# Patient Record
Sex: MALE | Race: WHITE | NOT HISPANIC OR LATINO | Employment: UNEMPLOYED | URBAN - METROPOLITAN AREA
[De-identification: names, ages, dates, MRNs, and addresses within clinical notes are randomized per-mention and may not be internally consistent; named-entity substitution may affect disease eponyms.]

---

## 2017-04-07 ENCOUNTER — HISTORICAL (OUTPATIENT)
Dept: LAB | Facility: HOSPITAL | Age: 22
End: 2017-04-07

## 2018-10-17 ENCOUNTER — HISTORICAL (OUTPATIENT)
Dept: LAB | Facility: HOSPITAL | Age: 23
End: 2018-10-17

## 2018-10-17 LAB
ALBUMIN SERPL-MCNC: 4.6 GM/DL (ref 3.4–5)
ALP SERPL-CCNC: 36 UNIT/L (ref 46–116)
ALT SERPL-CCNC: 16 UNIT/L (ref 12–78)
AST SERPL-CCNC: 10 UNIT/L (ref 15–37)
BILIRUB SERPL-MCNC: 0.6 MG/DL (ref 0.2–1)
BILIRUBIN DIRECT+TOT PNL SERPL-MCNC: 0.15 MG/DL (ref 0–0.2)
BILIRUBIN DIRECT+TOT PNL SERPL-MCNC: 0.45 MG/DL (ref 0–0.8)
BUN SERPL-MCNC: 10.4 MG/DL (ref 7–18)
CALCIUM SERPL-MCNC: 9.1 MG/DL (ref 8.5–10.1)
CHLORIDE SERPL-SCNC: 104 MMOL/L (ref 98–107)
CHOLEST SERPL-MCNC: 116 MG/DL (ref 0–200)
CHOLEST/HDLC SERPL: 2 {RATIO} (ref 0–5)
CK SERPL-CCNC: 44 UNIT/L (ref 26–308)
CO2 SERPL-SCNC: 25.7 MMOL/L (ref 21–32)
CREAT SERPL-MCNC: 1.01 MG/DL (ref 0.6–1.3)
CREAT/UREA NIT SERPL: 10
ERYTHROCYTE [DISTWIDTH] IN BLOOD BY AUTOMATED COUNT: 13 % (ref 11.5–17)
FT4I SERPL CALC-MCNC: 2.94
GLUCOSE SERPL-MCNC: 91 MG/DL (ref 74–106)
HCT VFR BLD AUTO: 47.4 % (ref 42–52)
HDLC SERPL-MCNC: 57 MG/DL (ref 40–60)
HGB BLD-MCNC: 15.4 GM/DL (ref 14–18)
LDLC SERPL CALC-MCNC: 48 MG/DL (ref 0–129)
MCH RBC QN AUTO: 29.7 PG (ref 27–31)
MCHC RBC AUTO-ENTMCNC: 32.5 GM/DL (ref 33–36)
MCV RBC AUTO: 91.3 FL (ref 80–94)
PLATELET # BLD AUTO: 241 X10(3)/MCL (ref 130–400)
PMV BLD AUTO: 11.4 FL (ref 9.4–12.4)
POTASSIUM SERPL-SCNC: 4.2 MMOL/L (ref 3.5–5.1)
PROT SERPL-MCNC: 7.8 GM/DL (ref 6.4–8.2)
RBC # BLD AUTO: 5.19 X10(6)/MCL (ref 4.7–6.1)
SODIUM SERPL-SCNC: 140 MMOL/L (ref 136–145)
T3RU NFR SERPL: 35 % (ref 31–39)
T4 SERPL-MCNC: 8.4 MCG/DL (ref 4.7–13.3)
TRIGL SERPL-MCNC: 56 MG/DL
TSH SERPL-ACNC: 2.98 MIU/ML (ref 0.36–3.74)
VLDLC SERPL CALC-MCNC: 11 MG/DL
WBC # SPEC AUTO: 9.7 X10(3)/MCL (ref 4.5–11.5)

## 2024-03-25 ENCOUNTER — HOSPITAL ENCOUNTER (INPATIENT)
Facility: HOSPITAL | Age: 29
LOS: 16 days | Discharge: HOME | DRG: 853 | End: 2024-04-11
Attending: EMERGENCY MEDICINE | Admitting: INTERNAL MEDICINE

## 2024-03-25 DIAGNOSIS — R00.0 TACHYCARDIA: ICD-10-CM

## 2024-03-25 DIAGNOSIS — D72.829 LEUKOCYTOSIS, UNSPECIFIED TYPE: ICD-10-CM

## 2024-03-25 DIAGNOSIS — J85.1 ABSCESS OF LEFT LUNG WITH PNEUMONIA, UNSPECIFIED PART OF LUNG: ICD-10-CM

## 2024-03-25 DIAGNOSIS — R07.9 CHEST PAIN: ICD-10-CM

## 2024-03-25 DIAGNOSIS — T17.500A MUCUS PLUGGING OF BRONCHI: ICD-10-CM

## 2024-03-25 DIAGNOSIS — J18.9 PNEUMONIA OF LEFT LOWER LOBE DUE TO INFECTIOUS ORGANISM: ICD-10-CM

## 2024-03-25 DIAGNOSIS — E87.1 HYPONATREMIA: ICD-10-CM

## 2024-03-25 DIAGNOSIS — J94.8 HYDROPNEUMOTHORAX: Primary | ICD-10-CM

## 2024-03-25 LAB
ALBUMIN SERPL BCP-MCNC: 2.3 G/DL (ref 3.5–5.2)
ALP SERPL-CCNC: 69 U/L (ref 55–135)
ALT SERPL W/O P-5'-P-CCNC: 26 U/L (ref 10–44)
AMPHET+METHAMPHET UR QL: NEGATIVE
ANION GAP SERPL CALC-SCNC: 11 MMOL/L (ref 8–16)
AST SERPL-CCNC: 22 U/L (ref 10–40)
BARBITURATES UR QL SCN>200 NG/ML: NEGATIVE
BASOPHILS # BLD AUTO: 0.09 K/UL (ref 0–0.2)
BASOPHILS NFR BLD: 0.3 % (ref 0–1.9)
BENZODIAZ UR QL SCN>200 NG/ML: NEGATIVE
BILIRUB SERPL-MCNC: 0.8 MG/DL (ref 0.1–1)
BILIRUB UR QL STRIP: NEGATIVE
BNP SERPL-MCNC: 14 PG/ML (ref 0–99)
BUN SERPL-MCNC: 8 MG/DL (ref 6–20)
BZE UR QL SCN: NEGATIVE
CALCIUM SERPL-MCNC: 8.8 MG/DL (ref 8.7–10.5)
CANNABINOIDS UR QL SCN: NEGATIVE
CHLORIDE SERPL-SCNC: 90 MMOL/L (ref 95–110)
CLARITY UR: CLEAR
CO2 SERPL-SCNC: 21 MMOL/L (ref 23–29)
COLOR UR: YELLOW
CREAT SERPL-MCNC: 0.7 MG/DL (ref 0.5–1.4)
CREAT UR-MCNC: 67.7 MG/DL (ref 23–375)
D DIMER PPP IA.FEU-MCNC: 1.37 MG/L FEU
DIFFERENTIAL METHOD BLD: ABNORMAL
EOSINOPHIL # BLD AUTO: 0 K/UL (ref 0–0.5)
EOSINOPHIL NFR BLD: 0.1 % (ref 0–8)
ERYTHROCYTE [DISTWIDTH] IN BLOOD BY AUTOMATED COUNT: 13.3 % (ref 11.5–14.5)
EST. GFR  (NO RACE VARIABLE): >60 ML/MIN/1.73 M^2
GLUCOSE SERPL-MCNC: 121 MG/DL (ref 70–110)
GLUCOSE UR QL STRIP: NEGATIVE
HCT VFR BLD AUTO: 37.2 % (ref 40–54)
HGB BLD-MCNC: 12.7 G/DL (ref 14–18)
HGB UR QL STRIP: NEGATIVE
IMM GRANULOCYTES # BLD AUTO: 0.28 K/UL (ref 0–0.04)
IMM GRANULOCYTES NFR BLD AUTO: 0.9 % (ref 0–0.5)
INFLUENZA A, MOLECULAR: NEGATIVE
INFLUENZA B, MOLECULAR: NEGATIVE
KETONES UR QL STRIP: ABNORMAL
LACTATE SERPL-SCNC: 1.5 MMOL/L (ref 0.5–2.2)
LEUKOCYTE ESTERASE UR QL STRIP: NEGATIVE
LYMPHOCYTES # BLD AUTO: 1.3 K/UL (ref 1–4.8)
LYMPHOCYTES NFR BLD: 4.3 % (ref 18–48)
MCH RBC QN AUTO: 29.4 PG (ref 27–31)
MCHC RBC AUTO-ENTMCNC: 34.1 G/DL (ref 32–36)
MCV RBC AUTO: 86 FL (ref 82–98)
METHADONE UR QL SCN>300 NG/ML: NEGATIVE
MONOCYTES # BLD AUTO: 1.4 K/UL (ref 0.3–1)
MONOCYTES NFR BLD: 4.5 % (ref 4–15)
NEUTROPHILS # BLD AUTO: 27.4 K/UL (ref 1.8–7.7)
NEUTROPHILS NFR BLD: 89.9 % (ref 38–73)
NITRITE UR QL STRIP: NEGATIVE
NRBC BLD-RTO: 0 /100 WBC
OPIATES UR QL SCN: NEGATIVE
PCP UR QL SCN>25 NG/ML: NEGATIVE
PH UR STRIP: 6 [PH] (ref 5–8)
PLATELET # BLD AUTO: 577 K/UL (ref 150–450)
PMV BLD AUTO: 8.7 FL (ref 9.2–12.9)
POTASSIUM SERPL-SCNC: 3.8 MMOL/L (ref 3.5–5.1)
PROT SERPL-MCNC: 8.3 G/DL (ref 6–8.4)
PROT UR QL STRIP: NEGATIVE
RBC # BLD AUTO: 4.32 M/UL (ref 4.6–6.2)
SARS-COV-2 RDRP RESP QL NAA+PROBE: NEGATIVE
SODIUM SERPL-SCNC: 122 MMOL/L (ref 136–145)
SP GR UR STRIP: 1.01 (ref 1–1.03)
SPECIMEN SOURCE: NORMAL
TOXICOLOGY INFORMATION: NORMAL
TROPONIN I SERPL DL<=0.01 NG/ML-MCNC: <0.006 NG/ML (ref 0–0.03)
URN SPEC COLLECT METH UR: ABNORMAL
UROBILINOGEN UR STRIP-ACNC: NEGATIVE EU/DL
WBC # BLD AUTO: 30.46 K/UL (ref 3.9–12.7)

## 2024-03-25 PROCEDURE — U0002 COVID-19 LAB TEST NON-CDC: HCPCS | Performed by: NURSE PRACTITIONER

## 2024-03-25 PROCEDURE — 80307 DRUG TEST PRSMV CHEM ANLYZR: CPT | Performed by: NURSE PRACTITIONER

## 2024-03-25 PROCEDURE — 25000003 PHARM REV CODE 250: Performed by: NURSE PRACTITIONER

## 2024-03-25 PROCEDURE — 85025 COMPLETE CBC W/AUTO DIFF WBC: CPT | Performed by: NURSE PRACTITIONER

## 2024-03-25 PROCEDURE — 96365 THER/PROPH/DIAG IV INF INIT: CPT

## 2024-03-25 PROCEDURE — 87449 NOS EACH ORGANISM AG IA: CPT

## 2024-03-25 PROCEDURE — 83605 ASSAY OF LACTIC ACID: CPT | Performed by: NURSE PRACTITIONER

## 2024-03-25 PROCEDURE — 81003 URINALYSIS AUTO W/O SCOPE: CPT | Mod: 59 | Performed by: NURSE PRACTITIONER

## 2024-03-25 PROCEDURE — 84484 ASSAY OF TROPONIN QUANT: CPT | Performed by: NURSE PRACTITIONER

## 2024-03-25 PROCEDURE — 25500020 PHARM REV CODE 255: Performed by: EMERGENCY MEDICINE

## 2024-03-25 PROCEDURE — 85379 FIBRIN DEGRADATION QUANT: CPT | Performed by: NURSE PRACTITIONER

## 2024-03-25 PROCEDURE — 96368 THER/DIAG CONCURRENT INF: CPT

## 2024-03-25 PROCEDURE — 99285 EMERGENCY DEPT VISIT HI MDM: CPT | Mod: 25

## 2024-03-25 PROCEDURE — 87040 BLOOD CULTURE FOR BACTERIA: CPT | Mod: 59 | Performed by: NURSE PRACTITIONER

## 2024-03-25 PROCEDURE — 63600175 PHARM REV CODE 636 W HCPCS: Performed by: NURSE PRACTITIONER

## 2024-03-25 PROCEDURE — 80053 COMPREHEN METABOLIC PANEL: CPT | Performed by: NURSE PRACTITIONER

## 2024-03-25 PROCEDURE — 83880 ASSAY OF NATRIURETIC PEPTIDE: CPT | Performed by: NURSE PRACTITIONER

## 2024-03-25 PROCEDURE — 87502 INFLUENZA DNA AMP PROBE: CPT | Performed by: NURSE PRACTITIONER

## 2024-03-25 RX ORDER — SPIRONOLACTONE 50 MG/1
50 TABLET, FILM COATED ORAL 2 TIMES DAILY
Status: ON HOLD | COMMUNITY
Start: 2023-11-16 | End: 2024-04-11 | Stop reason: HOSPADM

## 2024-03-25 RX ORDER — MEDROXYPROGESTERONE ACETATE 10 MG/1
10 TABLET ORAL
COMMUNITY
Start: 2024-02-16

## 2024-03-25 RX ORDER — SPIRONOLACTONE 100 MG/1
100 TABLET, FILM COATED ORAL 2 TIMES DAILY
Status: ON HOLD | COMMUNITY
Start: 2024-03-04 | End: 2024-04-11 | Stop reason: HOSPADM

## 2024-03-25 RX ORDER — ESTRADIOL 2 MG/1
8 TABLET ORAL
COMMUNITY
Start: 2024-03-04

## 2024-03-25 RX ADMIN — SODIUM CHLORIDE 1000 ML: 0.9 INJECTION, SOLUTION INTRAVENOUS at 10:03

## 2024-03-25 RX ADMIN — IOHEXOL 100 ML: 350 INJECTION, SOLUTION INTRAVENOUS at 09:03

## 2024-03-25 RX ADMIN — AZITHROMYCIN MONOHYDRATE 500 MG: 500 INJECTION, POWDER, LYOPHILIZED, FOR SOLUTION INTRAVENOUS at 11:03

## 2024-03-25 RX ADMIN — CEFTRIAXONE 1 G: 1 INJECTION, POWDER, FOR SOLUTION INTRAMUSCULAR; INTRAVENOUS at 11:03

## 2024-03-26 PROBLEM — D64.9 NORMOCYTIC ANEMIA: Status: ACTIVE | Noted: 2024-03-26

## 2024-03-26 PROBLEM — A41.9 SEPSIS: Status: ACTIVE | Noted: 2024-03-26

## 2024-03-26 PROBLEM — D75.839 THROMBOCYTOSIS: Status: ACTIVE | Noted: 2024-03-26

## 2024-03-26 PROBLEM — Z78.9 MALE-TO-FEMALE TRANSGENDER PERSON: Status: ACTIVE | Noted: 2024-03-26

## 2024-03-26 PROBLEM — J18.9 PNEUMONIA OF LEFT LUNG DUE TO INFECTIOUS ORGANISM: Status: ACTIVE | Noted: 2024-03-26

## 2024-03-26 PROBLEM — R07.81 PLEURITIC CHEST PAIN: Status: ACTIVE | Noted: 2024-03-26

## 2024-03-26 PROBLEM — I47.9 TACHYCARDIA, PAROXYSMAL: Status: ACTIVE | Noted: 2024-03-26

## 2024-03-26 PROBLEM — E87.1 HYPONATREMIA: Status: ACTIVE | Noted: 2024-03-26

## 2024-03-26 PROBLEM — R63.6 UNDERWEIGHT: Status: ACTIVE | Noted: 2024-03-26

## 2024-03-26 PROBLEM — I95.9 HYPOTENSION: Status: ACTIVE | Noted: 2024-03-26

## 2024-03-26 LAB
ALBUMIN SERPL BCP-MCNC: 2 G/DL (ref 3.5–5.2)
ALLENS TEST: ABNORMAL
ALP SERPL-CCNC: 61 U/L (ref 55–135)
ALT SERPL W/O P-5'-P-CCNC: 21 U/L (ref 10–44)
ANION GAP SERPL CALC-SCNC: 12 MMOL/L (ref 8–16)
AST SERPL-CCNC: 20 U/L (ref 10–40)
BASOPHILS # BLD AUTO: 0.1 K/UL (ref 0–0.2)
BASOPHILS NFR BLD: 0.3 % (ref 0–1.9)
BILIRUB SERPL-MCNC: 0.9 MG/DL (ref 0.1–1)
BILIRUB UR QL STRIP: NEGATIVE
BUN SERPL-MCNC: 4 MG/DL (ref 6–20)
CALCIUM SERPL-MCNC: 8.3 MG/DL (ref 8.7–10.5)
CHLORIDE SERPL-SCNC: 96 MMOL/L (ref 95–110)
CK SERPL-CCNC: 18 U/L (ref 20–200)
CLARITY UR: CLEAR
CO2 SERPL-SCNC: 21 MMOL/L (ref 23–29)
COLOR UR: YELLOW
CORTIS SERPL-MCNC: 36.6 UG/DL
CREAT SERPL-MCNC: 0.7 MG/DL (ref 0.5–1.4)
DELSYS: ABNORMAL
DIFFERENTIAL METHOD BLD: ABNORMAL
EOSINOPHIL # BLD AUTO: 0.1 K/UL (ref 0–0.5)
EOSINOPHIL NFR BLD: 0.2 % (ref 0–8)
ERYTHROCYTE [DISTWIDTH] IN BLOOD BY AUTOMATED COUNT: 13.4 % (ref 11.5–14.5)
EST. GFR  (NO RACE VARIABLE): >60 ML/MIN/1.73 M^2
FIO2: 36
FLOW: 4
GLUCOSE SERPL-MCNC: 116 MG/DL (ref 70–110)
GLUCOSE SERPL-MCNC: 125 MG/DL (ref 70–110)
GLUCOSE UR QL STRIP: NEGATIVE
HCO3 UR-SCNC: 22.1 MMOL/L (ref 24–28)
HCT VFR BLD AUTO: 34.8 % (ref 40–54)
HCT VFR BLD CALC: 32 %PCV (ref 36–54)
HGB BLD-MCNC: 11.9 G/DL (ref 14–18)
HGB UR QL STRIP: NEGATIVE
HIV 1+2 AB+HIV1 P24 AG SERPL QL IA: NEGATIVE
IMM GRANULOCYTES # BLD AUTO: 0.32 K/UL (ref 0–0.04)
IMM GRANULOCYTES NFR BLD AUTO: 1.1 % (ref 0–0.5)
INR PPP: 1.2 (ref 0.8–1.2)
KETONES UR QL STRIP: NEGATIVE
LEUKOCYTE ESTERASE UR QL STRIP: NEGATIVE
LYMPHOCYTES # BLD AUTO: 1.9 K/UL (ref 1–4.8)
LYMPHOCYTES NFR BLD: 6.5 % (ref 18–48)
MAGNESIUM SERPL-MCNC: 1.6 MG/DL (ref 1.6–2.6)
MCH RBC QN AUTO: 29.5 PG (ref 27–31)
MCHC RBC AUTO-ENTMCNC: 34.2 G/DL (ref 32–36)
MCV RBC AUTO: 86 FL (ref 82–98)
MODE: ABNORMAL
MONOCYTES # BLD AUTO: 1.1 K/UL (ref 0.3–1)
MONOCYTES NFR BLD: 3.7 % (ref 4–15)
NEUTROPHILS # BLD AUTO: 26 K/UL (ref 1.8–7.7)
NEUTROPHILS NFR BLD: 88.2 % (ref 38–73)
NITRITE UR QL STRIP: NEGATIVE
NRBC BLD-RTO: 0 /100 WBC
OSMOLALITY SERPL: 271 MOSM/KG (ref 280–300)
OSMOLALITY SERPL: 271 MOSM/KG (ref 280–300)
PCO2 BLDA: 31.1 MMHG (ref 35–45)
PH SMN: 7.46 [PH] (ref 7.35–7.45)
PH UR STRIP: 7 [PH] (ref 5–8)
PHOSPHATE SERPL-MCNC: 3.5 MG/DL (ref 2.7–4.5)
PLATELET # BLD AUTO: 571 K/UL (ref 150–450)
PMV BLD AUTO: 8.7 FL (ref 9.2–12.9)
PO2 BLDA: 62 MMHG (ref 80–100)
POC BE: -2 MMOL/L
POC IONIZED CALCIUM: 1.13 MMOL/L (ref 1.06–1.42)
POC SATURATED O2: 93 % (ref 95–100)
POTASSIUM BLD-SCNC: 4 MMOL/L (ref 3.5–5.1)
POTASSIUM SERPL-SCNC: 4 MMOL/L (ref 3.5–5.1)
PREALB SERPL-MCNC: 5 MG/DL (ref 20–43)
PROT SERPL-MCNC: 7.3 G/DL (ref 6–8.4)
PROT UR QL STRIP: NEGATIVE
PROTHROMBIN TIME: 12.7 SEC (ref 9–12.5)
RBC # BLD AUTO: 4.04 M/UL (ref 4.6–6.2)
SAMPLE: ABNORMAL
SITE: ABNORMAL
SODIUM BLD-SCNC: 128 MMOL/L (ref 136–145)
SODIUM SERPL-SCNC: 122 MMOL/L (ref 136–145)
SODIUM SERPL-SCNC: 128 MMOL/L (ref 136–145)
SODIUM SERPL-SCNC: 129 MMOL/L (ref 136–145)
SODIUM SERPL-SCNC: 129 MMOL/L (ref 136–145)
SODIUM SERPL-SCNC: 131 MMOL/L (ref 136–145)
SP GR UR STRIP: 1.01 (ref 1–1.03)
TSH SERPL DL<=0.005 MIU/L-ACNC: 1.98 UIU/ML (ref 0.4–4)
URN SPEC COLLECT METH UR: NORMAL
UROBILINOGEN UR STRIP-ACNC: NEGATIVE EU/DL
WBC # BLD AUTO: 29.45 K/UL (ref 3.9–12.7)

## 2024-03-26 PROCEDURE — 0BH17EZ INSERTION OF ENDOTRACHEAL AIRWAY INTO TRACHEA, VIA NATURAL OR ARTIFICIAL OPENING: ICD-10-PCS | Performed by: INTERNAL MEDICINE

## 2024-03-26 PROCEDURE — 82330 ASSAY OF CALCIUM: CPT

## 2024-03-26 PROCEDURE — 25000003 PHARM REV CODE 250: Performed by: NURSE PRACTITIONER

## 2024-03-26 PROCEDURE — 87081 CULTURE SCREEN ONLY: CPT | Performed by: INTERNAL MEDICINE

## 2024-03-26 PROCEDURE — 25000003 PHARM REV CODE 250

## 2024-03-26 PROCEDURE — 82533 TOTAL CORTISOL: CPT | Performed by: INTERNAL MEDICINE

## 2024-03-26 PROCEDURE — 20000000 HC ICU ROOM

## 2024-03-26 PROCEDURE — 99900035 HC TECH TIME PER 15 MIN (STAT)

## 2024-03-26 PROCEDURE — 25000003 PHARM REV CODE 250: Performed by: INTERNAL MEDICINE

## 2024-03-26 PROCEDURE — 63600175 PHARM REV CODE 636 W HCPCS

## 2024-03-26 PROCEDURE — 82550 ASSAY OF CK (CPK): CPT | Performed by: INTERNAL MEDICINE

## 2024-03-26 PROCEDURE — 85025 COMPLETE CBC W/AUTO DIFF WBC: CPT | Performed by: INTERNAL MEDICINE

## 2024-03-26 PROCEDURE — 36415 COLL VENOUS BLD VENIPUNCTURE: CPT | Performed by: INTERNAL MEDICINE

## 2024-03-26 PROCEDURE — 85014 HEMATOCRIT: CPT

## 2024-03-26 PROCEDURE — 84132 ASSAY OF SERUM POTASSIUM: CPT

## 2024-03-26 PROCEDURE — 63600175 PHARM REV CODE 636 W HCPCS: Performed by: INTERNAL MEDICINE

## 2024-03-26 PROCEDURE — 80053 COMPREHEN METABOLIC PANEL: CPT | Performed by: INTERNAL MEDICINE

## 2024-03-26 PROCEDURE — 5A0935A ASSISTANCE WITH RESPIRATORY VENTILATION, LESS THAN 24 CONSECUTIVE HOURS, HIGH NASAL FLOW/VELOCITY: ICD-10-PCS | Performed by: INTERNAL MEDICINE

## 2024-03-26 PROCEDURE — 84443 ASSAY THYROID STIM HORMONE: CPT | Performed by: INTERNAL MEDICINE

## 2024-03-26 PROCEDURE — 25000242 PHARM REV CODE 250 ALT 637 W/ HCPCS: Performed by: INTERNAL MEDICINE

## 2024-03-26 PROCEDURE — 87389 HIV-1 AG W/HIV-1&-2 AB AG IA: CPT | Performed by: INTERNAL MEDICINE

## 2024-03-26 PROCEDURE — 83735 ASSAY OF MAGNESIUM: CPT | Performed by: INTERNAL MEDICINE

## 2024-03-26 PROCEDURE — 36415 COLL VENOUS BLD VENIPUNCTURE: CPT | Mod: XB | Performed by: INTERNAL MEDICINE

## 2024-03-26 PROCEDURE — 94640 AIRWAY INHALATION TREATMENT: CPT

## 2024-03-26 PROCEDURE — 27100171 HC OXYGEN HIGH FLOW UP TO 24 HOURS

## 2024-03-26 PROCEDURE — 36600 WITHDRAWAL OF ARTERIAL BLOOD: CPT

## 2024-03-26 PROCEDURE — 84100 ASSAY OF PHOSPHORUS: CPT | Performed by: INTERNAL MEDICINE

## 2024-03-26 PROCEDURE — 84295 ASSAY OF SERUM SODIUM: CPT

## 2024-03-26 PROCEDURE — 84295 ASSAY OF SERUM SODIUM: CPT | Mod: 91 | Performed by: INTERNAL MEDICINE

## 2024-03-26 PROCEDURE — 83930 ASSAY OF BLOOD OSMOLALITY: CPT | Performed by: INTERNAL MEDICINE

## 2024-03-26 PROCEDURE — 82800 BLOOD PH: CPT

## 2024-03-26 PROCEDURE — 84134 ASSAY OF PREALBUMIN: CPT | Performed by: INTERNAL MEDICINE

## 2024-03-26 PROCEDURE — 85610 PROTHROMBIN TIME: CPT | Performed by: INTERNAL MEDICINE

## 2024-03-26 PROCEDURE — 81003 URINALYSIS AUTO W/O SCOPE: CPT | Performed by: INTERNAL MEDICINE

## 2024-03-26 PROCEDURE — 94761 N-INVAS EAR/PLS OXIMETRY MLT: CPT | Mod: XB

## 2024-03-26 PROCEDURE — 82803 BLOOD GASES ANY COMBINATION: CPT

## 2024-03-26 RX ORDER — SODIUM CHLORIDE 0.9 % (FLUSH) 0.9 %
10 SYRINGE (ML) INJECTION
Status: DISCONTINUED | OUTPATIENT
Start: 2024-03-26 | End: 2024-04-10

## 2024-03-26 RX ORDER — BENZONATATE 100 MG/1
100 CAPSULE ORAL 3 TIMES DAILY PRN
Status: DISCONTINUED | OUTPATIENT
Start: 2024-03-26 | End: 2024-04-08

## 2024-03-26 RX ORDER — ALBUTEROL SULFATE 0.83 MG/ML
2.5 SOLUTION RESPIRATORY (INHALATION) EVERY 6 HOURS PRN
Status: DISCONTINUED | OUTPATIENT
Start: 2024-03-26 | End: 2024-04-11 | Stop reason: HOSPADM

## 2024-03-26 RX ORDER — ENOXAPARIN SODIUM 100 MG/ML
30 INJECTION SUBCUTANEOUS EVERY 24 HOURS
Status: DISCONTINUED | OUTPATIENT
Start: 2024-03-26 | End: 2024-03-28

## 2024-03-26 RX ORDER — LORAZEPAM 2 MG/ML
1 INJECTION INTRAMUSCULAR ONCE
Status: COMPLETED | OUTPATIENT
Start: 2024-03-26 | End: 2024-03-26

## 2024-03-26 RX ORDER — ONDANSETRON HYDROCHLORIDE 2 MG/ML
4 INJECTION, SOLUTION INTRAVENOUS EVERY 6 HOURS PRN
Status: DISCONTINUED | OUTPATIENT
Start: 2024-03-26 | End: 2024-04-10

## 2024-03-26 RX ORDER — METOPROLOL TARTRATE 1 MG/ML
5 INJECTION, SOLUTION INTRAVENOUS ONCE
Status: DISCONTINUED | OUTPATIENT
Start: 2024-03-26 | End: 2024-03-26

## 2024-03-26 RX ORDER — ACETAMINOPHEN 325 MG/1
650 TABLET ORAL EVERY 6 HOURS PRN
Status: DISCONTINUED | OUTPATIENT
Start: 2024-03-26 | End: 2024-04-11 | Stop reason: HOSPADM

## 2024-03-26 RX ORDER — FAMOTIDINE 20 MG/1
20 TABLET, FILM COATED ORAL 2 TIMES DAILY
Status: DISCONTINUED | OUTPATIENT
Start: 2024-03-26 | End: 2024-03-29 | Stop reason: SDUPTHER

## 2024-03-26 RX ORDER — SODIUM CHLORIDE 9 MG/ML
INJECTION, SOLUTION INTRAVENOUS CONTINUOUS
Status: DISCONTINUED | OUTPATIENT
Start: 2024-03-26 | End: 2024-03-26

## 2024-03-26 RX ORDER — IBUPROFEN 200 MG
16 TABLET ORAL
Status: DISCONTINUED | OUTPATIENT
Start: 2024-03-26 | End: 2024-04-11 | Stop reason: HOSPADM

## 2024-03-26 RX ORDER — ADENOSINE 3 MG/ML
6 INJECTION, SOLUTION INTRAVENOUS ONCE
Status: COMPLETED | OUTPATIENT
Start: 2024-03-26 | End: 2024-03-26

## 2024-03-26 RX ORDER — NALOXONE HCL 0.4 MG/ML
0.02 VIAL (ML) INJECTION
Status: DISCONTINUED | OUTPATIENT
Start: 2024-03-26 | End: 2024-04-11 | Stop reason: HOSPADM

## 2024-03-26 RX ORDER — SODIUM CHLORIDE 0.9 % (FLUSH) 0.9 %
10 SYRINGE (ML) INJECTION EVERY 12 HOURS PRN
Status: DISCONTINUED | OUTPATIENT
Start: 2024-03-26 | End: 2024-04-09

## 2024-03-26 RX ORDER — MUPIROCIN 20 MG/G
OINTMENT TOPICAL 2 TIMES DAILY
Status: COMPLETED | OUTPATIENT
Start: 2024-03-26 | End: 2024-03-30

## 2024-03-26 RX ORDER — GLUCAGON 1 MG
1 KIT INJECTION
Status: DISCONTINUED | OUTPATIENT
Start: 2024-03-26 | End: 2024-04-11 | Stop reason: HOSPADM

## 2024-03-26 RX ORDER — LORAZEPAM 2 MG/ML
INJECTION INTRAMUSCULAR
Status: COMPLETED
Start: 2024-03-26 | End: 2024-03-26

## 2024-03-26 RX ORDER — IBUPROFEN 200 MG
24 TABLET ORAL
Status: DISCONTINUED | OUTPATIENT
Start: 2024-03-26 | End: 2024-04-11 | Stop reason: HOSPADM

## 2024-03-26 RX ORDER — ALBUTEROL SULFATE 90 UG/1
2 AEROSOL, METERED RESPIRATORY (INHALATION) EVERY 6 HOURS PRN
Status: DISCONTINUED | OUTPATIENT
Start: 2024-03-26 | End: 2024-03-26 | Stop reason: CLARIF

## 2024-03-26 RX ORDER — CHLORHEXIDINE GLUCONATE ORAL RINSE 1.2 MG/ML
15 SOLUTION DENTAL 2 TIMES DAILY
Status: DISCONTINUED | OUTPATIENT
Start: 2024-03-26 | End: 2024-03-27

## 2024-03-26 RX ORDER — TALC
6 POWDER (GRAM) TOPICAL NIGHTLY PRN
Status: DISCONTINUED | OUTPATIENT
Start: 2024-03-26 | End: 2024-04-11 | Stop reason: HOSPADM

## 2024-03-26 RX ORDER — ENOXAPARIN SODIUM 100 MG/ML
40 INJECTION SUBCUTANEOUS EVERY 24 HOURS
Status: DISCONTINUED | OUTPATIENT
Start: 2024-03-26 | End: 2024-03-26

## 2024-03-26 RX ADMIN — MUPIROCIN: 20 OINTMENT TOPICAL at 09:03

## 2024-03-26 RX ADMIN — ACETAMINOPHEN 650 MG: 325 TABLET ORAL at 02:03

## 2024-03-26 RX ADMIN — ALBUTEROL SULFATE 2.5 MG: 2.5 SOLUTION RESPIRATORY (INHALATION) at 08:03

## 2024-03-26 RX ADMIN — CHLORHEXIDINE GLUCONATE 0.12% ORAL RINSE 15 ML: 1.2 LIQUID ORAL at 08:03

## 2024-03-26 RX ADMIN — ENOXAPARIN SODIUM 30 MG: 30 INJECTION SUBCUTANEOUS at 04:03

## 2024-03-26 RX ADMIN — SODIUM CHLORIDE, POTASSIUM CHLORIDE, SODIUM LACTATE AND CALCIUM CHLORIDE 1000 ML: 600; 310; 30; 20 INJECTION, SOLUTION INTRAVENOUS at 06:03

## 2024-03-26 RX ADMIN — CHLORHEXIDINE GLUCONATE 0.12% ORAL RINSE 15 ML: 1.2 LIQUID ORAL at 09:03

## 2024-03-26 RX ADMIN — FAMOTIDINE 20 MG: 20 TABLET ORAL at 09:03

## 2024-03-26 RX ADMIN — VANCOMYCIN HYDROCHLORIDE 750 MG: 750 INJECTION, POWDER, LYOPHILIZED, FOR SOLUTION INTRAVENOUS at 02:03

## 2024-03-26 RX ADMIN — BENZONATATE 100 MG: 100 CAPSULE ORAL at 02:03

## 2024-03-26 RX ADMIN — ADENOSINE 6 MG: 3 INJECTION, SOLUTION INTRAVENOUS at 06:03

## 2024-03-26 RX ADMIN — MUPIROCIN: 20 OINTMENT TOPICAL at 08:03

## 2024-03-26 RX ADMIN — Medication 6 MG: at 11:03

## 2024-03-26 RX ADMIN — PIPERACILLIN SODIUM AND TAZOBACTAM SODIUM 4.5 G: 4; .5 INJECTION, POWDER, FOR SOLUTION INTRAVENOUS at 04:03

## 2024-03-26 RX ADMIN — FAMOTIDINE 20 MG: 20 TABLET ORAL at 08:03

## 2024-03-26 RX ADMIN — AZITHROMYCIN MONOHYDRATE 500 MG: 500 INJECTION, POWDER, LYOPHILIZED, FOR SOLUTION INTRAVENOUS at 10:03

## 2024-03-26 RX ADMIN — SODIUM CHLORIDE: 9 INJECTION, SOLUTION INTRAVENOUS at 01:03

## 2024-03-26 RX ADMIN — ACETAMINOPHEN 650 MG: 325 TABLET ORAL at 09:03

## 2024-03-26 RX ADMIN — VANCOMYCIN HYDROCHLORIDE 1000 MG: 1 INJECTION, POWDER, LYOPHILIZED, FOR SOLUTION INTRAVENOUS at 01:03

## 2024-03-26 RX ADMIN — BENZONATATE 100 MG: 100 CAPSULE ORAL at 10:03

## 2024-03-26 RX ADMIN — PIPERACILLIN SODIUM AND TAZOBACTAM SODIUM 4.5 G: 4; .5 INJECTION, POWDER, FOR SOLUTION INTRAVENOUS at 08:03

## 2024-03-26 RX ADMIN — SODIUM CHLORIDE, POTASSIUM CHLORIDE, SODIUM LACTATE AND CALCIUM CHLORIDE 500 ML: 600; 310; 30; 20 INJECTION, SOLUTION INTRAVENOUS at 08:03

## 2024-03-26 RX ADMIN — LORAZEPAM 1 MG: 2 INJECTION INTRAMUSCULAR; INTRAVENOUS at 05:03

## 2024-03-26 RX ADMIN — LORAZEPAM 1 MG: 2 INJECTION INTRAMUSCULAR at 05:03

## 2024-03-26 NOTE — ED PROVIDER NOTES
HISTORY     Chief Complaint   Patient presents with    Cough     Pt c/o cough/SOB  x3-4wks w/ pain from coughing. Denies sick contacts/CP.      Review of patient's allergies indicates:  No Known Allergies     HPI   The history is provided by the patient.   Cough  This is a new problem. The current episode started several weeks ago. The problem occurs hourly. The problem has been waxing and waning. The cough is Non-productive. Associated symptoms include chest pain and shortness of breath. Pertinent negatives include no sore throat. He has tried nothing for the symptoms. The treatment provided no relief. He is not a smoker.        PCP: No primary care provider on file.     Past Medical History:  History reviewed. No pertinent past medical history.     Past Surgical History:  History reviewed. No pertinent surgical history.     Family History:  History reviewed. No pertinent family history.     Social History:  Social History     Tobacco Use    Smoking status: Never    Smokeless tobacco: Never   Substance and Sexual Activity    Alcohol use: Never    Drug use: Never    Sexual activity: Yes         ROS   Review of Systems   Constitutional:  Negative for fever.   HENT:  Negative for sore throat.    Respiratory:  Positive for cough and shortness of breath.    Cardiovascular:  Positive for chest pain.   Gastrointestinal:  Negative for nausea.   Genitourinary:  Negative for dysuria.   Musculoskeletal:  Negative for back pain.   Skin:  Negative for rash.   Neurological:  Negative for weakness.   Hematological:  Does not bruise/bleed easily.       PHYSICAL EXAM     Initial Vitals [03/25/24 1944]   BP Pulse Resp Temp SpO2   118/67 (!) 135 19 99.8 °F (37.7 °C) 98 %      MAP       --           Physical Exam    Constitutional: He appears well-developed and well-nourished. No distress.   Thin    Transgender   HENT:   Head: Normocephalic and atraumatic.   Eyes: Conjunctivae are normal. Pupils are equal, round, and reactive to  "light.   Neck: Neck supple.   Normal range of motion.  Cardiovascular:  Regular rhythm and normal heart sounds.   Tachycardia present.         Pulmonary/Chest: Breath sounds normal.   Abdominal: Abdomen is soft. Bowel sounds are normal.   Musculoskeletal:         General: Normal range of motion.      Cervical back: Normal range of motion and neck supple.     Neurological: He is alert and oriented to person, place, and time. No cranial nerve deficit.   Skin: Skin is warm and dry.   Psychiatric: He has a normal mood and affect.          ED COURSE   Procedures  ED ONGOING VITALS:  Vitals:    03/25/24 1944 03/26/24 0016 03/26/24 0031 03/26/24 0047   BP: 118/67 (!) 101/58 102/62 99/62   Pulse: (!) 135 106 106 105   Resp: 19      Temp: 99.8 °F (37.7 °C)      TempSrc: Oral      SpO2: 98% 99% 100% 99%   Weight: 46.9 kg (103 lb 6.3 oz)      Height: 5' 9" (1.753 m)            ABNORMAL LAB VALUES:  Labs Reviewed   CBC W/ AUTO DIFFERENTIAL - Abnormal; Notable for the following components:       Result Value    WBC 30.46 (*)     RBC 4.32 (*)     Hemoglobin 12.7 (*)     Hematocrit 37.2 (*)     Platelets 577 (*)     MPV 8.7 (*)     Immature Granulocytes 0.9 (*)     Gran # (ANC) 27.4 (*)     Immature Grans (Abs) 0.28 (*)     Mono # 1.4 (*)     Gran % 89.9 (*)     Lymph % 4.3 (*)     All other components within normal limits   COMPREHENSIVE METABOLIC PANEL - Abnormal; Notable for the following components:    Sodium 122 (*)     Chloride 90 (*)     CO2 21 (*)     Glucose 121 (*)     Albumin 2.3 (*)     All other components within normal limits   URINALYSIS, REFLEX TO URINE CULTURE - Abnormal; Notable for the following components:    Ketones, UA Trace (*)     All other components within normal limits    Narrative:     Specimen Source->Urine   D DIMER, QUANTITATIVE - Abnormal; Notable for the following components:    D-Dimer 1.37 (*)     All other components within normal limits   INFLUENZA A & B BY MOLECULAR   CULTURE, BLOOD   CULTURE, " BLOOD   CULTURE, RESPIRATORY   CULTURE, METHICILLIN-RESISTANT STAPHYLOCOCCUS AUREUS   DRUG SCREEN PANEL, URINE EMERGENCY    Narrative:     Specimen Source->Urine   TROPONIN I   B-TYPE NATRIURETIC PEPTIDE   SARS-COV-2 RNA AMPLIFICATION, QUAL   LACTIC ACID, PLASMA   SODIUM   HIV 1 / 2 ANTIBODY   PREALBUMIN   TSH   PROTIME-INR         ALL LAB VALUES:  Results for orders placed or performed during the hospital encounter of 03/25/24   Influenza A & B by Molecular    Specimen: Nasopharyngeal Swab   Result Value Ref Range    Influenza A, Molecular Negative Negative    Influenza B, Molecular Negative Negative    Flu A & B Source Nasal swab    CBC auto differential   Result Value Ref Range    WBC 30.46 (H) 3.90 - 12.70 K/uL    RBC 4.32 (L) 4.60 - 6.20 M/uL    Hemoglobin 12.7 (L) 14.0 - 18.0 g/dL    Hematocrit 37.2 (L) 40.0 - 54.0 %    MCV 86 82 - 98 fL    MCH 29.4 27.0 - 31.0 pg    MCHC 34.1 32.0 - 36.0 g/dL    RDW 13.3 11.5 - 14.5 %    Platelets 577 (H) 150 - 450 K/uL    MPV 8.7 (L) 9.2 - 12.9 fL    Immature Granulocytes 0.9 (H) 0.0 - 0.5 %    Gran # (ANC) 27.4 (H) 1.8 - 7.7 K/uL    Immature Grans (Abs) 0.28 (H) 0.00 - 0.04 K/uL    Lymph # 1.3 1.0 - 4.8 K/uL    Mono # 1.4 (H) 0.3 - 1.0 K/uL    Eos # 0.0 0.0 - 0.5 K/uL    Baso # 0.09 0.00 - 0.20 K/uL    nRBC 0 0 /100 WBC    Gran % 89.9 (H) 38.0 - 73.0 %    Lymph % 4.3 (L) 18.0 - 48.0 %    Mono % 4.5 4.0 - 15.0 %    Eosinophil % 0.1 0.0 - 8.0 %    Basophil % 0.3 0.0 - 1.9 %    Differential Method Automated    Comprehensive metabolic panel   Result Value Ref Range    Sodium 122 (L) 136 - 145 mmol/L    Potassium 3.8 3.5 - 5.1 mmol/L    Chloride 90 (L) 95 - 110 mmol/L    CO2 21 (L) 23 - 29 mmol/L    Glucose 121 (H) 70 - 110 mg/dL    BUN 8 6 - 20 mg/dL    Creatinine 0.7 0.5 - 1.4 mg/dL    Calcium 8.8 8.7 - 10.5 mg/dL    Total Protein 8.3 6.0 - 8.4 g/dL    Albumin 2.3 (L) 3.5 - 5.2 g/dL    Total Bilirubin 0.8 0.1 - 1.0 mg/dL    Alkaline Phosphatase 69 55 - 135 U/L    AST 22 10 -  40 U/L    ALT 26 10 - 44 U/L    eGFR >60 >60 mL/min/1.73 m^2    Anion Gap 11 8 - 16 mmol/L   Urinalysis, Reflex to Urine Culture Urine, Clean Catch    Specimen: Urine   Result Value Ref Range    Specimen UA Urine, Clean Catch     Color, UA Yellow Yellow, Straw, Rachel    Appearance, UA Clear Clear    pH, UA 6.0 5.0 - 8.0    Specific Gravity, UA 1.010 1.005 - 1.030    Protein, UA Negative Negative    Glucose, UA Negative Negative    Ketones, UA Trace (A) Negative    Bilirubin (UA) Negative Negative    Occult Blood UA Negative Negative    Nitrite, UA Negative Negative    Urobilinogen, UA Negative <2.0 EU/dL    Leukocytes, UA Negative Negative   Drug screen panel, emergency   Result Value Ref Range    Benzodiazepines Negative Negative    Methadone metabolites Negative Negative    Cocaine (Metab.) Negative Negative    Opiate Scrn, Ur Negative Negative    Barbiturate Screen, Ur Negative Negative    Amphetamine Screen, Ur Negative Negative    THC Negative Negative    Phencyclidine Negative Negative    Creatinine, Urine 67.7 23.0 - 375.0 mg/dL    Toxicology Information SEE COMMENT    Troponin I   Result Value Ref Range    Troponin I <0.006 0.000 - 0.026 ng/mL   B-Type natriuretic peptide (BNP)   Result Value Ref Range    BNP 14 0 - 99 pg/mL   D dimer, quantitative   Result Value Ref Range    D-Dimer 1.37 (H) <0.50 mg/L FEU   COVID-19 Rapid Screening   Result Value Ref Range    SARS-CoV-2 RNA, Amplification, Qual Negative Negative   Lactic acid, plasma   Result Value Ref Range    Lactate (Lactic Acid) 1.5 0.5 - 2.2 mmol/L           RADIOLOGY STUDIES:  Imaging Results              CTA Chest Non-Coronary (PE Studies) (Final result)  Result time 03/25/24 22:02:47      Final result by Chandler Lopez MD (03/25/24 22:02:47)                   Impression:      No pulmonary embolism.  No dissection.    Dense consolidation in the left lower lobe extending to the left upper lobe with tree in bud opacities consistent with severe  pneumonia    Questionable areas of fluid density in the left lung base with foci of gas may relate to component of fluid density or possible liquefaction or possible abscess formation.  Recommend clinical correlation and short-term follow-up.    All CT scans   are performed using dose optimization techniques including the following: automated exposure control; adjustment of the mA and/or kV; use of iterative reconstruction technique.  Dose modulation was employed for ALARA by means of: Automated exposure control; adjustment of the mA and/or kV according to patient size (this includes techniques or standardized protocols for targeted exams where dose is matched to indication/reason for exam; i.e. extremities or head); and/or use of iterative reconstructive technique.      Electronically signed by: Chandler Lopez  Date:    03/25/2024  Time:    22:02               Narrative:    EXAMINATION:  CTA CHEST NON CORONARY (PE STUDIES)    CLINICAL HISTORY:  Pulmonary embolism (PE) suspected, high prob;    TECHNIQUE:  Low dose axial images, sagittal and coronal reformations were obtained from the thoracic inlet to the lung bases following the IV administration of 100 mL of Omnipaque 350.  Contrast timing was optimized to evaluate the pulmonary arteries.  MIP images were performed.    COMPARISON:  None    FINDINGS:  No evidence for pulmonary embolism.  No evidence for aortic dissection or aneurysm.  Cardiovascular structures have a normal non gated appearance.  Moderate severe consolidation in the left lower lobe with extension to the posterior aspect of left upper lobe.  Tree in bud opacities noted in the left upper lobe.  Question foci of fluid in the left lower lobe and foci of gas.  Right lung is clear.  Moderate motion artifacts.    No acute osseous injury                                       X-Ray Chest AP Portable (Final result)  Result time 03/25/24 20:31:00      Final result by Chandler Lopez MD (03/25/24 20:31:00)                    Impression:      As above      Electronically signed by: Chandler Lopez  Date:    03/25/2024  Time:    20:31               Narrative:    EXAMINATION:  XR CHEST AP PORTABLE    CLINICAL HISTORY:  cough;    TECHNIQUE:  Single frontal view of the chest was performed.    COMPARISON:  None    FINDINGS:  Moderate left-sided pleural effusion with associated atelectasis/consolidation consistent with left basilar pneumonia.  Correlate clinically.    Bones are intact.                                        EKG Readings: (Independently Interpreted)   Initial Reading: No STEMI. Rhythm: Sinus Tachycardia. Heart Rate: 121.          The above vital signs and test results have been reviewed by the emergency provider.     ED Medications:  Current Discharge Medication List        Discharge Medications:  New Prescriptions    No medications on file         Consult: I discussed the case with Dr. Sylvester ([facility] medicine). Dr. Sylvester will not admit the pt at this time due to the patient not being in a bed w/ cardiac monitoring.    I discussed the case with Dr. Sylvester ([facility] medicine). Dr. Sylvester will now admit the pt since patient is in a bed w/ cardiac monitoring. Dr. Sylvester recommends: Tele, inpatient, rocephin and azithromycin     Reassessment: I reassessed the pt.  Discussed test results, shared treatment plan, and the need for admission.  Pt and family understand and agree to the plan.  All questions were answered at this time.    Admitting service: [facility] medicine   Admitting physician: Dr. Sylvester  Admit to: Inpatient Tele         MEDICAL DECISION MAKING                 CLINICAL IMPRESSION       ICD-10-CM ICD-9-CM   1. Pneumonia of left lower lobe due to infectious organism  J18.9 486   2. Tachycardia  R00.0 785.0   3. Leukocytosis, unspecified type  D72.829 288.60   4. Hyponatremia  E87.1 276.1   5. Chest pain  R07.9 786.50       Disposition:   Disposition: Admitted  Condition: Stable         Shane Harry  NP  03/26/24 0113

## 2024-03-26 NOTE — PROGRESS NOTES
Pharmacokinetic Initial Assessment: IV Vancomycin    Assessment/Plan:    Initiate intravenous vancomycin with loading dose of 1000 mg once followed by a maintenance dose of vancomycin 750 mg IV every 12 hours  Desired empiric serum trough concentration is 15 to 20 mcg/mL  Draw vancomycin trough level 60 min prior to fourth dose on 3/27/24 at approximately 1300.  Pharmacy will continue to follow and monitor vancomycin.      Please contact pharmacy at extension [phone] with any questions regarding this assessment.     Thank you for the consult,   Jeremy Garg       Patient brief summary:  Mayela Molina is a 28 y.o. male initiated on antimicrobial therapy with IV Vancomycin for treatment of suspected lower respiratory infection    Drug Allergies:   Review of patient's allergies indicates:  No Known Allergies    Actual Body Weight:   46.9 kg    Renal Function:   Estimated Creatinine Clearance: 106.1 mL/min (based on SCr of 0.7 mg/dL).,     Dialysis Method (if applicable):  N/A    CBC (last 72 hours):  Recent Labs   Lab Result Units 03/25/24 2042   WBC K/uL 30.46*   Hemoglobin g/dL 12.7*   Hematocrit % 37.2*   Platelets K/uL 577*   Gran % % 89.9*   Lymph % % 4.3*   Mono % % 4.5   Eosinophil % % 0.1   Basophil % % 0.3   Differential Method  Automated       Metabolic Panel (last 72 hours):  Recent Labs   Lab Result Units 03/25/24 2042 03/25/24  2133 03/26/24  0102   Sodium mmol/L 122*  --  122*   Potassium mmol/L 3.8  --   --    Chloride mmol/L 90*  --   --    CO2 mmol/L 21*  --   --    Glucose mg/dL 121*  --   --    Glucose, UA   --  Negative  --    BUN mg/dL 8  --   --    Creatinine mg/dL 0.7  --   --    Creatinine, Urine mg/dL  --  67.7  --    Albumin g/dL 2.3*  --   --    Total Bilirubin mg/dL 0.8  --   --    Alkaline Phosphatase U/L 69  --   --    AST U/L 22  --   --    ALT U/L 26  --   --        Microbiologic Results:  Microbiology Results (last 7 days)       Procedure Component Value Units Date/Time     Culture, MRSA [[id number]] Collected: 03/26/24 0145    Order Status: Sent Specimen: MRSA source from Nares, Left Updated: 03/26/24 0148    Culture, Respiratory with Gram Stain [[id number]]     Order Status: No result Specimen: Sputum, Expectorated     Blood Culture #1 **CANNOT BE ORDERED STAT** [[id number]] Collected: 03/25/24 2306    Order Status: Sent Specimen: Blood from Peripheral, Forearm, Left     Blood Culture #2 **CANNOT BE ORDERED STAT** [[id number]] Collected: 03/25/24 2306    Order Status: Sent Specimen: Blood from Peripheral, Hand, Left     Influenza A & B by Molecular [[id number]] Collected: 03/25/24 2034    Order Status: Completed Specimen: Nasopharyngeal Swab Updated: 03/25/24 2209     Influenza A, Molecular Negative     Influenza B, Molecular Negative     Flu A & B Source Nasal swab

## 2024-03-26 NOTE — ASSESSMENT & PLAN NOTE
Current BMI 15.27, albumin 2.3.  Check pre-albumin level, TSH, and HIV.  Patient may benefit from nutrition consult.

## 2024-03-26 NOTE — ASSESSMENT & PLAN NOTE
"-white blood cell count 30.46, lactic acid level 1.5, D-dimer 1.37, BNP 14, troponin negative, influenza a/B negative, COVID-19 negative  -blood cultures pending  -CTA of chest with "no pulmonary embolism, no dissection; dense consolidation in the left lower lobe extending to the left upper lobe with tree in bud opacities consistent with severe pneumonia; questionable areas of fluid density in the left lung base with foci of gas may relate to component of fluid density or possible liquefaction or possible abscess formation."  -continue IV Rocephin and IV azithromycin initiated in the emergency department  -will also add IV vancomycin in the setting of possible abscess formation  -check MRSA culture, sputum culture, HIV  -give IV fluids, O2 supplementation, p.r.n. albuterol inhaler, continuous pulse oximetry monitoring, cardiac monitoring  -NPO except for ice chips and medications until patient is evaluated by Pulmonary  -pulmonary consult for a.m.  "

## 2024-03-26 NOTE — HPI
Chief complaint: pleuritic pain    Mayela Molina is a 28-year-old patient with no chronic medical problems who presented to the ED complaining of a cough x 3 weeks. Patient is a biological male in the transition to female who reports a cough with yellow sputum, left sided pleuritic pain, and dyspnea that increases with exertion. Denies fever, chills, dizziness, abdominal pain, n/v/d, dysuria, hematuria, myalgia. Denies tobacco use, vaping/e-cigarettes, or drug use. Abnormal labs in the emergency department include white blood cell count 30.46, hemoglobin 12.7, platelets 577, D-dimer 1.37, sodium 122, chloride 90, bicarb 21, glucose 121, albumin 2.3. Patient was admitted under hospital medicine service for left-sided pneumonia with possible underlying abscess formation. Pulmonary consulted for evaluation.    Interval history:  3/30: underwent VATS yesterday; chest tube in place. Pulmonary status stable on room air.   3/31: Remains on room air; poor cough quality. H&H decreased this am with plans for blood transfusion.  4/8: Pulmonary status stable on room air. Chest tube x1 removed today per cardiothoracic surgery.   4/9: Pulmonary status stable on room air. Plan of care discussed. Patient anxious for discharge to home. Discussed long-term goals upon discharge and follow-up needs.

## 2024-03-26 NOTE — ASSESSMENT & PLAN NOTE
This patient does have evidence of infective focus  My overall impression is sepsis.  Source: Respiratory  Antibiotics given-   Antibiotics (72h ago, onward)      Start     Stop Route Frequency Ordered    03/26/24 2300  azithromycin (ZITHROMAX) 500 mg in dextrose 5 % (D5W) 250 mL IVPB (Vial-Mate)         -- IV Every 24 hours (non-standard times) 03/26/24 0043    03/26/24 1400  vancomycin 750 mg in dextrose 5 % (D5W) 250 mL IVPB (Vial-Mate)         -- IV Every 12 hours (non-standard times) 03/26/24 0156    03/26/24 0800  piperacillin-tazobactam (ZOSYN) 4.5 g in dextrose 5 % in water (D5W) 100 mL IVPB (MB+)         -- IV Every 8 hours (non-standard times) 03/26/24 0731    03/26/24 0141  vancomycin - pharmacy to dose  (vancomycin IVPB (PEDS and ADULTS))        See Hyperspace for full Linked Orders Report.    -- IV pharmacy to manage frequency 03/26/24 0043          Latest lactate reviewed-  Recent Labs   Lab 03/25/24  2307   LACTATE 1.5     Organ dysfunction indicated by  none    Fluid challenge Ideal Body Weight- The patient's ideal body weight is Ideal body weight: 70.7 kg (155 lb 14.2 oz) was given in ED.      Post- resuscitation assessment No - Post resuscitation assessment not needed       Will Not start Pressors- Levophed for MAP of 65  Source control achieved by: Antibiotics

## 2024-03-26 NOTE — ASSESSMENT & PLAN NOTE
- sinus tachycardia on 12-lead  - no response to adenosine  - appears to be self-limiting  - continue cardiac monitoring  - additional IVF ordered

## 2024-03-26 NOTE — CONSULTS
[facility] - Emergency Dept.  Critical Care Medicine  Consult Note    Patient Name: Mayela Molina  MRN: [id number]  Admission Date: 3/25/2024  Hospital Length of Stay: 0 days  Code Status: Full Code  Attending Physician: Shasha Grace MD   Primary Care Provider: No primary care provider on file.   Principal Problem: Pneumonia of left lung due to infectious organism    Inpatient consult to Critical Care Medicine  Consult performed by: Leonora Fuentes NP  Consult ordered by: Romy Sylvester MD        Subjective:     HPI:  28-year-old patient (biological male to female, on hormone therapy) with no chronic medical problems who presented to the ED complaining of a cough x 3 weeks. She reports yellow sputum, left sided pleuritic pain, and dyspnea that increases with exertion. She reports no fever, chills, dizziness, abdominal pain, n/v/d, dysuria, hematuria, myalgia. Patient denies tobacco use, vaping/e-cigarettes, or drug use.      Abnormal labs in the emergency department include white blood cell count 30.46, hemoglobin 12.7, platelets 577, D-dimer 1.37, sodium 122, chloride 90, bicarb 21, glucose 121, albumin 2.3.     Patient was admitted under hospital medicine service for left-sided pneumonia with possible abscess formation. Patient was on nasal cannula and this morning, became acutely short of breath, anxious, SpO2 87%, tachycardic w/HR ST into 170's. Stat ABG and CXR were ordered by  and reviewed. Vagal maneuvers ineffective. Patient was placed on high-flow nasal cannula. EKG shows ST. She was given a dose of adenosine with no effect. Her HR has gradually improved, however. Critical care was consulted by  for higher level of care and management.     Hospital/ICU Course:  No notes on file    History reviewed. No pertinent past medical history.    History reviewed. No pertinent surgical history.    Review of patient's allergies indicates:  No Known Allergies    Family History    None       Tobacco Use     Smoking status: Never    Smokeless tobacco: Never   Substance and Sexual Activity    Alcohol use: Never    Drug use: Never    Sexual activity: Yes         Review of Systems   Constitutional:  Positive for activity change and fever. Negative for chills, diaphoresis, fatigue and unexpected weight change.   HENT: Negative.     Eyes: Negative.    Respiratory:  Positive for cough and shortness of breath.    Cardiovascular: Negative.    Gastrointestinal: Negative.    Endocrine: Negative.    Genitourinary: Negative.    Musculoskeletal: Negative.    Skin: Negative.    Allergic/Immunologic: Negative.    Neurological: Negative.    Hematological: Negative.    Psychiatric/Behavioral: Negative.       Objective:     Vital Signs (Most Recent):  Temp: 99.8 °F (37.7 °C) (03/25/24 1944)  Pulse: (!) 156 (03/26/24 0621)  Resp: (!) 45 (03/26/24 0621)  BP: (!) 105/56 (03/26/24 0621)  SpO2: 100 % (03/26/24 0621) Vital Signs (24h Range):  Temp:  [99.8 °F (37.7 °C)] 99.8 °F (37.7 °C)  Pulse:  [105-164] 156  Resp:  [19-46] 45  SpO2:  [87 %-100 %] 100 %  BP: ()/(56-69) 105/56     Weight: 46.9 kg (103 lb 6.3 oz)  Body mass index is 15.27 kg/m².      Intake/Output Summary (Last 24 hours) at 3/26/2024 0622  Last data filed at 3/26/2024 0327  Gross per 24 hour   Intake 1600.78 ml   Output --   Net 1600.78 ml        Physical Exam  Vitals and nursing note reviewed.   Constitutional:       General: She is not in acute distress.     Appearance: She is ill-appearing. She is not diaphoretic.   HENT:      Head: Normocephalic and atraumatic.      Mouth/Throat:      Mouth: Mucous membranes are moist.      Pharynx: Oropharynx is clear. No posterior oropharyngeal erythema.   Eyes:      Extraocular Movements: Extraocular movements intact.      Pupils: Pupils are equal, round, and reactive to light.   Cardiovascular:      Rate and Rhythm: Regular rhythm. Tachycardia present.      Pulses: Normal pulses.      Heart sounds: Normal heart sounds.    Pulmonary:      Effort: Respiratory distress present.      Breath sounds: Rhonchi present.   Chest:      Chest wall: Tenderness present.   Abdominal:      General: Bowel sounds are normal.      Palpations: Abdomen is soft.   Musculoskeletal:         General: Normal range of motion.      Cervical back: Normal range of motion and neck supple.   Skin:     General: Skin is warm and dry.      Capillary Refill: Capillary refill takes less than 2 seconds.   Neurological:      General: No focal deficit present.      Mental Status: She is alert and oriented to person, place, and time.   Psychiatric:         Mood and Affect: Mood normal.         Behavior: Behavior normal.          Vents:       Lines/Drains/Airways       Peripheral Intravenous Line  Duration                  Peripheral IV - Single Lumen 03/25/24 2044 20 G Left Antecubital <1 day         Peripheral IV - Single Lumen 03/26/24 0550 18 G Right Antecubital <1 day                    Significant Labs:    CBC/Anemia Profile:  Recent Labs   Lab 03/25/24 2042 03/26/24  0551 03/26/24  0559   WBC 30.46* 29.45*  --    HGB 12.7* 11.9*  --    HCT 37.2* 34.8* 32*   * 571*  --    MCV 86 86  --    RDW 13.3 13.4  --         Chemistries:  Recent Labs   Lab 03/25/24 2042 03/26/24  0102   * 122*   K 3.8  --    CL 90*  --    CO2 21*  --    BUN 8  --    CREATININE 0.7  --    CALCIUM 8.8  --    ALBUMIN 2.3*  --    PROT 8.3  --    BILITOT 0.8  --    ALKPHOS 69  --    ALT 26  --    AST 22  --        Lactic Acid:   Recent Labs   Lab 03/25/24  2307   LACTATE 1.5     Urine Studies:   Recent Labs   Lab 03/25/24  2133   COLORU Yellow   APPEARANCEUA Clear   PHUR 6.0   SPECGRAV 1.010   PROTEINUA Negative   GLUCUA Negative   KETONESU Trace*   BILIRUBINUA Negative   OCCULTUA Negative   NITRITE Negative   UROBILINOGEN Negative   LEUKOCYTESUR Negative     ABG  Recent Labs   Lab 03/26/24  0559   PH 7.461*   PO2 62*   PCO2 31.1*   HCO3 22.1*   BE -2       All pertinent labs within  the past 24 hours have been reviewed.    Significant Imaging:   I have reviewed all pertinent imaging results/findings within the past 24 hours.    ABG  Recent Labs   Lab 03/26/24  0559   PH 7.461*   PO2 62*   PCO2 31.1*   HCO3 22.1*   BE -2     Assessment/Plan:     Pulmonary  * Pneumonia of left lung due to infectious organism  - blood, sputum cultures, MRSA swab pending  - HIV negative  - CT chest w/tree in bud opacities and questionable areas of abscess formation  - broaden from Rocephin to Zosyn, continue Azithro & Vanc  - check legionella  - supplemental oxygen-currently HFNC, wean for SpO2>92%  - NPO and no anticoagulation pending pulmonology evaluation for poss procedures    Cardiac/Vascular  Tachycardia, paroxysmal  - sinus tachycardia on 12-lead  - no response to adenosine  - appears to be self-limiting  - continue cardiac monitoring  - additional IVF ordered    Hypotension  - resolved    Renal/  Hyponatremia  Patient has hyponatremia which is uncontrolled,We will aim to correct the sodium by 4-6mEq in 24 hours. We will monitor sodium Every 6 hours. The hyponatremia is due to Dehydration/hypovolemia and Medications: Spironolactone. We will obtain the following studies: AM cortisol or TSH, T4. We will treat the hyponatremia with IV fluids as follows: NS infusion. The patient's sodium results have been reviewed and are listed below.  Recent Labs   Lab 03/26/24  0551   *  129*       ID  Sepsis  This patient does have evidence of infective focus  My overall impression is sepsis.  Source: Respiratory  Antibiotics given-   Antibiotics (72h ago, onward)      Start     Stop Route Frequency Ordered    03/26/24 2300  azithromycin (ZITHROMAX) 500 mg in dextrose 5 % (D5W) 250 mL IVPB (Vial-Mate)         -- IV Every 24 hours (non-standard times) 03/26/24 0043    03/26/24 1400  vancomycin 750 mg in dextrose 5 % (D5W) 250 mL IVPB (Vial-Mate)         -- IV Every 12 hours (non-standard times) 03/26/24 0156     03/26/24 0800  piperacillin-tazobactam (ZOSYN) 4.5 g in dextrose 5 % in water (D5W) 100 mL IVPB (MB+)         -- IV Every 8 hours (non-standard times) 03/26/24 0731    03/26/24 0141  vancomycin - pharmacy to dose  (vancomycin IVPB (PEDS and ADULTS))        See Hyperspace for full Linked Orders Report.    -- IV pharmacy to manage frequency 03/26/24 0043          Latest lactate reviewed-  Recent Labs   Lab 03/25/24  2307   LACTATE 1.5     Organ dysfunction indicated by  none    Fluid challenge Ideal Body Weight- The patient's ideal body weight is Ideal body weight: 70.7 kg (155 lb 14.2 oz) was given in ED.      Post- resuscitation assessment No - Post resuscitation assessment not needed       Will Not start Pressors- Levophed for MAP of 65  Source control achieved by: Antibiotics    Oncology  Thrombocytosis  - likely sepsis mediated  - monitor    Normocytic anemia  - Monitor serial CBC and transfuse if patient becomes hemodynamically unstable, symptomatic or H/H drops below 7/21.    Endocrine  Underweight  - consider nutrition consult  - pre-albumin pending  - dietary supplements as recommended when able to take PO again    Other  Male-to-female transgender person  - acutely holding home meds of Estrace, Provera, Spironolactone        Critical Care Daily Checklist:    A: Awake: RASS Goal/Actual Goal:    Actual:     B: Spontaneous Breathing Trial Performed?     C: SAT & SBT Coordinated?  N/a                      D: Delirium: CAM-ICU     E: Early Mobility Performed? Yes   F: Feeding Goal:    Status:     Current Diet Order   Procedures    Diet NPO Except for: Medication, Ice Chips, Sips with Medication     Order Specific Question:   Except for     Answer:   Medication     Order Specific Question:   Except for     Answer:   Ice Chips     Order Specific Question:   Except for     Answer:   Sips with Medication      AS: Analgesia/Sedation N/a   T: Thromboembolic Prophylaxis SCD's   H: HOB > 300 Yes   U: Stress Ulcer  Prophylaxis (if needed) Pepcid   G: Glucose Control Monitor   B: Bowel Function     I: Indwelling Catheter (Lines & Najera) Necessity N/a   D: De-escalation of Antimicrobials/Pharmacotherapies reviewed    Plan for the day/ETD admit    Code Status:  Family/Goals of Care: Full Code  Discharge home     Critical Care Time: 39 minutes  Critical secondary to high risk monitoring. Patient has a condition that poses threat to life and bodily function.      Critical care was time spent personally by me on the following activities: development of treatment plan with patient or surrogate and bedside caregivers, discussions with consultants, evaluation of patient's response to treatment, examination of patient, ordering and performing treatments and interventions, ordering and review of laboratory studies, ordering and review of radiographic studies, pulse oximetry, re-evaluation of patient's condition. This critical care time did not overlap with that of any other provider or involve time for any procedures.    Thank you for your consult. I will follow-up with patient. Please contact us if you have any additional questions.     Leonora Fuentes NP  Critical Care Medicine  [facility] - Emergency Dept.

## 2024-03-26 NOTE — ASSESSMENT & PLAN NOTE
Relative hypotension with blood pressure 99/56.  Patient received 1 L normal saline IV fluid bolus in the emergency department.  Continue IV fluids with normal saline at 100 mL/hr.

## 2024-03-26 NOTE — ASSESSMENT & PLAN NOTE
Patient has hyponatremia which is uncontrolled,We will aim to correct the sodium by 4-6mEq in 24 hours. We will monitor sodium Every 6 hours. The hyponatremia is due to Dehydration/hypovolemia and Medications: Spironolactone. We will obtain the following studies: AM cortisol or TSH, T4. We will treat the hyponatremia with IV fluids as follows: NS infusion. The patient's sodium results have been reviewed and are listed below.  Recent Labs   Lab 03/26/24  0551   *  129*

## 2024-03-26 NOTE — HPI
28-year-old patient (male to female transgender) with no chronic medical conditions who presented to the emergency department with complaint of cough over the last 3 weeks, moderate severity, persistent.  Patient does have associated yellow sputum production, shortness for breath, and pleuritic chest pain.  No associated nausea, vomiting, diarrhea.  No complaints of fevers or chills although T-max in the emergency department recorded as 99.8.  Patient does not smoke cigarettes or vape.  No illicit drug use.  Abnormal labs in the emergency department include white blood cell count 30.46, hemoglobin 12.7, platelets 577, D-dimer 1.37, sodium 122, chloride 90, bicarb 21, glucose 121, albumin 2.3.  Last labs are from 2018 with no interim labs to compare with.  Patient does take spironolactone, Estrace, and Provera.  CT scan of chest with left-sided pneumonia with possible abscess formation.

## 2024-03-26 NOTE — PLAN OF CARE
Problem: Adjustment to Illness (Sepsis/Septic Shock)  Goal: Optimal Coping  3/26/2024 1744 by Noah Nicholson RN  Outcome: Ongoing, Progressing  3/26/2024 1744 by Noah Nicholson RN  Outcome: Ongoing, Progressing     Problem: Adult Inpatient Plan of Care  Goal: Plan of Care Review  3/26/2024 1744 by Noah Nicholson RN  Outcome: Ongoing, Progressing  3/26/2024 1744 by Noah Nicholson RN  Outcome: Ongoing, Progressing  Goal: Patient-Specific Goal (Individualized)  3/26/2024 1744 by Noah Nicholson RN  Outcome: Ongoing, Progressing  3/26/2024 1744 by Noah Nicholson RN  Outcome: Ongoing, Progressing  Goal: Absence of Hospital-Acquired Illness or Injury  3/26/2024 1744 by Noah Nicholson RN  Outcome: Ongoing, Progressing  3/26/2024 1744 by Noah Nicholson RN  Outcome: Ongoing, Progressing  Goal: Optimal Comfort and Wellbeing  3/26/2024 1744 by Noah Nicholson RN  Outcome: Ongoing, Progressing  3/26/2024 1744 by Noah Nicholson RN  Outcome: Ongoing, Progressing  Goal: Readiness for Transition of Care  3/26/2024 1744 by Noah Nicholson RN  Outcome: Ongoing, Progressing  3/26/2024 1744 by Noah Nicholson RN  Outcome: Ongoing, Progressing     Problem: Adjustment to Illness (Sepsis/Septic Shock)  Goal: Optimal Coping  3/26/2024 1744 by Noah Nicholson RN  Outcome: Ongoing, Progressing  3/26/2024 1744 by Noah Nicholson RN  Outcome: Ongoing, Progressing     Problem: Bleeding (Sepsis/Septic Shock)  Goal: Absence of Bleeding  3/26/2024 1744 by Noah Nicholson RN  Outcome: Ongoing, Progressing  3/26/2024 1744 by Noah Nicholson RN  Outcome: Ongoing, Progressing     Problem: Glycemic Control Impaired (Sepsis/Septic Shock)  Goal: Blood Glucose Level Within Desired Range  3/26/2024 1744 by Noah Nicholson RN  Outcome: Ongoing, Progressing  3/26/2024 1744 by Noah Nicholson RN  Outcome: Ongoing, Progressing     Problem: Infection Progression (Sepsis/Septic Shock)  Goal: Absence of Infection Signs and  Symptoms  3/26/2024 1744 by Noah Nicholson RN  Outcome: Ongoing, Progressing  3/26/2024 1744 by Noah Nicholson RN  Outcome: Ongoing, Progressing     Problem: Nutrition Impaired (Sepsis/Septic Shock)  Goal: Optimal Nutrition Intake  3/26/2024 1744 by Noah Nicholson RN  Outcome: Ongoing, Progressing  3/26/2024 1744 by Naoh Nicholson RN  Outcome: Ongoing, Progressing     Problem: Fluid Imbalance (Pneumonia)  Goal: Fluid Balance  3/26/2024 1744 by Noah Nicholson RN  Outcome: Ongoing, Progressing  3/26/2024 1744 by Noah Nicholson RN  Outcome: Ongoing, Progressing     Problem: Infection (Pneumonia)  Goal: Resolution of Infection Signs and Symptoms  3/26/2024 1744 by Noah Nicholson RN  Outcome: Ongoing, Progressing  3/26/2024 1744 by Noah Nicholson RN  Outcome: Ongoing, Progressing     Problem: Respiratory Compromise (Pneumonia)  Goal: Effective Oxygenation and Ventilation  3/26/2024 1744 by Noah Nicholson RN  Outcome: Ongoing, Progressing  3/26/2024 1744 by Noah Nicholson RN  Outcome: Ongoing, Progressing

## 2024-03-26 NOTE — ASSESSMENT & PLAN NOTE
- blood, sputum cultures, MRSA swab pending  - HIV negative  - CT chest w/tree in bud opacities and questionable areas of abscess formation  - broaden from Rocephin to Zosyn, continue Azithro & Vanc  - check legionella  - supplemental oxygen-currently HFNC, wean for SpO2>92%  - NPO and no anticoagulation pending pulmonology evaluation for poss procedures

## 2024-03-26 NOTE — ASSESSMENT & PLAN NOTE
This patient does have evidence of infective focus  My overall impression is sepsis.  Source: Respiratory  Antibiotics given-   Antibiotics (72h ago, onward)      Start     Stop Route Frequency Ordered    03/26/24 2300  azithromycin (ZITHROMAX) 500 mg in dextrose 5 % (D5W) 250 mL IVPB (Vial-Mate)         -- IV Every 24 hours (non-standard times) 03/26/24 0043    03/26/24 2100  cefTRIAXone (ROCEPHIN) 2 g in dextrose 5 % in water (D5W) 100 mL IVPB (MB+)         -- IV Every 24 hours (non-standard times) 03/26/24 0043    03/26/24 0141  vancomycin - pharmacy to dose  (vancomycin IVPB (PEDS and ADULTS))        See Hyperspace for full Linked Orders Report.    -- IV pharmacy to manage frequency 03/26/24 0043          Latest lactate reviewed-  Recent Labs   Lab 03/25/24 2307   LACTATE 1.5     Organ dysfunction indicated by  none    Fluid challenge Not needed - patient is not hypotensive      Post- resuscitation assessment No - Post resuscitation assessment not needed       Will Not start Pressors- Levophed for MAP of 65  Source control achieved by: broad spectrum antibiotics

## 2024-03-26 NOTE — ASSESSMENT & PLAN NOTE
- consider nutrition consult  - pre-albumin pending  - dietary supplements as recommended when able to take PO again

## 2024-03-26 NOTE — ASSESSMENT & PLAN NOTE
- Monitor serial CBC and transfuse if patient becomes hemodynamically unstable, symptomatic or H/H drops below 7/21.

## 2024-03-26 NOTE — SUBJECTIVE & OBJECTIVE
History reviewed. No pertinent past medical history.    History reviewed. No pertinent surgical history.    Review of patient's allergies indicates:  No Known Allergies    Family History    None       Tobacco Use    Smoking status: Never    Smokeless tobacco: Never   Substance and Sexual Activity    Alcohol use: Never    Drug use: Never    Sexual activity: Yes         Review of Systems   Constitutional:  Positive for activity change and fever. Negative for chills, diaphoresis, fatigue and unexpected weight change.   HENT: Negative.     Eyes: Negative.    Respiratory:  Positive for cough and shortness of breath.    Cardiovascular: Negative.    Gastrointestinal: Negative.    Endocrine: Negative.    Genitourinary: Negative.    Musculoskeletal: Negative.    Skin: Negative.    Allergic/Immunologic: Negative.    Neurological: Negative.    Hematological: Negative.    Psychiatric/Behavioral: Negative.       Objective:     Vital Signs (Most Recent):  Temp: 99.8 °F (37.7 °C) (03/25/24 1944)  Pulse: (!) 156 (03/26/24 0621)  Resp: (!) 45 (03/26/24 0621)  BP: (!) 105/56 (03/26/24 0621)  SpO2: 100 % (03/26/24 0621) Vital Signs (24h Range):  Temp:  [99.8 °F (37.7 °C)] 99.8 °F (37.7 °C)  Pulse:  [105-164] 156  Resp:  [19-46] 45  SpO2:  [87 %-100 %] 100 %  BP: ()/(56-69) 105/56     Weight: 46.9 kg (103 lb 6.3 oz)  Body mass index is 15.27 kg/m².      Intake/Output Summary (Last 24 hours) at 3/26/2024 0622  Last data filed at 3/26/2024 0327  Gross per 24 hour   Intake 1600.78 ml   Output --   Net 1600.78 ml        Physical Exam  Vitals and nursing note reviewed.   Constitutional:       General: She is not in acute distress.     Appearance: She is ill-appearing. She is not diaphoretic.   HENT:      Head: Normocephalic and atraumatic.      Mouth/Throat:      Mouth: Mucous membranes are moist.      Pharynx: Oropharynx is clear. No posterior oropharyngeal erythema.   Eyes:      Extraocular Movements: Extraocular movements intact.       Pupils: Pupils are equal, round, and reactive to light.   Cardiovascular:      Rate and Rhythm: Regular rhythm. Tachycardia present.      Pulses: Normal pulses.      Heart sounds: Normal heart sounds.   Pulmonary:      Effort: Respiratory distress present.      Breath sounds: Rhonchi present.   Chest:      Chest wall: Tenderness present.   Abdominal:      General: Bowel sounds are normal.      Palpations: Abdomen is soft.   Musculoskeletal:         General: Normal range of motion.      Cervical back: Normal range of motion and neck supple.   Skin:     General: Skin is warm and dry.      Capillary Refill: Capillary refill takes less than 2 seconds.   Neurological:      General: No focal deficit present.      Mental Status: She is alert and oriented to person, place, and time.   Psychiatric:         Mood and Affect: Mood normal.         Behavior: Behavior normal.          Vents:       Lines/Drains/Airways       Peripheral Intravenous Line  Duration                  Peripheral IV - Single Lumen 03/25/24 2044 20 G Left Antecubital <1 day         Peripheral IV - Single Lumen 03/26/24 0550 18 G Right Antecubital <1 day                    Significant Labs:    CBC/Anemia Profile:  Recent Labs   Lab 03/25/24 2042 03/26/24  0551 03/26/24  0559   WBC 30.46* 29.45*  --    HGB 12.7* 11.9*  --    HCT 37.2* 34.8* 32*   * 571*  --    MCV 86 86  --    RDW 13.3 13.4  --         Chemistries:  Recent Labs   Lab 03/25/24 2042 03/26/24  0102   * 122*   K 3.8  --    CL 90*  --    CO2 21*  --    BUN 8  --    CREATININE 0.7  --    CALCIUM 8.8  --    ALBUMIN 2.3*  --    PROT 8.3  --    BILITOT 0.8  --    ALKPHOS 69  --    ALT 26  --    AST 22  --        Lactic Acid:   Recent Labs   Lab 03/25/24  2307   LACTATE 1.5     Urine Studies:   Recent Labs   Lab 03/25/24  2133   COLORU Yellow   APPEARANCEUA Clear   PHUR 6.0   SPECGRAV 1.010   PROTEINUA Negative   GLUCUA Negative   KETONESU Trace*   BILIRUBINUA Negative   OCCULTUA  Negative   NITRITE Negative   UROBILINOGEN Negative   LEUKOCYTESUR Negative     ABG  Recent Labs   Lab 03/26/24  0559   PH 7.461*   PO2 62*   PCO2 31.1*   HCO3 22.1*   BE -2       All pertinent labs within the past 24 hours have been reviewed.    Significant Imaging:   I have reviewed all pertinent imaging results/findings within the past 24 hours.   normal...

## 2024-03-26 NOTE — ASSESSMENT & PLAN NOTE
Current platelet count 577.  Likely reactive thrombocytosis related to sepsis with underlying pneumonia.  Check a.m. labs.

## 2024-03-26 NOTE — ASSESSMENT & PLAN NOTE
Patient's anemia is currently controlled. Has not received any PRBCs to date.   Current CBC reviewed-   Lab Results   Component Value Date    HGB 12.7 (L) 03/25/2024    HCT 37.2 (L) 03/25/2024     Monitor serial CBC and transfuse if patient becomes hemodynamically unstable, symptomatic or H/H drops below 7/21.

## 2024-03-26 NOTE — ASSESSMENT & PLAN NOTE
Patient has hyponatremia which is uncontrolled,We will aim to correct the sodium by 4-6mEq in 24 hours. We will monitor sodium Every 6 hours x4. The hyponatremia is due to Dehydration/hypovolemia. We will treat the hyponatremia with IV fluids as follows:  Normal saline at 100 mL/hr. The patient's sodium results have been reviewed and are listed below.  Recent Labs   Lab 03/25/24  2042   *     -hold spironolactone  -check sodium level q.6 hours x4 occurrences  -check TSH  -status post 1 L normal saline IV fluid bolus given in the emergency department  -continue IV fluids with normal saline at 100 mL/hr

## 2024-03-26 NOTE — SIGNIFICANT EVENT
Patient admitted earlier this morning for sepsis, left lung pneumonia with possible developing abscess, hypotension, hyponatremia, pleuritic chest pain, thrombocytosis, normocytic anemia, and underweight.    Nursing staff called as patient became extremely short of breath, tachypneic, tachycardic (heart rate 160-170s), and acutely hypoxic.  Most recent vital signs with blood pressure 105/56 heart rate 156 respiratory rate 45 pulse ox 100% on high-flow nasal cannula, previously 87% on 4 L nasal cannula.  Patient is alert and oriented, tachycardic, in respiratory distress.  Labs for this a.m. with white blood cell count 29.45, hemoglobin 11.9, platelets 571, INR 1.2, sodium 122, TSH 1.981, HIV negative.    Impression/plan:  1.  Sepsis with left-sided pneumonia   2. Acute hypoxic respiratory failure  3.  Sinus tachycardia   4. Hyponatremia   5. Relative hypotension     -stat ABG ordered and reviewed-->7.461/31.1/62/22.1/93% (on 4 L NC)  -stat chest x-ray ordered  -stat EKG  -patient received adenosine 6 mg IV x1 dose with no improvement in heart rate  -place patient on high-flow nasal cannula/Vapotherm  -continue broad-spectrum IV antibiotics  -will transfer patient to the ICU, I did speak with critical care nurse practitioner Leonora  -consult critical Care team

## 2024-03-26 NOTE — SUBJECTIVE & OBJECTIVE
History reviewed. No pertinent past medical history.    History reviewed. No pertinent surgical history.    Review of patient's allergies indicates:  No Known Allergies    No current facility-administered medications on file prior to encounter.     Current Outpatient Medications on File Prior to Encounter   Medication Sig    estradioL (ESTRACE) 2 MG tablet Take 8 mg by mouth.    medroxyPROGESTERone (PROVERA) 10 MG tablet Take 10 mg by mouth.    spironolactone (ALDACTONE) 100 MG tablet Take 100 mg by mouth 2 (two) times daily.    spironolactone (ALDACTONE) 50 MG tablet Take 50 mg by mouth 2 (two) times daily.     Family History    None       Tobacco Use    Smoking status: Never    Smokeless tobacco: Never   Substance and Sexual Activity    Alcohol use: Never    Drug use: Never    Sexual activity: Yes     Review of Systems   Constitutional:  Negative for chills and fever.   Respiratory:  Positive for cough and shortness of breath.    Cardiovascular:  Positive for chest pain (pleuritic).   Gastrointestinal:  Negative for diarrhea, nausea and vomiting.   All other systems reviewed and are negative.    Objective:     Vital Signs (Most Recent):  Temp: 99.8 °F (37.7 °C) (03/25/24 1944)  Pulse: 105 (03/26/24 0047)  Resp: 19 (03/25/24 1944)  BP: 99/62 (03/26/24 0047)  SpO2: 99 % (03/26/24 0047) Vital Signs (24h Range):  Temp:  [99.8 °F (37.7 °C)] 99.8 °F (37.7 °C)  Pulse:  [105-135] 105  Resp:  [19] 19  SpO2:  [98 %-100 %] 99 %  BP: ()/(58-67) 99/62     Weight: 46.9 kg (103 lb 6.3 oz)  Body mass index is 15.27 kg/m².     Physical Exam  Vitals reviewed.   Constitutional:       Appearance: He is ill-appearing.      Comments: cachectic   HENT:      Nose: Nose normal.   Eyes:      Conjunctiva/sclera: Conjunctivae normal.   Cardiovascular:      Rate and Rhythm: Tachycardia present.   Pulmonary:      Effort: Pulmonary effort is normal. No respiratory distress.   Abdominal:      General: There is no distension.       "Tenderness: There is no abdominal tenderness.   Musculoskeletal:         General: No swelling.   Skin:     General: Skin is warm and dry.   Neurological:      Mental Status: He is alert and oriented to person, place, and time.   Psychiatric:         Mood and Affect: Mood normal.         Behavior: Behavior normal.                Significant Labs: All pertinent labs within the past 24 hours have been reviewed.  Blood Culture: No results for input(s): "LABBLOO" in the last 48 hours.  Recent Lab Results         03/25/24 2307 03/25/24 2133 03/25/24 2042 03/25/24 2034        Influenza A, Molecular       Negative       Influenza B, Molecular       Negative       Benzodiazepines   Negative           Methadone metabolites   Negative           Phencyclidine   Negative           Albumin     2.3         ALP     69         ALT     26         Amphetamines, Urine   Negative           Anion Gap     11         Appearance, UA   Clear           AST     22         Barbituates, Urine   Negative           Baso #     0.09         Basophil %     0.3         Bilirubin (UA)   Negative           BILIRUBIN TOTAL     0.8  Comment: For infants and newborns, interpretation of results should be based  on gestational age, weight and in agreement with clinical  observations.    Premature Infant recommended reference ranges:  Up to 24 hours.............<8.0 mg/dL  Up to 48 hours............<12.0 mg/dL  3-5 days..................<15.0 mg/dL  6-29 days.................<15.0 mg/dL           BNP     14  Comment: Values of less than 100 pg/ml are consistent with non-CHF populations.         BUN     8         Calcium     8.8         Chloride     90         CO2     21         Cocaine, Urine   Negative           Color, UA   Yellow           Creatinine     0.7         Urine Creatinine   67.7           D-Dimer     1.37  Comment: The quantitative D-dimer assay should be used as an aid in   the diagnosis of deep vein thrombosis and pulmonary " embolism  in patients with the appropriate presentation and clinical  history. The upper limit of the reference interval and the clinical   cut off   point are identical. Causes of a positive (>0.50 mg/L FEU) D-Dimer   test  include, but are not limited to: DVT, PE, DIC, thrombolytic   therapy, anticoagulant therapy, recent surgery, trauma, or   pregnancy, disseminated malignancy, aortic aneurysm, cirrhosis,  and severe infection. False negative results may occur in   patients with distal DVT.           Differential Method     Automated         eGFR     >60         Eos #     0.0         Eos %     0.1         Flu A & B Source       Nasal swab       Glucose     121         Glucose, UA   Negative           Gran # (ANC)     27.4         Gran %     89.9         Hematocrit     37.2         Hemoglobin     12.7         Immature Grans (Abs)     0.28  Comment: Mild elevation in immature granulocytes is non specific and   can be seen in a variety of conditions including stress response,   acute inflammation, trauma and pregnancy. Correlation with other   laboratory and clinical findings is essential.           Immature Granulocytes     0.9         Ketones, UA   Trace           Lactic Acid Level 1.5  Comment: Falsely low lactic acid results can be found in samples   containing >=13.0 mg/dL total bilirubin and/or >=3.5 mg/dL   direct bilirubin.               Leukocyte Esterase, UA   Negative           Lymph #     1.3         Lymph %     4.3         MCH     29.4         MCHC     34.1         MCV     86         Mono #     1.4         Mono %     4.5         MPV     8.7         NITRITE UA   Negative           nRBC     0         Blood, UA   Negative           Opiates, Urine   Negative           pH, UA   6.0           Platelet Count     577         Potassium     3.8         PROTEIN TOTAL     8.3         Protein, UA   Negative  Comment: Recommend a 24 hour urine protein or a urine   protein/creatinine ratio if globulin induced  proteinuria is  clinically suspected.             RBC     4.32         RDW     13.3         SARS-CoV-2 RNA, Amplification, Qual       Negative  Comment: This test utilizes isothermal nucleic acid amplification technology   to   detect the SARS-CoV-2 RdRp nucleic acid segment. The analytical   sensitivity   (limit of detection) is 500 copies/swab.    A POSITIVE result is indicative of the presence of SARS-CoV-2 RNA;   clinical   correlation with patient history and other diagnostic information is   necessary to determine patient infection status.    A NEGATIVE result means that SARS-CoV-2 nucleic acids are not present   above   the limit of detection. A NEGATIVE result should be treated as   presumptive.   It does not rule out the possibility of COVID-19 and should not be   the sole   basis for treatment decisions.    If COVID-19 is strongly suspected based on clinical and exposure   history,   re-testing using an alternate molecular assay should be considered.    This test is Food and Drug Administration (FDA) approved. Performance   characteristics of this has been independently verified by [facility] Department of Pathology and Laboratory Medicine.         Sodium     122         Specific [place], UA   1.010           Specimen UA   Urine, Clean Catch           Marijuana (THC) Metabolite   Negative           Toxicology Information   SEE COMMENT  Comment: This screen includes the following classes of drugs at the listed   cut-off:    Benzodiazepines 200 ng/ml  Methadone 300 ng/ml  Cocaine metabolite 300 ng/ml  Opiates 300 ng/ml  Barbiturates 200 ng/ml  Amphetamines 1000 ng/ml  Marijuana metabs (THC) 50 ng/ml  Phencyclidine (PCP) 25 ng/ml    This is a screening test. If results do not correlate with clinical   presentation, then a confirmatory send out test is advised.     This report is intended for use in clinical monitoring and management   of   patients. It is not intended for use in employment  related drug   testing.             Troponin I     <0.006  Comment: The reference interval for Troponin I represents the 99th percentile   cutoff   for our facility and is consistent with 3rd generation assay   performance.           UROBILINOGEN UA   Negative           WBC     30.46                 Significant Imaging: I have reviewed all pertinent imaging results/findings within the past 24 hours.  CTA Chest Non-Coronary (PE Studies)   Final Result      No pulmonary embolism.  No dissection.      Dense consolidation in the left lower lobe extending to the left upper lobe with tree in bud opacities consistent with severe pneumonia      Questionable areas of fluid density in the left lung base with foci of gas may relate to component of fluid density or possible liquefaction or possible abscess formation.  Recommend clinical correlation and short-term follow-up.      All CT scans   are performed using dose optimization techniques including the following: automated exposure control; adjustment of the mA and/or kV; use of iterative reconstruction technique.  Dose modulation was employed for ALARA by means of: Automated exposure control; adjustment of the mA and/or kV according to patient size (this includes techniques or standardized protocols for targeted exams where dose is matched to indication/reason for exam; i.e. extremities or head); and/or use of iterative reconstructive technique.         Electronically signed by: Chandler Lopez   Date:    03/25/2024   Time:    22:02      X-Ray Chest AP Portable   Final Result      As above         Electronically signed by: Chandler Lopez   Date:    03/25/2024   Time:    20:31

## 2024-03-27 PROBLEM — R76.8 HEPATITIS C ANTIBODY POSITIVE IN BLOOD: Status: ACTIVE | Noted: 2024-03-27

## 2024-03-27 LAB
ALBUMIN SERPL BCP-MCNC: 1.7 G/DL (ref 3.5–5.2)
ALP SERPL-CCNC: 53 U/L (ref 55–135)
ALT SERPL W/O P-5'-P-CCNC: 16 U/L (ref 10–44)
ANION GAP SERPL CALC-SCNC: 10 MMOL/L (ref 8–16)
ANISOCYTOSIS BLD QL SMEAR: SLIGHT
AST SERPL-CCNC: 14 U/L (ref 10–40)
BASOPHILS # BLD AUTO: 0.03 K/UL (ref 0–0.2)
BASOPHILS NFR BLD: 0.1 % (ref 0–1.9)
BILIRUB SERPL-MCNC: 0.4 MG/DL (ref 0.1–1)
BUN SERPL-MCNC: 6 MG/DL (ref 6–20)
CALCIUM SERPL-MCNC: 7.5 MG/DL (ref 8.7–10.5)
CHLORIDE SERPL-SCNC: 95 MMOL/L (ref 95–110)
CO2 SERPL-SCNC: 24 MMOL/L (ref 23–29)
CREAT SERPL-MCNC: 0.6 MG/DL (ref 0.5–1.4)
DACRYOCYTES BLD QL SMEAR: ABNORMAL
DIFFERENTIAL METHOD BLD: ABNORMAL
EOSINOPHIL # BLD AUTO: 0 K/UL (ref 0–0.5)
EOSINOPHIL NFR BLD: 0.1 % (ref 0–8)
ERYTHROCYTE [DISTWIDTH] IN BLOOD BY AUTOMATED COUNT: 13.3 % (ref 11.5–14.5)
EST. GFR  (NO RACE VARIABLE): >60 ML/MIN/1.73 M^2
GLUCOSE SERPL-MCNC: 126 MG/DL (ref 70–110)
HCT VFR BLD AUTO: 28.4 % (ref 40–54)
HGB BLD-MCNC: 9.5 G/DL (ref 14–18)
IMM GRANULOCYTES # BLD AUTO: 0.21 K/UL (ref 0–0.04)
IMM GRANULOCYTES NFR BLD AUTO: 0.8 % (ref 0–0.5)
LYMPHOCYTES # BLD AUTO: 1.8 K/UL (ref 1–4.8)
LYMPHOCYTES NFR BLD: 6.6 % (ref 18–48)
MAGNESIUM SERPL-MCNC: 1.8 MG/DL (ref 1.6–2.6)
MCH RBC QN AUTO: 29 PG (ref 27–31)
MCHC RBC AUTO-ENTMCNC: 33.5 G/DL (ref 32–36)
MCV RBC AUTO: 87 FL (ref 82–98)
MONOCYTES # BLD AUTO: 1.4 K/UL (ref 0.3–1)
MONOCYTES NFR BLD: 5.1 % (ref 4–15)
NEUTROPHILS # BLD AUTO: 23.5 K/UL (ref 1.8–7.7)
NEUTROPHILS NFR BLD: 87.3 % (ref 38–73)
NRBC BLD-RTO: 0 /100 WBC
PLATELET # BLD AUTO: 387 K/UL (ref 150–450)
PLATELET BLD QL SMEAR: ABNORMAL
PMV BLD AUTO: 9.3 FL (ref 9.2–12.9)
POTASSIUM SERPL-SCNC: 3.9 MMOL/L (ref 3.5–5.1)
PROT SERPL-MCNC: 5.7 G/DL (ref 6–8.4)
RBC # BLD AUTO: 3.28 M/UL (ref 4.6–6.2)
SCHISTOCYTES BLD QL SMEAR: PRESENT
SODIUM SERPL-SCNC: 129 MMOL/L (ref 136–145)
SODIUM UR-SCNC: 38 MMOL/L (ref 20–250)
VANCOMYCIN TROUGH SERPL-MCNC: 4 UG/ML (ref 10–22)
WBC # BLD AUTO: 26.9 K/UL (ref 3.9–12.7)

## 2024-03-27 PROCEDURE — 83935 ASSAY OF URINE OSMOLALITY: CPT | Performed by: INTERNAL MEDICINE

## 2024-03-27 PROCEDURE — 25000003 PHARM REV CODE 250: Performed by: INTERNAL MEDICINE

## 2024-03-27 PROCEDURE — 85025 COMPLETE CBC W/AUTO DIFF WBC: CPT

## 2024-03-27 PROCEDURE — 36415 COLL VENOUS BLD VENIPUNCTURE: CPT | Performed by: INTERNAL MEDICINE

## 2024-03-27 PROCEDURE — 25000003 PHARM REV CODE 250

## 2024-03-27 PROCEDURE — 99900035 HC TECH TIME PER 15 MIN (STAT)

## 2024-03-27 PROCEDURE — 83735 ASSAY OF MAGNESIUM: CPT

## 2024-03-27 PROCEDURE — 94799 UNLISTED PULMONARY SVC/PX: CPT

## 2024-03-27 PROCEDURE — 97116 GAIT TRAINING THERAPY: CPT

## 2024-03-27 PROCEDURE — 80053 COMPREHEN METABOLIC PANEL: CPT

## 2024-03-27 PROCEDURE — 63600175 PHARM REV CODE 636 W HCPCS: Performed by: INTERNAL MEDICINE

## 2024-03-27 PROCEDURE — 80202 ASSAY OF VANCOMYCIN: CPT | Performed by: INTERNAL MEDICINE

## 2024-03-27 PROCEDURE — 21400001 HC TELEMETRY ROOM

## 2024-03-27 PROCEDURE — 97161 PT EVAL LOW COMPLEX 20 MIN: CPT

## 2024-03-27 PROCEDURE — 63600175 PHARM REV CODE 636 W HCPCS

## 2024-03-27 PROCEDURE — 36415 COLL VENOUS BLD VENIPUNCTURE: CPT | Mod: XB

## 2024-03-27 PROCEDURE — 84300 ASSAY OF URINE SODIUM: CPT | Performed by: INTERNAL MEDICINE

## 2024-03-27 RX ORDER — GUAIFENESIN 600 MG/1
600 TABLET, EXTENDED RELEASE ORAL 2 TIMES DAILY
Status: DISCONTINUED | OUTPATIENT
Start: 2024-03-27 | End: 2024-03-29

## 2024-03-27 RX ORDER — MEDROXYPROGESTERONE ACETATE 5 MG/1
10 TABLET ORAL DAILY
Status: DISCONTINUED | OUTPATIENT
Start: 2024-03-27 | End: 2024-04-11 | Stop reason: HOSPADM

## 2024-03-27 RX ORDER — ESTRADIOL 1 MG/1
8 TABLET ORAL DAILY
Status: DISCONTINUED | OUTPATIENT
Start: 2024-03-27 | End: 2024-04-11 | Stop reason: HOSPADM

## 2024-03-27 RX ORDER — SODIUM CHLORIDE 9 MG/ML
INJECTION, SOLUTION INTRAVENOUS
Status: DISCONTINUED | OUTPATIENT
Start: 2024-03-27 | End: 2024-04-11 | Stop reason: HOSPADM

## 2024-03-27 RX ADMIN — SODIUM CHLORIDE: 9 INJECTION, SOLUTION INTRAVENOUS at 03:03

## 2024-03-27 RX ADMIN — MUPIROCIN: 20 OINTMENT TOPICAL at 08:03

## 2024-03-27 RX ADMIN — FAMOTIDINE 20 MG: 20 TABLET ORAL at 08:03

## 2024-03-27 RX ADMIN — ACETAMINOPHEN 650 MG: 325 TABLET ORAL at 07:03

## 2024-03-27 RX ADMIN — VANCOMYCIN HYDROCHLORIDE 1000 MG: 1 INJECTION, POWDER, LYOPHILIZED, FOR SOLUTION INTRAVENOUS at 11:03

## 2024-03-27 RX ADMIN — GUAIFENESIN 600 MG: 600 TABLET, EXTENDED RELEASE ORAL at 08:03

## 2024-03-27 RX ADMIN — PIPERACILLIN SODIUM AND TAZOBACTAM SODIUM 4.5 G: 4; .5 INJECTION, POWDER, FOR SOLUTION INTRAVENOUS at 12:03

## 2024-03-27 RX ADMIN — ESTRADIOL 8 MG: 1 TABLET ORAL at 08:03

## 2024-03-27 RX ADMIN — CHLORHEXIDINE GLUCONATE 0.12% ORAL RINSE 15 ML: 1.2 LIQUID ORAL at 08:03

## 2024-03-27 RX ADMIN — ACETAMINOPHEN 650 MG: 325 TABLET ORAL at 03:03

## 2024-03-27 RX ADMIN — PIPERACILLIN SODIUM AND TAZOBACTAM SODIUM 4.5 G: 4; .5 INJECTION, POWDER, FOR SOLUTION INTRAVENOUS at 11:03

## 2024-03-27 RX ADMIN — GUAIFENESIN 600 MG: 600 TABLET, EXTENDED RELEASE ORAL at 09:03

## 2024-03-27 RX ADMIN — VANCOMYCIN HYDROCHLORIDE 1000 MG: 1 INJECTION, POWDER, LYOPHILIZED, FOR SOLUTION INTRAVENOUS at 04:03

## 2024-03-27 RX ADMIN — MEDROXYPROGESTERONE ACETATE 10 MG: 5 TABLET ORAL at 05:03

## 2024-03-27 RX ADMIN — PIPERACILLIN SODIUM AND TAZOBACTAM SODIUM 4.5 G: 4; .5 INJECTION, POWDER, FOR SOLUTION INTRAVENOUS at 04:03

## 2024-03-27 RX ADMIN — AZITHROMYCIN MONOHYDRATE 500 MG: 500 INJECTION, POWDER, LYOPHILIZED, FOR SOLUTION INTRAVENOUS at 10:03

## 2024-03-27 RX ADMIN — SODIUM CHLORIDE: 9 INJECTION, SOLUTION INTRAVENOUS at 04:03

## 2024-03-27 RX ADMIN — PIPERACILLIN SODIUM AND TAZOBACTAM SODIUM 4.5 G: 4; .5 INJECTION, POWDER, FOR SOLUTION INTRAVENOUS at 08:03

## 2024-03-27 RX ADMIN — BENZONATATE 100 MG: 100 CAPSULE ORAL at 11:03

## 2024-03-27 RX ADMIN — VANCOMYCIN HYDROCHLORIDE 750 MG: 750 INJECTION, POWDER, LYOPHILIZED, FOR SOLUTION INTRAVENOUS at 03:03

## 2024-03-27 RX ADMIN — ACETAMINOPHEN 650 MG: 325 TABLET ORAL at 11:03

## 2024-03-27 RX ADMIN — ENOXAPARIN SODIUM 30 MG: 30 INJECTION SUBCUTANEOUS at 06:03

## 2024-03-27 NOTE — PLAN OF CARE
[facility] - Telemetry (Hospital)  Initial Discharge Assessment       Primary Care Provider: No primary care provider on file.    Admission Diagnosis: Hyponatremia [E87.1]  Tachycardia [R00.0]  Chest pain [R07.9]  Pneumonia of left lower lobe due to infectious organism [J18.9]  Leukocytosis, unspecified type [D72.829]    Admission Date: 3/25/2024  Expected Discharge Date:     Transition of Care Barriers: Unisured    Payor: /     Extended Emergency Contact Information  Primary Emergency Contact: RolandoMilesTracy  Address: [address] [place] [address] [place]  Home Phone: [phone]  Relation: Mother    Discharge Plan A: Home       No Pharmacies Listed    Initial Assessment (most recent)       Adult Discharge Assessment - 03/27/24 1100          Discharge Assessment    Assessment Type Discharge Planning Assessment     Confirmed/corrected address, phone number and insurance Yes     Confirmed Demographics Correct on Facesheet     Source of Information patient     Communicated RAVINDER with patient/caregiver Yes     Reason For Admission PNA     People in Home alone     Facility Arrived From: home     Do you expect to return to your current living situation? Yes     Do you have help at home or someone to help you manage your care at home? Yes     Who are your caregiver(s) and their phone number(s)? Friend, Tomasa     Prior to hospitilization cognitive status: Alert/Oriented     Current cognitive status: Alert/Oriented     Walking or Climbing Stairs Difficulty no     Dressing/Bathing Difficulty no     Home Accessibility wheelchair accessible     Home Layout Able to live on 1st floor     Equipment Currently Used at Home none     Readmission within 30 days? No     Patient currently being followed by outpatient case management? No     Do you currently have service(s) that help you manage your care at home? No     Do you take prescription medications? No     Do you have prescription coverage?  No     Who is going to help you get home at discharge? Self - car in in the parking lot     How do you get to doctors appointments? car, drives self     Are you on dialysis? No     Do you take coumadin? No     Discharge Plan A Home     DME Needed Upon Discharge  none     Discharge Plan discussed with: Patient     Transition of Care Barriers Unisured                   Anticipated DC dispo: home   Prior Level of Function: independent with ADLs   People in home: lives alone     Comments:  CM met with patient at bedside to introduce role and discuss discharge planning. Friend, Tomasa, will be help at home and can provide transport at time of discharge. Patient does not use any DME at home. Confirmed demographics, insurance, and emergency contacts. CM discharge needs depends on hospital progress. CM will continue following to assist with other needs.

## 2024-03-27 NOTE — ASSESSMENT & PLAN NOTE
"-white blood cell count 30.46, lactic acid level 1.5, D-dimer 1.37, BNP 14, troponin negative, influenza a/B negative, COVID-19 negative  -blood cultures pending  -CTA of chest with "no pulmonary embolism, no dissection; dense consolidation in the left lower lobe extending to the left upper lobe with tree in bud opacities consistent with severe pneumonia; questionable areas of fluid density in the left lung base with foci of gas may relate to component of fluid density or possible liquefaction or possible abscess formation."  -continue IV Zosyn, vanco and IV azithromycin initiated in the emergency department  -check MRSA culture, sputum culture, HIV  -give IV fluids, O2 supplementation, p.r.n. albuterol inhaler  -resume regular diet  "

## 2024-03-27 NOTE — PROGRESS NOTES
[facility]  Progress Note    Patient Name: Mayela Molina  MRN: [id number]  Patient Class: IP- Inpatient   Admission Date: 3/25/2024  Length of Stay: 1 days  Attending Physician: Shasha Grace MD  Primary Care Provider: No primary care provider on file.        Subjective:     Principal Problem:Pneumonia of left lung due to infectious organism        HPI:  28-year-old patient (male to female transgender) with no chronic medical conditions who presented to the emergency department with complaint of cough over the last 3 weeks, moderate severity, persistent.  Patient does have associated yellow sputum production, shortness for breath, and pleuritic chest pain.  No associated nausea, vomiting, diarrhea.  No complaints of fevers or chills although T-max in the emergency department recorded as 99.8.  Patient does not smoke cigarettes or vape.  No illicit drug use.  Abnormal labs in the emergency department include white blood cell count 30.46, hemoglobin 12.7, platelets 577, D-dimer 1.37, sodium 122, chloride 90, bicarb 21, glucose 121, albumin 2.3.  Last labs are from 2018 with no interim labs to compare with.  Patient does take spironolactone, Estrace, and Provera.  CT scan of chest with left-sided pneumonia with possible abscess formation.    Overview/Hospital Course:   Patient was admitted acute respiratory failure with left-sided pneumonia and possible abscess formation.  She initially was on nasal cannula and on morning after admission became acutely short of breath with a PO2 of 87% and tachycardic.  She was given a dose of adenosine which did not improve things however gradually her heart rate decreased.  Patient was admitted to the intensive care unit for respiratory failure tachycardia.  She was initially on high-flow nasal cannula and subsequently was able to wean to room air.  Cultures remained pending.  She was initially started on Rocephin and azithromycin and  changed to Zosyn who azithromycin vein.  Legionella antigen pending.  Continue with aggressive pulmonary care    Hyponatremia patient was admitted with hyponatremia she had fluid restrictions in place cortisol was normal, TSH normal, patient was on spironolactone and this was held.  She was being maintained on fluid restricted diet.  fluid restriction.     Interval History:  Patient seen and examined at bedside discussed with critical Care Medicine.  She reports feeling better.  Occasional cough which is nonproductive.    Review of Systems   Constitutional:  Positive for fatigue. Negative for fever.   Respiratory:  Positive for cough. Negative for shortness of breath.         Coarse breath sounds in the lower lobes   Cardiovascular:  Negative for chest pain and leg swelling.   Gastrointestinal:  Negative for nausea and vomiting.   Neurological:  Negative for dizziness and weakness.   Hematological:  Negative for adenopathy. Does not bruise/bleed easily.   All other systems reviewed and are negative.    Objective:     Vital Signs (Most Recent):  Temp: 99.4 °F (37.4 °C) (03/27/24 1424)  Pulse: (!) 130 (03/27/24 1424)  Resp: (!) 28 (03/27/24 1424)  BP: 113/73 (03/27/24 1424)  SpO2: 98 % (03/27/24 1424) Vital Signs (24h Range):  Temp:  [97.7 °F (36.5 °C)-99.4 °F (37.4 °C)] 99.4 °F (37.4 °C)  Pulse:  [108-139] 130  Resp:  [24-43] 28  SpO2:  [93 %-100 %] 98 %  BP: (103-128)/(55-74) 113/73     Weight: 45.4 kg (100 lb 1.4 oz)  Body mass index is 14.78 kg/m².    Intake/Output Summary (Last 24 hours) at 3/27/2024 1458  Last data filed at 3/27/2024 1130  Gross per 24 hour   Intake 1762.42 ml   Output 500 ml   Net 1262.42 ml         Physical Exam  Vitals reviewed.   Constitutional:       Appearance: Normal appearance.   HENT:      Head: Normocephalic and atraumatic.      Mouth/Throat:      Mouth: Mucous membranes are moist.      Pharynx: Oropharynx is clear.   Eyes:      Extraocular Movements: Extraocular movements intact.       Conjunctiva/sclera: Conjunctivae normal.   Cardiovascular:      Rate and Rhythm: Regular rhythm. Tachycardia present.      Pulses: Normal pulses.      Heart sounds: Normal heart sounds.   Pulmonary:      Effort: Pulmonary effort is normal.      Breath sounds: Rhonchi present.   Abdominal:      General: Bowel sounds are normal.      Palpations: Abdomen is soft.      Tenderness: There is no abdominal tenderness.   Musculoskeletal:         General: Normal range of motion.      Cervical back: Normal range of motion and neck supple.   Skin:     General: Skin is warm and dry.   Neurological:      General: No focal deficit present.      Mental Status: She is alert and oriented to person, place, and time. Mental status is at baseline.   Psychiatric:         Mood and Affect: Mood normal.         Behavior: Behavior normal.         Thought Content: Thought content normal.             Significant Labs: All pertinent labs within the past 24 hours have been reviewed.  ABGs:   Recent Labs   Lab 03/26/24  0559   PH 7.461*   PCO2 31.1*   HCO3 22.1*   POCSATURATED 93   BE -2   PO2 62*     Blood Culture:   Recent Labs   Lab 03/25/24  2306   LABBLOO No Growth to date  No Growth to date  No Growth to date  No Growth to date     CBC:   Recent Labs   Lab 03/25/24 2042 03/26/24  0551 03/26/24  0559 03/27/24  0429   WBC 30.46* 29.45*  --  26.90*   HGB 12.7* 11.9*  --  9.5*   HCT 37.2* 34.8* 32* 28.4*   * 571*  --  387     CMP:   Recent Labs   Lab 03/25/24  2042 03/26/24  0102 03/26/24  0551 03/26/24  1203 03/26/24  1807 03/27/24  0429   *   < > 129*  129* 128* 131* 129*   K 3.8  --  4.0  --   --  3.9   CL 90*  --  96  --   --  95   CO2 21*  --  21*  --   --  24   *  --  116*  --   --  126*   BUN 8  --  4*  --   --  6   CREATININE 0.7  --  0.7  --   --  0.6   CALCIUM 8.8  --  8.3*  --   --  7.5*   PROT 8.3  --  7.3  --   --  5.7*   ALBUMIN 2.3*  --  2.0*  --   --  1.7*   BILITOT 0.8  --  0.9  --   --  0.4   ALKPHOS  "69  --  61  --   --  53*   AST 22  --  20  --   --  14   ALT 26  --  21  --   --  16   ANIONGAP 11  --  12  --   --  10    < > = values in this interval not displayed.     Cardiac Markers:   Recent Labs   Lab 03/25/24  2042   BNP 14     Lactic Acid:   Recent Labs   Lab 03/25/24  2307   LACTATE 1.5     Magnesium:   Recent Labs   Lab 03/26/24  0551 03/27/24  0429   MG 1.6 1.8     Prealbumin:   Recent Labs   Lab 03/26/24  0102   PREALBUMIN 5*     Troponin:   Recent Labs   Lab 03/25/24  2042   TROPONINI <0.006     TSH:   Recent Labs   Lab 03/26/24  0102   TSH 1.981     Urine Studies:   Recent Labs   Lab 03/26/24  0807   COLORU Yellow   APPEARANCEUA Clear   PHUR 7.0   SPECGRAV 1.010   PROTEINUA Negative   GLUCUA Negative   KETONESU Negative   BILIRUBINUA Negative   OCCULTUA Negative   NITRITE Negative   UROBILINOGEN Negative   LEUKOCYTESUR Negative       Significant Imaging: I have reviewed all pertinent imaging results/findings within the past 24 hours.    Assessment/Plan:      * Pneumonia of left lung due to infectious organism  -white blood cell count 30.46, lactic acid level 1.5, D-dimer 1.37, BNP 14, troponin negative, influenza a/B negative, COVID-19 negative  -blood cultures pending  -CTA of chest with "no pulmonary embolism, no dissection; dense consolidation in the left lower lobe extending to the left upper lobe with tree in bud opacities consistent with severe pneumonia; questionable areas of fluid density in the left lung base with foci of gas may relate to component of fluid density or possible liquefaction or possible abscess formation."  -continue IV Zosyn, vanco and IV azithromycin initiated in the emergency department  -check MRSA culture, sputum culture, HIV  -give IV fluids, O2 supplementation, p.r.n. albuterol inhaler  -resume regular diet    Tachycardia, paroxysmal    Pleuritic chest pain  Pleuritic chest pain related to left-sided pneumonia.  Cardiac enzymes negative.      Male-to-female transgender " person  Resume home medications to include Estrace & Provera; hold spironolactone due to hyponatremia      Thrombocytosis  Current platelet count 577.  Likely reactive thrombocytosis related to sepsis with underlying pneumonia.      Normocytic anemia  Patient's anemia is currently controlled. Has not received any PRBCs to date.   Current CBC reviewed-   Lab Results   Component Value Date    HGB 9.5 (L) 03/27/2024    HCT 28.4 (L) 03/27/2024     Monitor serial CBC and transfuse if patient becomes hemodynamically unstable, symptomatic or H/H drops below 7/21.  Check iron stores    Underweight  Current BMI 15.27, albumin 2.3.    Add nutritional supplements      Hyponatremia  Patient has hyponatremia which is uncontrolled,We will aim to correct the sodium by 4-6mEq in 24 hours. We will monitor sodium Every 6 hours x4. The hyponatremia is due to Dehydration/hypovolemia. We will treat the hyponatremia with IV fluids as follows:  Normal saline at 100 mL/hr. The patient's sodium results have been reviewed and are listed below.  Recent Labs   Lab 03/27/24  0429   *       -hold spironolactone  -check sodium level q.6 hours x4 occurrences  - TSH normal, cortisol appropriate, free water restrict  -status post 1 L normal saline IV fluid bolus given in the emergency department      Hypotension  Relative hypotension with blood pressure 99/56.  Patient received 1 L normal saline IV fluid bolus in the emergency department.   Transfer to ICU with overall improvement , now patient is able to be transitioned to telemetry floor      Sepsis  This patient does have evidence of infective focus  My overall impression is sepsis.  Source: Respiratory  Antibiotics given-   Antibiotics (72h ago, onward)      Start     Stop Route Frequency Ordered    03/26/24 2300  azithromycin (ZITHROMAX) 500 mg in dextrose 5 % (D5W) 250 mL IVPB (Vial-Mate)         03/28/24 2259 IV Every 24 hours (non-standard times) 03/26/24 0043    03/26/24 1400   vancomycin 750 mg in dextrose 5 % (D5W) 250 mL IVPB (Vial-Mate)         -- IV Every 12 hours (non-standard times) 03/26/24 0156    03/26/24 0900  mupirocin 2 % ointment         03/31/24 0859 Nasl 2 times daily 03/26/24 0751    03/26/24 0800  piperacillin-tazobactam (ZOSYN) 4.5 g in dextrose 5 % in water (D5W) 100 mL IVPB (MB+)         -- IV Every 8 hours (non-standard times) 03/26/24 0731    03/26/24 0141  vancomycin - pharmacy to dose  (vancomycin IVPB (PEDS and ADULTS))        See Hyperspace for full Linked Orders Report.    -- IV pharmacy to manage frequency 03/26/24 0043          Latest lactate reviewed-  Recent Labs   Lab 03/25/24  2307   LACTATE 1.5       Organ dysfunction indicated by  none    Fluid challenge Not needed - patient is not hypotensive      Post- resuscitation assessment No - Post resuscitation assessment not needed       Will Not start Pressors- Levophed for MAP of 65  Source control achieved by: broad spectrum antibiotics      VTE Risk Mitigation (From admission, onward)           Ordered     enoxaparin injection 30 mg  Every 24 hours         03/26/24 1029     IP VTE HIGH RISK PATIENT  Once         03/26/24 0043     Place sequential compression device  Until discontinued         03/26/24 0043     Reason for No Pharmacological VTE Prophylaxis  Once        Question:  Reasons:  Answer:  Physician Provided (leave comment)  Comment:  pending pulmonary consult    03/26/24 0043     IP VTE HIGH RISK PATIENT  Once         03/26/24 0043     Place sequential compression device  Until discontinued         03/26/24 0043                    Discharge Planning   RAVINDER:      Code Status: Full Code   Is the patient medically ready for discharge?:     Reason for patient still in hospital (select all that apply): Patient trending condition, Laboratory test, Treatment, and Consult recommendations  Discharge Plan A: Home                  Linsey Barragan MD  Department of Hospital Medicine   '[place] - Telemetry  ([facility])

## 2024-03-27 NOTE — ASSESSMENT & PLAN NOTE
Patient has hyponatremia which is uncontrolled,We will aim to correct the sodium by 4-6mEq in 24 hours. We will monitor sodium Every 6 hours x4. The hyponatremia is due to Dehydration/hypovolemia. We will treat the hyponatremia with IV fluids as follows:  Normal saline at 100 mL/hr. The patient's sodium results have been reviewed and are listed below.  Recent Labs   Lab 03/27/24  0429   *       -hold spironolactone  -check sodium level q.6 hours x4 occurrences  - TSH normal, cortisol appropriate, free water restrict  -status post 1 L normal saline IV fluid bolus given in the emergency department

## 2024-03-27 NOTE — ASSESSMENT & PLAN NOTE
Patient has hyponatremia which is uncontrolled,We will aim to correct the sodium by 4-6mEq in 24 hours. We will monitor sodium Every 6 hours. The hyponatremia is due to Dehydration/hypovolemia and Medications: Spironolactone. We will obtain the following studies: AM cortisol or TSH, T4. We will treat the hyponatremia with IV fluids as follows: NS infusion. The patient's sodium results have been reviewed and are listed below.  Recent Labs   Lab 03/27/24  0429   *       3/27- aldactone held  Monitor

## 2024-03-27 NOTE — ASSESSMENT & PLAN NOTE
- blood, sputum cultures, MRSA swab pending  - HIV negative  - CT chest w/tree in bud opacities and questionable areas of abscess formation  - broaden from Rocephin to Zosyn, continue Azithro & Vanc  - check legionella  - supplemental oxygen-currently HFNC, wean for SpO2>92%  - NPO and no anticoagulation pending pulmonology evaluation for poss procedures    3/27 ? Abscess   Cont f/u cultures  On broad antibiotics  Cont f/u CXR for effusion , CT did not have significant effusion  If enlarges will need thora

## 2024-03-27 NOTE — ASSESSMENT & PLAN NOTE
- sinus tachycardia on 12-lead  - no response to adenosine  - appears to be self-limiting  - continue cardiac monitoring  - additional IVF ordered    3/27 monitor

## 2024-03-27 NOTE — SUBJECTIVE & OBJECTIVE
Interval History: no acute events    On 2l nc  No complaints       Objective:     Vital Signs (Most Recent):  Temp: 97.7 °F (36.5 °C) (03/27/24 1130)  Pulse: (!) 131 (03/27/24 1130)  Resp: (!) 37 (03/27/24 1130)  BP: 112/74 (03/27/24 1129)  SpO2: 95 % (03/27/24 1130) Vital Signs (24h Range):  Temp:  [97.7 °F (36.5 °C)-98.7 °F (37.1 °C)] 97.7 °F (36.5 °C)  Pulse:  [108-139] 131  Resp:  [24-43] 37  SpO2:  [93 %-100 %] 95 %  BP: (103-128)/(55-74) 112/74     Weight: 46.9 kg (103 lb 6.3 oz)  Body mass index is 15.27 kg/m².      Intake/Output Summary (Last 24 hours) at 3/27/2024 1301  Last data filed at 3/27/2024 1130  Gross per 24 hour   Intake 1781.22 ml   Output 500 ml   Net 1281.22 ml        Physical Exam  Vitals and nursing note reviewed.   Constitutional:       General: She is not in acute distress.  HENT:      Head: Normocephalic and atraumatic.   Eyes:      General:         Right eye: No discharge.         Left eye: No discharge.   Cardiovascular:      Rate and Rhythm: Regular rhythm. Tachycardia present.      Heart sounds: No murmur heard.  Pulmonary:      Effort: No respiratory distress.      Breath sounds: No wheezing or rales.   Abdominal:      General: There is no distension.      Palpations: Abdomen is soft.   Musculoskeletal:         General: No swelling or tenderness.      Cervical back: Neck supple.   Neurological:      General: No focal deficit present.      Mental Status: She is alert and oriented to person, place, and time.           Review of Systems   Constitutional: Negative.    HENT: Negative.     Respiratory: Negative.     Cardiovascular: Negative.    Gastrointestinal: Negative.    Genitourinary: Negative.    Neurological: Negative.        Vents:       Lines/Drains/Airways       Peripheral Intravenous Line  Duration                  Peripheral IV - Single Lumen 03/27/24 0116 20 G Distal;Posterior;Right Forearm <1 day         Peripheral IV - Single Lumen 03/27/24 0116 20 G Posterior;Right Forearm  <1 day                    Significant Labs:    CBC/Anemia Profile:  Recent Labs   Lab 03/25/24 2042 03/26/24  0551 03/26/24  0559 03/27/24  0429   WBC 30.46* 29.45*  --  26.90*   HGB 12.7* 11.9*  --  9.5*   HCT 37.2* 34.8* 32* 28.4*   * 571*  --  387   MCV 86 86  --  87   RDW 13.3 13.4  --  13.3        Chemistries:  Recent Labs   Lab 03/25/24 2042 03/26/24  0102 03/26/24  0551 03/26/24  1203 03/26/24  1807 03/27/24  0429   *   < > 129*  129* 128* 131* 129*   K 3.8  --  4.0  --   --  3.9   CL 90*  --  96  --   --  95   CO2 21*  --  21*  --   --  24   BUN 8  --  4*  --   --  6   CREATININE 0.7  --  0.7  --   --  0.6   CALCIUM 8.8  --  8.3*  --   --  7.5*   ALBUMIN 2.3*  --  2.0*  --   --  1.7*   PROT 8.3  --  7.3  --   --  5.7*   BILITOT 0.8  --  0.9  --   --  0.4   ALKPHOS 69  --  61  --   --  53*   ALT 26  --  21  --   --  16   AST 22  --  20  --   --  14   MG  --   --  1.6  --   --  1.8   PHOS  --   --  3.5  --   --   --     < > = values in this interval not displayed.       All pertinent labs within the past 24 hours have been reviewed.    Significant Imaging:  I have reviewed all pertinent imaging results/findings within the past 24 hours.

## 2024-03-27 NOTE — SUBJECTIVE & OBJECTIVE
Interval History:  Patient seen and examined at bedside discussed with critical Care Medicine.  She reports feeling better.  Occasional cough which is nonproductive.    Review of Systems   Constitutional:  Positive for fatigue. Negative for fever.   Respiratory:  Positive for cough. Negative for shortness of breath.         Coarse breath sounds in the lower lobes   Cardiovascular:  Negative for chest pain and leg swelling.   Gastrointestinal:  Negative for nausea and vomiting.   Neurological:  Negative for dizziness and weakness.   Hematological:  Negative for adenopathy. Does not bruise/bleed easily.   All other systems reviewed and are negative.    Objective:     Vital Signs (Most Recent):  Temp: 99.4 °F (37.4 °C) (03/27/24 1424)  Pulse: (!) 130 (03/27/24 1424)  Resp: (!) 28 (03/27/24 1424)  BP: 113/73 (03/27/24 1424)  SpO2: 98 % (03/27/24 1424) Vital Signs (24h Range):  Temp:  [97.7 °F (36.5 °C)-99.4 °F (37.4 °C)] 99.4 °F (37.4 °C)  Pulse:  [108-139] 130  Resp:  [24-43] 28  SpO2:  [93 %-100 %] 98 %  BP: (103-128)/(55-74) 113/73     Weight: 45.4 kg (100 lb 1.4 oz)  Body mass index is 14.78 kg/m².    Intake/Output Summary (Last 24 hours) at 3/27/2024 1458  Last data filed at 3/27/2024 1130  Gross per 24 hour   Intake 1762.42 ml   Output 500 ml   Net 1262.42 ml         Physical Exam  Vitals reviewed.   Constitutional:       Appearance: Normal appearance.   HENT:      Head: Normocephalic and atraumatic.      Mouth/Throat:      Mouth: Mucous membranes are moist.      Pharynx: Oropharynx is clear.   Eyes:      Extraocular Movements: Extraocular movements intact.      Conjunctiva/sclera: Conjunctivae normal.   Cardiovascular:      Rate and Rhythm: Regular rhythm. Tachycardia present.      Pulses: Normal pulses.      Heart sounds: Normal heart sounds.   Pulmonary:      Effort: Pulmonary effort is normal.      Breath sounds: Rhonchi present.   Abdominal:      General: Bowel sounds are normal.      Palpations: Abdomen is  soft.      Tenderness: There is no abdominal tenderness.   Musculoskeletal:         General: Normal range of motion.      Cervical back: Normal range of motion and neck supple.   Skin:     General: Skin is warm and dry.   Neurological:      General: No focal deficit present.      Mental Status: She is alert and oriented to person, place, and time. Mental status is at baseline.   Psychiatric:         Mood and Affect: Mood normal.         Behavior: Behavior normal.         Thought Content: Thought content normal.             Significant Labs: All pertinent labs within the past 24 hours have been reviewed.  ABGs:   Recent Labs   Lab 03/26/24  0559   PH 7.461*   PCO2 31.1*   HCO3 22.1*   POCSATURATED 93   BE -2   PO2 62*     Blood Culture:   Recent Labs   Lab 03/25/24  2306   LABBLOO No Growth to date  No Growth to date  No Growth to date  No Growth to date     CBC:   Recent Labs   Lab 03/25/24 2042 03/26/24  0551 03/26/24  0559 03/27/24  0429   WBC 30.46* 29.45*  --  26.90*   HGB 12.7* 11.9*  --  9.5*   HCT 37.2* 34.8* 32* 28.4*   * 571*  --  387     CMP:   Recent Labs   Lab 03/25/24 2042 03/26/24  0102 03/26/24  0551 03/26/24  1203 03/26/24  1807 03/27/24  0429   *   < > 129*  129* 128* 131* 129*   K 3.8  --  4.0  --   --  3.9   CL 90*  --  96  --   --  95   CO2 21*  --  21*  --   --  24   *  --  116*  --   --  126*   BUN 8  --  4*  --   --  6   CREATININE 0.7  --  0.7  --   --  0.6   CALCIUM 8.8  --  8.3*  --   --  7.5*   PROT 8.3  --  7.3  --   --  5.7*   ALBUMIN 2.3*  --  2.0*  --   --  1.7*   BILITOT 0.8  --  0.9  --   --  0.4   ALKPHOS 69  --  61  --   --  53*   AST 22  --  20  --   --  14   ALT 26  --  21  --   --  16   ANIONGAP 11  --  12  --   --  10    < > = values in this interval not displayed.     Cardiac Markers:   Recent Labs   Lab 03/25/24  2042   BNP 14     Lactic Acid:   Recent Labs   Lab 03/25/24  2307   LACTATE 1.5     Magnesium:   Recent Labs   Lab 03/26/24  0597  03/27/24  0429   MG 1.6 1.8     Prealbumin:   Recent Labs   Lab 03/26/24  0102   PREALBUMIN 5*     Troponin:   Recent Labs   Lab 03/25/24  2042   TROPONINI <0.006     TSH:   Recent Labs   Lab 03/26/24  0102   TSH 1.981     Urine Studies:   Recent Labs   Lab 03/26/24  0807   COLORU Yellow   APPEARANCEUA Clear   PHUR 7.0   SPECGRAV 1.010   PROTEINUA Negative   GLUCUA Negative   KETONESU Negative   BILIRUBINUA Negative   OCCULTUA Negative   NITRITE Negative   UROBILINOGEN Negative   LEUKOCYTESUR Negative       Significant Imaging: I have reviewed all pertinent imaging results/findings within the past 24 hours.

## 2024-03-27 NOTE — ASSESSMENT & PLAN NOTE
- acutely holding home meds of Estrace, Provera, Spironolactone    3/27 -patient asking to restart hormone therapy   Explained risk of dvt/ pe  On lovenox  Pt/oob

## 2024-03-27 NOTE — PLAN OF CARE
POC reviewed with pt. Pt verbalizes understanding of POC. No questions at this time.  AAOx4. NADN.  Sinus tach on cardiac monitor.  Pt remains free of falls.  No complaints at this time.  Receiving IV antibiotics.  Safety measures in place. Will continue to monitor.  Pt independent with activity - will call for assist if needed.  Hourly rounding and chart check complete.

## 2024-03-27 NOTE — PLAN OF CARE
PT EVAL complete. Pt INDEP with all mobility, ambulated 420ft INDEP, no AD. At this time, patient is functioning at their prior level of function and does not require further acute PT services. Please re-consult if situation changes. Recommending home upon d/c.

## 2024-03-27 NOTE — PLAN OF CARE
No significant changes overnight. Pt AAOx4. On 2L NC sating in the upper 90s. ST on heart monitor with HR in 120s, MD aware. Pt independent with ADLs. Bed low and locked. Call light within reach. Bed alarm on. Will continue to monitor.

## 2024-03-27 NOTE — ASSESSMENT & PLAN NOTE
Relative hypotension with blood pressure 99/56.  Patient received 1 L normal saline IV fluid bolus in the emergency department.   Transfer to ICU with overall improvement , now patient is able to be transitioned to telemetry floor

## 2024-03-27 NOTE — PLAN OF CARE
Faxed clinicals and referral to [facility]. Primary Care follow up scheduled at [facility] and added to the AVS. This clinic uses a sliding scale due to pt not having insurance.

## 2024-03-27 NOTE — ASSESSMENT & PLAN NOTE
Patient's anemia is currently controlled. Has not received any PRBCs to date.   Current CBC reviewed-   Lab Results   Component Value Date    HGB 9.5 (L) 03/27/2024    HCT 28.4 (L) 03/27/2024     Monitor serial CBC and transfuse if patient becomes hemodynamically unstable, symptomatic or H/H drops below 7/21.  Check iron stores

## 2024-03-27 NOTE — HOSPITAL COURSE
Patient was admitted acute respiratory failure with left-sided pneumonia and possible abscess formation.  She initially was on nasal cannula and on morning after admission became acutely short of breath with a PO2 of 87% and tachycardic.  She was given a dose of adenosine which did not improve things however gradually her heart rate decreased.  Patient was admitted to the intensive care unit for respiratory failure tachycardia.  She was initially on high-flow nasal cannula and subsequently was able to wean to room air.  Cultures remained pending.  She was initially started on Rocephin and azithromycin and changed to Zosyn who azithromycin vein.  Legionella antigen pending.  Continue with aggressive pulmonary care  With worsening CT scan, continued elevated WBC, continued elevated heart rate.  Patient underwent repeat CT scan of the chest which showed worsening of empyema.  She was seen and evaluated by Cardiothoracic surgery and underwent left VATS with decortication on 3/19.    Hyponatremia patient was admitted with hyponatremia she had fluid restrictions in place cortisol was normal, TSH normal, patient was on spironolactone and this was held.  She was being maintained on fluid restricted diet.     Encourage patient to deep breathe and walk in halls.  4/1 undergoing breathing treatment. Mom at bedside. Questions and concerns addressed. Encourage ambulation  4/2 questions and concerns addressed. Chest tube drainage 50cc yesterday. Ambulated with physical/occupational therapy. Continue intravenous fluids and intravenous antibiotic(s).   4/3 leukocytosis trending down. chest tube drainage 10cc/24 yesterday. Repeat chest x-ray with no adverse interval change. Patient wishes to ambulate more. Continue current care.  4/4 minimal chest tube output last 2 days. Leukocytosis resolved. Acute kidney injury improving. Ambulating with physical/occupational therapy. Repeat chest x-ray reviewed. Appetite improving  4/5 primary  CTS recommending repeat ct chest noncontrast and prealbumin. Chest tube to water seal per primary surgeon. Discussed restarting spironolactone at lower dose with nephrology. Patient ambulating with physical/occupational therapy.   4/6 repeat ct chest with complete plugging of left mainstem bronchus. Chest physiotherapy with G5 added. Spironolactone resumed at reduced dose. Complains of chest pain at site of chest tube improved with pain analgesics.  4/7 pulmonology planning on bronch in am if no improvement. Chest x-ray in AM. Complains of pain with chest tube.  4/8 multidisciplinary meeting with patient. Cts removing one chest tube today. Counseled on compliance with medical recommendations with ct physiotherapy, incentive spirometer, ambulation, cough. Patient voiced understanding    4/9 patient seen and examined.  Discussed with Pulmonary and CTS.  Patient sitting on couch using incentive spirometer and Acapella.  Reports she discussed CPT with respiratory and they reported it was contraindicated with chest tube in place.    Six weeks  Augmentin prescribed and patient instructed to complete all 6 weeks  Patient will need pulmonary follow up.  Referral has been sent to [facility] as well as to [facility].  Patient reports that even though she has no insurance she does have cast to pay for 1-2 pulmonary follow up.  Renal function has stabilized therefore will increase spironolactone to 50 mg b.i.d. with instructions to follow up with her PCP for lab work prior to further increase.  Nutritional status is poor him instructed patient to use protein diet.  Patient has been instructed to continue using her Acapella daily to continue with exercise.    4/10 patient reports much less pain.  She was seen by Cardiothoracic surgery and chest tube removed.    Pt without SOB, still with some discomfort.   Pt seen and examined on day of discharge and stable for dc home.

## 2024-03-27 NOTE — PROGRESS NOTES
Pt transferred to [place] [id number] via wheel chair at this time. VSS. PIVs intact. Skin intact. Receiving nurse at bedside. No questions at this time.

## 2024-03-27 NOTE — ASSESSMENT & PLAN NOTE
Current platelet count 577.  Likely reactive thrombocytosis related to sepsis with underlying pneumonia.

## 2024-03-27 NOTE — CONSULTS
Pharmacokinetic Assessment Follow Up: IV Vancomycin    Vancomycin serum concentration assessment(s):    The trough level was drawn correctly and can be used to guide therapy at this time. The measurement is below the desired definitive target range of 15 to 20 mcg/mL.    Vancomycin Regimen Plan:    Change regimen to Vancomycin 1000 mg IV every 8 hours with next serum trough concentration measured at 0730 prior to 3rd dose on 3/28    Drug levels (last 3 results):  Recent Labs   Lab Result Units 03/27/24  1400   Vancomycin-Trough ug/mL 4.0*       Pharmacy will continue to follow and monitor vancomycin.    Please contact pharmacy at extension [phone] for questions regarding this assessment.    Thank you for the consult,   Kalyani Valle, PharmD 3/27/2024 3:50 PM         Patient brief summary:  Mayela Molina is a 28 y.o. adult initiated on antimicrobial therapy with IV Vancomycin for treatment of lower respiratory infection    The patient's current regimen is 1000 mg every 8 hours.    Drug Allergies:   Review of patient's allergies indicates:  No Known Allergies    Actual Body Weight:   45.4 kg    Renal Function:   Estimated Creatinine Clearance (based on SCr of 0.6 mg/dL)  Female: 101.8 mL/min  Male: 119.8 mL/min  Hover for info,     Dialysis Method (if applicable):  N/A    CBC (last 72 hours):  Recent Labs   Lab Result Units 03/25/24 2042 03/26/24  0551 03/27/24  0429   WBC K/uL 30.46* 29.45* 26.90*   Hemoglobin g/dL 12.7* 11.9* 9.5*   Hematocrit % 37.2* 34.8* 28.4*   Platelets K/uL 577* 571* 387   Gran % % 89.9* 88.2* 87.3*   Lymph % % 4.3* 6.5* 6.6*   Mono % % 4.5 3.7* 5.1   Eosinophil % % 0.1 0.2 0.1   Basophil % % 0.3 0.3 0.1   Differential Method  Automated Automated Automated       Metabolic Panel (last 72 hours):  Recent Labs   Lab Result Units 03/25/24 2042 03/25/24  2133 03/26/24  0102 03/26/24  0551 03/26/24  0807 03/26/24  1203 03/26/24  1807 03/27/24  0429   Sodium mmol/L 122*  --  122* 129*   129*  --  128* 131* 129*   Potassium mmol/L 3.8  --   --  4.0  --   --   --  3.9   Chloride mmol/L 90*  --   --  96  --   --   --  95   CO2 mmol/L 21*  --   --  21*  --   --   --  24   Glucose mg/dL 121*  --   --  116*  --   --   --  126*   Glucose, UA   --  Negative  --   --  Negative  --   --   --    BUN mg/dL 8  --   --  4*  --   --   --  6   Creatinine mg/dL 0.7  --   --  0.7  --   --   --  0.6   Creatinine, Urine mg/dL  --  67.7  --   --   --   --   --   --    Albumin g/dL 2.3*  --   --  2.0*  --   --   --  1.7*   Total Bilirubin mg/dL 0.8  --   --  0.9  --   --   --  0.4   Alkaline Phosphatase U/L 69  --   --  61  --   --   --  53*   AST U/L 22  --   --  20  --   --   --  14   ALT U/L 26  --   --  21  --   --   --  16   Magnesium mg/dL  --   --   --  1.6  --   --   --  1.8   Phosphorus mg/dL  --   --   --  3.5  --   --   --   --        Vancomycin Administrations:  vancomycin given in the last 96 hours                     vancomycin 750 mg in dextrose 5 % (D5W) 250 mL IVPB (Vial-Mate) (mg) 750 mg New Bag 03/27/24 0306     750 mg New Bag 03/26/24 1426    vancomycin (VANCOCIN) 1,000 mg in dextrose 5 % (D5W) 250 mL IVPB (Vial-Mate) (mg) 1,000 mg New Bag 03/26/24 0148                    Microbiologic Results:  Microbiology Results (last 7 days)       Procedure Component Value Units Date/Time    Blood Culture #1 **CANNOT BE ORDERED STAT** [[id number]] Collected: 03/25/24 9058    Order Status: Completed Specimen: Blood from Peripheral, Forearm, Left Updated: 03/27/24 1012     Blood Culture, Routine No Growth to date      No Growth to date    Blood Culture #2 **CANNOT BE ORDERED STAT** [[id number]] Collected: 03/25/24 2302    Order Status: Completed Specimen: Blood from Peripheral, Hand, Left Updated: 03/27/24 1012     Blood Culture, Routine No Growth to date      No Growth to date    Culture, MRSA [[id number]] Collected: 03/26/24 0145    Order Status: Sent Specimen: MRSA source from Nares, Left Updated: 03/26/24 0962     Culture, Respiratory with Gram Stain [[id number]] Collected: 03/26/24 0931    Order Status: Sent Specimen: Sputum, Expectorated Updated: 03/26/24 0931    Culture, Respiratory with Gram Stain [[id number]]     Order Status: Canceled Specimen: Sputum, Expectorated     Influenza A & B by Molecular [[id number]] Collected: 03/25/24 2034    Order Status: Completed Specimen: Nasopharyngeal Swab Updated: 03/25/24 2209     Influenza A, Molecular Negative     Influenza B, Molecular Negative     Flu A & B Source Nasal swab

## 2024-03-27 NOTE — ASSESSMENT & PLAN NOTE
This patient does have evidence of infective focus  My overall impression is sepsis.  Source: Respiratory  Antibiotics given-   Antibiotics (72h ago, onward)    Start     Stop Route Frequency Ordered    03/26/24 2300  azithromycin (ZITHROMAX) 500 mg in dextrose 5 % (D5W) 250 mL IVPB (Vial-Mate)         03/28/24 2259 IV Every 24 hours (non-standard times) 03/26/24 0043    03/26/24 1400  vancomycin 750 mg in dextrose 5 % (D5W) 250 mL IVPB (Vial-Mate)         -- IV Every 12 hours (non-standard times) 03/26/24 0156    03/26/24 0900  mupirocin 2 % ointment         03/31/24 0859 Nasl 2 times daily 03/26/24 0751    03/26/24 0800  piperacillin-tazobactam (ZOSYN) 4.5 g in dextrose 5 % in water (D5W) 100 mL IVPB (MB+)         -- IV Every 8 hours (non-standard times) 03/26/24 0731    03/26/24 0141  vancomycin - pharmacy to dose  (vancomycin IVPB (PEDS and ADULTS))        See Hyperspace for full Linked Orders Report.    -- IV pharmacy to manage frequency 03/26/24 0043        Latest lactate reviewed-  Recent Labs   Lab 03/25/24  2307   LACTATE 1.5       Organ dysfunction indicated by  none    Fluid challenge Not needed - patient is not hypotensive      Post- resuscitation assessment No - Post resuscitation assessment not needed       Will Not start Pressors- Levophed for MAP of 65  Source control achieved by: broad spectrum antibiotics

## 2024-03-27 NOTE — PT/OT/SLP EVAL
Physical Therapy Evaluation and Discharge Note    Patient Name:  Mayela Molina   MRN:  [id number]    Recommendations:     Discharge Recommendations: No Therapy Indicated  Discharge Equipment Recommendations: none   Barriers to discharge: None    Assessment:     Mayela Molina is a 28 y.o. adult admitted with a medical diagnosis of Pneumonia of left lung due to infectious organism. .  At this time, patient is functioning at their prior level of function and does not require further acute PT services.     Recent Surgery: * No surgery found *      Plan:     During this hospitalization, patient does not require further acute PT services.  Please re-consult if situation changes.      Subjective     Chief Complaint: Pt is motivated to participate  Patient/Family Comments/goals: none stated  Pain/Comfort:  Pain Rating 1: 7/10 (reports increasing/decreasing with movement/coughing)  Location - Side 1: Left  Location - Orientation 1: generalized  Location 1: chest  Pain Addressed 1: Distraction, Reposition  Pain Rating Post-Intervention 1: 7/10  Pain Rating 2: 7/10  Location - Side 2: Left  Location - Orientation 2: generalized  Location 2: flank  Pain Addressed 2: Reposition, Distraction  Pain Rating Post-Intervention 2: 7/10    Patients cultural, spiritual, Jehovah's witness conflicts given the current situation: no    Living Environment:  Pt reports living alone in a 1 story home, 2 steps to enter.  Prior to admission, patients level of function was INDEP bathing, dressing, household and community ambulation, no AD, driving, working.  Equipment used at home: none.  DME owned (not currently used): none.  Upon discharge, patient will have assistance from family.    Objective:     Communicated with nurse Jamil and epic chart review prior to session.  Patient found with bed in chair position with peripheral IV, telemetry upon PT entry to room.    General Precautions: Standard, fall    Orthopedic Precautions:N/A  "  Braces: N/A  Respiratory Status: Room air    Exams:  Cognitive Exam:  Patient is oriented to Person, Place, Time, and Situation  Sensation:    -       Intact  Skin Integrity/Edema:      -       Skin integrity: Visible skin intact  RLE ROM: WFL  RLE Strength: Grossly 4+/5  LLE ROM: WFL  LLE Strength: Grossly 4+/5    Functional Mobility:  Gait belt applied - Yes  Bed Mobility  Rolling Left: independence  Scooting: independence  Supine to Sit: independence  Transfers  Sit to Stand: independence with no AD  Gait  Patient ambulated 420ft with no AD and independence. Patient demonstrates steady gait, no LOB. Initially occasional unsteadiness but progressed to INDEP.  Balance  Sitting: independence  Standing: independence  All lines remained intact throughout ambulation trail.  SpO2 after ambulation 98%, HR during ambulation 150s bpm, nurse notified.    AM-PAC 6 CLICK MOBILITY  Total Score:21     Treatment and Education:  Educated on importance of OOB activities, activity pacing, and HEP (marching/hip flex, hip abd, heel slides/LAQ, quad sets, ankle pumps) in order to maintain/regain strength. Encouraged to sit up in chair for all meals. Educated on "call don't fall" policy and increased risk of falling due to weakness, instructed to utilize call bell for assistance with all transfers. Pt agreeable to all requests.      AM-PAC 6 CLICK MOBILITY  Total Score:21     Patient left with bed in chair position with all lines intact, call button in reach, and nurse notified.    GOALS:   Multidisciplinary Problems       Physical Therapy Goals       Not on file                    History:     History reviewed. No pertinent past medical history.    History reviewed. No pertinent surgical history.    Time Tracking:     PT Received On: 03/27/24  PT Start Time: 1425     PT Stop Time: 1450  PT Total Time (min): 25 min     Billable Minutes: Evaluation 15min and Gait Training 10min      03/27/2024  "

## 2024-03-28 ENCOUNTER — ANESTHESIA EVENT (OUTPATIENT)
Dept: SURGERY | Facility: HOSPITAL | Age: 29
DRG: 853 | End: 2024-03-28

## 2024-03-28 PROBLEM — R76.8 HEPATITIS C ANTIBODY POSITIVE IN BLOOD: Status: RESOLVED | Noted: 2024-03-27 | Resolved: 2024-03-28

## 2024-03-28 LAB
ABO + RH BLD: NORMAL
ALBUMIN SERPL BCP-MCNC: 1.8 G/DL (ref 3.5–5.2)
ALP SERPL-CCNC: 59 U/L (ref 55–135)
ALT SERPL W/O P-5'-P-CCNC: 19 U/L (ref 10–44)
ANION GAP SERPL CALC-SCNC: 9 MMOL/L (ref 8–16)
AST SERPL-CCNC: 21 U/L (ref 10–40)
BASOPHILS # BLD AUTO: 0.06 K/UL (ref 0–0.2)
BASOPHILS NFR BLD: 0.2 % (ref 0–1.9)
BILIRUB SERPL-MCNC: 0.5 MG/DL (ref 0.1–1)
BLD GP AB SCN CELLS X3 SERPL QL: NORMAL
BUN SERPL-MCNC: 6 MG/DL (ref 6–20)
CALCIUM SERPL-MCNC: 7.9 MG/DL (ref 8.7–10.5)
CHLORIDE SERPL-SCNC: 95 MMOL/L (ref 95–110)
CO2 SERPL-SCNC: 24 MMOL/L (ref 23–29)
CREAT SERPL-MCNC: 0.6 MG/DL (ref 0.5–1.4)
DIFFERENTIAL METHOD BLD: ABNORMAL
EOSINOPHIL # BLD AUTO: 0 K/UL (ref 0–0.5)
EOSINOPHIL NFR BLD: 0.1 % (ref 0–8)
ERYTHROCYTE [DISTWIDTH] IN BLOOD BY AUTOMATED COUNT: 13.3 % (ref 11.5–14.5)
EST. GFR  (NO RACE VARIABLE): >60 ML/MIN/1.73 M^2
GLUCOSE SERPL-MCNC: 102 MG/DL (ref 70–110)
HCT VFR BLD AUTO: 30.3 % (ref 40–54)
HGB BLD-MCNC: 10.4 G/DL (ref 14–18)
IMM GRANULOCYTES # BLD AUTO: 0.34 K/UL (ref 0–0.04)
IMM GRANULOCYTES NFR BLD AUTO: 1.2 % (ref 0–0.5)
IRON SERPL-MCNC: 11 UG/DL (ref 45–160)
LYMPHOCYTES # BLD AUTO: 1.8 K/UL (ref 1–4.8)
LYMPHOCYTES NFR BLD: 6.6 % (ref 18–48)
MAGNESIUM SERPL-MCNC: 1.9 MG/DL (ref 1.6–2.6)
MCH RBC QN AUTO: 29.5 PG (ref 27–31)
MCHC RBC AUTO-ENTMCNC: 34.3 G/DL (ref 32–36)
MCV RBC AUTO: 86 FL (ref 82–98)
MONOCYTES # BLD AUTO: 1.6 K/UL (ref 0.3–1)
MONOCYTES NFR BLD: 5.7 % (ref 4–15)
MRSA SPEC QL CULT: NORMAL
NEUTROPHILS # BLD AUTO: 24.1 K/UL (ref 1.8–7.7)
NEUTROPHILS NFR BLD: 86.2 % (ref 38–73)
NRBC BLD-RTO: 0 /100 WBC
OSMOLALITY UR: 208 MOSM/KG (ref 50–1200)
PHOSPHATE SERPL-MCNC: 3.6 MG/DL (ref 2.7–4.5)
PLATELET # BLD AUTO: 453 K/UL (ref 150–450)
PMV BLD AUTO: 8.6 FL (ref 9.2–12.9)
POTASSIUM SERPL-SCNC: 4.2 MMOL/L (ref 3.5–5.1)
PROT SERPL-MCNC: 6.4 G/DL (ref 6–8.4)
RBC # BLD AUTO: 3.53 M/UL (ref 4.6–6.2)
SATURATED IRON: 6 % (ref 20–50)
SODIUM SERPL-SCNC: 128 MMOL/L (ref 136–145)
SPECIMEN OUTDATE: NORMAL
TOTAL IRON BINDING CAPACITY: 191 UG/DL (ref 250–450)
TRANSFERRIN SERPL-MCNC: 129 MG/DL (ref 200–375)
VANCOMYCIN SERPL-MCNC: 9.7 UG/ML
WBC # BLD AUTO: 27.93 K/UL (ref 3.9–12.7)

## 2024-03-28 PROCEDURE — 99900035 HC TECH TIME PER 15 MIN (STAT)

## 2024-03-28 PROCEDURE — 25000003 PHARM REV CODE 250

## 2024-03-28 PROCEDURE — 21400001 HC TELEMETRY ROOM

## 2024-03-28 PROCEDURE — 84100 ASSAY OF PHOSPHORUS: CPT | Performed by: INTERNAL MEDICINE

## 2024-03-28 PROCEDURE — 25000003 PHARM REV CODE 250: Performed by: NURSE PRACTITIONER

## 2024-03-28 PROCEDURE — 25000003 PHARM REV CODE 250: Performed by: INTERNAL MEDICINE

## 2024-03-28 PROCEDURE — 36415 COLL VENOUS BLD VENIPUNCTURE: CPT | Mod: XB | Performed by: THORACIC SURGERY (CARDIOTHORACIC VASCULAR SURGERY)

## 2024-03-28 PROCEDURE — 80202 ASSAY OF VANCOMYCIN: CPT | Performed by: INTERNAL MEDICINE

## 2024-03-28 PROCEDURE — 83735 ASSAY OF MAGNESIUM: CPT | Performed by: INTERNAL MEDICINE

## 2024-03-28 PROCEDURE — 94761 N-INVAS EAR/PLS OXIMETRY MLT: CPT

## 2024-03-28 PROCEDURE — 83540 ASSAY OF IRON: CPT | Performed by: INTERNAL MEDICINE

## 2024-03-28 PROCEDURE — 36415 COLL VENOUS BLD VENIPUNCTURE: CPT | Performed by: INTERNAL MEDICINE

## 2024-03-28 PROCEDURE — 94799 UNLISTED PULMONARY SVC/PX: CPT

## 2024-03-28 PROCEDURE — 80053 COMPREHEN METABOLIC PANEL: CPT | Performed by: INTERNAL MEDICINE

## 2024-03-28 PROCEDURE — 63600175 PHARM REV CODE 636 W HCPCS: Performed by: INTERNAL MEDICINE

## 2024-03-28 PROCEDURE — 63600175 PHARM REV CODE 636 W HCPCS

## 2024-03-28 PROCEDURE — 86901 BLOOD TYPING SEROLOGIC RH(D): CPT | Performed by: THORACIC SURGERY (CARDIOTHORACIC VASCULAR SURGERY)

## 2024-03-28 PROCEDURE — 85025 COMPLETE CBC W/AUTO DIFF WBC: CPT | Performed by: INTERNAL MEDICINE

## 2024-03-28 RX ORDER — SODIUM CHLORIDE 0.9 % (FLUSH) 0.9 %
10 SYRINGE (ML) INJECTION
Status: DISCONTINUED | OUTPATIENT
Start: 2024-03-28 | End: 2024-04-11 | Stop reason: HOSPADM

## 2024-03-28 RX ORDER — ENOXAPARIN SODIUM 100 MG/ML
40 INJECTION SUBCUTANEOUS EVERY 24 HOURS
Status: DISCONTINUED | OUTPATIENT
Start: 2024-03-28 | End: 2024-04-11 | Stop reason: HOSPADM

## 2024-03-28 RX ORDER — SODIUM CHLORIDE 0.9 % (FLUSH) 0.9 %
10 SYRINGE (ML) INJECTION
Status: DISCONTINUED | OUTPATIENT
Start: 2024-03-28 | End: 2024-04-09

## 2024-03-28 RX ORDER — METOPROLOL TARTRATE 25 MG/1
12.5 TABLET ORAL 2 TIMES DAILY
Status: DISCONTINUED | OUTPATIENT
Start: 2024-03-28 | End: 2024-03-29

## 2024-03-28 RX ORDER — SODIUM CHLORIDE 1 G/1
1000 TABLET ORAL 3 TIMES DAILY
Status: DISCONTINUED | OUTPATIENT
Start: 2024-03-28 | End: 2024-04-10

## 2024-03-28 RX ADMIN — ENOXAPARIN SODIUM 40 MG: 40 INJECTION SUBCUTANEOUS at 05:03

## 2024-03-28 RX ADMIN — VANCOMYCIN HYDROCHLORIDE 1500 MG: 1.5 INJECTION, POWDER, LYOPHILIZED, FOR SOLUTION INTRAVENOUS at 04:03

## 2024-03-28 RX ADMIN — GUAIFENESIN 600 MG: 600 TABLET, EXTENDED RELEASE ORAL at 08:03

## 2024-03-28 RX ADMIN — SODIUM CHLORIDE 1000 MG: 1 TABLET ORAL at 08:03

## 2024-03-28 RX ADMIN — FAMOTIDINE 20 MG: 20 TABLET ORAL at 08:03

## 2024-03-28 RX ADMIN — METOPROLOL TARTRATE 12.5 MG: 25 TABLET, FILM COATED ORAL at 09:03

## 2024-03-28 RX ADMIN — ESTRADIOL 8 MG: 1 TABLET ORAL at 10:03

## 2024-03-28 RX ADMIN — SODIUM CHLORIDE: 9 INJECTION, SOLUTION INTRAVENOUS at 06:03

## 2024-03-28 RX ADMIN — ACETAMINOPHEN 650 MG: 325 TABLET ORAL at 08:03

## 2024-03-28 RX ADMIN — BENZONATATE 100 MG: 100 CAPSULE ORAL at 08:03

## 2024-03-28 RX ADMIN — MEDROXYPROGESTERONE ACETATE 10 MG: 5 TABLET ORAL at 10:03

## 2024-03-28 RX ADMIN — SODIUM CHLORIDE: 9 INJECTION, SOLUTION INTRAVENOUS at 04:03

## 2024-03-28 RX ADMIN — MUPIROCIN: 20 OINTMENT TOPICAL at 10:03

## 2024-03-28 RX ADMIN — PIPERACILLIN SODIUM AND TAZOBACTAM SODIUM 4.5 G: 4; .5 INJECTION, POWDER, FOR SOLUTION INTRAVENOUS at 06:03

## 2024-03-28 RX ADMIN — MUPIROCIN: 20 OINTMENT TOPICAL at 08:03

## 2024-03-28 RX ADMIN — SODIUM CHLORIDE 1000 MG: 1 TABLET ORAL at 03:03

## 2024-03-28 RX ADMIN — SODIUM CHLORIDE: 9 INJECTION, SOLUTION INTRAVENOUS at 08:03

## 2024-03-28 RX ADMIN — METOPROLOL TARTRATE 12.5 MG: 25 TABLET, FILM COATED ORAL at 08:03

## 2024-03-28 RX ADMIN — PIPERACILLIN SODIUM AND TAZOBACTAM SODIUM 4.5 G: 4; .5 INJECTION, POWDER, FOR SOLUTION INTRAVENOUS at 08:03

## 2024-03-28 RX ADMIN — VANCOMYCIN HYDROCHLORIDE 1000 MG: 1 INJECTION, POWDER, LYOPHILIZED, FOR SOLUTION INTRAVENOUS at 08:03

## 2024-03-28 RX ADMIN — VANCOMYCIN HYDROCHLORIDE 1500 MG: 1.5 INJECTION, POWDER, LYOPHILIZED, FOR SOLUTION INTRAVENOUS at 11:03

## 2024-03-28 RX ADMIN — Medication 6 MG: at 11:03

## 2024-03-28 NOTE — CONSULTS
Pharmacokinetic Assessment Follow Up: IV Vancomycin    Vancomycin serum concentration assessment(s):    The trough level was drawn correctly and can be used to guide therapy at this time. The measurement is below the desired definitive target range of 15 to 20 mcg/mL.    Vancomycin Regimen Plan:    Change regimen to Vancomycin 1500 mg IV every 8 hours with next serum trough concentration measured at 0730 prior to 3rd new dose on 3/29    Drug levels (last 3 results):  Recent Labs   Lab Result Units 03/27/24  1400 03/28/24  0712   Vancomycin, Random ug/mL  --  9.7   Vancomycin-Trough ug/mL 4.0*  --        Pharmacy will continue to follow and monitor vancomycin.    Please contact pharmacy at extension [phone] for questions regarding this assessment.    Thank you for the consult,   Kalyani Valle, PharmD 3/28/2024 4:40 PM         Patient brief summary:  Mayela Molina is a 28 y.o. adult initiated on antimicrobial therapy with IV Vancomycin for treatment of lower respiratory infection    The patient's current regimen is 1500 mg every 8 hours.    Drug Allergies:   Review of patient's allergies indicates:  No Known Allergies    Actual Body Weight:   45.4 kg    Renal Function:   Estimated Creatinine Clearance (based on SCr of 0.6 mg/dL)  Female: 101.8 mL/min  Male: 119.8 mL/min  Hover for info,     Dialysis Method (if applicable):  N/A    CBC (last 72 hours):  Recent Labs   Lab Result Units 03/25/24  2042 03/26/24  0551 03/27/24  0429 03/28/24  0712   WBC K/uL 30.46* 29.45* 26.90* 27.93*   Hemoglobin g/dL 12.7* 11.9* 9.5* 10.4*   Hematocrit % 37.2* 34.8* 28.4* 30.3*   Platelets K/uL 577* 571* 387 453*   Gran % % 89.9* 88.2* 87.3* 86.2*   Lymph % % 4.3* 6.5* 6.6* 6.6*   Mono % % 4.5 3.7* 5.1 5.7   Eosinophil % % 0.1 0.2 0.1 0.1   Basophil % % 0.3 0.3 0.1 0.2   Differential Method  Automated Automated Automated Automated       Metabolic Panel (last 72 hours):  Recent Labs   Lab Result Units 03/25/24 2042 03/25/24  6846  03/26/24  0102 03/26/24  0551 03/26/24  0807 03/26/24  1203 03/26/24  1807 03/27/24  0429 03/27/24  1817 03/28/24  0712   Sodium mmol/L 122*  --  122* 129*  129*  --  128* 131* 129*  --  128*   Sodium, Urine mmol/L  --   --   --   --   --   --   --   --  38  --    Potassium mmol/L 3.8  --   --  4.0  --   --   --  3.9  --  4.2   Chloride mmol/L 90*  --   --  96  --   --   --  95  --  95   CO2 mmol/L 21*  --   --  21*  --   --   --  24  --  24   Glucose mg/dL 121*  --   --  116*  --   --   --  126*  --  102   Glucose, UA   --  Negative  --   --  Negative  --   --   --   --   --    BUN mg/dL 8  --   --  4*  --   --   --  6  --  6   Creatinine mg/dL 0.7  --   --  0.7  --   --   --  0.6  --  0.6   Creatinine, Urine mg/dL  --  67.7  --   --   --   --   --   --   --   --    Albumin g/dL 2.3*  --   --  2.0*  --   --   --  1.7*  --  1.8*   Total Bilirubin mg/dL 0.8  --   --  0.9  --   --   --  0.4  --  0.5   Alkaline Phosphatase U/L 69  --   --  61  --   --   --  53*  --  59   AST U/L 22  --   --  20  --   --   --  14  --  21   ALT U/L 26  --   --  21  --   --   --  16  --  19   Magnesium mg/dL  --   --   --  1.6  --   --   --  1.8  --  1.9   Phosphorus mg/dL  --   --   --  3.5  --   --   --   --   --  3.6       Vancomycin Administrations:  vancomycin given in the last 96 hours                     vancomycin (VANCOCIN) 1,000 mg in dextrose 5 % (D5W) 250 mL IVPB (Vial-Mate) (mg) 1,000 mg New Bag 03/28/24 0852     1,000 mg New Bag 03/27/24 2352     1,000 mg New Bag  1608    vancomycin 750 mg in dextrose 5 % (D5W) 250 mL IVPB (Vial-Mate) (mg) 750 mg New Bag 03/27/24 0306     750 mg New Bag 03/26/24 1426    vancomycin (VANCOCIN) 1,000 mg in dextrose 5 % (D5W) 250 mL IVPB (Vial-Mate) (mg) 1,000 mg New Bag 03/26/24 0148                    Microbiologic Results:  Microbiology Results (last 7 days)       Procedure Component Value Units Date/Time    Blood Culture #1 **CANNOT BE ORDERED STAT** [[id number]] Collected: 03/25/24 2303     Order Status: Completed Specimen: Blood from Peripheral, Forearm, Left Updated: 03/28/24 1012     Blood Culture, Routine No Growth to date      No Growth to date      No Growth to date    Blood Culture #2 **CANNOT BE ORDERED STAT** [[id number]] Collected: 03/25/24 2306    Order Status: Completed Specimen: Blood from Peripheral, Hand, Left Updated: 03/28/24 1012     Blood Culture, Routine No Growth to date      No Growth to date      No Growth to date    Culture, MRSA [[id number]] Collected: 03/26/24 0145    Order Status: Completed Specimen: MRSA source from Nares, Left Updated: 03/28/24 0715     MRSA Surveillance Screen No MRSA isolated    Culture, Respiratory with Gram Stain [[id number]] Collected: 03/26/24 0931    Order Status: Sent Specimen: Sputum, Expectorated Updated: 03/26/24 0931    Culture, Respiratory with Gram Stain [[id number]]     Order Status: Canceled Specimen: Sputum, Expectorated     Influenza A & B by Molecular [[id number]] Collected: 03/25/24 2034    Order Status: Completed Specimen: Nasopharyngeal Swab Updated: 03/25/24 2209     Influenza A, Molecular Negative     Influenza B, Molecular Negative     Flu A & B Source Nasal swab

## 2024-03-28 NOTE — CONSULTS
[facility]  Pulmonology Consult Note    Patient Name: Mayela Molina  MRN: [id number]  Admission Date: 3/25/2024   Hospital Length of Stay: 2 days  Code Status: Full Code    Attending Physician: Linsey Garcia MD    Inpatient consult to Pulmonology  Consult performed by: Kiarra Willis NP  Consult ordered by: Romy Sylvester MD        Subjective:   Chief complaint: cough and pleuritic pain  History of Present Illness:  Mayela Molina is a 28-year-old patient with no chronic medical problems who presented to the ED complaining of a cough x 3 weeks. Patient is a biological male in the transition to female who reports a cough with yellow sputum, left sided pleuritic pain, and dyspnea that increases with exertion. Denies fever, chills, dizziness, abdominal pain, n/v/d, dysuria, hematuria, myalgia. Denies tobacco use, vaping/e-cigarettes, or drug use. Abnormal labs in the emergency department include white blood cell count 30.46, hemoglobin 12.7, platelets 577, D-dimer 1.37, sodium 122, chloride 90, bicarb 21, glucose 121, albumin 2.3. Patient was admitted under hospital medicine service for left-sided pneumonia with possible underlying abscess formation. Pulmonary consulted for evaluation.    History reviewed. No pertinent past medical history.    History reviewed. No pertinent surgical history.    Review of patient's allergies indicates:  No Known Allergies    Family History    None       Tobacco Use    Smoking status: Never    Smokeless tobacco: Never   Substance and Sexual Activity    Alcohol use: Never    Drug use: Never    Sexual activity: Yes       Review of Systems: Negative except as indicated in HPI.  Objective:     Vital Signs (Most Recent):  Temp: 98 °F (36.7 °C) (03/28/24 1133)  Pulse: 106 (03/28/24 1133)  Resp: 16 (03/28/24 1133)  BP: 112/72 (03/28/24 1133)  SpO2: 96 % (03/28/24 1133) Vital Signs (24h Range):  Temp:  [98 °F (36.7 °C)-99.4 °F  (37.4 °C)] 98 °F (36.7 °C)  Pulse:  [106-156] 106  Resp:  [16-33] 16  SpO2:  [96 %-98 %] 96 %  BP: (112-118)/(65-73) 112/72     Weight: 45.4 kg (100 lb 1.4 oz); Body mass index is 14.78 kg/m².    No intake or output data in the 24 hours ending 03/28/24 1228     Physical Exam  Vitals reviewed.   HENT:      Head: Normocephalic.   Eyes:      Conjunctiva/sclera: Conjunctivae normal.   Cardiovascular:      Rate and Rhythm: Normal rate.   Pulmonary:      Effort: Pulmonary effort is normal.   Abdominal:      Palpations: Abdomen is soft.   Musculoskeletal:         General: Normal range of motion.      Cervical back: Normal range of motion.   Skin:     General: Skin is warm and dry.   Neurological:      Mental Status: She is alert and oriented to person, place, and time.      Significant Labs:  CBC/Anemia Profile:  Recent Labs   Lab 03/27/24 0429 03/28/24  0712   WBC 26.90* 27.93*   HGB 9.5* 10.4*   HCT 28.4* 30.3*    453*   MCV 87 86   RDW 13.3 13.3   Chemistries:  Recent Labs   Lab 03/26/24  1807 03/27/24  0429 03/28/24  0712   * 129* 128*   K  --  3.9 4.2   CL  --  95 95   CO2  --  24 24   BUN  --  6 6   CREATININE  --  0.6 0.6   CALCIUM  --  7.5* 7.9*   ALBUMIN  --  1.7* 1.8*   PROT  --  5.7* 6.4   BILITOT  --  0.4 0.5   ALKPHOS  --  53* 59   ALT  --  16 19   AST  --  14 21   MG  --  1.8 1.9   PHOS  --   --  3.6     Significant Imaging:   CXR: I have reviewed all pertinent results/findings within the past 24 hours and my personal findings are:  slightly improved aeration of left lower lobe; possible underlying effusion    ABG  Recent Labs   Lab 03/26/24  0559   PH 7.461*   PO2 62*   PCO2 31.1*   HCO3 22.1*   BE -2     Assessment/Plan:     Pulmonary  * Pneumonia of left lung due to infectious organism  - CT chest on admit with dense consolidation and questionable underlying abscess formation; no significant effusion noted  - blood cultures with NGTD  - sputum culture pending  - MRSA swab negative; HIV  negative  - CT chest w/tree in bud opacities and questionable areas of abscess formation  - continue Zosyn and Vanc for now  - Legionella pending  - supplemental oxygen as needed to maintain saturations 90% or greater; currently on room air  - CXR today reviewed with overall improved aeration; possible underlying effusion  - CT chest to evaluate; if developing effusion may need thoracentesis      Thank you for your consult. I will follow-up with patient. Please contact us if you have any additional questions.     Kiarra Willis NP  Pulmonary and Critical Care  [facility]

## 2024-03-28 NOTE — ASSESSMENT & PLAN NOTE
- CT chest on admit with dense consolidation and questionable underlying abscess formation; no significant effusion noted  - blood cultures with NGTD  - sputum culture pending  - MRSA swab negative; HIV negative  - CT chest w/tree in bud opacities and questionable areas of abscess formation  - continue Zosyn and Vanc for now  - Legionella pending  - supplemental oxygen as needed to maintain saturations 90% or greater; currently on room air  - CXR today reviewed with overall improved aeration; possible underlying effusion  - CT chest to evaluate; if developing effusion may need thoracentesis

## 2024-03-28 NOTE — PROGRESS NOTES
[facility] Medicine  Progress Note    Patient Name: Mayela Molina  MRN: [id number]  Patient Class: IP- Inpatient   Admission Date: 3/25/2024  Length of Stay: 2 days  Attending Physician: Linsey Garcia MD  Primary Care Provider: No primary care provider on file.        Subjective:     Principal Problem:Pneumonia of left lung due to infectious organism        HPI:  28-year-old patient (male to female transgender) with no chronic medical conditions who presented to the emergency department with complaint of cough over the last 3 weeks, moderate severity, persistent.  Patient does have associated yellow sputum production, shortness for breath, and pleuritic chest pain.  No associated nausea, vomiting, diarrhea.  No complaints of fevers or chills although T-max in the emergency department recorded as 99.8.  Patient does not smoke cigarettes or vape.  No illicit drug use.  Abnormal labs in the emergency department include white blood cell count 30.46, hemoglobin 12.7, platelets 577, D-dimer 1.37, sodium 122, chloride 90, bicarb 21, glucose 121, albumin 2.3.  Last labs are from 2018 with no interim labs to compare with.  Patient does take spironolactone, Estrace, and Provera.  CT scan of chest with left-sided pneumonia with possible abscess formation.    Overview/Hospital Course:   Patient was admitted acute respiratory failure with left-sided pneumonia and possible abscess formation.  She initially was on nasal cannula and on morning after admission became acutely short of breath with a PO2 of 87% and tachycardic.  She was given a dose of adenosine which did not improve things however gradually her heart rate decreased.  Patient was admitted to the intensive care unit for respiratory failure tachycardia.  She was initially on high-flow nasal cannula and subsequently was able to wean to room air.  Cultures remained pending.  She was initially started on Rocephin and azithromycin  and changed to Zosyn who azithromycin vein.  Legionella antigen pending.  Continue with aggressive pulmonary care    Hyponatremia patient was admitted with hyponatremia she had fluid restrictions in place cortisol was normal, TSH normal, patient was on spironolactone and this was held.  She was being maintained on fluid restricted diet.  fluid restriction.     Interval History:  Patient seen and examined at bedside.  She complains generalized weakness and malaise but otherwise no complaints.  Noted heart rate runs anywhere from 110-160.    Review of Systems   Constitutional:  Positive for fatigue. Negative for fever.   Respiratory:  Positive for cough. Negative for shortness of breath.    Cardiovascular:  Positive for palpitations. Negative for chest pain and leg swelling.   Gastrointestinal:  Negative for nausea and vomiting.   All other systems reviewed and are negative.    Objective:     Vital Signs (Most Recent):  Temp: 99.5 °F (37.5 °C) (03/28/24 1721)  Pulse: (!) 119 (03/28/24 1721)  Resp: (!) 24 (03/28/24 1721)  BP: 110/70 (03/28/24 1721)  SpO2: 96 % (03/28/24 1721) Vital Signs (24h Range):  Temp:  [98 °F (36.7 °C)-99.5 °F (37.5 °C)] 99.5 °F (37.5 °C)  Pulse:  [106-156] 119  Resp:  [16-26] 24  SpO2:  [96 %] 96 %  BP: (110-118)/(65-72) 110/70     Weight: 45.4 kg (100 lb 1.4 oz)  Body mass index is 14.78 kg/m².    Intake/Output Summary (Last 24 hours) at 3/28/2024 1805  Last data filed at 3/28/2024 1256  Gross per 24 hour   Intake 300 ml   Output --   Net 300 ml         Physical Exam  Vitals reviewed.   Constitutional:       Appearance: Normal appearance.   HENT:      Head: Normocephalic and atraumatic.      Mouth/Throat:      Mouth: Mucous membranes are moist.      Pharynx: Oropharynx is clear.   Eyes:      Extraocular Movements: Extraocular movements intact.      Conjunctiva/sclera: Conjunctivae normal.   Cardiovascular:      Rate and Rhythm: Regular rhythm. Tachycardia present.      Pulses: Normal pulses.     "  Heart sounds: Normal heart sounds.   Pulmonary:      Effort: Pulmonary effort is normal.      Comments: Decreased breath sounds on left  Abdominal:      General: Bowel sounds are normal.      Palpations: Abdomen is soft.   Musculoskeletal:         General: Normal range of motion.      Cervical back: Normal range of motion and neck supple.   Skin:     General: Skin is warm and dry.   Neurological:      General: No focal deficit present.      Mental Status: She is alert and oriented to person, place, and time. Mental status is at baseline.   Psychiatric:         Mood and Affect: Mood normal.         Behavior: Behavior normal.         Thought Content: Thought content normal.             Significant Labs: All pertinent labs within the past 24 hours have been reviewed.  CBC:   Recent Labs   Lab 03/27/24  0429 03/28/24  0712   WBC 26.90* 27.93*   HGB 9.5* 10.4*   HCT 28.4* 30.3*    453*     CMP:   Recent Labs   Lab 03/26/24  1807 03/27/24  0429 03/28/24  0712   * 129* 128*   K  --  3.9 4.2   CL  --  95 95   CO2  --  24 24   GLU  --  126* 102   BUN  --  6 6   CREATININE  --  0.6 0.6   CALCIUM  --  7.5* 7.9*   PROT  --  5.7* 6.4   ALBUMIN  --  1.7* 1.8*   BILITOT  --  0.4 0.5   ALKPHOS  --  53* 59   AST  --  14 21   ALT  --  16 19   ANIONGAP  --  10 9       Significant Imaging: I have reviewed all pertinent imaging results/findings within the past 24 hours.    Assessment/Plan:      * Pneumonia of left lung due to infectious organism  -white blood cell count 30.46, lactic acid level 1.5, D-dimer 1.37, BNP 14, troponin negative, influenza a/B negative, COVID-19 negative  -blood cultures pending  -CTA of chest with "no pulmonary embolism, no dissection; dense consolidation in the left lower lobe extending to the left upper lobe with tree in bud opacities consistent with severe pneumonia; questionable areas of fluid density in the left lung base with foci of gas may relate to component of fluid density or " "possible liquefaction or possible abscess formation."  -continue IV Zosyn, vanco and IV azithromycin initiated in the emergency department  -check MRSA culture-negative, sputum culture-pending, HIV  -give IV fluids, O2 supplementation, p.r.n. albuterol inhaler  - repeat CT scan of chest today looks more like an empyema  -CVT consulted  -keep NPO    Tachycardia, paroxysmal        Pleuritic chest pain  Pleuritic chest pain related to left-sided pneumonia.  Cardiac enzymes negative.      Male-to-female transgender person  Resume home medications to include Estrace & Provera; hold spironolactone due to hyponatremia      Thrombocytosis  Current platelet count 577.  Likely reactive thrombocytosis related to sepsis with underlying pneumonia.      Normocytic anemia  Patient's anemia is currently controlled. Has not received any PRBCs to date.   Current CBC reviewed-   Lab Results   Component Value Date    HGB 10.4 (L) 03/28/2024    HCT 30.3 (L) 03/28/2024     Monitor serial CBC and transfuse if patient becomes hemodynamically unstable, symptomatic or H/H drops below 7/21.  Check iron stores    Underweight  Current BMI 15.27, albumin 2.3.    Add nutritional supplements      Hyponatremia  Patient has hyponatremia which is uncontrolled,We will aim to correct the sodium by 4-6mEq in 24 hours. We will monitor sodium Every 6 hours x4. The hyponatremia is due to Dehydration/hypovolemia. We will treat the hyponatremia with IV fluids as follows:  Normal saline at 100 mL/hr. The patient's sodium results have been reviewed and are listed below.  Recent Labs   Lab 03/28/24  0712   *       -hold spironolactone    - TSH normal, cortisol appropriate, free water restrict  -status post 1 L normal saline IV fluid bolus given in the emergency department      Hypotension  Relative hypotension with blood pressure 99/56.  Patient received 1 L normal saline IV fluid bolus in the emergency department.   Hemodynamically stable    Sepsis  This " patient does have evidence of infective focus  My overall impression is sepsis.  Source: Respiratory  Antibiotics given-   Antibiotics (72h ago, onward)      Start     Stop Route Frequency Ordered    03/28/24 1800  piperacillin-tazobactam (ZOSYN) 4.5 g in dextrose 5 % in water (D5W) 100 mL IVPB (MB+)         -- IV Every 8 hours (non-standard times) 03/28/24 1538    03/28/24 1630  vancomycin 1,500 mg in dextrose 5 % (D5W) 250 mL IVPB (Vial-Mate)         -- IV Every 8 hours (non-standard times) 03/28/24 1608    03/26/24 0900  mupirocin 2 % ointment         03/31/24 0859 Nasl 2 times daily 03/26/24 0751    03/26/24 0141  vancomycin - pharmacy to dose  (vancomycin IVPB (PEDS and ADULTS))        See Hyperspace for full Linked Orders Report.    -- IV pharmacy to manage frequency 03/26/24 0043          Latest lactate reviewed-  Recent Labs   Lab 03/25/24  2307   LACTATE 1.5       Organ dysfunction indicated by  none    Fluid challenge Not needed - patient is not hypotensive      Post- resuscitation assessment No - Post resuscitation assessment not needed       Will Not start Pressors- Levophed for MAP of 65  Source control achieved by: broad spectrum antibiotics      VTE Risk Mitigation (From admission, onward)           Ordered     enoxaparin injection 40 mg  Every 24 hours         03/28/24 0912     IP VTE HIGH RISK PATIENT  Once         03/26/24 0043     Reason for No Pharmacological VTE Prophylaxis  Once        Question:  Reasons:  Answer:  Physician Provided (leave comment)  Comment:  pending pulmonary consult    03/26/24 0043     IP VTE HIGH RISK PATIENT  Once         03/26/24 0043     Place sequential compression device  Until discontinued         03/26/24 0043                    Discharge Planning   RAVINDER:      Code Status: Full Code   Is the patient medically ready for discharge?:     Reason for patient still in hospital (select all that apply): Patient trending condition, Laboratory test, Treatment, Imaging, and  Consult recommendations  Discharge Plan A: Home                  Linsey Barragan MD  Department of Hospital Medicine   [facility] - Telemetry ([facility])

## 2024-03-28 NOTE — ASSESSMENT & PLAN NOTE
Patient's anemia is currently controlled. Has not received any PRBCs to date.   Current CBC reviewed-   Lab Results   Component Value Date    HGB 10.4 (L) 03/28/2024    HCT 30.3 (L) 03/28/2024     Monitor serial CBC and transfuse if patient becomes hemodynamically unstable, symptomatic or H/H drops below 7/21.  Check iron stores

## 2024-03-28 NOTE — ASSESSMENT & PLAN NOTE
Relative hypotension with blood pressure 99/56.  Patient received 1 L normal saline IV fluid bolus in the emergency department.   Hemodynamically stable

## 2024-03-28 NOTE — ASSESSMENT & PLAN NOTE
"-white blood cell count 30.46, lactic acid level 1.5, D-dimer 1.37, BNP 14, troponin negative, influenza a/B negative, COVID-19 negative  -blood cultures pending  -CTA of chest with "no pulmonary embolism, no dissection; dense consolidation in the left lower lobe extending to the left upper lobe with tree in bud opacities consistent with severe pneumonia; questionable areas of fluid density in the left lung base with foci of gas may relate to component of fluid density or possible liquefaction or possible abscess formation."  -continue IV Zosyn, vanco and IV azithromycin initiated in the emergency department  -check MRSA culture-negative, sputum culture-pending, HIV  -give IV fluids, O2 supplementation, p.r.n. albuterol inhaler  - repeat CT scan of chest today looks more like an empyema  -CVT consulted  -keep NPO  "

## 2024-03-28 NOTE — SUBJECTIVE & OBJECTIVE
History reviewed. No pertinent past medical history.    History reviewed. No pertinent surgical history.    Review of patient's allergies indicates:  No Known Allergies    Family History    None       Tobacco Use    Smoking status: Never    Smokeless tobacco: Never   Substance and Sexual Activity    Alcohol use: Never    Drug use: Never    Sexual activity: Yes       Review of Systems: Negative except as indicated in HPI.  Objective:     Vital Signs (Most Recent):  Temp: 98 °F (36.7 °C) (03/28/24 1133)  Pulse: 106 (03/28/24 1133)  Resp: 16 (03/28/24 1133)  BP: 112/72 (03/28/24 1133)  SpO2: 96 % (03/28/24 1133) Vital Signs (24h Range):  Temp:  [98 °F (36.7 °C)-99.4 °F (37.4 °C)] 98 °F (36.7 °C)  Pulse:  [106-156] 106  Resp:  [16-33] 16  SpO2:  [96 %-98 %] 96 %  BP: (112-118)/(65-73) 112/72     Weight: 45.4 kg (100 lb 1.4 oz); Body mass index is 14.78 kg/m².    No intake or output data in the 24 hours ending 03/28/24 1228     Physical Exam  Vitals reviewed.   HENT:      Head: Normocephalic.   Eyes:      Conjunctiva/sclera: Conjunctivae normal.   Cardiovascular:      Rate and Rhythm: Normal rate.   Pulmonary:      Effort: Pulmonary effort is normal.   Abdominal:      Palpations: Abdomen is soft.   Musculoskeletal:         General: Normal range of motion.      Cervical back: Normal range of motion.   Skin:     General: Skin is warm and dry.   Neurological:      Mental Status: She is alert and oriented to person, place, and time.          Significant Labs:  CBC/Anemia Profile:  Recent Labs   Lab 03/27/24  0429 03/28/24  0712   WBC 26.90* 27.93*   HGB 9.5* 10.4*   HCT 28.4* 30.3*    453*   MCV 87 86   RDW 13.3 13.3   Chemistries:  Recent Labs   Lab 03/26/24  1807 03/27/24  0429 03/28/24  0712   * 129* 128*   K  --  3.9 4.2   CL  --  95 95   CO2  --  24 24   BUN  --  6 6   CREATININE  --  0.6 0.6   CALCIUM  --  7.5* 7.9*   ALBUMIN  --  1.7* 1.8*   PROT  --  5.7* 6.4   BILITOT  --  0.4 0.5   ALKPHOS  --  53* 59    ALT  --  16 19   AST  --  14 21   MG  --  1.8 1.9   PHOS  --   --  3.6     Significant Imaging:   CXR: I have reviewed all pertinent results/findings within the past 24 hours and my personal findings are:  slightly improved aeration of left lower lobe; possible underlying effusion

## 2024-03-28 NOTE — ASSESSMENT & PLAN NOTE
Patient has hyponatremia which is uncontrolled,We will aim to correct the sodium by 4-6mEq in 24 hours. We will monitor sodium Every 6 hours x4. The hyponatremia is due to Dehydration/hypovolemia. We will treat the hyponatremia with IV fluids as follows:  Normal saline at 100 mL/hr. The patient's sodium results have been reviewed and are listed below.  Recent Labs   Lab 03/28/24  0712   *       -hold spironolactone    - TSH normal, cortisol appropriate, free water restrict  -status post 1 L normal saline IV fluid bolus given in the emergency department

## 2024-03-28 NOTE — PLAN OF CARE
POC reviewed with pt. Pt verbalizes understanding of POC. No questions at this time.  AAOx4. NADN.  Sinus tach on cardiac monitor, metoprolol started. Resting 's, HR with activity 140's-150's.  Chest CT done.  IV antibiotics received.  Pt remains free of falls.  No complaints at this time.  Safety measures in place. Will continue to monitor.  Informed pt to call for assistance before ambulating out into hallway - is independent in room. Pt verbalizes understanding.  Hourly rounding and chart check complete.

## 2024-03-28 NOTE — ASSESSMENT & PLAN NOTE
This patient does have evidence of infective focus  My overall impression is sepsis.  Source: Respiratory  Antibiotics given-   Antibiotics (72h ago, onward)    Start     Stop Route Frequency Ordered    03/28/24 1800  piperacillin-tazobactam (ZOSYN) 4.5 g in dextrose 5 % in water (D5W) 100 mL IVPB (MB+)         -- IV Every 8 hours (non-standard times) 03/28/24 1538    03/28/24 1630  vancomycin 1,500 mg in dextrose 5 % (D5W) 250 mL IVPB (Vial-Mate)         -- IV Every 8 hours (non-standard times) 03/28/24 1608    03/26/24 0900  mupirocin 2 % ointment         03/31/24 0859 Nasl 2 times daily 03/26/24 0751    03/26/24 0141  vancomycin - pharmacy to dose  (vancomycin IVPB (PEDS and ADULTS))        See Hyperspace for full Linked Orders Report.    -- IV pharmacy to manage frequency 03/26/24 0043        Latest lactate reviewed-  Recent Labs   Lab 03/25/24  2307   LACTATE 1.5       Organ dysfunction indicated by  none    Fluid challenge Not needed - patient is not hypotensive      Post- resuscitation assessment No - Post resuscitation assessment not needed       Will Not start Pressors- Levophed for MAP of 65  Source control achieved by: broad spectrum antibiotics

## 2024-03-28 NOTE — SUBJECTIVE & OBJECTIVE
Interval History:  Patient seen and examined at bedside.  She complains generalized weakness and malaise but otherwise no complaints.  Noted heart rate runs anywhere from 110-160.    Review of Systems   Constitutional:  Positive for fatigue. Negative for fever.   Respiratory:  Positive for cough. Negative for shortness of breath.    Cardiovascular:  Positive for palpitations. Negative for chest pain and leg swelling.   Gastrointestinal:  Negative for nausea and vomiting.   All other systems reviewed and are negative.    Objective:     Vital Signs (Most Recent):  Temp: 99.5 °F (37.5 °C) (03/28/24 1721)  Pulse: (!) 119 (03/28/24 1721)  Resp: (!) 24 (03/28/24 1721)  BP: 110/70 (03/28/24 1721)  SpO2: 96 % (03/28/24 1721) Vital Signs (24h Range):  Temp:  [98 °F (36.7 °C)-99.5 °F (37.5 °C)] 99.5 °F (37.5 °C)  Pulse:  [106-156] 119  Resp:  [16-26] 24  SpO2:  [96 %] 96 %  BP: (110-118)/(65-72) 110/70     Weight: 45.4 kg (100 lb 1.4 oz)  Body mass index is 14.78 kg/m².    Intake/Output Summary (Last 24 hours) at 3/28/2024 1805  Last data filed at 3/28/2024 1256  Gross per 24 hour   Intake 300 ml   Output --   Net 300 ml         Physical Exam  Vitals reviewed.   Constitutional:       Appearance: Normal appearance.   HENT:      Head: Normocephalic and atraumatic.      Mouth/Throat:      Mouth: Mucous membranes are moist.      Pharynx: Oropharynx is clear.   Eyes:      Extraocular Movements: Extraocular movements intact.      Conjunctiva/sclera: Conjunctivae normal.   Cardiovascular:      Rate and Rhythm: Regular rhythm. Tachycardia present.      Pulses: Normal pulses.      Heart sounds: Normal heart sounds.   Pulmonary:      Effort: Pulmonary effort is normal.      Comments: Decreased breath sounds on left  Abdominal:      General: Bowel sounds are normal.      Palpations: Abdomen is soft.   Musculoskeletal:         General: Normal range of motion.      Cervical back: Normal range of motion and neck supple.   Skin:      General: Skin is warm and dry.   Neurological:      General: No focal deficit present.      Mental Status: She is alert and oriented to person, place, and time. Mental status is at baseline.   Psychiatric:         Mood and Affect: Mood normal.         Behavior: Behavior normal.         Thought Content: Thought content normal.             Significant Labs: All pertinent labs within the past 24 hours have been reviewed.  CBC:   Recent Labs   Lab 03/27/24  0429 03/28/24  0712   WBC 26.90* 27.93*   HGB 9.5* 10.4*   HCT 28.4* 30.3*    453*     CMP:   Recent Labs   Lab 03/26/24  1807 03/27/24  0429 03/28/24  0712   * 129* 128*   K  --  3.9 4.2   CL  --  95 95   CO2  --  24 24   GLU  --  126* 102   BUN  --  6 6   CREATININE  --  0.6 0.6   CALCIUM  --  7.5* 7.9*   PROT  --  5.7* 6.4   ALBUMIN  --  1.7* 1.8*   BILITOT  --  0.4 0.5   ALKPHOS  --  53* 59   AST  --  14 21   ALT  --  16 19   ANIONGAP  --  10 9       Significant Imaging: I have reviewed all pertinent imaging results/findings within the past 24 hours.

## 2024-03-29 ENCOUNTER — ANESTHESIA (OUTPATIENT)
Dept: SURGERY | Facility: HOSPITAL | Age: 29
DRG: 853 | End: 2024-03-29

## 2024-03-29 LAB
ALBUMIN SERPL BCP-MCNC: 1.9 G/DL (ref 3.5–5.2)
ALP SERPL-CCNC: 74 U/L (ref 55–135)
ALT SERPL W/O P-5'-P-CCNC: 26 U/L (ref 10–44)
ANION GAP SERPL CALC-SCNC: 13 MMOL/L (ref 8–16)
AST SERPL-CCNC: 32 U/L (ref 10–40)
BASOPHILS # BLD AUTO: 0.06 K/UL (ref 0–0.2)
BASOPHILS NFR BLD: 0.3 % (ref 0–1.9)
BILIRUB SERPL-MCNC: 0.6 MG/DL (ref 0.1–1)
BUN SERPL-MCNC: 11 MG/DL (ref 6–20)
CALCIUM SERPL-MCNC: 8 MG/DL (ref 8.7–10.5)
CHLORIDE SERPL-SCNC: 97 MMOL/L (ref 95–110)
CO2 SERPL-SCNC: 21 MMOL/L (ref 23–29)
CREAT SERPL-MCNC: 0.7 MG/DL (ref 0.5–1.4)
DIFFERENTIAL METHOD BLD: ABNORMAL
EOSINOPHIL # BLD AUTO: 0 K/UL (ref 0–0.5)
EOSINOPHIL NFR BLD: 0.1 % (ref 0–8)
ERYTHROCYTE [DISTWIDTH] IN BLOOD BY AUTOMATED COUNT: 13.3 % (ref 11.5–14.5)
EST. GFR  (NO RACE VARIABLE): >60 ML/MIN/1.73 M^2
GLUCOSE SERPL-MCNC: 93 MG/DL (ref 70–110)
HCT VFR BLD AUTO: 32.1 % (ref 40–54)
HGB BLD-MCNC: 10.7 G/DL (ref 14–18)
IMM GRANULOCYTES # BLD AUTO: 0.19 K/UL (ref 0–0.04)
IMM GRANULOCYTES NFR BLD AUTO: 0.9 % (ref 0–0.5)
L PNEUMO AG UR QL IA: NEGATIVE
LYMPHOCYTES # BLD AUTO: 2.5 K/UL (ref 1–4.8)
LYMPHOCYTES NFR BLD: 11.2 % (ref 18–48)
MCH RBC QN AUTO: 28.9 PG (ref 27–31)
MCHC RBC AUTO-ENTMCNC: 33.3 G/DL (ref 32–36)
MCV RBC AUTO: 87 FL (ref 82–98)
MONOCYTES # BLD AUTO: 1.6 K/UL (ref 0.3–1)
MONOCYTES NFR BLD: 7.1 % (ref 4–15)
NEUTROPHILS # BLD AUTO: 17.6 K/UL (ref 1.8–7.7)
NEUTROPHILS NFR BLD: 80.4 % (ref 38–73)
NRBC BLD-RTO: 0 /100 WBC
PLATELET # BLD AUTO: 486 K/UL (ref 150–450)
PMV BLD AUTO: 8.3 FL (ref 9.2–12.9)
POTASSIUM SERPL-SCNC: 3.9 MMOL/L (ref 3.5–5.1)
PROT SERPL-MCNC: 6.7 G/DL (ref 6–8.4)
RBC # BLD AUTO: 3.7 M/UL (ref 4.6–6.2)
SODIUM SERPL-SCNC: 131 MMOL/L (ref 136–145)
VANCOMYCIN TROUGH SERPL-MCNC: 20.8 UG/ML (ref 10–22)
WBC # BLD AUTO: 21.84 K/UL (ref 3.9–12.7)

## 2024-03-29 PROCEDURE — 94761 N-INVAS EAR/PLS OXIMETRY MLT: CPT

## 2024-03-29 PROCEDURE — 25000003 PHARM REV CODE 250: Performed by: INTERNAL MEDICINE

## 2024-03-29 PROCEDURE — 25000003 PHARM REV CODE 250: Performed by: THORACIC SURGERY (CARDIOTHORACIC VASCULAR SURGERY)

## 2024-03-29 PROCEDURE — 63600175 PHARM REV CODE 636 W HCPCS: Performed by: NURSE ANESTHETIST, CERTIFIED REGISTERED

## 2024-03-29 PROCEDURE — 25000003 PHARM REV CODE 250: Performed by: ANESTHESIOLOGY

## 2024-03-29 PROCEDURE — 87102 FUNGUS ISOLATION CULTURE: CPT | Performed by: INTERNAL MEDICINE

## 2024-03-29 PROCEDURE — 87205 SMEAR GRAM STAIN: CPT | Performed by: INTERNAL MEDICINE

## 2024-03-29 PROCEDURE — 88305 TISSUE EXAM BY PATHOLOGIST: CPT | Mod: 26,,, | Performed by: PATHOLOGY

## 2024-03-29 PROCEDURE — 80053 COMPREHEN METABOLIC PANEL: CPT | Performed by: INTERNAL MEDICINE

## 2024-03-29 PROCEDURE — 36000710: Performed by: THORACIC SURGERY (CARDIOTHORACIC VASCULAR SURGERY)

## 2024-03-29 PROCEDURE — 37000009 HC ANESTHESIA EA ADD 15 MINS: Performed by: THORACIC SURGERY (CARDIOTHORACIC VASCULAR SURGERY)

## 2024-03-29 PROCEDURE — 63600175 PHARM REV CODE 636 W HCPCS: Performed by: ANESTHESIOLOGY

## 2024-03-29 PROCEDURE — A4216 STERILE WATER/SALINE, 10 ML: HCPCS | Performed by: INTERNAL MEDICINE

## 2024-03-29 PROCEDURE — 87070 CULTURE OTHR SPECIMN AEROBIC: CPT | Performed by: INTERNAL MEDICINE

## 2024-03-29 PROCEDURE — 63600175 PHARM REV CODE 636 W HCPCS: Performed by: THORACIC SURGERY (CARDIOTHORACIC VASCULAR SURGERY)

## 2024-03-29 PROCEDURE — 80202 ASSAY OF VANCOMYCIN: CPT | Mod: 91 | Performed by: INTERNAL MEDICINE

## 2024-03-29 PROCEDURE — 36000711: Performed by: THORACIC SURGERY (CARDIOTHORACIC VASCULAR SURGERY)

## 2024-03-29 PROCEDURE — 37000008 HC ANESTHESIA 1ST 15 MINUTES: Performed by: THORACIC SURGERY (CARDIOTHORACIC VASCULAR SURGERY)

## 2024-03-29 PROCEDURE — 21400001 HC TELEMETRY ROOM

## 2024-03-29 PROCEDURE — 99900035 HC TECH TIME PER 15 MIN (STAT)

## 2024-03-29 PROCEDURE — 63600175 PHARM REV CODE 636 W HCPCS: Performed by: INTERNAL MEDICINE

## 2024-03-29 PROCEDURE — 87075 CULTR BACTERIA EXCEPT BLOOD: CPT | Performed by: INTERNAL MEDICINE

## 2024-03-29 PROCEDURE — 25000003 PHARM REV CODE 250

## 2024-03-29 PROCEDURE — 25000003 PHARM REV CODE 250: Performed by: NURSE ANESTHETIST, CERTIFIED REGISTERED

## 2024-03-29 PROCEDURE — 0BDP4ZZ EXTRACTION OF LEFT PLEURA, PERCUTANEOUS ENDOSCOPIC APPROACH: ICD-10-PCS | Performed by: THORACIC SURGERY (CARDIOTHORACIC VASCULAR SURGERY)

## 2024-03-29 PROCEDURE — C9290 INJ, BUPIVACAINE LIPOSOME: HCPCS | Performed by: THORACIC SURGERY (CARDIOTHORACIC VASCULAR SURGERY)

## 2024-03-29 PROCEDURE — 85025 COMPLETE CBC W/AUTO DIFF WBC: CPT | Performed by: INTERNAL MEDICINE

## 2024-03-29 PROCEDURE — A4216 STERILE WATER/SALINE, 10 ML: HCPCS | Performed by: ANESTHESIOLOGY

## 2024-03-29 PROCEDURE — 87206 SMEAR FLUORESCENT/ACID STAI: CPT | Performed by: INTERNAL MEDICINE

## 2024-03-29 PROCEDURE — 87077 CULTURE AEROBIC IDENTIFY: CPT | Performed by: INTERNAL MEDICINE

## 2024-03-29 PROCEDURE — 94799 UNLISTED PULMONARY SVC/PX: CPT | Mod: XB

## 2024-03-29 PROCEDURE — 88305 TISSUE EXAM BY PATHOLOGIST: CPT | Performed by: PATHOLOGY

## 2024-03-29 PROCEDURE — 27201423 OPTIME MED/SURG SUP & DEVICES STERILE SUPPLY: Performed by: THORACIC SURGERY (CARDIOTHORACIC VASCULAR SURGERY)

## 2024-03-29 PROCEDURE — 87186 SC STD MICRODIL/AGAR DIL: CPT | Performed by: INTERNAL MEDICINE

## 2024-03-29 PROCEDURE — 71000039 HC RECOVERY, EACH ADD'L HOUR: Performed by: THORACIC SURGERY (CARDIOTHORACIC VASCULAR SURGERY)

## 2024-03-29 PROCEDURE — 32220 RELEASE OF LUNG: CPT | Mod: LT,,, | Performed by: THORACIC SURGERY (CARDIOTHORACIC VASCULAR SURGERY)

## 2024-03-29 PROCEDURE — 87116 MYCOBACTERIA CULTURE: CPT | Performed by: INTERNAL MEDICINE

## 2024-03-29 PROCEDURE — C1729 CATH, DRAINAGE: HCPCS | Performed by: THORACIC SURGERY (CARDIOTHORACIC VASCULAR SURGERY)

## 2024-03-29 PROCEDURE — 71000033 HC RECOVERY, INTIAL HOUR: Performed by: THORACIC SURGERY (CARDIOTHORACIC VASCULAR SURGERY)

## 2024-03-29 PROCEDURE — 36415 COLL VENOUS BLD VENIPUNCTURE: CPT | Performed by: INTERNAL MEDICINE

## 2024-03-29 RX ORDER — OXYCODONE AND ACETAMINOPHEN 5; 325 MG/1; MG/1
1 TABLET ORAL
Status: DISCONTINUED | OUTPATIENT
Start: 2024-03-29 | End: 2024-03-29

## 2024-03-29 RX ORDER — ONDANSETRON 8 MG/1
8 TABLET, ORALLY DISINTEGRATING ORAL EVERY 8 HOURS PRN
Status: DISCONTINUED | OUTPATIENT
Start: 2024-03-29 | End: 2024-04-11 | Stop reason: HOSPADM

## 2024-03-29 RX ORDER — MUPIROCIN 20 MG/G
1 OINTMENT TOPICAL 2 TIMES DAILY
Status: DISCONTINUED | OUTPATIENT
Start: 2024-03-29 | End: 2024-03-29 | Stop reason: SDUPTHER

## 2024-03-29 RX ORDER — MORPHINE SULFATE 4 MG/ML
4 INJECTION, SOLUTION INTRAMUSCULAR; INTRAVENOUS EVERY 4 HOURS PRN
Status: DISCONTINUED | OUTPATIENT
Start: 2024-03-29 | End: 2024-03-30

## 2024-03-29 RX ORDER — HYDROMORPHONE HYDROCHLORIDE 2 MG/ML
0.2 INJECTION, SOLUTION INTRAMUSCULAR; INTRAVENOUS; SUBCUTANEOUS EVERY 5 MIN PRN
Status: COMPLETED | OUTPATIENT
Start: 2024-03-29 | End: 2024-03-29

## 2024-03-29 RX ORDER — METOPROLOL TARTRATE 25 MG/1
25 TABLET, FILM COATED ORAL 2 TIMES DAILY
Status: DISCONTINUED | OUTPATIENT
Start: 2024-03-29 | End: 2024-03-30

## 2024-03-29 RX ORDER — FAMOTIDINE 20 MG/1
20 TABLET, FILM COATED ORAL 2 TIMES DAILY
Status: DISCONTINUED | OUTPATIENT
Start: 2024-03-29 | End: 2024-04-02

## 2024-03-29 RX ORDER — ONDANSETRON HYDROCHLORIDE 2 MG/ML
INJECTION, SOLUTION INTRAVENOUS
Status: DISCONTINUED | OUTPATIENT
Start: 2024-03-29 | End: 2024-03-29

## 2024-03-29 RX ORDER — IPRATROPIUM BROMIDE 0.5 MG/2.5ML
0.5 SOLUTION RESPIRATORY (INHALATION) EVERY 4 HOURS
Status: DISCONTINUED | OUTPATIENT
Start: 2024-03-30 | End: 2024-03-31

## 2024-03-29 RX ORDER — METOCLOPRAMIDE HYDROCHLORIDE 5 MG/ML
5 INJECTION INTRAMUSCULAR; INTRAVENOUS EVERY 6 HOURS PRN
Status: DISCONTINUED | OUTPATIENT
Start: 2024-03-29 | End: 2024-04-11 | Stop reason: HOSPADM

## 2024-03-29 RX ORDER — BISACODYL 10 MG/1
10 SUPPOSITORY RECTAL DAILY PRN
Status: DISCONTINUED | OUTPATIENT
Start: 2024-03-30 | End: 2024-04-11 | Stop reason: HOSPADM

## 2024-03-29 RX ORDER — BUPIVACAINE HYDROCHLORIDE 2.5 MG/ML
INJECTION, SOLUTION EPIDURAL; INFILTRATION; INTRACAUDAL
Status: DISCONTINUED | OUTPATIENT
Start: 2024-03-29 | End: 2024-03-29 | Stop reason: HOSPADM

## 2024-03-29 RX ORDER — LIDOCAINE HYDROCHLORIDE 20 MG/ML
INJECTION INTRAVENOUS
Status: DISCONTINUED | OUTPATIENT
Start: 2024-03-29 | End: 2024-03-29

## 2024-03-29 RX ORDER — ACETAMINOPHEN 10 MG/ML
1000 INJECTION, SOLUTION INTRAVENOUS ONCE
Status: DISCONTINUED | OUTPATIENT
Start: 2024-03-29 | End: 2024-03-29

## 2024-03-29 RX ORDER — SODIUM CHLORIDE 0.9 % (FLUSH) 0.9 %
2 SYRINGE (ML) INJECTION EVERY 6 HOURS
Status: DISCONTINUED | OUTPATIENT
Start: 2024-03-29 | End: 2024-04-10

## 2024-03-29 RX ORDER — MIDAZOLAM HYDROCHLORIDE 1 MG/ML
INJECTION INTRAMUSCULAR; INTRAVENOUS
Status: DISCONTINUED | OUTPATIENT
Start: 2024-03-29 | End: 2024-03-29

## 2024-03-29 RX ORDER — MEPERIDINE HYDROCHLORIDE 25 MG/ML
12.5 INJECTION INTRAMUSCULAR; INTRAVENOUS; SUBCUTANEOUS ONCE AS NEEDED
Status: DISCONTINUED | OUTPATIENT
Start: 2024-03-29 | End: 2024-03-30

## 2024-03-29 RX ORDER — PROPOFOL 10 MG/ML
VIAL (ML) INTRAVENOUS
Status: DISCONTINUED | OUTPATIENT
Start: 2024-03-29 | End: 2024-03-29

## 2024-03-29 RX ORDER — OXYCODONE HYDROCHLORIDE 5 MG/1
5 TABLET ORAL EVERY 4 HOURS PRN
Status: DISCONTINUED | OUTPATIENT
Start: 2024-03-29 | End: 2024-04-11 | Stop reason: HOSPADM

## 2024-03-29 RX ORDER — DEXTROSE MONOHYDRATE, SODIUM CHLORIDE, AND POTASSIUM CHLORIDE 50; 1.49; 4.5 G/1000ML; G/1000ML; G/1000ML
INJECTION, SOLUTION INTRAVENOUS CONTINUOUS
Status: DISCONTINUED | OUTPATIENT
Start: 2024-03-29 | End: 2024-03-30

## 2024-03-29 RX ORDER — ROCURONIUM BROMIDE 10 MG/ML
INJECTION, SOLUTION INTRAVENOUS
Status: DISCONTINUED | OUTPATIENT
Start: 2024-03-29 | End: 2024-03-29

## 2024-03-29 RX ORDER — POLYETHYLENE GLYCOL 3350 17 G/17G
17 POWDER, FOR SOLUTION ORAL DAILY
Status: DISCONTINUED | OUTPATIENT
Start: 2024-03-30 | End: 2024-03-29 | Stop reason: SDUPTHER

## 2024-03-29 RX ORDER — ONDANSETRON HYDROCHLORIDE 2 MG/ML
4 INJECTION, SOLUTION INTRAVENOUS DAILY PRN
Status: DISCONTINUED | OUTPATIENT
Start: 2024-03-29 | End: 2024-03-29

## 2024-03-29 RX ORDER — PHENYLEPHRINE HYDROCHLORIDE 10 MG/ML
INJECTION INTRAVENOUS
Status: DISCONTINUED | OUTPATIENT
Start: 2024-03-29 | End: 2024-03-29

## 2024-03-29 RX ORDER — ACETAMINOPHEN 10 MG/ML
15 INJECTION, SOLUTION INTRAVENOUS ONCE
Status: COMPLETED | OUTPATIENT
Start: 2024-03-29 | End: 2024-03-29

## 2024-03-29 RX ORDER — POLYETHYLENE GLYCOL 3350 17 G/17G
17 POWDER, FOR SOLUTION ORAL DAILY
Status: DISCONTINUED | OUTPATIENT
Start: 2024-03-29 | End: 2024-04-11 | Stop reason: HOSPADM

## 2024-03-29 RX ORDER — FENTANYL CITRATE 50 UG/ML
25 INJECTION, SOLUTION INTRAMUSCULAR; INTRAVENOUS EVERY 5 MIN PRN
Status: DISCONTINUED | OUTPATIENT
Start: 2024-03-29 | End: 2024-03-30

## 2024-03-29 RX ORDER — FENTANYL CITRATE 50 UG/ML
INJECTION, SOLUTION INTRAMUSCULAR; INTRAVENOUS
Status: DISCONTINUED | OUTPATIENT
Start: 2024-03-29 | End: 2024-03-29

## 2024-03-29 RX ADMIN — MUPIROCIN: 20 OINTMENT TOPICAL at 12:03

## 2024-03-29 RX ADMIN — SODIUM CHLORIDE 1000 MG: 1 TABLET ORAL at 08:03

## 2024-03-29 RX ADMIN — METOPROLOL TARTRATE 25 MG: 25 TABLET, FILM COATED ORAL at 08:03

## 2024-03-29 RX ADMIN — PHENYLEPHRINE HYDROCHLORIDE 100 MCG: 10 INJECTION INTRAVENOUS at 08:03

## 2024-03-29 RX ADMIN — VANCOMYCIN HYDROCHLORIDE 1250 MG: 1.25 INJECTION, POWDER, LYOPHILIZED, FOR SOLUTION INTRAVENOUS at 05:03

## 2024-03-29 RX ADMIN — VANCOMYCIN HYDROCHLORIDE 1500 MG: 1.5 INJECTION, POWDER, LYOPHILIZED, FOR SOLUTION INTRAVENOUS at 08:03

## 2024-03-29 RX ADMIN — Medication 2 ML: at 02:03

## 2024-03-29 RX ADMIN — LIDOCAINE HYDROCHLORIDE 80 MG: 20 INJECTION INTRAVENOUS at 07:03

## 2024-03-29 RX ADMIN — MORPHINE SULFATE 4 MG: 4 INJECTION INTRAVENOUS at 02:03

## 2024-03-29 RX ADMIN — FAMOTIDINE 20 MG: 20 TABLET ORAL at 11:03

## 2024-03-29 RX ADMIN — MEDROXYPROGESTERONE ACETATE 10 MG: 5 TABLET ORAL at 12:03

## 2024-03-29 RX ADMIN — ESTRADIOL 8 MG: 1 TABLET ORAL at 12:03

## 2024-03-29 RX ADMIN — HYDROMORPHONE HYDROCHLORIDE 0.2 MG: 2 INJECTION INTRAMUSCULAR; INTRAVENOUS; SUBCUTANEOUS at 10:03

## 2024-03-29 RX ADMIN — OXYCODONE HYDROCHLORIDE 5 MG: 5 TABLET ORAL at 11:03

## 2024-03-29 RX ADMIN — POLYETHYLENE GLYCOL 3350 17 G: 17 POWDER, FOR SOLUTION ORAL at 11:03

## 2024-03-29 RX ADMIN — DOXYCYCLINE 500 MG: 100 INJECTION, POWDER, LYOPHILIZED, FOR SOLUTION INTRAVENOUS at 08:03

## 2024-03-29 RX ADMIN — SODIUM CHLORIDE 1000 MG: 1 TABLET ORAL at 05:03

## 2024-03-29 RX ADMIN — SUGAMMADEX 200 MG: 100 INJECTION, SOLUTION INTRAVENOUS at 09:03

## 2024-03-29 RX ADMIN — SODIUM CHLORIDE: 9 INJECTION, SOLUTION INTRAVENOUS at 06:03

## 2024-03-29 RX ADMIN — FENTANYL CITRATE 100 MCG: 50 INJECTION, SOLUTION INTRAMUSCULAR; INTRAVENOUS at 07:03

## 2024-03-29 RX ADMIN — METOPROLOL TARTRATE 25 MG: 25 TABLET, FILM COATED ORAL at 10:03

## 2024-03-29 RX ADMIN — FENTANYL CITRATE 50 MCG: 50 INJECTION, SOLUTION INTRAMUSCULAR; INTRAVENOUS at 09:03

## 2024-03-29 RX ADMIN — MUPIROCIN: 20 OINTMENT TOPICAL at 08:03

## 2024-03-29 RX ADMIN — ROCURONIUM BROMIDE 50 MG: 10 INJECTION, SOLUTION INTRAVENOUS at 07:03

## 2024-03-29 RX ADMIN — DEXTROSE, SODIUM CHLORIDE, AND POTASSIUM CHLORIDE: 5; .45; .15 INJECTION INTRAVENOUS at 01:03

## 2024-03-29 RX ADMIN — SODIUM CHLORIDE: 9 INJECTION, SOLUTION INTRAVENOUS at 05:03

## 2024-03-29 RX ADMIN — ONDANSETRON 4 MG: 2 INJECTION INTRAMUSCULAR; INTRAVENOUS at 09:03

## 2024-03-29 RX ADMIN — DEXTROSE, SODIUM CHLORIDE, AND POTASSIUM CHLORIDE: 5; .45; .15 INJECTION INTRAVENOUS at 08:03

## 2024-03-29 RX ADMIN — ENOXAPARIN SODIUM 40 MG: 40 INJECTION SUBCUTANEOUS at 05:03

## 2024-03-29 RX ADMIN — BENZONATATE 100 MG: 100 CAPSULE ORAL at 11:03

## 2024-03-29 RX ADMIN — OXYCODONE HYDROCHLORIDE 5 MG: 5 TABLET ORAL at 05:03

## 2024-03-29 RX ADMIN — PIPERACILLIN SODIUM AND TAZOBACTAM SODIUM 4.5 G: 4; .5 INJECTION, POWDER, FOR SOLUTION INTRAVENOUS at 09:03

## 2024-03-29 RX ADMIN — MIDAZOLAM HYDROCHLORIDE 2 MG: 1 INJECTION, SOLUTION INTRAMUSCULAR; INTRAVENOUS at 07:03

## 2024-03-29 RX ADMIN — PIPERACILLIN SODIUM AND TAZOBACTAM SODIUM 4.5 G: 4; .5 INJECTION, POWDER, FOR SOLUTION INTRAVENOUS at 02:03

## 2024-03-29 RX ADMIN — Medication 2 ML: at 06:03

## 2024-03-29 RX ADMIN — PIPERACILLIN SODIUM AND TAZOBACTAM SODIUM 4.5 G: 4; .5 INJECTION, POWDER, FOR SOLUTION INTRAVENOUS at 06:03

## 2024-03-29 RX ADMIN — GUAIFENESIN 600 MG: 600 TABLET, EXTENDED RELEASE ORAL at 11:03

## 2024-03-29 RX ADMIN — ROCURONIUM BROMIDE 20 MG: 10 INJECTION, SOLUTION INTRAVENOUS at 08:03

## 2024-03-29 RX ADMIN — FAMOTIDINE 20 MG: 20 TABLET ORAL at 08:03

## 2024-03-29 RX ADMIN — ACETAMINOPHEN 680 MG: 10 INJECTION, SOLUTION INTRAVENOUS at 10:03

## 2024-03-29 RX ADMIN — Medication 10 ML: at 02:03

## 2024-03-29 RX ADMIN — PROPOFOL 150 MG: 10 INJECTION, EMULSION INTRAVENOUS at 07:03

## 2024-03-29 RX ADMIN — OXYCODONE HYDROCHLORIDE 5 MG: 5 TABLET ORAL at 09:03

## 2024-03-29 NOTE — SUBJECTIVE & OBJECTIVE
Interval History:  Patient seen and examined at bedside.  She reports feeling better.  Still has some pleuritic type chest pain upon inspiration.    Review of Systems   Constitutional:  Positive for fatigue. Negative for fever.   Respiratory:  Positive for cough. Negative for shortness of breath.    Cardiovascular:  Positive for palpitations. Negative for chest pain and leg swelling.   Gastrointestinal:  Negative for nausea and vomiting.   All other systems reviewed and are negative.    Objective:     Vital Signs (Most Recent):  Temp: 97.2 °F (36.2 °C) (03/29/24 1707)  Pulse: 76 (03/29/24 1707)  Resp: 20 (03/29/24 1732)  BP: 110/66 (03/29/24 1707)  SpO2: 99 % (03/29/24 1707) Vital Signs (24h Range):  Temp:  [97.2 °F (36.2 °C)-98.7 °F (37.1 °C)] 97.2 °F (36.2 °C)  Pulse:  [] 76  Resp:  [12-26] 20  SpO2:  [93 %-99 %] 99 %  BP: (102-135)/(57-78) 110/66     Weight: 45.4 kg (100 lb 1.4 oz)  Body mass index is 14.78 kg/m².    Intake/Output Summary (Last 24 hours) at 3/29/2024 1748  Last data filed at 3/29/2024 1100  Gross per 24 hour   Intake --   Output 180 ml   Net -180 ml         Physical Exam  Vitals reviewed.   Constitutional:       Appearance: Normal appearance.   HENT:      Head: Normocephalic and atraumatic.      Mouth/Throat:      Mouth: Mucous membranes are moist.      Pharynx: Oropharynx is clear.   Eyes:      Extraocular Movements: Extraocular movements intact.      Conjunctiva/sclera: Conjunctivae normal.   Cardiovascular:      Rate and Rhythm: Regular rhythm. Tachycardia present.      Pulses: Normal pulses.      Heart sounds: Normal heart sounds.   Pulmonary:      Effort: Pulmonary effort is normal.      Comments: Decreased breath sounds on left  Abdominal:      General: Bowel sounds are normal.      Palpations: Abdomen is soft.   Musculoskeletal:         General: Normal range of motion.      Cervical back: Normal range of motion and neck supple.   Skin:     General: Skin is warm and dry.    Neurological:      General: No focal deficit present.      Mental Status: She is alert and oriented to person, place, and time. Mental status is at baseline.   Psychiatric:         Mood and Affect: Mood normal.         Behavior: Behavior normal.         Thought Content: Thought content normal.             Significant Labs: All pertinent labs within the past 24 hours have been reviewed.    CBC:   Recent Labs   Lab 03/28/24  0712 03/29/24  0708   WBC 27.93* 21.84*   HGB 10.4* 10.7*   HCT 30.3* 32.1*   * 486*     CMP:   Recent Labs   Lab 03/28/24  0712 03/29/24  0708   * 131*   K 4.2 3.9   CL 95 97   CO2 24 21*    93   BUN 6 11   CREATININE 0.6 0.7   CALCIUM 7.9* 8.0*   PROT 6.4 6.7   ALBUMIN 1.8* 1.9*   BILITOT 0.5 0.6   ALKPHOS 59 74   AST 21 32   ALT 19 26   ANIONGAP 9 13       Significant Imaging: I have reviewed all pertinent imaging results/findings within the past 24 hours.

## 2024-03-29 NOTE — ANESTHESIA PROCEDURE NOTES
Intubation    Date/Time: 3/29/2024 7:47 AM    Performed by: Ted Red CRNA  Authorized by: Kaitlyn Perez MD    Intubation:     Induction:  Intravenous    Intubated:  Postinduction    Mask Ventilation:  Easy mask    Attempts:  1    Attempted By:  CRNA    Method of Intubation:  Direct    Blade:  Marah 3    Laryngeal View Grade: Grade I - full view of cords      Difficult Airway Encountered?: No      Complications:  None    Airway Device:  Oral endotracheal tube    Airway Device Size:  39F    Style/Cuff Inflation:  Cuffed (inflated to minimal occlusive pressure)    Placement Verified By:  Capnometry and Fiber optic visualization    Complicating Factors:  None    Findings Post-Intubation:  BS equal bilateral and atraumatic/condition of teeth unchanged

## 2024-03-29 NOTE — ANESTHESIA POSTPROCEDURE EVALUATION
Anesthesia Post Evaluation    Patient: Mayela Molina    Procedure(s) Performed: Procedure(s) (LRB):  VATS (VIDEO-ASSISTED THORACOSCOPIC SURGERY) (Left)  BLOCK, NERVE, INTERCOSTAL, 2 OR MORE (Left)    Final Anesthesia Type: general      Patient location during evaluation: PACU  Patient participation: Yes- Able to Participate  Level of consciousness: awake and alert, oriented and awake  Post-procedure vital signs: reviewed and stable  Pain management: adequate  Airway patency: patent    PONV status at discharge: No PONV  Anesthetic complications: no      Cardiovascular status: blood pressure returned to baseline  Respiratory status: unassisted  Hydration status: euvolemic  Follow-up not needed.              Vitals Value Taken Time   /78 03/29/24 1030   Temp 36.6 °C (97.8 °F) 03/29/24 0935   Pulse 112 03/29/24 1041   Resp 29 03/29/24 1041   SpO2 96 % 03/29/24 1041   Vitals shown include unvalidated device data.      No case tracking events are documented in the log.      Pain/Karey Score: Pain Rating Prior to Med Admin: 8 (3/29/2024 10:40 AM)  Pain Rating Post Med Admin: 1 (3/28/2024  9:56 AM)  Karey Score: 8 (3/29/2024 10:30 AM)

## 2024-03-29 NOTE — ASSESSMENT & PLAN NOTE
Current platelet count 486.  Likely reactive thrombocytosis related to sepsis with underlying pneumonia.

## 2024-03-29 NOTE — CONSULTS
[facility])  Cardiothoracic Surgery  Consult Note    Patient Name: Mayela Molina  MRN: [id number]  Admission Date: 3/25/2024  Attending Physician: Linsey Garcia MD  Referring Provider: Self, Aaareferral    Patient information was obtained from patient, ER records, and primary team.     Inpatient consult to Cardiothoracic Surgery  Consult performed by: Josafat Sandoval MD  Consult ordered by: Kiarra Willis NP        Subjective:     Chief Complaint/Reason for Admission:  chest pain    History of Present Illness: 28-year-old patient with no chronic medical problems who presented to the ED complaining of a cough x 3 weeks. Patient is a biological male in the transition to female who reports a cough with yellow sputum, left sided pleuritic pain, and dyspnea that increases with exertion  Scan showed a large left-sided hydropneumothorax and the consolidation of the left lower lobe.  She is afebrile and got IV antibiotics.  White cell count is coming down     No current facility-administered medications on file prior to encounter.     Current Outpatient Medications on File Prior to Encounter   Medication Sig    estradioL (ESTRACE) 2 MG tablet Take 8 mg by mouth.    medroxyPROGESTERone (PROVERA) 10 MG tablet Take 10 mg by mouth.    spironolactone (ALDACTONE) 100 MG tablet Take 100 mg by mouth 2 (two) times daily.    spironolactone (ALDACTONE) 50 MG tablet Take 50 mg by mouth 2 (two) times daily.       Review of patient's allergies indicates:  No Known Allergies    History reviewed. No pertinent past medical history.  History reviewed. No pertinent surgical history.  Family History    None       Tobacco Use    Smoking status: Never    Smokeless tobacco: Never   Substance and Sexual Activity    Alcohol use: Never    Drug use: Never    Sexual activity: Yes     Review of Systems   Constitutional:  Positive for chills and fatigue. Negative for activity change and appetite change.   HENT:   Negative for dental problem, nosebleeds and sore throat.    Eyes:  Negative for discharge and visual disturbance.   Respiratory:  Positive for cough, chest tightness and shortness of breath. Negative for stridor.    Cardiovascular:  Negative for leg swelling.   Gastrointestinal:  Negative for abdominal distention and abdominal pain.   Genitourinary:  Negative for difficulty urinating and dysuria.   Musculoskeletal:  Negative for arthralgias, back pain and joint swelling.   Allergic/Immunologic: Negative for environmental allergies.   Neurological:  Negative for dizziness, syncope and headaches.   Hematological:  Does not bruise/bleed easily.   Psychiatric/Behavioral:  Negative for behavioral problems.      Objective:     Vital Signs (Most Recent):  Temp: 98.3 °F (36.8 °C) (03/28/24 2024)  Pulse: (!) 128 (03/28/24 2024)  Resp: 18 (03/28/24 2024)  BP: 126/61 (03/28/24 2024)  SpO2: 96 % (03/28/24 2024) Vital Signs (24h Range):  Temp:  [98 °F (36.7 °C)-99.5 °F (37.5 °C)] 98.3 °F (36.8 °C)  Pulse:  [106-156] 128  Resp:  [16-26] 18  SpO2:  [96 %] 96 %  BP: (110-126)/(61-72) 126/61     Weight: 45.4 kg (100 lb 1.4 oz)  Body mass index is 14.78 kg/m².    SpO2: 96 %        Intake/Output - Last 3 Shifts         03/26 0700  03/27 0659 03/27 0700  03/28 0659 03/28 0700  03/29 0659    P.O. 350 480 300    I.V. (mL/kg) 660.6 (14.1)      IV Piggyback 2353.9      Total Intake(mL/kg) 3364.5 (71.7) 480 (10.6) 300 (6.6)    Urine (mL/kg/hr) 900 (0.8)      Total Output 900      Net +2464.5 +480 +300           Urine Occurrence 5 x 3 x              Lines/Drains/Airways       Peripheral Intravenous Line  Duration                  Peripheral IV - Single Lumen 03/27/24 0116 20 G Distal;Posterior;Right Forearm 1 day                     STS Risk Score: na    Physical Exam  Vitals reviewed.   Constitutional:       Appearance: Normal appearance.   HENT:      Head: Normocephalic and atraumatic.      Mouth/Throat:      Mouth: Mucous membranes are  moist.   Eyes:      Extraocular Movements: Extraocular movements intact.   Cardiovascular:      Rate and Rhythm: Normal rate and regular rhythm.      Pulses: Normal pulses.      Heart sounds: Normal heart sounds.   Pulmonary:      Effort: Pulmonary effort is normal.      Breath sounds: Rhonchi and rales present.   Abdominal:      Palpations: Abdomen is soft.   Musculoskeletal:         General: Normal range of motion.      Cervical back: Normal range of motion and neck supple.   Skin:     General: Skin is warm.      Capillary Refill: Capillary refill takes less than 2 seconds.   Neurological:      General: No focal deficit present.      Mental Status: She is alert and oriented to person, place, and time.   Psychiatric:         Mood and Affect: Mood normal.         Behavior: Behavior normal.         Significant Labs:  BMP:   Recent Labs   Lab 03/28/24 0712      *   K 4.2   CL 95   CO2 24   BUN 6   CREATININE 0.6   CALCIUM 7.9*   MG 1.9     CBC:   Recent Labs   Lab 03/28/24 0712   WBC 27.93*   RBC 3.53*   HGB 10.4*   HCT 30.3*   *   MCV 86   MCH 29.5   MCHC 34.3     CMP:   Recent Labs   Lab 03/28/24  0712      CALCIUM 7.9*   ALBUMIN 1.8*   PROT 6.4   *   K 4.2   CO2 24   CL 95   BUN 6   CREATININE 0.6   ALKPHOS 59   ALT 19   AST 21   BILITOT 0.5       Significant Diagnostics:  CT: I have reviewed all pertinent results/findings within the past 24 hours    Assessment/Plan:   28-year-old female with a left-sided pneumonia and a left-sided hydropneumothorax.  I explained the risks benefits of the procedure left VATS decortication possible thoracotomy and any other indicated procedure the patient understand the risk of infection bleeding myocardial infarction and agreed to proceed.  Continue IV antibiotics  NPO after midnight.  DVT prophylaxis bilateral SCDs.  NYHA Score: NYHA I: cardiac disease, but without resulting limitations of physical activity    Active Diagnoses:    Diagnosis Date  Noted POA    PRINCIPAL PROBLEM:  Pneumonia of left lung due to infectious organism [J18.9] 03/26/2024 Yes    Sepsis [A41.9] 03/26/2024 Yes    Hypotension [I95.9] 03/26/2024 Yes    Hyponatremia [E87.1] 03/26/2024 Yes    Underweight [R63.6] 03/26/2024 Yes    Normocytic anemia [D64.9] 03/26/2024 Yes    Thrombocytosis [D75.839] 03/26/2024 Yes    Male-to-female transgender person [Z78.9] 03/26/2024 Yes    Pleuritic chest pain [R07.81] 03/26/2024 Yes    Tachycardia, paroxysmal [I47.9] 03/26/2024 No      Problems Resolved During this Admission:    Diagnosis Date Noted Date Resolved POA    Hepatitis C antibody positive in blood [R76.8] 03/27/2024 03/28/2024 Yes       Thank you for your consult. I will follow-up with patient. Please contact us if you have any additional questions.    Josafat Sandoval MD  Cardiothoracic Surgery  [facility] ([facility])

## 2024-03-29 NOTE — TRANSFER OF CARE
"Anesthesia Transfer of Care Note    Patient: Mayela Molina    Procedure(s) Performed: Procedure(s) (LRB):  VATS (VIDEO-ASSISTED THORACOSCOPIC SURGERY) (Left)  BLOCK, NERVE, INTERCOSTAL, 2 OR MORE (Left)    Patient location: PACU    Anesthesia Type: general    Transport from OR: Transported from OR on room air with adequate spontaneous ventilation    Post pain: adequate analgesia    Post assessment: no apparent anesthetic complications and tolerated procedure well    Post vital signs: stable    Level of consciousness: sedated    Nausea/Vomiting: no nausea/vomiting    Complications: none    Transfer of care protocol was followed      Last vitals: Visit Vitals  /64   Pulse (!) 117   Temp 36.6 °C (97.8 °F) (Temporal)   Resp (!) 24   Ht 5' 9" (1.753 m)   Wt 45.4 kg (100 lb 1.4 oz)   SpO2 96%   BMI 14.78 kg/m²     "

## 2024-03-29 NOTE — ASSESSMENT & PLAN NOTE
Patient has hyponatremia which is uncontrolled,We will aim to correct the sodium by 4-6mEq in 24 hours. We will monitor sodium Every 6 hours x4. The hyponatremia is due to Dehydration/hypovolemia. We will treat the hyponatremia with IV fluids as follows:  Normal saline at 100 mL/hr. The patient's sodium results have been reviewed and are listed below.  Recent Labs   Lab 03/29/24  0708   *       -hold spironolactone    - TSH normal, cortisol appropriate, free water restrict  -status post 1 L normal saline IV fluid bolus given in the emergency department  Salt tablets begun

## 2024-03-29 NOTE — PLAN OF CARE
Bedside report given to primary nurse. Patient's family at bedside as well. Incisions x2 has clean, dry, and intact dressing. No air leak present- chest tube on -20 water suction, dark red drainage, output charted on flowsheet. Najera catheter present. Patient reports moderate discomfort to left lateral chest when deep breathing/coughing, currently tolerable. Questions answered.

## 2024-03-29 NOTE — OP NOTE
[facility] ([facility])  Cardiothoracic Surgery  Operative Note    SUMMARY     Date of Procedure: 3/29/2024     Procedure: Procedure(s) (LRB):  VATS (VIDEO-ASSISTED THORACOSCOPIC SURGERY) (Left)  BLOCK, NERVE, INTERCOSTAL, 2 OR MORE (Left)    Surgeon(s) and Role:     * Josafat Sandoval MD - Primary    Assisting Surgeon: None    Pre-Operative Diagnosis: Pneumonia of left lower lobe due to infectious organism [J18.9]  Left empyema hydropneumothorax  Hepatitis-C  Supraventricular tachycardia    Post-Operative Diagnosis: Post-Op Diagnosis Codes:     * Pneumonia of left lower lobe due to infectious organism [J18.9]  Same  Anesthesia: General    Operative Findings (including complications, if any):  Purulent secretion with multiloculated effusion consolidation of the lower lobe multiple adhesions and multiple lung abscesses.    Description of Technical Procedures:  The patient was taken to the operating room placed in a supine position endotracheal general anesthesia was given monitoring lines were placed preoperative antibiotics were given surgical time-out was done surgical site was prepared and draped with chlorhexidine x2 patient was placed in a right lateral position adequately pad with a beanbag and secured well to the table pressure points were protected Najera catheter was introduced under aseptic precautions.  Surgical site was prepared and draped with chlorhexidine x2 I placed a 5 mm trocar in the 8th intercostal space in the posterior axillary line diagnostic thoracoscopy was done multiple adhesions thick pleural peel and purulent material was encountered at this time I decided to do a mini thoracotomy.  I went and did a mini thoracotomy in the posterolateral fashion about 10 cm incision over the 8th rib subcutaneous tissue was entered latissimus muscle was divided and after entering the 8th intercostal space placed a to Tufier retractor.  Thick pleural peel was noted multiple lung abscesses on the left  lower lobe which were unroofed and pus sent for Gram stain culture sensitivity washed it with copious amount of antibiotic irrigation.  Then released the left lower lobe of the lung from the diaphragm the pleural peel was then gently peeled off with a sharp and blunt dissection upper lobe was free of any pleural peel could not delineate the fissure after releasing as much of lung as possible from the lower lobe lung was inflated multiple air leaks were identified hemostasis was achieved with Bovie cautery.  Multiple intercostal nerve blocks were given with the 0.25% Marcaine involving 24509 and 9th intercostal space.  I then placed 2 chest tubes 1 straight 32 in the apex and 1 right angle and the base after checking final hemostasis counts were correct instrument and sponge count chest was closed with pericostal sutures followed by 2-0 Vicryl for the muscle latissimus dorsi subcutaneous tissue was closed with 3-0 Vicryl Monocryl 4-0 Monocryl for the skin patient was extubated on the table was transferred to the recovery in stable condition.      Significant Surgical Tasks Conducted by the Assistant(s), if Applicable:     Estimated Blood Loss (EBL): * No values recorded between 3/29/2024  8:14 AM and 3/29/2024  9:15 AM *           Implants: * No implants in log *    Specimens:   Specimen (24h ago, onward)       Start     Ordered    03/29/24 0908  Specimen to Pathology, Surgery Cardiovascular  Once        Comments: Pre-op Diagnosis: Pneumonia of left lower lobe due to infectious organism [J18.9]Procedure(s):VATS (VIDEO-ASSISTED THORACOSCOPIC SURGERY)BLOCK, NERVE, INTERCOSTAL, 2 OR MORE Number of specimens: 1Name of specimens: 1.  Left Pleural Peel--perm.     References:    Click here for ordering Quick Tip   Question Answer Comment   Procedure Type: Cardiovascular    Specimen Class: Routine/Screening    Which provider would you like to cc? MICHAEL ONTIVEROS    Release to patient Immediate        03/29/24 0908                             Condition: Fair    Disposition: PACU - hemodynamically stable.    Attestation: I was present and scrubbed for the entire procedure.

## 2024-03-29 NOTE — ASSESSMENT & PLAN NOTE
"This patient does have evidence of infective focus  My overall impression is sepsis.  Source: Respiratory  Antibiotics given-   Antibiotics (72h ago, onward)    Start     Stop Route Frequency Ordered    03/29/24 1630  vancomycin 1,250 mg in dextrose 5 % (D5W) 250 mL IVPB (Vial-Mate)         -- IV Every 8 hours (non-standard times) 03/29/24 1141    03/28/24 1800  piperacillin-tazobactam (ZOSYN) 4.5 g in dextrose 5 % in water (D5W) 100 mL IVPB (MB+)         -- IV Every 8 hours (non-standard times) 03/28/24 1538    03/26/24 0900  mupirocin 2 % ointment         03/31/24 0859 Nasl 2 times daily 03/26/24 0751    03/26/24 0141  vancomycin - pharmacy to dose  (vancomycin IVPB (PEDS and ADULTS))        See Hyperspace for full Linked Orders Report.    -- IV pharmacy to manage frequency 03/26/24 0043        Latest lactate reviewed-  No results for input(s): "LACTATE", "POCLAC" in the last 72 hours.    Organ dysfunction indicated by  none    Fluid challenge Not needed - patient is not hypotensive      Post- resuscitation assessment No - Post resuscitation assessment not needed       Will Not start Pressors- Levophed for MAP of 65  Source control achieved by: broad spectrum antibiotics  "

## 2024-03-29 NOTE — ASSESSMENT & PLAN NOTE
"-white blood cell count 30.46, lactic acid level 1.5, D-dimer 1.37, BNP 14, troponin negative, influenza a/B negative, COVID-19 negative  -blood cultures pending  -CTA of chest with "no pulmonary embolism, no dissection; dense consolidation in the left lower lobe extending to the left upper lobe with tree in bud opacities consistent with severe pneumonia; questionable areas of fluid density in the left lung base with foci of gas may relate to component of fluid density or possible liquefaction or possible abscess formation."  -continue IV Zosyn, vanco and IV azithromycin initiated in the emergency department  -check MRSA culture-negative, sputum culture-pending, HIV  -give IV fluids, O2 supplementation, p.r.n. albuterol inhaler  - repeat CT scan of chest today looks more like an empyema  -CVT consulted and patient underwent VATS with drainage    "

## 2024-03-29 NOTE — PROGRESS NOTES
[facility] Medicine  Progress Note    Patient Name: Mayela Molina  MRN: [id number]  Patient Class: IP- Inpatient   Admission Date: 3/25/2024  Length of Stay: 3 days  Attending Physician: Linsey Garcia MD  Primary Care Provider: No primary care provider on file.        Subjective:     Principal Problem:Pneumonia of left lung due to infectious organism        HPI:  28-year-old patient (male to female transgender) with no chronic medical conditions who presented to the emergency department with complaint of cough over the last 3 weeks, moderate severity, persistent.  Patient does have associated yellow sputum production, shortness for breath, and pleuritic chest pain.  No associated nausea, vomiting, diarrhea.  No complaints of fevers or chills although T-max in the emergency department recorded as 99.8.  Patient does not smoke cigarettes or vape.  No illicit drug use.  Abnormal labs in the emergency department include white blood cell count 30.46, hemoglobin 12.7, platelets 577, D-dimer 1.37, sodium 122, chloride 90, bicarb 21, glucose 121, albumin 2.3.  Last labs are from 2018 with no interim labs to compare with.  Patient does take spironolactone, Estrace, and Provera.  CT scan of chest with left-sided pneumonia with possible abscess formation.    Overview/Hospital Course:   Patient was admitted acute respiratory failure with left-sided pneumonia and possible abscess formation.  She initially was on nasal cannula and on morning after admission became acutely short of breath with a PO2 of 87% and tachycardic.  She was given a dose of adenosine which did not improve things however gradually her heart rate decreased.  Patient was admitted to the intensive care unit for respiratory failure tachycardia.  She was initially on high-flow nasal cannula and subsequently was able to wean to room air.  Cultures remained pending.  She was initially started on Rocephin and azithromycin  and changed to Zosyn who azithromycin vein.  Legionella antigen pending.  Continue with aggressive pulmonary care  With worsening CT scan, continued elevated WBC, continued elevated heart rate.  Patient underwent repeat CT scan of the chest which showed worsening of empyema.  She was seen and evaluated by Cardiothoracic surgery and underwent left VATS with decortication on 3/19.    Hyponatremia patient was admitted with hyponatremia she had fluid restrictions in place cortisol was normal, TSH normal, patient was on spironolactone and this was held.  She was being maintained on fluid restricted diet.         Interval History:  Patient seen and examined at bedside.  She reports feeling better.  Still has some pleuritic type chest pain upon inspiration.    Review of Systems   Constitutional:  Positive for fatigue. Negative for fever.   Respiratory:  Positive for cough. Negative for shortness of breath.    Cardiovascular:  Positive for palpitations. Negative for chest pain and leg swelling.   Gastrointestinal:  Negative for nausea and vomiting.   All other systems reviewed and are negative.    Objective:     Vital Signs (Most Recent):  Temp: 97.2 °F (36.2 °C) (03/29/24 1707)  Pulse: 76 (03/29/24 1707)  Resp: 20 (03/29/24 1732)  BP: 110/66 (03/29/24 1707)  SpO2: 99 % (03/29/24 1707) Vital Signs (24h Range):  Temp:  [97.2 °F (36.2 °C)-98.7 °F (37.1 °C)] 97.2 °F (36.2 °C)  Pulse:  [] 76  Resp:  [12-26] 20  SpO2:  [93 %-99 %] 99 %  BP: (102-135)/(57-78) 110/66     Weight: 45.4 kg (100 lb 1.4 oz)  Body mass index is 14.78 kg/m².    Intake/Output Summary (Last 24 hours) at 3/29/2024 1148  Last data filed at 3/29/2024 1100  Gross per 24 hour   Intake --   Output 180 ml   Net -180 ml         Physical Exam  Vitals reviewed.   Constitutional:       Appearance: Normal appearance.   HENT:      Head: Normocephalic and atraumatic.      Mouth/Throat:      Mouth: Mucous membranes are moist.      Pharynx: Oropharynx is clear.  "  Eyes:      Extraocular Movements: Extraocular movements intact.      Conjunctiva/sclera: Conjunctivae normal.   Cardiovascular:      Rate and Rhythm: Regular rhythm. Tachycardia present.      Pulses: Normal pulses.      Heart sounds: Normal heart sounds.   Pulmonary:      Effort: Pulmonary effort is normal.      Comments: Decreased breath sounds on left  Abdominal:      General: Bowel sounds are normal.      Palpations: Abdomen is soft.   Musculoskeletal:         General: Normal range of motion.      Cervical back: Normal range of motion and neck supple.   Skin:     General: Skin is warm and dry.   Neurological:      General: No focal deficit present.      Mental Status: She is alert and oriented to person, place, and time. Mental status is at baseline.   Psychiatric:         Mood and Affect: Mood normal.         Behavior: Behavior normal.         Thought Content: Thought content normal.             Significant Labs: All pertinent labs within the past 24 hours have been reviewed.    CBC:   Recent Labs   Lab 03/28/24  0712 03/29/24  0708   WBC 27.93* 21.84*   HGB 10.4* 10.7*   HCT 30.3* 32.1*   * 486*     CMP:   Recent Labs   Lab 03/28/24  0712 03/29/24  0708   * 131*   K 4.2 3.9   CL 95 97   CO2 24 21*    93   BUN 6 11   CREATININE 0.6 0.7   CALCIUM 7.9* 8.0*   PROT 6.4 6.7   ALBUMIN 1.8* 1.9*   BILITOT 0.5 0.6   ALKPHOS 59 74   AST 21 32   ALT 19 26   ANIONGAP 9 13       Significant Imaging: I have reviewed all pertinent imaging results/findings within the past 24 hours.    Assessment/Plan:      * Pneumonia of left lung due to infectious organism  -white blood cell count 30.46, lactic acid level 1.5, D-dimer 1.37, BNP 14, troponin negative, influenza a/B negative, COVID-19 negative  -blood cultures pending  -CTA of chest with "no pulmonary embolism, no dissection; dense consolidation in the left lower lobe extending to the left upper lobe with tree in bud opacities consistent with severe " "pneumonia; questionable areas of fluid density in the left lung base with foci of gas may relate to component of fluid density or possible liquefaction or possible abscess formation."  -continue IV Zosyn, vanco and IV azithromycin initiated in the emergency department  -check MRSA culture-negative, sputum culture-pending, HIV  -give IV fluids, O2 supplementation, p.r.n. albuterol inhaler  - repeat CT scan of chest today looks more like an empyema  -CVT consulted and patient underwent VATS with drainage      Tachycardia, paroxysmal    Beta-blocker started heart rate markedly improved.    Pleuritic chest pain  Pleuritic chest pain related to left-sided pneumonia/empyema.  Cardiac enzymes negative.      Male-to-female transgender person  Resume home medications to include Estrace & Provera; hold spironolactone due to hyponatremia      Thrombocytosis  Current platelet count 486.  Likely reactive thrombocytosis related to sepsis with underlying pneumonia.      Normocytic anemia  Patient's anemia is currently controlled. Has not received any PRBCs to date.   Current CBC reviewed-   Lab Results   Component Value Date    HGB 10.7 (L) 03/29/2024    HCT 32.1 (L) 03/29/2024     Monitor serial CBC and transfuse if patient becomes hemodynamically unstable, symptomatic or H/H drops below 7/21.  Check iron stores    Underweight  Current BMI 15.27, albumin 2.3.    Add nutritional supplements      Hyponatremia  Patient has hyponatremia which is uncontrolled,We will aim to correct the sodium by 4-6mEq in 24 hours. We will monitor sodium Every 6 hours x4. The hyponatremia is due to Dehydration/hypovolemia. We will treat the hyponatremia with IV fluids as follows:  Normal saline at 100 mL/hr. The patient's sodium results have been reviewed and are listed below.  Recent Labs   Lab 03/29/24  0708   *       -hold spironolactone    - TSH normal, cortisol appropriate, free water restrict  -status post 1 L normal saline IV fluid bolus " "given in the emergency department  Salt tablets begun      Hypotension  Relative hypotension with blood pressure 99/56.  Patient received 1 L normal saline IV fluid bolus in the emergency department.   Hemodynamically stable    Sepsis  This patient does have evidence of infective focus  My overall impression is sepsis.  Source: Respiratory  Antibiotics given-   Antibiotics (72h ago, onward)      Start     Stop Route Frequency Ordered    03/29/24 1630  vancomycin 1,250 mg in dextrose 5 % (D5W) 250 mL IVPB (Vial-Mate)         -- IV Every 8 hours (non-standard times) 03/29/24 1141    03/28/24 1800  piperacillin-tazobactam (ZOSYN) 4.5 g in dextrose 5 % in water (D5W) 100 mL IVPB (MB+)         -- IV Every 8 hours (non-standard times) 03/28/24 1538    03/26/24 0900  mupirocin 2 % ointment         03/31/24 0859 Nasl 2 times daily 03/26/24 0751    03/26/24 0141  vancomycin - pharmacy to dose  (vancomycin IVPB (PEDS and ADULTS))        See Hyperspace for full Linked Orders Report.    -- IV pharmacy to manage frequency 03/26/24 0043          Latest lactate reviewed-  No results for input(s): "LACTATE", "POCLAC" in the last 72 hours.    Organ dysfunction indicated by  none    Fluid challenge Not needed - patient is not hypotensive      Post- resuscitation assessment No - Post resuscitation assessment not needed       Will Not start Pressors- Levophed for MAP of 65  Source control achieved by: broad spectrum antibiotics      VTE Risk Mitigation (From admission, onward)           Ordered     enoxaparin injection 40 mg  Every 24 hours         03/28/24 0912     IP VTE HIGH RISK PATIENT  Once         03/26/24 0043     Reason for No Pharmacological VTE Prophylaxis  Once        Question:  Reasons:  Answer:  Physician Provided (leave comment)  Comment:  pending pulmonary consult    03/26/24 0043     IP VTE HIGH RISK PATIENT  Once         03/26/24 0043     Place sequential compression device  Until discontinued         03/26/24 0043 "                    Discharge Planning   RAVINDER:      Code Status: Full Code   Is the patient medically ready for discharge?:     Reason for patient still in hospital (select all that apply): Patient trending condition, Treatment, and Consult recommendations  Discharge Plan A: Home                  Linsey Barragan MD  Department of Hospital Medicine   [facility] [facility] ([facility])

## 2024-03-29 NOTE — PROGRESS NOTES
Pharmacokinetic Assessment Follow Up: IV Vancomycin    Vancomycin serum concentration assessment(s):    The trough level was drawn correctly and can be used to guide therapy at this time. The measurement is above the desired definitive target range of 15 to 20 mcg/mL.    Vancomycin Regimen Plan:    Change regimen to Vancomycin 1250 mg IV every 8 hours with next serum trough concentration measured at 2330 prior to 3rd dose on 3/29    Drug levels (last 3 results):  Recent Labs   Lab Result Units 03/27/24  1400 03/28/24  0712 03/29/24  0708   Vancomycin, Random ug/mL  --  9.7  --    Vancomycin-Trough ug/mL 4.0*  --  20.8       Pharmacy will continue to follow and monitor vancomycin.    Please contact pharmacy at extension [phone] for questions regarding this assessment.    Thank you for the consult,   Therese Meneses       Patient brief summary:  Mayela Molina is a 28 y.o. adult initiated on antimicrobial therapy with IV Vancomycin for treatment of lower respiratory infection    The patient's current regimen is 1250mg eery 8 hours    Drug Allergies:   Review of patient's allergies indicates:  No Known Allergies    Actual Body Weight:   45.4kg    Renal Function:   Estimated Creatinine Clearance (based on SCr of 0.7 mg/dL)  Female: 87.3 mL/min  Male: 102.7 mL/min  Hover for info,     Dialysis Method (if applicable):  N/A    CBC (last 72 hours):  Recent Labs   Lab Result Units 03/27/24  0429 03/28/24  0712 03/29/24  0708   WBC K/uL 26.90* 27.93* 21.84*   Hemoglobin g/dL 9.5* 10.4* 10.7*   Hematocrit % 28.4* 30.3* 32.1*   Platelets K/uL 387 453* 486*   Gran % % 87.3* 86.2* 80.4*   Lymph % % 6.6* 6.6* 11.2*   Mono % % 5.1 5.7 7.1   Eosinophil % % 0.1 0.1 0.1   Basophil % % 0.1 0.2 0.3   Differential Method  Automated Automated Automated       Metabolic Panel (last 72 hours):  Recent Labs   Lab Result Units 03/26/24  1203 03/26/24  1807 03/27/24  0429 03/27/24  1817 03/28/24  0712 03/29/24  0708   Sodium mmol/L  128* 131* 129*  --  128* 131*   Sodium, Urine mmol/L  --   --   --  38  --   --    Potassium mmol/L  --   --  3.9  --  4.2 3.9   Chloride mmol/L  --   --  95  --  95 97   CO2 mmol/L  --   --  24  --  24 21*   Glucose mg/dL  --   --  126*  --  102 93   BUN mg/dL  --   --  6  --  6 11   Creatinine mg/dL  --   --  0.6  --  0.6 0.7   Albumin g/dL  --   --  1.7*  --  1.8* 1.9*   Total Bilirubin mg/dL  --   --  0.4  --  0.5 0.6   Alkaline Phosphatase U/L  --   --  53*  --  59 74   AST U/L  --   --  14  --  21 32   ALT U/L  --   --  16  --  19 26   Magnesium mg/dL  --   --  1.8  --  1.9  --    Phosphorus mg/dL  --   --   --   --  3.6  --        Vancomycin Administrations:  vancomycin given in the last 96 hours                     vancomycin 1,500 mg in dextrose 5 % (D5W) 250 mL IVPB (Vial-Mate) (mg) 1,500 mg Bolus 03/29/24 0819     1,500 mg New Bag 03/28/24 2330     1,500 mg New Bag  1651    vancomycin (VANCOCIN) 1,000 mg in dextrose 5 % (D5W) 250 mL IVPB (Vial-Mate) (mg) 1,000 mg New Bag 03/28/24 0852     1,000 mg New Bag 03/27/24 2352     1,000 mg New Bag  1608    vancomycin 750 mg in dextrose 5 % (D5W) 250 mL IVPB (Vial-Mate) (mg) 750 mg New Bag 03/27/24 0306     750 mg New Bag 03/26/24 1426    vancomycin (VANCOCIN) 1,000 mg in dextrose 5 % (D5W) 250 mL IVPB (Vial-Mate) (mg) 1,000 mg New Bag 03/26/24 0148                    Microbiologic Results:  Microbiology Results (last 7 days)       Procedure Component Value Units Date/Time    Blood Culture #1 **CANNOT BE ORDERED STAT** [[id number]] Collected: 03/25/24 9467    Order Status: Completed Specimen: Blood from Peripheral, Forearm, Left Updated: 03/29/24 1012     Blood Culture, Routine No Growth to date      No Growth to date      No Growth to date      No Growth to date    Blood Culture #2 **CANNOT BE ORDERED STAT** [[id number]] Collected: 03/25/24 2306    Order Status: Completed Specimen: Blood from Peripheral, Hand, Left Updated: 03/29/24 1012     Blood Culture, Routine  No Growth to date      No Growth to date      No Growth to date      No Growth to date    Culture, Body Fluid (Aerobic) w/ GS [[id number]] Collected: 03/29/24 0916    Order Status: Sent Specimen: Body Fluid from Pleural Fluid Updated: 03/29/24 1000    Gram stain [[id number]] Collected: 03/29/24 0916    Order Status: Sent Specimen: Body Fluid from Pleural Fluid Updated: 03/29/24 0957    AFB Culture & Smear [[id number]] Collected: 03/29/24 0916    Order Status: Sent Specimen: Body Fluid from Pleural Fluid Updated: 03/29/24 0957    Fungus culture [[id number]] Collected: 03/29/24 0916    Order Status: Sent Specimen: Body Fluid from Pleural Fluid Updated: 03/29/24 0956    Culture, Anaerobe [[id number]] Collected: 03/29/24 0916    Order Status: Sent Specimen: Body Fluid from Pleural Fluid Updated: 03/29/24 0956    Aerobic culture [[id number]] Collected: 03/29/24 0918    Order Status: Sent Specimen: Abscess from Pleural Fluid Updated: 03/29/24 0956    Culture, Anaerobe [[id number]]     Order Status: No result Specimen: Body Fluid from Pleural Fluid     Aerobic culture [[id number]]     Order Status: No result Specimen: Wound from Pleural Fluid     Aerobic culture [[id number]]     Order Status: No result Specimen: Wound from Pleural Fluid     Culture, MRSA [[id number]] Collected: 03/26/24 0145    Order Status: Completed Specimen: MRSA source from Nares, Left Updated: 03/28/24 0715     MRSA Surveillance Screen No MRSA isolated    Culture, Respiratory with Gram Stain [[id number]] Collected: 03/26/24 0931    Order Status: Sent Specimen: Sputum, Expectorated Updated: 03/26/24 0931    Culture, Respiratory with Gram Stain [[id number]]     Order Status: Canceled Specimen: Sputum, Expectorated     Influenza A & B by Molecular [[id number]] Collected: 03/25/24 2034    Order Status: Completed Specimen: Nasopharyngeal Swab Updated: 03/25/24 2209     Influenza A, Molecular Negative     Influenza B, Molecular Negative     Flu A & B Source  Nasal swab

## 2024-03-29 NOTE — ASSESSMENT & PLAN NOTE
Patient's anemia is currently controlled. Has not received any PRBCs to date.   Current CBC reviewed-   Lab Results   Component Value Date    HGB 10.7 (L) 03/29/2024    HCT 32.1 (L) 03/29/2024     Monitor serial CBC and transfuse if patient becomes hemodynamically unstable, symptomatic or H/H drops below 7/21.  Check iron stores

## 2024-03-29 NOTE — PT/OT/SLP PROGRESS
Physical Therapy      Patient Name:  Mayela Molina   MRN:  [id number]    PT re-eval orders received and chart review completed. Patient not seen today secondary to patient declined. Patient stated she was too sore to move and requested PT return tomorrow.  Will continue efforts.     Carey Chu, PT  03/29/2024  13:35

## 2024-03-29 NOTE — PLAN OF CARE
POC reviewed with pt and mother. Pt and mother verbalizes understanding of POC. No questions at this time.  AAOx4. NADN.  NSR on cardiac monitor. Beta blocker for persistent sinus tach.  S/P VATS today.  Chest tubes x 2 chest tube marked at 300cc with a total of 150cc of dark sanguineous drainage for the shift.  Najera catheter to be removed in am.  Receiving IV antibiotics.  Receiving pain medication.  Encouraging coughing, deep breathing and incentive spirometry.  Pt remains free of falls.  No complaints at this time.  Safety measures in place. Will continue to monitor.  Informed pt to call for assistance for turning. Pt verbalizes understanding.  Hourly rounding and chart check complete.

## 2024-03-29 NOTE — ANESTHESIA PREPROCEDURE EVALUATION
03/28/2024  Mayela Molina is a 28 y.o., adult with no chronic medical problems who presented to the ED complaining of a cough x 3 weeks. Patient is a biological male in the transition to female who reports a cough with yellow sputum, left sided pleuritic pain, and dyspnea that increases with exertion. Denies fever, chills, dizziness, abdominal pain, n/v/d, dysuria, hematuria, myalgia. Denies tobacco use, vaping/e-cigarettes, or drug use. Abnormal labs in the emergency department include white blood cell count 30.46, hemoglobin 12.7, platelets 577, D-dimer 1.37, sodium 122, chloride 90, bicarb 21, glucose 121, albumin 2.3.    Patient Active Problem List   Diagnosis    Sepsis    Pneumonia of left lung due to infectious organism    Hypotension    Hyponatremia    Underweight    Normocytic anemia    Thrombocytosis    Male-to-female transgender person    Pleuritic chest pain    Tachycardia, paroxysmal     History reviewed. No pertinent past medical history.  History reviewed. No pertinent surgical history.      Chemistry        Component Value Date/Time     (L) 03/28/2024 0712    K 4.2 03/28/2024 0712    CL 95 03/28/2024 0712    CO2 24 03/28/2024 0712    BUN 6 03/28/2024 0712    CREATININE 0.6 03/28/2024 0712     03/28/2024 0712        Component Value Date/Time    CALCIUM 7.9 (L) 03/28/2024 0712    ALKPHOS 59 03/28/2024 0712    AST 21 03/28/2024 0712    ALT 19 03/28/2024 0712    BILITOT 0.5 03/28/2024 0712    EGFRNONAA >60 10/17/2018 1110        Lab Results   Component Value Date    WBC 27.93 (H) 03/28/2024    HGB 10.4 (L) 03/28/2024    HCT 30.3 (L) 03/28/2024    MCV 86 03/28/2024     (H) 03/28/2024     Vitals:    03/28/24 1721   BP: 110/70   Pulse: (!) 119   Resp: (!) 24   Temp: 37.5 °C (99.5 °F)     Pre-op Assessment    I have reviewed the Patient Summary Reports.     I have reviewed  the Nursing Notes. I have reviewed the NPO Status.   I have reviewed the Medications.     Review of Systems  Anesthesia Hx:  No problems with previous Anesthesia   Neg history of prior surgery.             Social:  Non-Smoker, No Alcohol Use       Hematology/Oncology:    Oncology Normal    -- Anemia:               Hematology Comments: WBC 27.93    10.4/30.3                        Cardiovascular:  Cardiovascular Normal                                            Pulmonary:  Pneumonia       PNA of Lt lung                Renal/:  Renal/ Normal                 Hepatic/GI:  Hepatic/GI Normal                 Musculoskeletal:  Musculoskeletal Normal                Neurological:  Neurology Normal                                      Endocrine:  Endocrine Normal    Cachexia - BMI 15      Denies Obesity / BMI > 30  Dermatological:  Skin Normal    Psych:  Psychiatric Normal                    Physical Exam  General: Cachexia    Airway:  Mallampati: II / II  Mouth Opening: Normal  TM Distance: Normal  Tongue: Normal  Neck ROM: Normal ROM    Dental:  Intact        Anesthesia Plan  Type of Anesthesia, risks & benefits discussed:    Anesthesia Type: Gen ETT  Intra-op Monitoring Plan: Standard ASA Monitors and Art Line  Post Op Pain Control Plan: multimodal analgesia and IV/PO Opioids PRN  Induction:  IV  Airway Plan: Direct  Informed Consent: Informed consent signed with the Patient and all parties understand the risks and agree with anesthesia plan.  All questions answered.   ASA Score: 3  Day of Surgery Review of History & Physical: H&P Update referred to the surgeon/provider.I have interviewed and examined the patient. I have reviewed the patient's H&P dated: There are no significant changes. H&P completed by Anesthesiologist.    Ready For Surgery From Anesthesia Perspective.     .

## 2024-03-30 PROBLEM — J94.8 HYDROPNEUMOTHORAX: Status: ACTIVE | Noted: 2024-03-30

## 2024-03-30 PROBLEM — J85.2 PULMONARY ABSCESS: Status: ACTIVE | Noted: 2024-03-30

## 2024-03-30 LAB
ALBUMIN SERPL BCP-MCNC: 1.5 G/DL (ref 3.5–5.2)
ANION GAP SERPL CALC-SCNC: 13 MMOL/L (ref 8–16)
BASOPHILS # BLD AUTO: 0.08 K/UL (ref 0–0.2)
BASOPHILS NFR BLD: 0.3 % (ref 0–1.9)
BUN SERPL-MCNC: 20 MG/DL (ref 6–20)
CALCIUM SERPL-MCNC: 7.6 MG/DL (ref 8.7–10.5)
CHLORIDE SERPL-SCNC: 97 MMOL/L (ref 95–110)
CO2 SERPL-SCNC: 19 MMOL/L (ref 23–29)
CREAT SERPL-MCNC: 1.8 MG/DL (ref 0.5–1.4)
DIFFERENTIAL METHOD BLD: ABNORMAL
EOSINOPHIL # BLD AUTO: 0 K/UL (ref 0–0.5)
EOSINOPHIL NFR BLD: 0 % (ref 0–8)
ERYTHROCYTE [DISTWIDTH] IN BLOOD BY AUTOMATED COUNT: 13.9 % (ref 11.5–14.5)
EST. GFR  (NO RACE VARIABLE): 53 ML/MIN/1.73 M^2
GLUCOSE SERPL-MCNC: 102 MG/DL (ref 70–110)
HCT VFR BLD AUTO: 28.1 % (ref 40–54)
HGB BLD-MCNC: 9.1 G/DL (ref 14–18)
IMM GRANULOCYTES # BLD AUTO: 0.32 K/UL (ref 0–0.04)
IMM GRANULOCYTES NFR BLD AUTO: 1.1 % (ref 0–0.5)
LYMPHOCYTES # BLD AUTO: 1.6 K/UL (ref 1–4.8)
LYMPHOCYTES NFR BLD: 5.8 % (ref 18–48)
MCH RBC QN AUTO: 29.2 PG (ref 27–31)
MCHC RBC AUTO-ENTMCNC: 32.4 G/DL (ref 32–36)
MCV RBC AUTO: 90 FL (ref 82–98)
MONOCYTES # BLD AUTO: 1.7 K/UL (ref 0.3–1)
MONOCYTES NFR BLD: 6 % (ref 4–15)
NEUTROPHILS # BLD AUTO: 24.5 K/UL (ref 1.8–7.7)
NEUTROPHILS NFR BLD: 86.8 % (ref 38–73)
NRBC BLD-RTO: 0 /100 WBC
PHOSPHATE SERPL-MCNC: 5.5 MG/DL (ref 2.7–4.5)
PLATELET # BLD AUTO: 446 K/UL (ref 150–450)
PMV BLD AUTO: 8.8 FL (ref 9.2–12.9)
POTASSIUM SERPL-SCNC: 4.9 MMOL/L (ref 3.5–5.1)
RBC # BLD AUTO: 3.12 M/UL (ref 4.6–6.2)
SODIUM SERPL-SCNC: 129 MMOL/L (ref 136–145)
VANCOMYCIN SERPL-MCNC: 41.2 UG/ML
VANCOMYCIN TROUGH SERPL-MCNC: 25.7 UG/ML (ref 10–22)
WBC # BLD AUTO: 28.27 K/UL (ref 3.9–12.7)

## 2024-03-30 PROCEDURE — 80069 RENAL FUNCTION PANEL: CPT | Performed by: INTERNAL MEDICINE

## 2024-03-30 PROCEDURE — 94640 AIRWAY INHALATION TREATMENT: CPT

## 2024-03-30 PROCEDURE — 97162 PT EVAL MOD COMPLEX 30 MIN: CPT

## 2024-03-30 PROCEDURE — 80202 ASSAY OF VANCOMYCIN: CPT | Performed by: INTERNAL MEDICINE

## 2024-03-30 PROCEDURE — A4216 STERILE WATER/SALINE, 10 ML: HCPCS | Performed by: INTERNAL MEDICINE

## 2024-03-30 PROCEDURE — 63600175 PHARM REV CODE 636 W HCPCS: Performed by: THORACIC SURGERY (CARDIOTHORACIC VASCULAR SURGERY)

## 2024-03-30 PROCEDURE — 97116 GAIT TRAINING THERAPY: CPT

## 2024-03-30 PROCEDURE — 25000003 PHARM REV CODE 250: Performed by: NURSE PRACTITIONER

## 2024-03-30 PROCEDURE — 85025 COMPLETE CBC W/AUTO DIFF WBC: CPT | Performed by: THORACIC SURGERY (CARDIOTHORACIC VASCULAR SURGERY)

## 2024-03-30 PROCEDURE — 25000003 PHARM REV CODE 250: Performed by: THORACIC SURGERY (CARDIOTHORACIC VASCULAR SURGERY)

## 2024-03-30 PROCEDURE — 25000003 PHARM REV CODE 250: Performed by: INTERNAL MEDICINE

## 2024-03-30 PROCEDURE — 25000242 PHARM REV CODE 250 ALT 637 W/ HCPCS: Performed by: THORACIC SURGERY (CARDIOTHORACIC VASCULAR SURGERY)

## 2024-03-30 PROCEDURE — 99900035 HC TECH TIME PER 15 MIN (STAT)

## 2024-03-30 PROCEDURE — A4216 STERILE WATER/SALINE, 10 ML: HCPCS | Performed by: ANESTHESIOLOGY

## 2024-03-30 PROCEDURE — P9045 ALBUMIN (HUMAN), 5%, 250 ML: HCPCS | Mod: JZ,JG | Performed by: THORACIC SURGERY (CARDIOTHORACIC VASCULAR SURGERY)

## 2024-03-30 PROCEDURE — 36415 COLL VENOUS BLD VENIPUNCTURE: CPT | Performed by: INTERNAL MEDICINE

## 2024-03-30 PROCEDURE — 63600175 PHARM REV CODE 636 W HCPCS: Performed by: INTERNAL MEDICINE

## 2024-03-30 PROCEDURE — 21400001 HC TELEMETRY ROOM

## 2024-03-30 PROCEDURE — 94761 N-INVAS EAR/PLS OXIMETRY MLT: CPT

## 2024-03-30 PROCEDURE — 25000003 PHARM REV CODE 250: Performed by: ANESTHESIOLOGY

## 2024-03-30 RX ORDER — METOPROLOL TARTRATE 25 MG/1
12.5 TABLET ORAL 2 TIMES DAILY
Status: DISCONTINUED | OUTPATIENT
Start: 2024-03-30 | End: 2024-03-31

## 2024-03-30 RX ORDER — METHOCARBAMOL 500 MG/1
500 TABLET, FILM COATED ORAL 4 TIMES DAILY
Status: DISCONTINUED | OUTPATIENT
Start: 2024-03-30 | End: 2024-04-09

## 2024-03-30 RX ORDER — LINEZOLID 600 MG/1
600 TABLET, FILM COATED ORAL EVERY 12 HOURS
Status: DISCONTINUED | OUTPATIENT
Start: 2024-03-30 | End: 2024-04-08

## 2024-03-30 RX ORDER — OXYCODONE HYDROCHLORIDE 5 MG/1
10 TABLET ORAL EVERY 4 HOURS PRN
Status: DISCONTINUED | OUTPATIENT
Start: 2024-03-30 | End: 2024-04-10

## 2024-03-30 RX ORDER — ALBUMIN HUMAN 50 G/1000ML
25 SOLUTION INTRAVENOUS ONCE
Status: COMPLETED | OUTPATIENT
Start: 2024-03-30 | End: 2024-03-30

## 2024-03-30 RX ORDER — ACETAMINOPHEN 500 MG
1000 TABLET ORAL EVERY 8 HOURS
Status: DISCONTINUED | OUTPATIENT
Start: 2024-03-31 | End: 2024-03-31

## 2024-03-30 RX ORDER — METHOCARBAMOL 100 MG/ML
1000 INJECTION, SOLUTION INTRAMUSCULAR; INTRAVENOUS ONCE
Status: COMPLETED | OUTPATIENT
Start: 2024-03-30 | End: 2024-03-30

## 2024-03-30 RX ORDER — MORPHINE SULFATE 4 MG/ML
4 INJECTION, SOLUTION INTRAMUSCULAR; INTRAVENOUS EVERY 4 HOURS PRN
Status: DISCONTINUED | OUTPATIENT
Start: 2024-03-30 | End: 2024-03-31

## 2024-03-30 RX ADMIN — METHOCARBAMOL 500 MG: 500 TABLET ORAL at 06:03

## 2024-03-30 RX ADMIN — IPRATROPIUM BROMIDE 0.5 MG: 0.5 SOLUTION RESPIRATORY (INHALATION) at 03:03

## 2024-03-30 RX ADMIN — ALBUMIN (HUMAN) 25 G: 12.5 INJECTION, SOLUTION INTRAVENOUS at 12:03

## 2024-03-30 RX ADMIN — ESTRADIOL 8 MG: 1 TABLET ORAL at 08:03

## 2024-03-30 RX ADMIN — FAMOTIDINE 20 MG: 20 TABLET ORAL at 08:03

## 2024-03-30 RX ADMIN — IPRATROPIUM BROMIDE 0.5 MG: 0.5 SOLUTION RESPIRATORY (INHALATION) at 12:03

## 2024-03-30 RX ADMIN — Medication 2 ML: at 01:03

## 2024-03-30 RX ADMIN — METOPROLOL TARTRATE 12.5 MG: 25 TABLET, FILM COATED ORAL at 08:03

## 2024-03-30 RX ADMIN — POLYETHYLENE GLYCOL 3350 17 G: 17 POWDER, FOR SOLUTION ORAL at 08:03

## 2024-03-30 RX ADMIN — PIPERACILLIN SODIUM AND TAZOBACTAM SODIUM 4.5 G: 4; .5 INJECTION, POWDER, FOR SOLUTION INTRAVENOUS at 10:03

## 2024-03-30 RX ADMIN — BENZONATATE 100 MG: 100 CAPSULE ORAL at 10:03

## 2024-03-30 RX ADMIN — OXYCODONE HYDROCHLORIDE 5 MG: 5 TABLET ORAL at 08:03

## 2024-03-30 RX ADMIN — Medication 2 ML: at 05:03

## 2024-03-30 RX ADMIN — MORPHINE SULFATE 4 MG: 4 INJECTION INTRAVENOUS at 05:03

## 2024-03-30 RX ADMIN — LINEZOLID 600 MG: 600 TABLET, FILM COATED ORAL at 08:03

## 2024-03-30 RX ADMIN — METHOCARBAMOL 1000 MG: 100 INJECTION INTRAMUSCULAR; INTRAVENOUS at 12:03

## 2024-03-30 RX ADMIN — MEDROXYPROGESTERONE ACETATE 10 MG: 5 TABLET ORAL at 08:03

## 2024-03-30 RX ADMIN — Medication 10 ML: at 11:03

## 2024-03-30 RX ADMIN — MORPHINE SULFATE 4 MG: 4 INJECTION INTRAVENOUS at 11:03

## 2024-03-30 RX ADMIN — SODIUM CHLORIDE 1000 MG: 1 TABLET ORAL at 08:03

## 2024-03-30 RX ADMIN — SODIUM CHLORIDE 1000 MG: 1 TABLET ORAL at 03:03

## 2024-03-30 RX ADMIN — PIPERACILLIN SODIUM AND TAZOBACTAM SODIUM 4.5 G: 4; .5 INJECTION, POWDER, FOR SOLUTION INTRAVENOUS at 01:03

## 2024-03-30 RX ADMIN — MUPIROCIN: 20 OINTMENT TOPICAL at 08:03

## 2024-03-30 RX ADMIN — MORPHINE SULFATE 4 MG: 4 INJECTION INTRAVENOUS at 07:03

## 2024-03-30 RX ADMIN — SODIUM CHLORIDE, POTASSIUM CHLORIDE, SODIUM LACTATE AND CALCIUM CHLORIDE 1000 ML: 600; 310; 30; 20 INJECTION, SOLUTION INTRAVENOUS at 11:03

## 2024-03-30 RX ADMIN — ENOXAPARIN SODIUM 40 MG: 40 INJECTION SUBCUTANEOUS at 04:03

## 2024-03-30 RX ADMIN — ONDANSETRON 4 MG: 2 INJECTION INTRAMUSCULAR; INTRAVENOUS at 07:03

## 2024-03-30 RX ADMIN — IPRATROPIUM BROMIDE 0.5 MG: 0.5 SOLUTION RESPIRATORY (INHALATION) at 08:03

## 2024-03-30 RX ADMIN — OXYCODONE HYDROCHLORIDE 5 MG: 5 TABLET ORAL at 03:03

## 2024-03-30 RX ADMIN — OXYCODONE HYDROCHLORIDE 10 MG: 5 TABLET ORAL at 10:03

## 2024-03-30 RX ADMIN — OXYCODONE HYDROCHLORIDE 10 MG: 5 TABLET ORAL at 12:03

## 2024-03-30 RX ADMIN — PIPERACILLIN SODIUM AND TAZOBACTAM SODIUM 4.5 G: 4; .5 INJECTION, POWDER, FOR SOLUTION INTRAVENOUS at 05:03

## 2024-03-30 NOTE — ASSESSMENT & PLAN NOTE
Patient has hyponatremia which is uncontrolled,We will aim to correct the sodium by 4-6mEq in 24 hours. We will monitor sodium Every 6 hours x4. The hyponatremia is due to Dehydration/hypovolemia. We will treat the hyponatremia with IV fluids as follows:  Normal saline at 100 mL/hr. The patient's sodium results have been reviewed and are listed below.  Recent Labs   Lab 03/30/24  0439   *       -hold spironolactone    - TSH normal, cortisol appropriate, free water restrict  -status post 1 L normal saline IV fluid bolus given in the emergency department  Salt tablets begun  Fluid restricted diet

## 2024-03-30 NOTE — ASSESSMENT & PLAN NOTE
Relative hypotension with blood pressure 99/56.  Patient received 1 L normal saline IV fluid bolus in the emergency department.   Hemodynamically stable    Blood pressure improving

## 2024-03-30 NOTE — SUBJECTIVE & OBJECTIVE
Interval History:  Patient seen and examined at bedside.  Family at bedside and discussed with CVT.  She still continues to complain of significant pain.    Review of Systems   Constitutional:  Negative for fatigue and fever.   Respiratory:  Positive for cough and shortness of breath.         Pain at site of chest tubes   Cardiovascular:  Negative for chest pain and leg swelling.   Gastrointestinal:  Negative for nausea and vomiting.   All other systems reviewed and are negative.    Objective:     Vital Signs (Most Recent):  Temp: 97.6 °F (36.4 °C) (03/30/24 1606)  Pulse: 99 (03/30/24 1606)  Resp: 18 (03/30/24 1606)  BP: 130/70 (03/30/24 1606)  SpO2: 96 % (03/30/24 1606) Vital Signs (24h Range):  Temp:  [97.2 °F (36.2 °C)-98.4 °F (36.9 °C)] 97.6 °F (36.4 °C)  Pulse:  [] 99  Resp:  [16-22] 18  SpO2:  [92 %-99 %] 96 %  BP: (102-130)/(59-70) 130/70     Weight: 45.4 kg (100 lb 1.4 oz)  Body mass index is 14.78 kg/m².    Intake/Output Summary (Last 24 hours) at 3/30/2024 1647  Last data filed at 3/30/2024 0640  Gross per 24 hour   Intake 1865.75 ml   Output 1085 ml   Net 780.75 ml         Physical Exam  Vitals reviewed.   Constitutional:       Appearance: Normal appearance.   HENT:      Head: Normocephalic and atraumatic.      Mouth/Throat:      Mouth: Mucous membranes are moist.      Pharynx: Oropharynx is clear.   Eyes:      Extraocular Movements: Extraocular movements intact.      Conjunctiva/sclera: Conjunctivae normal.   Cardiovascular:      Rate and Rhythm: Regular rhythm. Tachycardia present.      Pulses: Normal pulses.      Heart sounds: Normal heart sounds.   Pulmonary:      Effort: Pulmonary effort is normal.      Breath sounds: Normal breath sounds.   Abdominal:      General: Bowel sounds are normal.      Palpations: Abdomen is soft.   Musculoskeletal:         General: Normal range of motion.      Cervical back: Normal range of motion and neck supple.   Skin:     General: Skin is warm and dry.  "  Neurological:      General: No focal deficit present.      Mental Status: She is alert and oriented to person, place, and time. Mental status is at baseline.   Psychiatric:         Mood and Affect: Mood normal.         Behavior: Behavior normal.         Thought Content: Thought content normal.             Significant Labs: All pertinent labs within the past 24 hours have been reviewed.  Blood Culture: No results for input(s): "LABBLOO" in the last 48 hours.  CBC:   Recent Labs   Lab 03/29/24  0708 03/30/24  0439   WBC 21.84* 28.27*   HGB 10.7* 9.1*   HCT 32.1* 28.1*   * 446     CMP:   Recent Labs   Lab 03/29/24  0708 03/30/24  0439   * 129*   K 3.9 4.9   CL 97 97   CO2 21* 19*   GLU 93 102   BUN 11 20   CREATININE 0.7 1.8*   CALCIUM 8.0* 7.6*   PROT 6.7  --    ALBUMIN 1.9* 1.5*   BILITOT 0.6  --    ALKPHOS 74  --    AST 32  --    ALT 26  --    ANIONGAP 13 13       Significant Imaging: I have reviewed all pertinent imaging results/findings within the past 24 hours.  "

## 2024-03-30 NOTE — ASSESSMENT & PLAN NOTE
This patient does have evidence of infective focus  My overall impression is sepsis.  Source: Respiratory  Antibiotics given-   Antibiotics (72h ago, onward)      Start     Stop Route Frequency Ordered    03/30/24 0900  linezolid tablet 600 mg         -- Oral Every 12 hours 03/30/24 0731    03/28/24 1800  piperacillin-tazobactam (ZOSYN) 4.5 g in dextrose 5 % in water (D5W) 100 mL IVPB (MB+)         -- IV Every 8 hours (non-standard times) 03/28/24 1538    03/26/24 0900  mupirocin 2 % ointment         03/31/24 0859 Nasl 2 times daily 03/26/24 0751            Organ dysfunction indicated by  none    Fluid challenge Not needed - patient is not hypotensive      Post- resuscitation assessment No - Post resuscitation assessment not needed       Will Not start Pressors- Levophed for MAP of 65  Source control achieved by: broad spectrum antibiotics

## 2024-03-30 NOTE — SUBJECTIVE & OBJECTIVE
Chief complaint: pleuritic pain    Mayela Molina is a 28-year-old patient with no chronic medical problems who presented to the ED complaining of a cough x 3 weeks. Patient is a biological male in the transition to female who reports a cough with yellow sputum, left sided pleuritic pain, and dyspnea that increases with exertion. Denies fever, chills, dizziness, abdominal pain, n/v/d, dysuria, hematuria, myalgia. Denies tobacco use, vaping/e-cigarettes, or drug use. Abnormal labs in the emergency department include white blood cell count 30.46, hemoglobin 12.7, platelets 577, D-dimer 1.37, sodium 122, chloride 90, bicarb 21, glucose 121, albumin 2.3. Patient was admitted under hospital medicine service for left-sided pneumonia with possible underlying abscess formation. Pulmonary consulted for evaluation.    Interval history:  3/30: underwent VATS yesterday; chest tube in place. Pulmonary status stable on room air.     Objective:     Vital Signs (Most Recent):  Temp: 97.3 °F (36.3 °C) (03/30/24 0720)  Pulse: 105 (03/30/24 0720)  Resp: 18 (03/30/24 0820)  BP: 115/64 (03/30/24 0720)  SpO2: 96 % (03/30/24 0720) Vital Signs (24h Range):  Temp:  [97.2 °F (36.2 °C)-98.4 °F (36.9 °C)] 97.3 °F (36.3 °C)  Pulse:  [] 105  Resp:  [12-26] 18  SpO2:  [92 %-99 %] 96 %  BP: (102-124)/(58-78) 115/64   Weight: 45.4 kg (100 lb 1.4 oz); Body mass index is 14.78 kg/m².    Intake/Output Summary (Last 24 hours) at 3/30/2024 0905  Last data filed at 3/30/2024 0640  Gross per 24 hour   Intake 1865.75 ml   Output 1265 ml   Net 600.75 ml      Physical Exam  Vitals and nursing note reviewed.   HENT:      Head: Normocephalic.   Eyes:      Conjunctiva/sclera: Conjunctivae normal.   Cardiovascular:      Rate and Rhythm: Tachycardia present.   Pulmonary:      Effort: Pulmonary effort is normal.      Breath sounds: Normal breath sounds.      Comments: Chest tube to pleurovac  Abdominal:      Palpations: Abdomen is soft.    Musculoskeletal:         General: Normal range of motion.      Cervical back: Normal range of motion.   Skin:     General: Skin is warm.   Neurological:      Mental Status: She is alert and oriented to person, place, and time.   Psychiatric:         Mood and Affect: Mood normal.         Review of Systems: Negative except as indicated in HPI    Significant Labs:  CBC/Anemia Profile:  Recent Labs   Lab 03/29/24  0708 03/30/24  0439   WBC 21.84* 28.27*   HGB 10.7* 9.1*   HCT 32.1* 28.1*   * 446   MCV 87 90   RDW 13.3 13.9   Chemistries:  Recent Labs   Lab 03/29/24  0708 03/30/24  0439   * 129*   K 3.9 4.9   CL 97 97   CO2 21* 19*   BUN 11 20   CREATININE 0.7 1.8*   CALCIUM 8.0* 7.6*   ALBUMIN 1.9* 1.5*   PROT 6.7  --    BILITOT 0.6  --    ALKPHOS 74  --    ALT 26  --    AST 32  --    PHOS  --  5.5*

## 2024-03-30 NOTE — PROGRESS NOTES
Pharmacokinetic Assessment Follow Up: IV Vancomycin    Vancomycin serum concentration assessment(s):    The random level was drawn correctly and can be used to guide therapy at this time. The measurement is above the desired definitive target range of 15 to 20 mcg/mL.    Vancomycin Regimen Plan:    Re-dose when the random level is less than 20 mcg/mL, next level to be drawn at 0600 on 03/31    Drug levels (last 3 results):  Recent Labs   Lab Result Units 03/27/24  1400 03/28/24  0712 03/29/24  0708 03/29/24  2357 03/30/24  0439   Vancomycin, Random ug/mL  --  9.7  --   --  41.2   Vancomycin-Trough ug/mL 4.0*  --  20.8 25.7*  --        Pharmacy will continue to follow and monitor vancomycin.    Please contact pharmacy at extension [phone] for questions regarding this assessment.    Thank you for the consult,   Vandana Garcia       Patient brief summary:  Mayela Molina is a 28 y.o. adult initiated on antimicrobial therapy with IV Vancomycin for treatment of lower respiratory infection    The patient's current regimen is pulse dosing.     Drug Allergies:   Review of patient's allergies indicates:  No Known Allergies    Actual Body Weight:   45.4 kg    Renal Function:   Estimated Creatinine Clearance (based on SCr of 1.8 mg/dL (H))  Female: 33.9 mL/min (A)  Male: 39.9 mL/min (A)  [facility] for info,     Dialysis Method (if applicable):  N/A    CBC (last 72 hours):  Recent Labs   Lab Result Units 03/28/24  0712 03/29/24  0708 03/30/24  0439   WBC K/uL 27.93* 21.84* 28.27*   Hemoglobin g/dL 10.4* 10.7* 9.1*   Hematocrit % 30.3* 32.1* 28.1*   Platelets K/uL 453* 486* 446   Gran % % 86.2* 80.4*  --    Lymph % % 6.6* 11.2*  --    Mono % % 5.7 7.1  --    Eosinophil % % 0.1 0.1  --    Basophil % % 0.2 0.3  --    Differential Method  Automated Automated  --        Metabolic Panel (last 72 hours):  Recent Labs   Lab Result Units 03/27/24  1817 03/28/24  0712 03/29/24  0708 03/30/24  0439   Sodium mmol/L  --  128* 131* 129*    Sodium, Urine mmol/L 38  --   --   --    Potassium mmol/L  --  4.2 3.9 4.9   Chloride mmol/L  --  95 97 97   CO2 mmol/L  --  24 21* 19*   Glucose mg/dL  --  102 93 102   BUN mg/dL  --  6 11 20   Creatinine mg/dL  --  0.6 0.7 1.8*   Albumin g/dL  --  1.8* 1.9* 1.5*   Total Bilirubin mg/dL  --  0.5 0.6  --    Alkaline Phosphatase U/L  --  59 74  --    AST U/L  --  21 32  --    ALT U/L  --  19 26  --    Magnesium mg/dL  --  1.9  --   --    Phosphorus mg/dL  --  3.6  --  5.5*       Vancomycin Administrations:  vancomycin given in the last 96 hours                     vancomycin 1,250 mg in dextrose 5 % (D5W) 250 mL IVPB (Vial-Mate) (mg) 1,250 mg New Bag 03/29/24 1704    vancomycin 1,500 mg in dextrose 5 % (D5W) 250 mL IVPB (Vial-Mate) (mg) 1,500 mg Bolus 03/29/24 0819     1,500 mg New Bag 03/28/24 2330     1,500 mg New Bag  1651    vancomycin (VANCOCIN) 1,000 mg in dextrose 5 % (D5W) 250 mL IVPB (Vial-Mate) (mg) 1,000 mg New Bag 03/28/24 0852     1,000 mg New Bag 03/27/24 2352     1,000 mg New Bag  1608    vancomycin 750 mg in dextrose 5 % (D5W) 250 mL IVPB (Vial-Mate) (mg) 750 mg New Bag 03/27/24 0306     750 mg New Bag 03/26/24 1426                    Microbiologic Results:  Microbiology Results (last 7 days)       Procedure Component Value Units Date/Time    Culture, Body Fluid (Aerobic) w/ GS [[id number]] Collected: 03/29/24 0916    Order Status: Completed Specimen: Body Fluid from Pleural Fluid Updated: 03/29/24 1800     Gram Stain Result Few WBC's      No organisms seen    Culture, Anaerobe [[id number]] Collected: 03/29/24 0916    Order Status: Sent Specimen: Body Fluid from Pleural Fluid Updated: 03/29/24 1551    AFB Culture & Smear [[id number]] Collected: 03/29/24 0916    Order Status: Sent Specimen: Body Fluid from Pleural Fluid Updated: 03/29/24 1551    Fungus culture [[id number]] Collected: 03/29/24 0916    Order Status: Sent Specimen: Body Fluid from Pleural Fluid Updated: 03/29/24 1551    Blood Culture #1  **CANNOT BE ORDERED STAT** [[id number]] Collected: 03/25/24 2306    Order Status: Completed Specimen: Blood from Peripheral, Forearm, Left Updated: 03/29/24 1012     Blood Culture, Routine No Growth to date      No Growth to date      No Growth to date      No Growth to date    Blood Culture #2 **CANNOT BE ORDERED STAT** [[id number]] Collected: 03/25/24 2306    Order Status: Completed Specimen: Blood from Peripheral, Hand, Left Updated: 03/29/24 1012     Blood Culture, Routine No Growth to date      No Growth to date      No Growth to date      No Growth to date    Aerobic culture [[id number]] Collected: 03/29/24 0918    Order Status: Canceled Specimen: Abscess from Pleural Fluid     Gram stain [[id number]] Collected: 03/29/24 0916    Order Status: Canceled Specimen: Body Fluid from Pleural Fluid     Culture, Anaerobe [[id number]]     Order Status: No result Specimen: Body Fluid from Pleural Fluid     Aerobic culture [[id number]]     Order Status: No result Specimen: Wound from Pleural Fluid     Aerobic culture [[id number]]     Order Status: No result Specimen: Wound from Pleural Fluid     Culture, MRSA [[id number]] Collected: 03/26/24 0145    Order Status: Completed Specimen: MRSA source from Nares, Left Updated: 03/28/24 0715     MRSA Surveillance Screen No MRSA isolated    Culture, Respiratory with Gram Stain [[id number]] Collected: 03/26/24 0931    Order Status: Sent Specimen: Sputum, Expectorated Updated: 03/26/24 0931    Culture, Respiratory with Gram Stain [[id number]]     Order Status: Canceled Specimen: Sputum, Expectorated     Influenza A & B by Molecular [[id number]] Collected: 03/25/24 2034    Order Status: Completed Specimen: Nasopharyngeal Swab Updated: 03/25/24 2209     Influenza A, Molecular Negative     Influenza B, Molecular Negative     Flu A & B Source Nasal swab

## 2024-03-30 NOTE — PROGRESS NOTES
Pharmacokinetic Assessment Follow Up: IV Vancomycin    Vancomycin serum concentration assessment(s):    The random level was drawn correctly and can be used to guide therapy at this time. The measurement is above the desired definitive target range of 15 to 20 mcg/mL.    Vancomycin Regimen Plan:    Discontinue the scheduled vancomycin regimen and re-dose when the random level is less than 20 mcg/mL, next level to be drawn at 0600 on 03/30.    Drug levels (last 3 results):  Recent Labs   Lab Result Units 03/27/24  1400 03/28/24  0712 03/29/24  0708 03/29/24  2357   Vancomycin, Random ug/mL  --  9.7  --   --    Vancomycin-Trough ug/mL 4.0*  --  20.8 25.7*       Pharmacy will continue to follow and monitor vancomycin.    Please contact pharmacy at extension [phone] for questions regarding this assessment.    Thank you for the consult,   Vandana Garcia       Patient brief summary:  Mayela Molina is a 28 y.o. adult initiated on antimicrobial therapy with IV Vancomycin for treatment of lower respiratory infection    The patient's current regimen is pulse dosing.     Drug Allergies:   Review of patient's allergies indicates:  No Known Allergies    Actual Body Weight:   45.4 kg    Renal Function:   Estimated Creatinine Clearance (based on SCr of 0.7 mg/dL)  Female: 87.3 mL/min  Male: 102.7 mL/min  [facility] for info,     Dialysis Method (if applicable):  N/A    CBC (last 72 hours):  Recent Labs   Lab Result Units 03/27/24  0429 03/28/24  0712 03/29/24  0708   WBC K/uL 26.90* 27.93* 21.84*   Hemoglobin g/dL 9.5* 10.4* 10.7*   Hematocrit % 28.4* 30.3* 32.1*   Platelets K/uL 387 453* 486*   Gran % % 87.3* 86.2* 80.4*   Lymph % % 6.6* 6.6* 11.2*   Mono % % 5.1 5.7 7.1   Eosinophil % % 0.1 0.1 0.1   Basophil % % 0.1 0.2 0.3   Differential Method  Automated Automated Automated       Metabolic Panel (last 72 hours):  Recent Labs   Lab Result Units 03/27/24  0429 03/27/24  1817 03/28/24  0712 03/29/24  0708   Sodium mmol/L 129*  --   128* 131*   Sodium, Urine mmol/L  --  38  --   --    Potassium mmol/L 3.9  --  4.2 3.9   Chloride mmol/L 95  --  95 97   CO2 mmol/L 24  --  24 21*   Glucose mg/dL 126*  --  102 93   BUN mg/dL 6  --  6 11   Creatinine mg/dL 0.6  --  0.6 0.7   Albumin g/dL 1.7*  --  1.8* 1.9*   Total Bilirubin mg/dL 0.4  --  0.5 0.6   Alkaline Phosphatase U/L 53*  --  59 74   AST U/L 14  --  21 32   ALT U/L 16  --  19 26   Magnesium mg/dL 1.8  --  1.9  --    Phosphorus mg/dL  --   --  3.6  --        Vancomycin Administrations:  vancomycin given in the last 96 hours                     vancomycin 1,250 mg in dextrose 5 % (D5W) 250 mL IVPB (Vial-Mate) (mg) 1,250 mg New Bag 03/29/24 1704    vancomycin 1,500 mg in dextrose 5 % (D5W) 250 mL IVPB (Vial-Mate) (mg) 1,500 mg Bolus 03/29/24 0819     1,500 mg New Bag 03/28/24 2330     1,500 mg New Bag  1651    vancomycin (VANCOCIN) 1,000 mg in dextrose 5 % (D5W) 250 mL IVPB (Vial-Mate) (mg) 1,000 mg New Bag 03/28/24 0852     1,000 mg New Bag 03/27/24 2352     1,000 mg New Bag  1608    vancomycin 750 mg in dextrose 5 % (D5W) 250 mL IVPB (Vial-Mate) (mg) 750 mg New Bag 03/27/24 0306     750 mg New Bag 03/26/24 1426    vancomycin (VANCOCIN) 1,000 mg in dextrose 5 % (D5W) 250 mL IVPB (Vial-Mate) (mg) 1,000 mg New Bag 03/26/24 0148                    Microbiologic Results:  Microbiology Results (last 7 days)       Procedure Component Value Units Date/Time    Culture, Body Fluid (Aerobic) w/ GS [[id number]] Collected: 03/29/24 0916    Order Status: Completed Specimen: Body Fluid from Pleural Fluid Updated: 03/29/24 1800     Gram Stain Result Few WBC's      No organisms seen    Culture, Anaerobe [[id number]] Collected: 03/29/24 0916    Order Status: Sent Specimen: Body Fluid from Pleural Fluid Updated: 03/29/24 1551    AFB Culture & Smear [[id number]] Collected: 03/29/24 0916    Order Status: Sent Specimen: Body Fluid from Pleural Fluid Updated: 03/29/24 1551    Fungus culture [[id number]] Collected:  03/29/24 0916    Order Status: Sent Specimen: Body Fluid from Pleural Fluid Updated: 03/29/24 1551    Blood Culture #1 **CANNOT BE ORDERED STAT** [[id number]] Collected: 03/25/24 2306    Order Status: Completed Specimen: Blood from Peripheral, Forearm, Left Updated: 03/29/24 1012     Blood Culture, Routine No Growth to date      No Growth to date      No Growth to date      No Growth to date    Blood Culture #2 **CANNOT BE ORDERED STAT** [[id number]] Collected: 03/25/24 2306    Order Status: Completed Specimen: Blood from Peripheral, Hand, Left Updated: 03/29/24 1012     Blood Culture, Routine No Growth to date      No Growth to date      No Growth to date      No Growth to date    Aerobic culture [[id number]] Collected: 03/29/24 0918    Order Status: Canceled Specimen: Abscess from Pleural Fluid     Gram stain [[id number]] Collected: 03/29/24 0916    Order Status: Canceled Specimen: Body Fluid from Pleural Fluid     Culture, Anaerobe [[id number]]     Order Status: No result Specimen: Body Fluid from Pleural Fluid     Aerobic culture [[id number]]     Order Status: No result Specimen: Wound from Pleural Fluid     Aerobic culture [[id number]]     Order Status: No result Specimen: Wound from Pleural Fluid     Culture, MRSA [[id number]] Collected: 03/26/24 0145    Order Status: Completed Specimen: MRSA source from Nares, Left Updated: 03/28/24 0715     MRSA Surveillance Screen No MRSA isolated    Culture, Respiratory with Gram Stain [[id number]] Collected: 03/26/24 0931    Order Status: Sent Specimen: Sputum, Expectorated Updated: 03/26/24 0931    Culture, Respiratory with Gram Stain [[id number]]     Order Status: Canceled Specimen: Sputum, Expectorated     Influenza A & B by Molecular [[id number]] Collected: 03/25/24 2034    Order Status: Completed Specimen: Nasopharyngeal Swab Updated: 03/25/24 2209     Influenza A, Molecular Negative     Influenza B, Molecular Negative     Flu A & B Source Nasal swab

## 2024-03-30 NOTE — PROGRESS NOTES
[facility]  Pulmonology Progress Note    Patient Name: Mayela Molina   MRN: [id number]  Admission Date: 3/25/2024    Hospital Length of Stay: 4 days  Code Status: Full Code     Attending Provider: Linsey Garcia MD    Subjective:   History of present illness:  Chief complaint: pleuritic pain    Mayela Molina is a 28-year-old patient with no chronic medical problems who presented to the ED complaining of a cough x 3 weeks. Patient is a biological male in the transition to female who reports a cough with yellow sputum, left sided pleuritic pain, and dyspnea that increases with exertion. Denies fever, chills, dizziness, abdominal pain, n/v/d, dysuria, hematuria, myalgia. Denies tobacco use, vaping/e-cigarettes, or drug use. Abnormal labs in the emergency department include white blood cell count 30.46, hemoglobin 12.7, platelets 577, D-dimer 1.37, sodium 122, chloride 90, bicarb 21, glucose 121, albumin 2.3. Patient was admitted under hospital medicine service for left-sided pneumonia with possible underlying abscess formation. Pulmonary consulted for evaluation.    Interval history:  3/30: underwent VATS yesterday; chest tube in place. Pulmonary status stable on room air.     Objective:     Vital Signs (Most Recent):  Temp: 97.3 °F (36.3 °C) (03/30/24 0720)  Pulse: 105 (03/30/24 0720)  Resp: 18 (03/30/24 0820)  BP: 115/64 (03/30/24 0720)  SpO2: 96 % (03/30/24 0720) Vital Signs (24h Range):  Temp:  [97.2 °F (36.2 °C)-98.4 °F (36.9 °C)] 97.3 °F (36.3 °C)  Pulse:  [] 105  Resp:  [12-26] 18  SpO2:  [92 %-99 %] 96 %  BP: (102-124)/(58-78) 115/64   Weight: 45.4 kg (100 lb 1.4 oz); Body mass index is 14.78 kg/m².    Intake/Output Summary (Last 24 hours) at 3/30/2024 0905  Last data filed at 3/30/2024 0640  Gross per 24 hour   Intake 1865.75 ml   Output 1265 ml   Net 600.75 ml      Physical Exam  Vitals and nursing note reviewed.   HENT:      Head: Normocephalic.    Eyes:      Conjunctiva/sclera: Conjunctivae normal.   Cardiovascular:      Rate and Rhythm: Tachycardia present.   Pulmonary:      Effort: Pulmonary effort is normal.      Breath sounds: Normal breath sounds.      Comments: Chest tube to pleurovac  Abdominal:      Palpations: Abdomen is soft.   Musculoskeletal:         General: Normal range of motion.      Cervical back: Normal range of motion.   Skin:     General: Skin is warm.   Neurological:      Mental Status: She is alert and oriented to person, place, and time.   Psychiatric:         Mood and Affect: Mood normal.         Review of Systems: Negative except as indicated in HPI    Significant Labs:  CBC/Anemia Profile:  Recent Labs   Lab 03/29/24  0708 03/30/24  0439   WBC 21.84* 28.27*   HGB 10.7* 9.1*   HCT 32.1* 28.1*   * 446   MCV 87 90   RDW 13.3 13.9   Chemistries:  Recent Labs   Lab 03/29/24  0708 03/30/24  0439   * 129*   K 3.9 4.9   CL 97 97   CO2 21* 19*   BUN 11 20   CREATININE 0.7 1.8*   CALCIUM 8.0* 7.6*   ALBUMIN 1.9* 1.5*   PROT 6.7  --    BILITOT 0.6  --    ALKPHOS 74  --    ALT 26  --    AST 32  --    PHOS  --  5.5*   ABG  Recent Labs   Lab 03/26/24  0559   PH 7.461*   PO2 62*   PCO2 31.1*   HCO3 22.1*   BE -2     Assessment/Plan:     Pneumonia of left lung due to infectious organism  Pulmonary abscess  Hydropneumothorax  CT chest on admit with dense consolidation and questionable underlying abscess formation; no significant effusion noted  Repeat CT chest with apparent hydropneumothorax with underlying pulmonary abscess  Post VATS and chest tube placement  Follow CT output  Follow cultures and de-escalate antibiotics as appropriate  Continue Zosyn and MRSA   Supplemental oxygen as needed to maintain saturations 90% or greater; currently on room air   Follow chest imaging as needed  Mobilize as able; OOB to chair TID  IS while awake    Pleuritic chest pain  Prn IV Morphine and oxycodone as needed    Kiarra Willis NP  Pulmonary  and Critical Care  [facility]

## 2024-03-30 NOTE — PLAN OF CARE
EVAL AND TX COMPLETED: facilitated bed mobility with SBA, transfers with CGA. Ambulated 15 ft x 2 with SBA with no AD. Recommend low intensity PT services

## 2024-03-30 NOTE — PT/OT/SLP EVAL
Physical Therapy Evaluation    Patient Name:  Mayela Molina   MRN:  [id number]    Recommendations:     Discharge Recommendations: Low Intensity Therapy   Discharge Equipment Recommendations: none   Barriers to discharge: None    Assessment:     Mayela Molina is a 28 y.o. adult admitted with a medical diagnosis of Pneumonia of left lung due to infectious organism.  She presents with the following impairments/functional limitations: weakness, impaired endurance, impaired functional mobility, gait instability, impaired balance, decreased coordination, decreased lower extremity function, decreased safety awareness, pain, impaired coordination which limits mobility, increases risk of falls, and increases caregiver burden. Pt will benefit from continued PT services at low intensity in order to return to baseline.    Rehab Prognosis: Good; patient would benefit from acute skilled PT services to address these deficits and reach maximum level of function.    Recent Surgery: Procedure(s) (LRB):  VATS (VIDEO-ASSISTED THORACOSCOPIC SURGERY) (Left)  BLOCK, NERVE, INTERCOSTAL, 2 OR MORE (Left) 1 Day Post-Op    Plan:     During this hospitalization, patient to be seen 3 x/week to address the identified rehab impairments via gait training, therapeutic activities, therapeutic exercises, neuromuscular re-education and progress toward the following goals:    Plan of Care Expires:  04/13/24    Subjective     Chief Complaint: pain at chest tube site  Patient/Family Comments/goals: to go home/get better  Pain/Comfort:  Pain Rating 1: 2/10  Pain Addressed 1: Pre-medicate for activity, Reposition, Distraction  Pain Rating Post-Intervention 1: 2/10    Patients cultural, spiritual, Buddhism conflicts given the current situation: no    Living Environment:  Pt lives in [place] in [facility] with 1-3 steps at entry and has an apartment in  with 14 steps at entry. Pt potentially would move out of  apartment. Overall unsure of living  situation at this time.  Prior to admission, patients level of function was independent.  Equipment used at home: none.  DME owned (not currently used): rolling walker and none.  Upon discharge, patient will have assistance from family.    Objective:     Communicated with nursing (Cassia) and performed chart review via epic system prior to session.  Patient found supine with telemetry, chest tube, peripheral IV  upon PT entry to room. Family    General Precautions: Standard, fall  Orthopedic Precautions:N/A   Braces: N/A  Respiratory Status: Room air    Exams:  Cognitive Exam:  Patient is oriented to Person, Place, Time, and Situation  Gross Motor Coordination:  WFL  Postural Exam:  Patient presented with the following abnormalities:    -       Rounded shoulders  -       Forward head  RLE ROM: WFL  RLE Strength: WFL  LLE ROM: WFL  LLE Strength: WFL  Grossly deconditioned but WFL    Functional Mobility:  Bed Mobility:     Scooting: stand by assistance  Supine to Sit: stand by assistance  Sit to Supine: stand by assistance  Transfers:     Sit to Stand:  contact guard assistance with hand-held assist  Bed to Chair: stand by assistance with  no AD  using  Stand Pivot  Gait: 15 ft x 2 SBA with no AD, demonstrated slow pace, narrow [place], unsteadiness on feet, increased time needed for all functional tasks  Balance: fair plus dynamic standing balance      AM-PAC 6 CLICK MOBILITY  Total Score:16       Treatment & Education:  Educated pt on benefits of consistent participation in PT services to meet functional goals. Educated pt on supine therex to promote strength, circulation and joint mobility. Exercises included AP. Educated to perform exercises intermittently throughout day to tolerance. Educated pt on importance of sitting OOB to promote endurance and overall activity tolerance. Educated pt on call don't fall policy and use of call button to alert nursing staff of needs (including to assist with returning back to bed).  Pt and family expressed understanding.     Patient left sitting in bedside chair with chair alarm with all lines intact, call button in reach, nursing notified, and family present. Nsg also at bedside    GOALS:   Multidisciplinary Problems       Physical Therapy Goals          Problem: Physical Therapy    Goal Priority Disciplines Outcome Goal Variances Interventions   Physical Therapy Goal     PT, PT/OT      Description: Pt will perform bed mobility independently in order to participate in EOB activity.  Pt will perform transfers independently in order to participate in OOB activity.  Pt will ambulate 450 ft I with no AD in order to participate in daily tasks.                        History:     History reviewed. No pertinent past medical history.    History reviewed. No pertinent surgical history.    Time Tracking:     PT Received On: 03/30/24  PT Start Time: 1440     PT Stop Time: 1505  PT Total Time (min): 25 min     Billable Minutes: Evaluation 10 and Gait Training 15      03/30/2024

## 2024-03-30 NOTE — ASSESSMENT & PLAN NOTE
Status post VATS with left lung chest tubes  -culture positive for MRSA  -on Zosyn and Zyvox  CVT and pulmonary following chest tubes

## 2024-03-30 NOTE — PROGRESS NOTES
Subjective:      Patient ID: Mayela Molina is a 28 y.o. adult.    Chief Complaint: Cough (Pt c/o cough/SOB  x3-4wks w/ pain from coughing. Denies sick contacts/CP. )    HPI: 28-year-old patient with no chronic medical problems who presented to the ED complaining of a cough x 3 weeks. Patient is a biological male in the transition to female who reports a cough with yellow sputum, left sided pleuritic pain, and dyspnea that increases with exertion  Scan showed a large left-sided hydropneumothorax and the consolidation of the left lower lobe.  She is afebrile and got IV antibiotics.  White cell count is coming down     Date of Procedure: 3/29/2024      Procedure: Procedure(s) (LRB):  VATS (VIDEO-ASSISTED THORACOSCOPIC SURGERY) (Left)  BLOCK, NERVE, INTERCOSTAL, 2 OR MORE (Left)     Surgeon(s) and Role:     * Josafat Sandoval MD - Primary     Assisting Surgeon: None     Pre-Operative Diagnosis: Pneumonia of left lower lobe due to infectious organism [J18.9]  Left empyema hydropneumothorax  Hepatitis-C  Supraventricular tachycardia     Post-Operative Diagnosis: Post-Op Diagnosis Codes:     * Pneumonia of left lower lobe due to infectious organism [J18.9]  Same  Patient underwent left thoracotomy and decortication, intraoperatively purulent secretion with a necrotic lung involving the left lower lobe with multiple abscesses found.  She was tachycardic overnight and looks volume contracted with a rising creatinine and low albumin.  Still having pain issues not controlled with current dosage of morphine and oxycodone.  Incentive spirometry is not being done because of the pain.  Not able to cough.  Poor appetite.      Review of patient's allergies indicates:  No Known Allergies    History reviewed. No pertinent past medical history.    History reviewed. No pertinent family history.    Social History     Socioeconomic History    Marital status: Single   Tobacco Use    Smoking status: Never    Smokeless tobacco:  "Never   Substance and Sexual Activity    Alcohol use: Never    Drug use: Never    Sexual activity: Yes       History reviewed. No pertinent surgical history.    Review of Systems   Constitutional:  Positive for activity change, appetite change and fatigue.   HENT:  Negative for dental problem, nosebleeds and sore throat.    Eyes:  Negative for discharge and visual disturbance.   Respiratory:  Positive for cough, chest tightness and shortness of breath. Negative for stridor.    Cardiovascular:  Negative for leg swelling.   Gastrointestinal:  Negative for abdominal distention and abdominal pain.   Genitourinary:  Negative for difficulty urinating and dysuria.   Musculoskeletal:  Negative for arthralgias, back pain and joint swelling.   Allergic/Immunologic: Negative for environmental allergies.   Neurological:  Negative for dizziness, syncope and headaches.   Hematological:  Does not bruise/bleed easily.   Psychiatric/Behavioral:  Negative for behavioral problems.           Objective:   /64   Pulse 105   Temp 97.3 °F (36.3 °C) (Temporal)   Resp 18   Ht 5' 9" (1.753 m)   Wt 45.4 kg (100 lb 1.4 oz)   SpO2 96%   BMI 14.78 kg/m²     X-Ray Chest AP Single View  Narrative: EXAMINATION:  XR CHEST 1 VIEW    CLINICAL HISTORY:  Pneumonia, unspecified organismpost operative;    FINDINGS:  Comparison is made to March 28, 2024.    Left-sided chest tube.  Improving left lower lung opacity.  Trace left apical pneumothorax.  Right lung is clear.  Impression: Large-bore left-sided chest tube.  Trace left apical pneumothorax.  Improving left lower lung opacity.    Electronically signed by: Juan Jose Macias  Date:    03/29/2024  Time:    10:43         Physical Exam  Vitals reviewed.   Constitutional:       Appearance: Normal appearance.   HENT:      Head: Normocephalic and atraumatic.      Mouth/Throat:      Mouth: Mucous membranes are moist.   Eyes:      Extraocular Movements: Extraocular movements intact.   Cardiovascular: "      Rate and Rhythm: Normal rate and regular rhythm.      Pulses: Normal pulses.      Heart sounds: Normal heart sounds.   Pulmonary:      Effort: Pulmonary effort is normal.      Breath sounds: Rhonchi and rales present.   Abdominal:      Palpations: Abdomen is soft.   Musculoskeletal:         General: Normal range of motion.      Cervical back: Normal range of motion and neck supple.   Skin:     General: Skin is warm.      Capillary Refill: Capillary refill takes less than 2 seconds.   Neurological:      General: No focal deficit present.      Mental Status: She is alert and oriented to person, place, and time.   Psychiatric:         Mood and Affect: Mood normal.         Behavior: Behavior normal.         Assessment:     1. Pneumonia of left lower lobe due to infectious organism    2. Tachycardia    3. Leukocytosis, unspecified type    4. Hyponatremia    5. Chest pain          Plan   28-year-old female with a left sided hydropneumothorax status post thoracotomy and decortication with chest tube placement postop day 1.  Neuro Pain control as needed.  Activity ambulate as tolerated patient chest tubes can be discontinued from the suction while ambulating and keep it under-20 cm when she is in the bed.  Respiratory aggressive pulmonary toilet incentive spirometry.  Cardiovascular beta-blocker for heart  rate control.  Infectious disease antibiotics as per the culture managed by hospital medicine team.  DVT and GI prophylaxis with Lovenox bilateral SCDs and Pepcid.  PTOT out of bed ambulate  Rest of the management by hospital medicine team.          Josafat Sandoval MD  [facility]  [place]

## 2024-03-30 NOTE — ASSESSMENT & PLAN NOTE
CT chest on admit with dense consolidation and questionable underlying abscess formation; no significant effusion noted  Repeat CT chest with apparent hydropneumothorax with underlying pulmonary abscess  Post VATS and chest tube placement  Follow CT output  Follow cultures and de-escalate antibiotics as appropriate  Continue Zosyn and MRSA   Supplemental oxygen as needed to maintain saturations 90% or greater; currently on room air   Follow chest imaging as needed  Mobilize as able; OOB to chair TID  IS while awake

## 2024-03-30 NOTE — PROGRESS NOTES
Therapy with vancomycin complete and/or consult discontinued by provider.    Pharmacy will sign off, please re-consult as needed.  /Zuleika Hurt [facility] 3/30/2024 7:45 AM

## 2024-03-30 NOTE — ASSESSMENT & PLAN NOTE
Patient's anemia is currently controlled. Has not received any PRBCs to date.   Current CBC reviewed-   Lab Results   Component Value Date    HGB 9.1 (L) 03/30/2024    HCT 28.1 (L) 03/30/2024     Monitor serial CBC and transfuse if patient becomes hemodynamically unstable, symptomatic or H/H drops below 7/21.  Check iron stores

## 2024-03-30 NOTE — PROGRESS NOTES
[facility] Medicine  Progress Note    Patient Name: Mayela Molina  MRN: [id number]  Patient Class: IP- Inpatient   Admission Date: 3/25/2024  Length of Stay: 4 days  Attending Physician: Linsey Garcia MD  Primary Care Provider: Katie, Primary Doctor        Subjective:     Principal Problem:Pneumonia/ empyema of left lung due to infectious organism        HPI:  28-year-old patient (male to female transgender) with no chronic medical conditions who presented to the emergency department with complaint of cough over the last 3 weeks, moderate severity, persistent.  Patient does have associated yellow sputum production, shortness for breath, and pleuritic chest pain.  No associated nausea, vomiting, diarrhea.  No complaints of fevers or chills although T-max in the emergency department recorded as 99.8.  Patient does not smoke cigarettes or vape.  No illicit drug use.  Abnormal labs in the emergency department include white blood cell count 30.46, hemoglobin 12.7, platelets 577, D-dimer 1.37, sodium 122, chloride 90, bicarb 21, glucose 121, albumin 2.3.  Last labs are from 2018 with no interim labs to compare with.  Patient does take spironolactone, Estrace, and Provera.  CT scan of chest with left-sided pneumonia with possible abscess formation.    Overview/Hospital Course:   Patient was admitted acute respiratory failure with left-sided pneumonia and possible abscess formation.  She initially was on nasal cannula and on morning after admission became acutely short of breath with a PO2 of 87% and tachycardic.  She was given a dose of adenosine which did not improve things however gradually her heart rate decreased.  Patient was admitted to the intensive care unit for respiratory failure tachycardia.  She was initially on high-flow nasal cannula and subsequently was able to wean to room air.  Cultures remained pending.  She was initially started on Rocephin and azithromycin and  changed to Zosyn who azithromycin vein.  Legionella antigen pending.  Continue with aggressive pulmonary care  With worsening CT scan, continued elevated WBC, continued elevated heart rate.  Patient underwent repeat CT scan of the chest which showed worsening of empyema.  She was seen and evaluated by Cardiothoracic surgery and underwent left VATS with decortication on 3/19.    Hyponatremia patient was admitted with hyponatremia she had fluid restrictions in place cortisol was normal, TSH normal, patient was on spironolactone and this was held.  She was being maintained on fluid restricted diet.         Interval History:  Patient seen and examined at bedside.  Family at bedside and discussed with CVT.  She still continues to complain of significant pain.    Review of Systems   Constitutional:  Negative for fatigue and fever.   Respiratory:  Positive for cough and shortness of breath.         Pain at site of chest tubes   Cardiovascular:  Negative for chest pain and leg swelling.   Gastrointestinal:  Negative for nausea and vomiting.   All other systems reviewed and are negative.    Objective:     Vital Signs (Most Recent):  Temp: 97.6 °F (36.4 °C) (03/30/24 1606)  Pulse: 99 (03/30/24 1606)  Resp: 18 (03/30/24 1606)  BP: 130/70 (03/30/24 1606)  SpO2: 96 % (03/30/24 1606) Vital Signs (24h Range):  Temp:  [97.2 °F (36.2 °C)-98.4 °F (36.9 °C)] 97.6 °F (36.4 °C)  Pulse:  [] 99  Resp:  [16-22] 18  SpO2:  [92 %-99 %] 96 %  BP: (102-130)/(59-70) 130/70     Weight: 45.4 kg (100 lb 1.4 oz)  Body mass index is 14.78 kg/m².    Intake/Output Summary (Last 24 hours) at 3/30/2024 1647  Last data filed at 3/30/2024 0640  Gross per 24 hour   Intake 1865.75 ml   Output 1085 ml   Net 780.75 ml         Physical Exam  Vitals reviewed.   Constitutional:       Appearance: Normal appearance.   HENT:      Head: Normocephalic and atraumatic.      Mouth/Throat:      Mouth: Mucous membranes are moist.      Pharynx: Oropharynx is clear.  "  Eyes:      Extraocular Movements: Extraocular movements intact.      Conjunctiva/sclera: Conjunctivae normal.   Cardiovascular:      Rate and Rhythm: Regular rhythm. Tachycardia present.      Pulses: Normal pulses.      Heart sounds: Normal heart sounds.   Pulmonary:      Effort: Pulmonary effort is normal.      Breath sounds: Normal breath sounds.   Abdominal:      General: Bowel sounds are normal.      Palpations: Abdomen is soft.   Musculoskeletal:         General: Normal range of motion.      Cervical back: Normal range of motion and neck supple.   Skin:     General: Skin is warm and dry.   Neurological:      General: No focal deficit present.      Mental Status: She is alert and oriented to person, place, and time. Mental status is at baseline.   Psychiatric:         Mood and Affect: Mood normal.         Behavior: Behavior normal.         Thought Content: Thought content normal.             Significant Labs: All pertinent labs within the past 24 hours have been reviewed.  Blood Culture: No results for input(s): "LABBLOO" in the last 48 hours.  CBC:   Recent Labs   Lab 03/29/24  0708 03/30/24  0439   WBC 21.84* 28.27*   HGB 10.7* 9.1*   HCT 32.1* 28.1*   * 446     CMP:   Recent Labs   Lab 03/29/24  0708 03/30/24  0439   * 129*   K 3.9 4.9   CL 97 97   CO2 21* 19*   GLU 93 102   BUN 11 20   CREATININE 0.7 1.8*   CALCIUM 8.0* 7.6*   PROT 6.7  --    ALBUMIN 1.9* 1.5*   BILITOT 0.6  --    ALKPHOS 74  --    AST 32  --    ALT 26  --    ANIONGAP 13 13       Significant Imaging: I have reviewed all pertinent imaging results/findings within the past 24 hours.    Assessment/Plan:      * Pneumonia of left lung due to infectious organism  -white blood cell count 30.46, lactic acid level 1.5, D-dimer 1.37, BNP 14, troponin negative, influenza a/B negative, COVID-19 negative  -blood cultures pending  -CTA of chest with "no pulmonary embolism, no dissection; dense consolidation in the left lower lobe extending " "to the left upper lobe with tree in bud opacities consistent with severe pneumonia; questionable areas of fluid density in the left lung base with foci of gas may relate to component of fluid density or possible liquefaction or possible abscess formation."  -continue IV Zosyn, vanco and IV azithromycin initiated in the emergency department  -check MRSA culture-negative, sputum culture-pending, HIV  -give IV fluids, O2 supplementation, p.r.n. albuterol inhaler  - repeat CT scan of chest today looks more like an empyema  -CVT consulted and patient underwent VATS with drainage      Hydropneumothorax    Status post VATS with left lung chest tubes  -culture positive for MRSA  -on Zosyn and Zyvox  CVT and pulmonary following chest tubes    Pulmonary abscess        Tachycardia, paroxysmal    Beta-blocker started heart rate markedly improved.    Pleuritic chest pain  Pleuritic chest pain related to left-sided pneumonia/empyema.  Cardiac enzymes negative.      Male-to-female transgender person  Resume home medications to include Estrace & Provera; hold spironolactone due to hyponatremia      Thrombocytosis  Current platelet count 486.  Likely reactive thrombocytosis related to sepsis with underlying pneumonia.      Normocytic anemia  Patient's anemia is currently controlled. Has not received any PRBCs to date.   Current CBC reviewed-   Lab Results   Component Value Date    HGB 9.1 (L) 03/30/2024    HCT 28.1 (L) 03/30/2024     Monitor serial CBC and transfuse if patient becomes hemodynamically unstable, symptomatic or H/H drops below 7/21.  Check iron stores    Underweight  Current BMI 15.27, albumin 2.3.    Add nutritional supplements      Hyponatremia  Patient has hyponatremia which is uncontrolled,We will aim to correct the sodium by 4-6mEq in 24 hours. We will monitor sodium Every 6 hours x4. The hyponatremia is due to Dehydration/hypovolemia. We will treat the hyponatremia with IV fluids as follows:  Normal saline at 100 " mL/hr. The patient's sodium results have been reviewed and are listed below.  Recent Labs   Lab 03/30/24  0439   *       -hold spironolactone    - TSH normal, cortisol appropriate, free water restrict  -status post 1 L normal saline IV fluid bolus given in the emergency department  Salt tablets begun  Fluid restricted diet      Hypotension  Relative hypotension with blood pressure 99/56.  Patient received 1 L normal saline IV fluid bolus in the emergency department.   Hemodynamically stable    Blood pressure improving    Sepsis  This patient does have evidence of infective focus  My overall impression is sepsis.  Source: Respiratory  Antibiotics given-   Antibiotics (72h ago, onward)      Start     Stop Route Frequency Ordered    03/30/24 0900  linezolid tablet 600 mg         -- Oral Every 12 hours 03/30/24 0731    03/28/24 1800  piperacillin-tazobactam (ZOSYN) 4.5 g in dextrose 5 % in water (D5W) 100 mL IVPB (MB+)         -- IV Every 8 hours (non-standard times) 03/28/24 1538    03/26/24 0900  mupirocin 2 % ointment         03/31/24 0859 Nasl 2 times daily 03/26/24 0751            Organ dysfunction indicated by  none    Fluid challenge Not needed - patient is not hypotensive      Post- resuscitation assessment No - Post resuscitation assessment not needed       Will Not start Pressors- Levophed for MAP of 65  Source control achieved by: broad spectrum antibiotics      VTE Risk Mitigation (From admission, onward)           Ordered     Place sequential compression device  Until discontinued        Comments: Can be discontinued when not in the bed.    03/30/24 1113     enoxaparin injection 40 mg  Every 24 hours         03/28/24 0912     IP VTE HIGH RISK PATIENT  Once         03/26/24 0043     Reason for No Pharmacological VTE Prophylaxis  Once        Question:  Reasons:  Answer:  Physician Provided (leave comment)  Comment:  pending pulmonary consult    03/26/24 0043     IP VTE HIGH RISK PATIENT  Once          03/26/24 0043     Place sequential compression device  Until discontinued         03/26/24 0043                    Discharge Planning   RAVINDER:      Code Status: Full Code   Is the patient medically ready for discharge?:     Reason for patient still in hospital (select all that apply): Patient trending condition, Laboratory test, Treatment, and Consult recommendations  Discharge Plan A: Home                  Linsey Barragan MD  Department of Hospital Medicine   [facility] [facility])

## 2024-03-31 LAB
ANION GAP SERPL CALC-SCNC: 11 MMOL/L (ref 8–16)
ANION GAP SERPL CALC-SCNC: 14 MMOL/L (ref 8–16)
BACTERIA BLD CULT: NORMAL
BACTERIA BLD CULT: NORMAL
BASOPHILS # BLD AUTO: 0.04 K/UL (ref 0–0.2)
BASOPHILS NFR BLD: 0.1 % (ref 0–1.9)
BILIRUB UR QL STRIP: NEGATIVE
BLD PROD TYP BPU: NORMAL
BLD PROD TYP BPU: NORMAL
BLOOD UNIT EXPIRATION DATE: NORMAL
BLOOD UNIT EXPIRATION DATE: NORMAL
BLOOD UNIT TYPE CODE: 1700
BLOOD UNIT TYPE CODE: 1700
BLOOD UNIT TYPE: NORMAL
BLOOD UNIT TYPE: NORMAL
BUN SERPL-MCNC: 23 MG/DL (ref 6–20)
BUN SERPL-MCNC: 24 MG/DL (ref 6–20)
CALCIUM SERPL-MCNC: 7.6 MG/DL (ref 8.7–10.5)
CALCIUM SERPL-MCNC: 8.1 MG/DL (ref 8.7–10.5)
CHLORIDE SERPL-SCNC: 100 MMOL/L (ref 95–110)
CHLORIDE SERPL-SCNC: 103 MMOL/L (ref 95–110)
CLARITY UR: CLEAR
CO2 SERPL-SCNC: 19 MMOL/L (ref 23–29)
CO2 SERPL-SCNC: 19 MMOL/L (ref 23–29)
CODING SYSTEM: NORMAL
CODING SYSTEM: NORMAL
COLOR UR: YELLOW
CREAT SERPL-MCNC: 2.3 MG/DL (ref 0.5–1.4)
CREAT SERPL-MCNC: 2.6 MG/DL (ref 0.5–1.4)
CREAT UR-MCNC: 57.8 MG/DL (ref 23–375)
CROSSMATCH INTERPRETATION: NORMAL
CROSSMATCH INTERPRETATION: NORMAL
DIFFERENTIAL METHOD BLD: ABNORMAL
DISPENSE STATUS: NORMAL
DISPENSE STATUS: NORMAL
EOSINOPHIL # BLD AUTO: 0 K/UL (ref 0–0.5)
EOSINOPHIL NFR BLD: 0.1 % (ref 0–8)
ERYTHROCYTE [DISTWIDTH] IN BLOOD BY AUTOMATED COUNT: 13.7 % (ref 11.5–14.5)
EST. GFR  (NO RACE VARIABLE): 34 ML/MIN/1.73 M^2
EST. GFR  (NO RACE VARIABLE): 39 ML/MIN/1.73 M^2
GLUCOSE SERPL-MCNC: 103 MG/DL (ref 70–110)
GLUCOSE SERPL-MCNC: 106 MG/DL (ref 70–110)
GLUCOSE UR QL STRIP: NEGATIVE
HCT VFR BLD AUTO: 22.1 % (ref 40–54)
HGB BLD-MCNC: 7.4 G/DL (ref 14–18)
HGB UR QL STRIP: NEGATIVE
IMM GRANULOCYTES # BLD AUTO: 0.5 K/UL (ref 0–0.04)
IMM GRANULOCYTES NFR BLD AUTO: 1.9 % (ref 0–0.5)
KETONES UR QL STRIP: NEGATIVE
LEUKOCYTE ESTERASE UR QL STRIP: NEGATIVE
LYMPHOCYTES # BLD AUTO: 1.9 K/UL (ref 1–4.8)
LYMPHOCYTES NFR BLD: 7.2 % (ref 18–48)
MCH RBC QN AUTO: 29.1 PG (ref 27–31)
MCHC RBC AUTO-ENTMCNC: 33.5 G/DL (ref 32–36)
MCV RBC AUTO: 87 FL (ref 82–98)
MONOCYTES # BLD AUTO: 1.8 K/UL (ref 0.3–1)
MONOCYTES NFR BLD: 6.6 % (ref 4–15)
NEUTROPHILS # BLD AUTO: 22.5 K/UL (ref 1.8–7.7)
NEUTROPHILS NFR BLD: 84.1 % (ref 38–73)
NITRITE UR QL STRIP: NEGATIVE
NRBC BLD-RTO: 0 /100 WBC
NUM UNITS TRANS PACKED RBC: NORMAL
NUM UNITS TRANS PACKED RBC: NORMAL
PH UR STRIP: 6 [PH] (ref 5–8)
PLATELET # BLD AUTO: 380 K/UL (ref 150–450)
PMV BLD AUTO: 8.6 FL (ref 9.2–12.9)
POTASSIUM SERPL-SCNC: 3.9 MMOL/L (ref 3.5–5.1)
POTASSIUM SERPL-SCNC: 4.4 MMOL/L (ref 3.5–5.1)
PROT UR QL STRIP: ABNORMAL
RBC # BLD AUTO: 2.54 M/UL (ref 4.6–6.2)
SODIUM SERPL-SCNC: 133 MMOL/L (ref 136–145)
SODIUM SERPL-SCNC: 133 MMOL/L (ref 136–145)
SODIUM UR-SCNC: 65 MMOL/L (ref 20–250)
SP GR UR STRIP: 1.02 (ref 1–1.03)
URN SPEC COLLECT METH UR: ABNORMAL
UROBILINOGEN UR STRIP-ACNC: NEGATIVE EU/DL
UUN UR-MCNC: 399 MG/DL (ref 140–1050)
WBC # BLD AUTO: 26.76 K/UL (ref 3.9–12.7)

## 2024-03-31 PROCEDURE — 25000003 PHARM REV CODE 250: Performed by: INTERNAL MEDICINE

## 2024-03-31 PROCEDURE — 25000003 PHARM REV CODE 250: Performed by: ANESTHESIOLOGY

## 2024-03-31 PROCEDURE — 82570 ASSAY OF URINE CREATININE: CPT | Performed by: INTERNAL MEDICINE

## 2024-03-31 PROCEDURE — 99900035 HC TECH TIME PER 15 MIN (STAT)

## 2024-03-31 PROCEDURE — 81003 URINALYSIS AUTO W/O SCOPE: CPT | Performed by: INTERNAL MEDICINE

## 2024-03-31 PROCEDURE — 80048 BASIC METABOLIC PNL TOTAL CA: CPT | Performed by: INTERNAL MEDICINE

## 2024-03-31 PROCEDURE — 27000646 HC AEROBIKA DEVICE

## 2024-03-31 PROCEDURE — P9016 RBC LEUKOCYTES REDUCED: HCPCS | Performed by: THORACIC SURGERY (CARDIOTHORACIC VASCULAR SURGERY)

## 2024-03-31 PROCEDURE — 85025 COMPLETE CBC W/AUTO DIFF WBC: CPT | Performed by: THORACIC SURGERY (CARDIOTHORACIC VASCULAR SURGERY)

## 2024-03-31 PROCEDURE — 80048 BASIC METABOLIC PNL TOTAL CA: CPT | Mod: 91 | Performed by: THORACIC SURGERY (CARDIOTHORACIC VASCULAR SURGERY)

## 2024-03-31 PROCEDURE — 94799 UNLISTED PULMONARY SVC/PX: CPT | Mod: XB

## 2024-03-31 PROCEDURE — 94664 DEMO&/EVAL PT USE INHALER: CPT

## 2024-03-31 PROCEDURE — 27000221 HC OXYGEN, UP TO 24 HOURS

## 2024-03-31 PROCEDURE — 36430 TRANSFUSION BLD/BLD COMPNT: CPT

## 2024-03-31 PROCEDURE — 99222 1ST HOSP IP/OBS MODERATE 55: CPT | Mod: ,,, | Performed by: INTERNAL MEDICINE

## 2024-03-31 PROCEDURE — 25000242 PHARM REV CODE 250 ALT 637 W/ HCPCS: Performed by: THORACIC SURGERY (CARDIOTHORACIC VASCULAR SURGERY)

## 2024-03-31 PROCEDURE — 25000003 PHARM REV CODE 250: Performed by: THORACIC SURGERY (CARDIOTHORACIC VASCULAR SURGERY)

## 2024-03-31 PROCEDURE — 94761 N-INVAS EAR/PLS OXIMETRY MLT: CPT

## 2024-03-31 PROCEDURE — 94640 AIRWAY INHALATION TREATMENT: CPT

## 2024-03-31 PROCEDURE — 63600175 PHARM REV CODE 636 W HCPCS: Performed by: INTERNAL MEDICINE

## 2024-03-31 PROCEDURE — 30233N1 TRANSFUSION OF NONAUTOLOGOUS RED BLOOD CELLS INTO PERIPHERAL VEIN, PERCUTANEOUS APPROACH: ICD-10-PCS | Performed by: INTERNAL MEDICINE

## 2024-03-31 PROCEDURE — 86920 COMPATIBILITY TEST SPIN: CPT | Performed by: THORACIC SURGERY (CARDIOTHORACIC VASCULAR SURGERY)

## 2024-03-31 PROCEDURE — 21400001 HC TELEMETRY ROOM

## 2024-03-31 PROCEDURE — 25000003 PHARM REV CODE 250: Performed by: NURSE PRACTITIONER

## 2024-03-31 PROCEDURE — 84540 ASSAY OF URINE/UREA-N: CPT | Performed by: INTERNAL MEDICINE

## 2024-03-31 PROCEDURE — 84300 ASSAY OF URINE SODIUM: CPT | Performed by: INTERNAL MEDICINE

## 2024-03-31 PROCEDURE — 36415 COLL VENOUS BLD VENIPUNCTURE: CPT | Performed by: INTERNAL MEDICINE

## 2024-03-31 PROCEDURE — 36415 COLL VENOUS BLD VENIPUNCTURE: CPT | Mod: XB | Performed by: THORACIC SURGERY (CARDIOTHORACIC VASCULAR SURGERY)

## 2024-03-31 PROCEDURE — A4216 STERILE WATER/SALINE, 10 ML: HCPCS | Performed by: ANESTHESIOLOGY

## 2024-03-31 RX ORDER — HYDROMORPHONE HYDROCHLORIDE 1 MG/ML
1 INJECTION, SOLUTION INTRAMUSCULAR; INTRAVENOUS; SUBCUTANEOUS 4 TIMES DAILY PRN
Status: DISCONTINUED | OUTPATIENT
Start: 2024-03-31 | End: 2024-04-08

## 2024-03-31 RX ORDER — ACETYLCYSTEINE 100 MG/ML
4 SOLUTION ORAL; RESPIRATORY (INHALATION) 4 TIMES DAILY
Status: DISCONTINUED | OUTPATIENT
Start: 2024-03-31 | End: 2024-03-31

## 2024-03-31 RX ORDER — HYDROCODONE BITARTRATE AND ACETAMINOPHEN 500; 5 MG/1; MG/1
TABLET ORAL
Status: DISCONTINUED | OUTPATIENT
Start: 2024-03-31 | End: 2024-04-11 | Stop reason: HOSPADM

## 2024-03-31 RX ORDER — ACETYLCYSTEINE 100 MG/ML
4 SOLUTION ORAL; RESPIRATORY (INHALATION)
Status: DISCONTINUED | OUTPATIENT
Start: 2024-03-31 | End: 2024-04-05

## 2024-03-31 RX ORDER — SODIUM CHLORIDE 9 MG/ML
INJECTION, SOLUTION INTRAVENOUS CONTINUOUS
Status: DISCONTINUED | OUTPATIENT
Start: 2024-03-31 | End: 2024-04-08

## 2024-03-31 RX ORDER — IPRATROPIUM BROMIDE 0.5 MG/2.5ML
0.5 SOLUTION RESPIRATORY (INHALATION) 4 TIMES DAILY PRN
Status: DISCONTINUED | OUTPATIENT
Start: 2024-03-31 | End: 2024-04-11 | Stop reason: HOSPADM

## 2024-03-31 RX ADMIN — PIPERACILLIN SODIUM AND TAZOBACTAM SODIUM 4.5 G: 4; .5 INJECTION, POWDER, FOR SOLUTION INTRAVENOUS at 01:03

## 2024-03-31 RX ADMIN — METHOCARBAMOL 500 MG: 500 TABLET ORAL at 05:03

## 2024-03-31 RX ADMIN — ENOXAPARIN SODIUM 40 MG: 40 INJECTION SUBCUTANEOUS at 05:03

## 2024-03-31 RX ADMIN — Medication 2 ML: at 09:03

## 2024-03-31 RX ADMIN — METHOCARBAMOL 500 MG: 500 TABLET ORAL at 12:03

## 2024-03-31 RX ADMIN — ACETYLCYSTEINE 4 ML: 100 INHALANT RESPIRATORY (INHALATION) at 07:03

## 2024-03-31 RX ADMIN — METHOCARBAMOL 500 MG: 500 TABLET ORAL at 09:03

## 2024-03-31 RX ADMIN — SODIUM CHLORIDE 1000 MG: 1 TABLET ORAL at 09:03

## 2024-03-31 RX ADMIN — LINEZOLID 600 MG: 600 TABLET, FILM COATED ORAL at 08:03

## 2024-03-31 RX ADMIN — SODIUM CHLORIDE: 9 INJECTION, SOLUTION INTRAVENOUS at 06:03

## 2024-03-31 RX ADMIN — PIPERACILLIN SODIUM AND TAZOBACTAM SODIUM 4.5 G: 4; .5 INJECTION, POWDER, FOR SOLUTION INTRAVENOUS at 09:03

## 2024-03-31 RX ADMIN — SODIUM CHLORIDE 1000 MG: 1 TABLET ORAL at 08:03

## 2024-03-31 RX ADMIN — ACETYLCYSTEINE 4 ML: 100 INHALANT RESPIRATORY (INHALATION) at 11:03

## 2024-03-31 RX ADMIN — FAMOTIDINE 20 MG: 20 TABLET ORAL at 08:03

## 2024-03-31 RX ADMIN — METHOCARBAMOL 500 MG: 500 TABLET ORAL at 08:03

## 2024-03-31 RX ADMIN — IPRATROPIUM BROMIDE 0.5 MG: 0.5 SOLUTION RESPIRATORY (INHALATION) at 08:03

## 2024-03-31 RX ADMIN — ACETAMINOPHEN 1000 MG: 500 TABLET ORAL at 06:03

## 2024-03-31 RX ADMIN — OXYCODONE HYDROCHLORIDE 5 MG: 5 TABLET ORAL at 05:03

## 2024-03-31 RX ADMIN — SODIUM CHLORIDE 1000 MG: 1 TABLET ORAL at 03:03

## 2024-03-31 RX ADMIN — ESTRADIOL 8 MG: 1 TABLET ORAL at 08:03

## 2024-03-31 RX ADMIN — OXYCODONE HYDROCHLORIDE 10 MG: 5 TABLET ORAL at 09:03

## 2024-03-31 RX ADMIN — BENZONATATE 100 MG: 100 CAPSULE ORAL at 09:03

## 2024-03-31 RX ADMIN — FAMOTIDINE 20 MG: 20 TABLET ORAL at 09:03

## 2024-03-31 RX ADMIN — LINEZOLID 600 MG: 600 TABLET, FILM COATED ORAL at 09:03

## 2024-03-31 RX ADMIN — POLYETHYLENE GLYCOL 3350 17 G: 17 POWDER, FOR SOLUTION ORAL at 08:03

## 2024-03-31 RX ADMIN — ACETYLCYSTEINE 4 ML: 100 INHALANT RESPIRATORY (INHALATION) at 04:03

## 2024-03-31 RX ADMIN — MORPHINE SULFATE 4 MG: 4 INJECTION INTRAVENOUS at 11:03

## 2024-03-31 RX ADMIN — Medication 2 ML: at 12:03

## 2024-03-31 RX ADMIN — MEDROXYPROGESTERONE ACETATE 10 MG: 5 TABLET ORAL at 08:03

## 2024-03-31 RX ADMIN — PIPERACILLIN SODIUM AND TAZOBACTAM SODIUM 4.5 G: 4; .5 INJECTION, POWDER, FOR SOLUTION INTRAVENOUS at 06:03

## 2024-03-31 NOTE — SUBJECTIVE & OBJECTIVE
Interval History:  Examined at bedside still has chest tube, culture from abscesses growing staph.  On Zyvox.  Discussed with Pulmonary and Cardiovascular surgery.    Review of Systems   Constitutional:  Negative for fatigue and fever.   Respiratory:  Positive for cough and shortness of breath.         Pain at site of chest tubes   Cardiovascular:  Negative for chest pain and leg swelling.   Gastrointestinal:  Negative for nausea and vomiting.   All other systems reviewed and are negative.    Objective:     Vital Signs (Most Recent):  Temp: 98 °F (36.7 °C) (03/31/24 1519)  Pulse: 90 (03/31/24 1519)  Resp: 16 (03/31/24 1519)  BP: 113/60 (03/31/24 1519)  SpO2: 98 % (03/31/24 1519) Vital Signs (24h Range):  Temp:  [97.7 °F (36.5 °C)-98.7 °F (37.1 °C)] 98 °F (36.7 °C)  Pulse:  [] 90  Resp:  [16-20] 16  SpO2:  [94 %-98 %] 98 %  BP: (101-126)/(55-66) 113/60     Weight: 45.4 kg (100 lb 1.4 oz)  Body mass index is 14.78 kg/m².    Intake/Output Summary (Last 24 hours) at 3/31/2024 1620  Last data filed at 3/31/2024 1517  Gross per 24 hour   Intake 727.08 ml   Output 380 ml   Net 347.08 ml         Physical Exam  Vitals reviewed.   Constitutional:       Appearance: Normal appearance.   HENT:      Head: Normocephalic and atraumatic.      Mouth/Throat:      Mouth: Mucous membranes are moist.      Pharynx: Oropharynx is clear.   Eyes:      Extraocular Movements: Extraocular movements intact.      Conjunctiva/sclera: Conjunctivae normal.   Cardiovascular:      Rate and Rhythm: Normal rate and regular rhythm.      Pulses: Normal pulses.      Heart sounds: Normal heart sounds.   Pulmonary:      Effort: Pulmonary effort is normal.      Breath sounds: Normal breath sounds.   Abdominal:      General: Bowel sounds are normal.      Palpations: Abdomen is soft.   Musculoskeletal:         General: Normal range of motion.      Cervical back: Normal range of motion and neck supple.   Skin:     General: Skin is warm and dry.  "  Neurological:      General: No focal deficit present.      Mental Status: She is alert and oriented to person, place, and time. Mental status is at baseline.   Psychiatric:         Mood and Affect: Mood normal.         Behavior: Behavior normal.         Thought Content: Thought content normal.             Significant Labs: All pertinent labs within the past 24 hours have been reviewed.  Blood Culture: No results for input(s): "LABBLOO" in the last 48 hours.  CBC:   Recent Labs   Lab 03/30/24  0439 03/31/24  0540   WBC 28.27* 26.76*   HGB 9.1* 7.4*   HCT 28.1* 22.1*    380     CMP:   Recent Labs   Lab 03/30/24  0439 03/31/24  0540   * 133*   K 4.9 4.4   CL 97 100   CO2 19* 19*    103   BUN 20 24*   CREATININE 1.8* 2.3*   CALCIUM 7.6* 7.6*   ALBUMIN 1.5*  --    ANIONGAP 13 14         Significant Imaging: I have reviewed all pertinent imaging results/findings within the past 24 hours.  "

## 2024-03-31 NOTE — ASSESSMENT & PLAN NOTE
Patient's anemia is currently controlled. Has not received any PRBCs to date.   Current CBC reviewed-   Lab Results   Component Value Date    HGB 7.4 (L) 03/31/2024    HCT 22.1 (L) 03/31/2024     Monitor serial CBC and transfuse if patient becomes hemodynamically unstable, symptomatic or H/H drops below 7/21.  Check iron stores

## 2024-03-31 NOTE — PROGRESS NOTES
Subjective:      Patient ID: Mayela Molina is a 28 y.o. adult.    Chief Complaint: Cough (Pt c/o cough/SOB  x3-4wks w/ pain from coughing. Denies sick contacts/CP. )    HPI: 28-year-old patient with no chronic medical problems who presented to the ED complaining of a cough x 3 weeks. Patient is a biological male in the transition to female who reports a cough with yellow sputum, left sided pleuritic pain, and dyspnea that increases with exertion  Scan showed a large left-sided hydropneumothorax and the consolidation of the left lower lobe.  She is afebrile and got IV antibiotics.  White cell count is coming down     Date of Procedure: 3/29/2024      Procedure: Procedure(s) (LRB):  VATS (VIDEO-ASSISTED THORACOSCOPIC SURGERY) (Left)  BLOCK, NERVE, INTERCOSTAL, 2 OR MORE (Left)     Surgeon(s) and Role:     * Josafat Sandoval MD - Primary     Assisting Surgeon: None     Pre-Operative Diagnosis: Pneumonia of left lower lobe due to infectious organism [J18.9]  Left empyema hydropneumothorax  Hepatitis-C  Supraventricular tachycardia     Post-Operative Diagnosis: Post-Op Diagnosis Codes:     * Pneumonia of left lower lobe due to infectious organism [J18.9]  Same  Patient underwent left thoracotomy and decortication, intraoperatively purulent secretion with a necrotic lung involving the left lower lobe with multiple abscesses found.  She was tachycardic overnight and looks volume contracted with a rising creatinine and low albumin.  Still having pain issues not controlled with current dosage of morphine and oxycodone.  Incentive spirometry is not being done because of the pain.  Not able to cough.  Poor appetite.    03/31/2024 the patient pain was better controlled overnight she still has a week off.  Not much secretions during coughing.  Serosanguineous fluid more anemia this morning no obvious signs of blood loss.  Making dark urine.  Incentive spirometry less than 500.  No air leak from the chest tube.   "Ambulated yesterday.  Appetite is improving.      Review of patient's allergies indicates:  No Known Allergies    History reviewed. No pertinent past medical history.    History reviewed. No pertinent family history.    Social History     Socioeconomic History    Marital status: Single   Tobacco Use    Smoking status: Never    Smokeless tobacco: Never   Substance and Sexual Activity    Alcohol use: Never    Drug use: Never    Sexual activity: Yes       History reviewed. No pertinent surgical history.    Review of Systems   Constitutional:  Positive for activity change, appetite change and fatigue.   HENT:  Negative for dental problem, nosebleeds and sore throat.    Eyes:  Negative for discharge and visual disturbance.   Respiratory:  Positive for cough, chest tightness and shortness of breath. Negative for stridor.    Cardiovascular:  Negative for leg swelling.   Gastrointestinal:  Negative for abdominal distention and abdominal pain.   Genitourinary:  Negative for difficulty urinating and dysuria.   Musculoskeletal:  Negative for arthralgias, back pain and joint swelling.   Allergic/Immunologic: Negative for environmental allergies.   Neurological:  Negative for dizziness, syncope and headaches.   Hematological:  Does not bruise/bleed easily.   Psychiatric/Behavioral:  Negative for behavioral problems.           Objective:   BP (!) 108/58 (BP Location: Left arm, Patient Position: Lying)   Pulse 76   Temp 97.7 °F (36.5 °C) (Oral)   Resp 20   Ht 5' 9" (1.753 m)   Wt 45.4 kg (100 lb 1.4 oz)   SpO2 (!) 94%   BMI 14.78 kg/m²     X-Ray Chest AP Single View  Narrative: EXAMINATION:  XR CHEST 1 VIEW    CLINICAL HISTORY:  Pneumonia, unspecified organismpost operative;    FINDINGS:  Comparison is made to March 28, 2024.    Left-sided chest tube.  Improving left lower lung opacity.  Trace left apical pneumothorax.  Right lung is clear.  Impression: Large-bore left-sided chest tube.  Trace left apical pneumothorax.  " Improving left lower lung opacity.    Electronically signed by: Juan Jose Macias  Date:    03/29/2024  Time:    10:43         Physical Exam  Vitals reviewed.   Constitutional:       Appearance: Normal appearance.   HENT:      Head: Normocephalic and atraumatic.      Mouth/Throat:      Mouth: Mucous membranes are moist.   Eyes:      Extraocular Movements: Extraocular movements intact.   Cardiovascular:      Rate and Rhythm: Normal rate and regular rhythm.      Pulses: Normal pulses.      Heart sounds: Normal heart sounds.   Pulmonary:      Effort: Pulmonary effort is normal.      Breath sounds: Rhonchi and rales present.   Abdominal:      Palpations: Abdomen is soft.   Musculoskeletal:         General: Normal range of motion.      Cervical back: Normal range of motion and neck supple.   Skin:     General: Skin is warm.      Capillary Refill: Capillary refill takes less than 2 seconds.   Neurological:      General: No focal deficit present.      Mental Status: She is alert and oriented to person, place, and time.   Psychiatric:         Mood and Affect: Mood normal.         Behavior: Behavior normal.         Assessment:     1. Pneumonia of left lower lobe due to infectious organism    2. Tachycardia    3. Leukocytosis, unspecified type    4. Hyponatremia    5. Chest pain          Plan   28-year-old female with a left sided hydropneumothorax status post thoracotomy and decortication with chest tube placement postop day 2.  Neuro Pain control as needed.  Activity ambulate as tolerated patient chest tubes can be discontinued from the suction while ambulating and keep it under-20 cm when she is in the bed.  Respiratory aggressive pulmonary toilet incentive spirometry.  Chest physiotherapy started with Mucomyst to loosen the secretions.  Cardiovascular beta-blocker for heart  rate control.  Anemia multifactorial transfusion 2 packed cells  Renal creatinine is up nephrology on board prerenal picture maintenance fluid and 2  packed cells ordered to expand the volume.  Avoid all the nephrotoxic medications.  Malnutrition and hypoalbuminemia protein supplements ordered.  Infectious disease antibiotics as per the culture managed by hospital medicine team.  DVT and GI prophylaxis with Lovenox bilateral SCDs and Pepcid.  PTOT out of bed ambulate  Rest of the management by hospital medicine team.          Jsoafat Sandoval MD  [facility]  [place]

## 2024-03-31 NOTE — ASSESSMENT & PLAN NOTE
Patient has hyponatremia which is uncontrolled,We will aim to correct the sodium by 4-6mEq in 24 hours. We will monitor sodium Every 6 hours x4. The hyponatremia is due to Dehydration/hypovolemia. We will treat the hyponatremia with IV fluids as follows:  Normal saline at 100 mL/hr. The patient's sodium results have been reviewed and are listed below.  Recent Labs   Lab 03/31/24  0540   *       -hold spironolactone    - TSH normal, cortisol appropriate, free water restrict  -status post 1 L normal saline IV fluid bolus given in the emergency department  Salt tablets begun  Fluid restricted diet

## 2024-03-31 NOTE — PROGRESS NOTES
[facility]  Progress Note    Patient Name: Mayela Molina  MRN: [id number]  Patient Class: IP- Inpatient   Admission Date: 3/25/2024  Length of Stay: 5 days  Attending Physician: Linsey Garcia MD  Primary Care Provider: Katie, Primary Doctor        Subjective:     Principal Problem:Pneumonia of left lung due to infectious organism        HPI:  28-year-old patient (male to female transgender) with no chronic medical conditions who presented to the emergency department with complaint of cough over the last 3 weeks, moderate severity, persistent.  Patient does have associated yellow sputum production, shortness for breath, and pleuritic chest pain.  No associated nausea, vomiting, diarrhea.  No complaints of fevers or chills although T-max in the emergency department recorded as 99.8.  Patient does not smoke cigarettes or vape.  No illicit drug use.  Abnormal labs in the emergency department include white blood cell count 30.46, hemoglobin 12.7, platelets 577, D-dimer 1.37, sodium 122, chloride 90, bicarb 21, glucose 121, albumin 2.3.  Last labs are from 2018 with no interim labs to compare with.  Patient does take spironolactone, Estrace, and Provera.  CT scan of chest with left-sided pneumonia with possible abscess formation.    Overview/Hospital Course:   Patient was admitted acute respiratory failure with left-sided pneumonia and possible abscess formation.  She initially was on nasal cannula and on morning after admission became acutely short of breath with a PO2 of 87% and tachycardic.  She was given a dose of adenosine which did not improve things however gradually her heart rate decreased.  Patient was admitted to the intensive care unit for respiratory failure tachycardia.  She was initially on high-flow nasal cannula and subsequently was able to wean to room air.  Cultures remained pending.  She was initially started on Rocephin and azithromycin and changed to  Zosyn who azithromycin vein.  Legionella antigen pending.  Continue with aggressive pulmonary care  With worsening CT scan, continued elevated WBC, continued elevated heart rate.  Patient underwent repeat CT scan of the chest which showed worsening of empyema.  She was seen and evaluated by Cardiothoracic surgery and underwent left VATS with decortication on 3/19.    Hyponatremia patient was admitted with hyponatremia she had fluid restrictions in place cortisol was normal, TSH normal, patient was on spironolactone and this was held.  She was being maintained on fluid restricted diet.     Encourage patient to deep breathe and walk in halls.        Interval History:  Examined at bedside still has chest tube, culture from abscesses growing staph.  On Zyvox.  Discussed with Pulmonary and Cardiovascular surgery.    Review of Systems   Constitutional:  Negative for fatigue and fever.   Respiratory:  Positive for cough and shortness of breath.         Pain at site of chest tubes   Cardiovascular:  Negative for chest pain and leg swelling.   Gastrointestinal:  Negative for nausea and vomiting.   All other systems reviewed and are negative.    Objective:     Vital Signs (Most Recent):  Temp: 98 °F (36.7 °C) (03/31/24 1519)  Pulse: 90 (03/31/24 1519)  Resp: 16 (03/31/24 1519)  BP: 113/60 (03/31/24 1519)  SpO2: 98 % (03/31/24 1519) Vital Signs (24h Range):  Temp:  [97.7 °F (36.5 °C)-98.7 °F (37.1 °C)] 98 °F (36.7 °C)  Pulse:  [] 90  Resp:  [16-20] 16  SpO2:  [94 %-98 %] 98 %  BP: (101-126)/(55-66) 113/60     Weight: 45.4 kg (100 lb 1.4 oz)  Body mass index is 14.78 kg/m².    Intake/Output Summary (Last 24 hours) at 3/31/2024 1620  Last data filed at 3/31/2024 1517  Gross per 24 hour   Intake 727.08 ml   Output 380 ml   Net 347.08 ml         Physical Exam  Vitals reviewed.   Constitutional:       Appearance: Normal appearance.   HENT:      Head: Normocephalic and atraumatic.      Mouth/Throat:      Mouth: Mucous  "membranes are moist.      Pharynx: Oropharynx is clear.   Eyes:      Extraocular Movements: Extraocular movements intact.      Conjunctiva/sclera: Conjunctivae normal.   Cardiovascular:      Rate and Rhythm: Normal rate and regular rhythm.      Pulses: Normal pulses.      Heart sounds: Normal heart sounds.   Pulmonary:      Effort: Pulmonary effort is normal.      Breath sounds: Normal breath sounds.   Abdominal:      General: Bowel sounds are normal.      Palpations: Abdomen is soft.   Musculoskeletal:         General: Normal range of motion.      Cervical back: Normal range of motion and neck supple.   Skin:     General: Skin is warm and dry.   Neurological:      General: No focal deficit present.      Mental Status: She is alert and oriented to person, place, and time. Mental status is at baseline.   Psychiatric:         Mood and Affect: Mood normal.         Behavior: Behavior normal.         Thought Content: Thought content normal.             Significant Labs: All pertinent labs within the past 24 hours have been reviewed.  Blood Culture: No results for input(s): "LABBLOO" in the last 48 hours.  CBC:   Recent Labs   Lab 03/30/24  0439 03/31/24  0540   WBC 28.27* 26.76*   HGB 9.1* 7.4*   HCT 28.1* 22.1*    380     CMP:   Recent Labs   Lab 03/30/24  0439 03/31/24  0540   * 133*   K 4.9 4.4   CL 97 100   CO2 19* 19*    103   BUN 20 24*   CREATININE 1.8* 2.3*   CALCIUM 7.6* 7.6*   ALBUMIN 1.5*  --    ANIONGAP 13 14         Significant Imaging: I have reviewed all pertinent imaging results/findings within the past 24 hours.    Assessment/Plan:      * Pneumonia of left lung due to infectious organism  -white blood cell count 30.46, lactic acid level 1.5, D-dimer 1.37, BNP 14, troponin negative, influenza a/B negative, COVID-19 negative  -blood cultures pending  -CTA of chest with "no pulmonary embolism, no dissection; dense consolidation in the left lower lobe extending to the left upper lobe with " "tree in bud opacities consistent with severe pneumonia; questionable areas of fluid density in the left lung base with foci of gas may relate to component of fluid density or possible liquefaction or possible abscess formation."  -continue IV Zosyn, vanco and IV azithromycin initiated in the emergency department  -check MRSA culture-negative, sputum culture-pending, HIV  -give IV fluids, O2 supplementation, p.r.n. albuterol inhaler  - repeat CT scan of chest today looks more like an empyema  -CVT consulted and patient underwent VATS with drainage      Hydropneumothorax    Status post VATS with left lung chest tubes  -culture positive for MRSA  -on Zosyn and Zyvox  CVT and pulmonary following chest tubes    Pulmonary abscess        Tachycardia, paroxysmal    Beta-blocker started heart rate markedly improved.  Borderline blood pressure and heart rate underwent 100 we will DC    Pleuritic chest pain  Pleuritic chest pain related to left-sided pneumonia/empyema.  Cardiac enzymes negative.      Male-to-female transgender person  Resume home medications to include Estrace & Provera; hold spironolactone due to hyponatremia      Thrombocytosis  Current platelet count 486.  Likely reactive thrombocytosis related to sepsis with underlying pneumonia.      Normocytic anemia  Patient's anemia is currently controlled. Has not received any PRBCs to date.   Current CBC reviewed-   Lab Results   Component Value Date    HGB 7.4 (L) 03/31/2024    HCT 22.1 (L) 03/31/2024     Monitor serial CBC and transfuse if patient becomes hemodynamically unstable, symptomatic or H/H drops below 7/21.  Check iron stores    Underweight  Current BMI 15.27, albumin 2.3.    Add nutritional supplements      Hyponatremia  Patient has hyponatremia which is uncontrolled,We will aim to correct the sodium by 4-6mEq in 24 hours. We will monitor sodium Every 6 hours x4. The hyponatremia is due to Dehydration/hypovolemia. We will treat the hyponatremia with IV " "fluids as follows:  Normal saline at 100 mL/hr. The patient's sodium results have been reviewed and are listed below.  Recent Labs   Lab 03/31/24  0540   *       -hold spironolactone    - TSH normal, cortisol appropriate, free water restrict  -status post 1 L normal saline IV fluid bolus given in the emergency department  Salt tablets begun  Fluid restricted diet      Hypotension  Relative hypotension with blood pressure 99/56.  Patient received 1 L normal saline IV fluid bolus in the emergency department.   Hemodynamically stable    Blood pressure improving    Sepsis  This patient does have evidence of infective focus  My overall impression is sepsis.  Source: Respiratory  Antibiotics given-   Antibiotics (72h ago, onward)      Start     Stop Route Frequency Ordered    03/30/24 0900  linezolid tablet 600 mg         -- Oral Every 12 hours 03/30/24 0731    03/28/24 1800  piperacillin-tazobactam (ZOSYN) 4.5 g in dextrose 5 % in water (D5W) 100 mL IVPB (MB+)         -- IV Every 8 hours (non-standard times) 03/28/24 1538        No results for input(s): "LACTATE", "POCLAC" in the last 72 hours.    Organ dysfunction indicated by  none    Fluid challenge Not needed - patient is not hypotensive      Post- resuscitation assessment No - Post resuscitation assessment not needed       Will Not start Pressors- Levophed for MAP of 65  Source control achieved by: broad spectrum antibiotics      VTE Risk Mitigation (From admission, onward)           Ordered     Place sequential compression device  Until discontinued        Comments: Can be discontinued when not in the bed.    03/30/24 1113     enoxaparin injection 40 mg  Every 24 hours         03/28/24 0912     IP VTE HIGH RISK PATIENT  Once         03/26/24 0043     Reason for No Pharmacological VTE Prophylaxis  Once        Question:  Reasons:  Answer:  Physician Provided (leave comment)  Comment:  pending pulmonary consult    03/26/24 0043     IP VTE HIGH RISK PATIENT  Once "         03/26/24 0043     Place sequential compression device  Until discontinued         03/26/24 0043                    Discharge Planning   RAVINDER:      Code Status: Full Code   Is the patient medically ready for discharge?:     Reason for patient still in hospital (select all that apply): Patient trending condition, Treatment, Imaging, and Consult recommendations  Discharge Plan A: Home                  Linsey Barragan MD  Department of Hospital Medicine   [facility] ([facility])

## 2024-03-31 NOTE — SUBJECTIVE & OBJECTIVE
Chief complaint: pleuritic pain    Mayela Molina is a 28-year-old patient with no chronic medical problems who presented to the ED complaining of a cough x 3 weeks. Patient is a biological male in the transition to female who reports a cough with yellow sputum, left sided pleuritic pain, and dyspnea that increases with exertion. Denies fever, chills, dizziness, abdominal pain, n/v/d, dysuria, hematuria, myalgia. Denies tobacco use, vaping/e-cigarettes, or drug use. Abnormal labs in the emergency department include white blood cell count 30.46, hemoglobin 12.7, platelets 577, D-dimer 1.37, sodium 122, chloride 90, bicarb 21, glucose 121, albumin 2.3. Patient was admitted under hospital medicine service for left-sided pneumonia with possible underlying abscess formation. Pulmonary consulted for evaluation.    Interval history:  3/30: underwent VATS yesterday; chest tube in place. Pulmonary status stable on room air.   3/31: Remains on room air; poor cough quality. H&H decreased this am with plans for blood transfusion.    Objective:     Vital Signs (Most Recent):  Temp: 97.7 °F (36.5 °C) (03/31/24 0736)  Pulse: 76 (03/31/24 0821)  Resp: 20 (03/31/24 0821)  BP: (!) 108/58 (03/31/24 0736)  SpO2: (!) 94 % (03/31/24 0821) Vital Signs (24h Range):  Temp:  [97.6 °F (36.4 °C)-98.7 °F (37.1 °C)] 97.7 °F (36.5 °C)  Pulse:  [] 76  Resp:  [16-20] 20  SpO2:  [92 %-98 %] 94 %  BP: (108-130)/(58-70) 108/58     Weight: 45.4 kg (100 lb 1.4 oz); Body mass index is 14.78 kg/m².    Intake/Output Summary (Last 24 hours) at 3/31/2024 1022  Last data filed at 3/30/2024 1828  Gross per 24 hour   Intake --   Output 80 ml   Net -80 ml      Physical Exam  Vitals and nursing note reviewed.   HENT:      Head: Normocephalic.   Eyes:      Conjunctiva/sclera: Conjunctivae normal.   Cardiovascular:      Rate and Rhythm: Normal rate.   Pulmonary:      Effort: Pulmonary effort is normal.      Comments: Chest tube to pleura vac  Abdominal:       Palpations: Abdomen is soft.   Musculoskeletal:         General: Normal range of motion.      Cervical back: Normal range of motion.   Skin:     General: Skin is warm and dry.   Neurological:      Mental Status: She is alert and oriented to person, place, and time.         Review of Systems: Negative except as indicated in HPI    Significant Labs:  CBC/Anemia Profile:  Recent Labs   Lab 03/30/24  0439 03/31/24  0540   WBC 28.27* 26.76*   HGB 9.1* 7.4*   HCT 28.1* 22.1*    380   MCV 90 87   RDW 13.9 13.7   Chemistries:  Recent Labs   Lab 03/30/24  0439 03/31/24  0540   * 133*   K 4.9 4.4   CL 97 100   CO2 19* 19*   BUN 20 24*   CREATININE 1.8* 2.3*   CALCIUM 7.6* 7.6*   ALBUMIN 1.5*  --    PHOS 5.5*  --

## 2024-03-31 NOTE — PLAN OF CARE
A253/A253 DELORISPark Molina is a 28 y.o.male admitted on 3/25/2024 for Pneumonia of left lung due to infectious organism   Code Status: Full Code MRN: [id number]   Review of patient's allergies indicates:  No Known Allergies  History reviewed. No pertinent past medical history.   PRN meds    0.9%  NaCl infusion (for blood administration), , Q24H PRN  sodium chloride 0.9%, , PRN  acetaminophen, 650 mg, Q6H PRN  albuterol sulfate, 2.5 mg, Q6H PRN  benzonatate, 100 mg, TID PRN  bisacodyL, 10 mg, Daily PRN  glucagon (human recombinant), 1 mg, PRN  glucose, 16 g, PRN  glucose, 24 g, PRN  HYDROmorphone, 1 mg, QID PRN  ipratropium, 0.5 mg, QID PRN  melatonin, 6 mg, Nightly PRN  metoclopramide, 5 mg, Q6H PRN  naloxone, 0.02 mg, PRN  ondansetron, 8 mg, Q8H PRN  ondansetron, 4 mg, Q6H PRN  oxyCODONE, 10 mg, Q4H PRN  oxyCODONE, 5 mg, Q4H PRN  sodium chloride 0.9%, 10 mL, PRN  sodium chloride 0.9%, 10 mL, Q12H PRN  sodium chloride 0.9%, 10 mL, PRN  sodium chloride 0.9%, 10 mL, PRN      Chart check completed. Will continue plan of care.      Orientation: oriented x 4  Ole Coma Scale Score: 15     Lead Monitored: Lead II Rhythm: normal sinus rhythm    Cardiac/Telemetry Box Number: [id number]  VTE Required Core Measure: (SCDs) Sequential compression device initiated/maintained Last Bowel Movement: 03/29/24  Diet Adult Regular (IDDSI Level 7) Standard Tray  Voiding Characteristics: urethral catheter (bladder)  Miki Score: 20  Fall Risk Score: 12  Accucheck []   Freq?      Lines/Drains/Airways       Drain  Duration                  Y Chest Tube 1 and 2 03/29/24 0903 1 Left Pleural 32 Fr. 2 Left Pleural 32 Fr. 2 days              Peripheral Intravenous Line  Duration                  Peripheral IV - Single Lumen 03/31/24 1104 20 G Left;Posterior Forearm <1 day                       Problem: Adult Inpatient Plan of Care  Goal: Plan of Care Review  Outcome: Ongoing, Progressing  Goal: Patient-Specific Goal  (Individualized)  Outcome: Ongoing, Progressing  Goal: Absence of Hospital-Acquired Illness or Injury  Outcome: Ongoing, Progressing  Goal: Optimal Comfort and Wellbeing  Outcome: Ongoing, Progressing  Goal: Readiness for Transition of Care  Outcome: Ongoing, Progressing     Problem: Adjustment to Illness (Sepsis/Septic Shock)  Goal: Optimal Coping  Outcome: Ongoing, Progressing     Problem: Bleeding (Sepsis/Septic Shock)  Goal: Absence of Bleeding  Outcome: Ongoing, Progressing     Problem: Glycemic Control Impaired (Sepsis/Septic Shock)  Goal: Blood Glucose Level Within Desired Range  Outcome: Ongoing, Progressing     Problem: Infection Progression (Sepsis/Septic Shock)  Goal: Absence of Infection Signs and Symptoms  Outcome: Ongoing, Progressing     Problem: Nutrition Impaired (Sepsis/Septic Shock)  Goal: Optimal Nutrition Intake  Outcome: Ongoing, Progressing     Problem: Fluid Imbalance (Pneumonia)  Goal: Fluid Balance  Outcome: Ongoing, Progressing     Problem: Infection (Pneumonia)  Goal: Resolution of Infection Signs and Symptoms  Outcome: Ongoing, Progressing     Problem: Respiratory Compromise (Pneumonia)  Goal: Effective Oxygenation and Ventilation  Outcome: Ongoing, Progressing     Problem: Infection  Goal: Absence of Infection Signs and Symptoms  Outcome: Ongoing, Progressing     Problem: Skin Injury Risk Increased  Goal: Skin Health and Integrity  Outcome: Ongoing, Progressing     Problem: Bleeding (Lung Surgery)  Goal: Absence of Bleeding  Outcome: Ongoing, Progressing     Problem: Bowel Motility Impaired (Lung Surgery)  Goal: Effective Bowel Elimination  Outcome: Ongoing, Progressing     Problem: Fluid and Electrolyte Imbalance (Lung Surgery)  Goal: Fluid and Electrolyte Balance  Outcome: Ongoing, Progressing     Problem: Hemodynamic Instability (Lung Surgery)  Goal: Effective Tissue Perfusion  Outcome: Ongoing, Progressing     Problem: Infection (Lung Surgery)  Goal: Absence of Infection Signs and  Symptoms  Outcome: Ongoing, Progressing     Problem: Ongoing Anesthesia Effects (Lung Surgery)  Goal: Anesthesia/Sedation Recovery  Outcome: Ongoing, Progressing     Problem: Pain (Lung Surgery)  Goal: Acceptable Pain Control  Outcome: Ongoing, Progressing     Problem: Postoperative Nausea and Vomiting (Lung Surgery)  Goal: Nausea and Vomiting Relief  Outcome: Ongoing, Progressing     Problem: Postoperative Urinary Retention (Lung Surgery)  Goal: Effective Urinary Elimination  Outcome: Ongoing, Progressing     Problem: Respiratory Compromise (Lung Surgery)  Goal: Effective Oxygenation and Ventilation  Outcome: Ongoing, Progressing

## 2024-03-31 NOTE — ASSESSMENT & PLAN NOTE
CT chest on admit with dense consolidation and questionable underlying abscess formation; no significant effusion noted  Repeat CT chest with apparent hydropneumothorax with underlying pulmonary abscess  Post VATS and chest tube placement  Follow CT output  Culture --> staph aureus; follow cultures and de-escalate antibiotics as appropriate  Continue Zosyn and Zyvox for now  Supplemental oxygen as needed to maintain saturations 90% or greater; currently on room air   Follow chest imaging as needed  Mobilize as able; OOB to chair TID  IS while awake

## 2024-03-31 NOTE — PROGRESS NOTES
[facility]  Pulmonology Progress Note    Patient Name: Mayela Molina  MRN: [id number]  Admission Date: 3/25/2024   Hospital Length of Stay: 5 days  Code Status: Full Code    Attending Provider: Linsey Garcia MD  Principal Problem: Pneumonia of left lung due to infectious organism    Subjective:   History of present illness:  Chief complaint: pleuritic pain    Mayela Molina is a 28-year-old patient with no chronic medical problems who presented to the ED complaining of a cough x 3 weeks. Patient is a biological male in the transition to female who reports a cough with yellow sputum, left sided pleuritic pain, and dyspnea that increases with exertion. Denies fever, chills, dizziness, abdominal pain, n/v/d, dysuria, hematuria, myalgia. Denies tobacco use, vaping/e-cigarettes, or drug use. Abnormal labs in the emergency department include white blood cell count 30.46, hemoglobin 12.7, platelets 577, D-dimer 1.37, sodium 122, chloride 90, bicarb 21, glucose 121, albumin 2.3. Patient was admitted under hospital medicine service for left-sided pneumonia with possible underlying abscess formation. Pulmonary consulted for evaluation.    Interval history:  3/30: underwent VATS yesterday; chest tube in place. Pulmonary status stable on room air.   3/31: Remains on room air; poor cough quality. H&H decreased this am with plans for blood transfusion.    Objective:     Vital Signs (Most Recent):  Temp: 97.7 °F (36.5 °C) (03/31/24 0736)  Pulse: 76 (03/31/24 0821)  Resp: 20 (03/31/24 0821)  BP: (!) 108/58 (03/31/24 0736)  SpO2: (!) 94 % (03/31/24 0821) Vital Signs (24h Range):  Temp:  [97.6 °F (36.4 °C)-98.7 °F (37.1 °C)] 97.7 °F (36.5 °C)  Pulse:  [] 76  Resp:  [16-20] 20  SpO2:  [92 %-98 %] 94 %  BP: (108-130)/(58-70) 108/58     Weight: 45.4 kg (100 lb 1.4 oz); Body mass index is 14.78 kg/m².    Intake/Output Summary (Last 24 hours) at 3/31/2024 1022  Last data  filed at 3/30/2024 1828  Gross per 24 hour   Intake --   Output 80 ml   Net -80 ml      Physical Exam  Vitals and nursing note reviewed.   HENT:      Head: Normocephalic.   Eyes:      Conjunctiva/sclera: Conjunctivae normal.   Cardiovascular:      Rate and Rhythm: Normal rate.   Pulmonary:      Effort: Pulmonary effort is normal.      Comments: Chest tube to pleura vac  Abdominal:      Palpations: Abdomen is soft.   Musculoskeletal:         General: Normal range of motion.      Cervical back: Normal range of motion.   Skin:     General: Skin is warm and dry.   Neurological:      Mental Status: She is alert and oriented to person, place, and time.         Review of Systems: Negative except as indicated in HPI    Significant Labs:  CBC/Anemia Profile:  Recent Labs   Lab 03/30/24  0439 03/31/24  0540   WBC 28.27* 26.76*   HGB 9.1* 7.4*   HCT 28.1* 22.1*    380   MCV 90 87   RDW 13.9 13.7   Chemistries:  Recent Labs   Lab 03/30/24  0439 03/31/24  0540   * 133*   K 4.9 4.4   CL 97 100   CO2 19* 19*   BUN 20 24*   CREATININE 1.8* 2.3*   CALCIUM 7.6* 7.6*   ALBUMIN 1.5*  --    PHOS 5.5*  --    ABG  Recent Labs   Lab 03/26/24  0559   PH 7.461*   PO2 62*   PCO2 31.1*   HCO3 22.1*   BE -2     Assessment/Plan:   Pneumonia of left lung due to infectious organism  Hydropneumothorax  Pulmonary abscess  CT chest on admit with dense consolidation and questionable underlying abscess formation; no significant effusion noted  Repeat CT chest with apparent hydropneumothorax with underlying pulmonary abscess  Post VATS and chest tube placement  Follow CT output  Culture --> staph aureus; follow cultures and de-escalate antibiotics as appropriate  Continue Zosyn and Zyvox for now  Supplemental oxygen as needed to maintain saturations 90% or greater; currently on room air   Follow chest imaging as needed  Mobilize as able; OOB to chair TID  IS while awake    Pleuritic chest pain  Prn IV Morphine and oxycodone as needed      Kiarra Willis NP  Pulmonary and Critical Care  [facility]

## 2024-03-31 NOTE — ASSESSMENT & PLAN NOTE
Beta-blocker started heart rate markedly improved.  Borderline blood pressure and heart rate underwent 100 we will DC

## 2024-03-31 NOTE — CONSULTS
[facility] - Tele[facility] ([facility])  Nephrology  Consult Note    Patient Name: Mayela Molina  MRN: [id number]  Admission Date: 3/25/2024  Hospital Length of Stay: 5 days  Attending Provider: Linsey Garcia MD   Primary Care Physician: Katei, Primary Doctor  Principal Problem:Pneumonia of left lung due to infectious organism    Inpatient consult to Nephrology  Consult performed by: Fortino Kwong MD  Consult ordered by: Linsey Garcia MD  Reason for consult: Acute kidney injury        Subjective:     HPI:  28-year-old admitted with necrotizing pneumonia and empyema.  Underwent thoracotomy and decortication on 03/29/2030.  Noted to have an acute rise in creatinine on 03/30.  Nephrology has been consulted for evaluation.  Patient was seen in her hospital room.  In bed resting comfortably.  Complains of some discomfort with deep inspiration and cough.  She has chest tubes in place.  No lower extremity edema or swelling.  No fevers or chills.  No nausea vomiting or diarrhea related.    History reviewed. No pertinent past medical history.    History reviewed. No pertinent surgical history.    Review of patient's allergies indicates:  No Known Allergies  Current Facility-Administered Medications   Medication Frequency    0.9%  NaCl infusion (for blood administration) Q24H PRN    0.9%  NaCl infusion PRN    0.9%  NaCl infusion Continuous    acetaminophen tablet 1,000 mg Q8H    acetaminophen tablet 650 mg Q6H PRN    acetylcysteine 100 mg/ml (10%) solution 4 mL QID    albuterol nebulizer solution 2.5 mg Q6H PRN    benzonatate capsule 100 mg TID PRN    bisacodyL suppository 10 mg Daily PRN    enoxaparin injection 40 mg Q24H (prophylaxis, 1700)    estradioL tablet 8 mg Daily    famotidine tablet 20 mg BID    glucagon (human recombinant) injection 1 mg PRN    glucose chewable tablet 16 g PRN    glucose chewable tablet 24 g PRN    ipratropium 0.02 % nebulizer solution 0.5 mg QID PRN    linezolid tablet 600 mg Q12H     medroxyPROGESTERone tablet 10 mg Daily    melatonin tablet 6 mg Nightly PRN    methocarbamoL tablet 500 mg QID    metoclopramide injection 5 mg Q6H PRN    morphine injection 4 mg Q4H PRN    naloxone 0.4 mg/mL injection 0.02 mg PRN    ondansetron disintegrating tablet 8 mg Q8H PRN    ondansetron injection 4 mg Q6H PRN    oxyCODONE immediate release tablet 10 mg Q4H PRN    oxyCODONE immediate release tablet 5 mg Q4H PRN    piperacillin-tazobactam (ZOSYN) 4.5 g in dextrose 5 % in water (D5W) 100 mL IVPB (MB+) Q8H    polyethylene glycol packet 17 g Daily    sodium chloride 0.9% flush 10 mL PRN    sodium chloride 0.9% flush 10 mL Q12H PRN    sodium chloride 0.9% flush 10 mL PRN    sodium chloride 0.9% flush 10 mL PRN    sodium chloride 0.9% flush 2 mL Q6H    sodium chloride oral tablet 1,000 mg TID     Family History    None       Tobacco Use    Smoking status: Never    Smokeless tobacco: Never   Substance and Sexual Activity    Alcohol use: Never    Drug use: Never    Sexual activity: Yes     Review of Systems   Constitutional:  Positive for fatigue.   HENT: Negative.     Respiratory:          Pain on deep inspiration and cough.   Cardiovascular: Negative.    Gastrointestinal: Negative.    Genitourinary: Negative.    Musculoskeletal: Negative.    Skin: Negative.      Objective:     Vital Signs (Most Recent):  Temp: 97.7 °F (36.5 °C) (03/31/24 0736)  Pulse: 76 (03/31/24 0821)  Resp: 20 (03/31/24 0821)  BP: (!) 108/58 (03/31/24 0736)  SpO2: (!) 94 % (03/31/24 0821) Vital Signs (24h Range):  Temp:  [97.6 °F (36.4 °C)-98.7 °F (37.1 °C)] 97.7 °F (36.5 °C)  Pulse:  [] 76  Resp:  [16-20] 20  SpO2:  [92 %-98 %] 94 %  BP: (108-130)/(58-70) 108/58     Weight: 45.4 kg (100 lb 1.4 oz) (03/27/24 1424)  Body mass index is 14.78 kg/m².  Body surface area is 1.49 meters squared.    I/O last 3 completed shifts:  In: 1865.8 [I.V.:422.6; IV Piggyback:1443.1]  Out: 740 [Urine:600; Chest Tube:140]    Physical Exam  Constitutional:        Appearance: Normal appearance.   HENT:      Head: Normocephalic and atraumatic.   Eyes:      General: No scleral icterus.     Extraocular Movements: Extraocular movements intact.      Pupils: Pupils are equal, round, and reactive to light.   Cardiovascular:      Pulses: Normal pulses.      Heart sounds: Normal heart sounds.      Comments: Decreased breath sounds over the left base.    Musculoskeletal:      Right lower leg: No edema.      Left lower leg: No edema.   Skin:     General: Skin is warm and dry.   Neurological:      General: No focal deficit present.      Mental Status: She is alert and oriented to person, place, and time.   Psychiatric:         Mood and Affect: Mood normal.         Behavior: Behavior normal.         Significant Labs:  BMP:   Recent Labs   Lab 03/28/24  0712 03/29/24  0708 03/31/24  0540      < > 103   *   < > 133*   K 4.2   < > 4.4   CL 95   < > 100   CO2 24   < > 19*   BUN 6   < > 24*   CREATININE 0.6   < > 2.3*   CALCIUM 7.9*   < > 7.6*   MG 1.9  --   --     < > = values in this interval not displayed.     CMP:   Recent Labs   Lab 03/29/24  0708 03/30/24  0439 03/31/24  0540   GLU 93 102 103   CALCIUM 8.0* 7.6* 7.6*   ALBUMIN 1.9* 1.5*  --    PROT 6.7  --   --    * 129* 133*   K 3.9 4.9 4.4   CO2 21* 19* 19*   CL 97 97 100   BUN 11 20 24*   CREATININE 0.7 1.8* 2.3*   ALKPHOS 74  --   --    ALT 26  --   --    AST 32  --   --    BILITOT 0.6  --   --      All labs within the past 24 hours have been reviewed.    Significant Imaging:  Labs: Reviewed      Assessment/Plan:     Active Diagnoses:    Diagnosis Date Noted POA    PRINCIPAL PROBLEM:  Pneumonia of left lung due to infectious organism [J18.9] 03/26/2024 Yes    Pulmonary abscess [J85.2] 03/30/2024 Yes    Hydropneumothorax [J94.8] 03/30/2024 Yes    Sepsis [A41.9] 03/26/2024 Yes    Hypotension [I95.9] 03/26/2024 Yes    Hyponatremia [E87.1] 03/26/2024 Yes    Underweight [R63.6] 03/26/2024 Yes    Normocytic anemia  [D64.9] 03/26/2024 Yes    Thrombocytosis [D75.839] 03/26/2024 Yes    Male-to-female transgender person [Z78.9] 03/26/2024 Yes    Pleuritic chest pain [R07.81] 03/26/2024 Yes    Tachycardia, paroxysmal [I47.9] 03/26/2024 No      Problems Resolved During this Admission:    Diagnosis Date Noted Date Resolved POA    Hepatitis C antibody positive in blood [R76.8] 03/27/2024 03/28/2024 Yes       Assessment and plan:    1. Acute kidney injury:  Likely multifactorial.  Given that creatinine bumped almost immediately post surgery there is likely postop ATN.  On review of the operative notes his quite a bit of puss and infection encountered.      Most recent vancomycin dose was noted to be 41.  This raises the possibility of vancomycin toxicity contributing to acute kidney injury.  Vancomycin has subsequently been discontinued.    Urinalysis, and urine electrolytes are currently pending.  Renal ultrasound has also been ordered.    2. Electrolytes:  Potassium is stable at 4.4.    3. Bicarbonate is slightly decreased at 19.  Secondary to acute kidney injury.  Supplemental bicarb does not need to be started at this point.    4. Volume:  Patient does not appear to be significantly volume overloaded.  On exam she appears to be somewhat volume depleted.    Gentle hydration may be warranted.  We will need to monitor strict I's and O's.        Thank you for your consult.     Fortino Kwong MD  Nephrology  [facility] - Telemetry ([facility])

## 2024-03-31 NOTE — ASSESSMENT & PLAN NOTE
"This patient does have evidence of infective focus  My overall impression is sepsis.  Source: Respiratory  Antibiotics given-   Antibiotics (72h ago, onward)    Start     Stop Route Frequency Ordered    03/30/24 0900  linezolid tablet 600 mg         -- Oral Every 12 hours 03/30/24 0731    03/28/24 1800  piperacillin-tazobactam (ZOSYN) 4.5 g in dextrose 5 % in water (D5W) 100 mL IVPB (MB+)         -- IV Every 8 hours (non-standard times) 03/28/24 1538      No results for input(s): "LACTATE", "POCLAC" in the last 72 hours.    Organ dysfunction indicated by  none    Fluid challenge Not needed - patient is not hypotensive      Post- resuscitation assessment No - Post resuscitation assessment not needed       Will Not start Pressors- Levophed for MAP of 65  Source control achieved by: broad spectrum antibiotics  "

## 2024-04-01 LAB
ALBUMIN SERPL BCP-MCNC: 1.8 G/DL (ref 3.5–5.2)
ANION GAP SERPL CALC-SCNC: 10 MMOL/L (ref 8–16)
BACTERIA FLD AEROBE CULT: ABNORMAL
BASOPHILS # BLD AUTO: 0.05 K/UL (ref 0–0.2)
BASOPHILS NFR BLD: 0.2 % (ref 0–1.9)
BUN SERPL-MCNC: 22 MG/DL (ref 6–20)
CALCIUM SERPL-MCNC: 7.8 MG/DL (ref 8.7–10.5)
CHLORIDE SERPL-SCNC: 104 MMOL/L (ref 95–110)
CO2 SERPL-SCNC: 19 MMOL/L (ref 23–29)
CREAT SERPL-MCNC: 2.5 MG/DL (ref 0.5–1.4)
DIFFERENTIAL METHOD BLD: ABNORMAL
EOSINOPHIL # BLD AUTO: 0 K/UL (ref 0–0.5)
EOSINOPHIL NFR BLD: 0.1 % (ref 0–8)
ERYTHROCYTE [DISTWIDTH] IN BLOOD BY AUTOMATED COUNT: 15.5 % (ref 11.5–14.5)
EST. GFR  (NO RACE VARIABLE): 35 ML/MIN/1.73 M^2
GLUCOSE SERPL-MCNC: 90 MG/DL (ref 70–110)
GRAM STN SPEC: ABNORMAL
GRAM STN SPEC: ABNORMAL
HCT VFR BLD AUTO: 30.2 % (ref 40–54)
HGB BLD-MCNC: 10.3 G/DL (ref 14–18)
IMM GRANULOCYTES # BLD AUTO: 0.44 K/UL (ref 0–0.04)
IMM GRANULOCYTES NFR BLD AUTO: 1.8 % (ref 0–0.5)
LYMPHOCYTES # BLD AUTO: 1.7 K/UL (ref 1–4.8)
LYMPHOCYTES NFR BLD: 7 % (ref 18–48)
MCH RBC QN AUTO: 28.6 PG (ref 27–31)
MCHC RBC AUTO-ENTMCNC: 34.1 G/DL (ref 32–36)
MCV RBC AUTO: 84 FL (ref 82–98)
MONOCYTES # BLD AUTO: 1.2 K/UL (ref 0.3–1)
MONOCYTES NFR BLD: 5 % (ref 4–15)
NEUTROPHILS # BLD AUTO: 21.1 K/UL (ref 1.8–7.7)
NEUTROPHILS NFR BLD: 85.9 % (ref 38–73)
NRBC BLD-RTO: 0 /100 WBC
PHOSPHATE SERPL-MCNC: 3.3 MG/DL (ref 2.7–4.5)
PLATELET # BLD AUTO: 385 K/UL (ref 150–450)
PMV BLD AUTO: 8.4 FL (ref 9.2–12.9)
POTASSIUM SERPL-SCNC: 4.1 MMOL/L (ref 3.5–5.1)
RBC # BLD AUTO: 3.6 M/UL (ref 4.6–6.2)
SODIUM SERPL-SCNC: 133 MMOL/L (ref 136–145)
WBC # BLD AUTO: 24.54 K/UL (ref 3.9–12.7)

## 2024-04-01 PROCEDURE — 25000242 PHARM REV CODE 250 ALT 637 W/ HCPCS: Performed by: INTERNAL MEDICINE

## 2024-04-01 PROCEDURE — 94761 N-INVAS EAR/PLS OXIMETRY MLT: CPT

## 2024-04-01 PROCEDURE — 27000646 HC AEROBIKA DEVICE

## 2024-04-01 PROCEDURE — 94640 AIRWAY INHALATION TREATMENT: CPT

## 2024-04-01 PROCEDURE — 25000003 PHARM REV CODE 250: Performed by: INTERNAL MEDICINE

## 2024-04-01 PROCEDURE — 25000003 PHARM REV CODE 250: Performed by: NURSE PRACTITIONER

## 2024-04-01 PROCEDURE — 99900035 HC TECH TIME PER 15 MIN (STAT)

## 2024-04-01 PROCEDURE — 85025 COMPLETE CBC W/AUTO DIFF WBC: CPT | Performed by: THORACIC SURGERY (CARDIOTHORACIC VASCULAR SURGERY)

## 2024-04-01 PROCEDURE — 63600175 PHARM REV CODE 636 W HCPCS: Performed by: INTERNAL MEDICINE

## 2024-04-01 PROCEDURE — 99232 SBSQ HOSP IP/OBS MODERATE 35: CPT | Mod: ,,, | Performed by: INTERNAL MEDICINE

## 2024-04-01 PROCEDURE — 25000003 PHARM REV CODE 250: Performed by: THORACIC SURGERY (CARDIOTHORACIC VASCULAR SURGERY)

## 2024-04-01 PROCEDURE — 21400001 HC TELEMETRY ROOM

## 2024-04-01 PROCEDURE — 94664 DEMO&/EVAL PT USE INHALER: CPT

## 2024-04-01 PROCEDURE — 80069 RENAL FUNCTION PANEL: CPT | Performed by: INTERNAL MEDICINE

## 2024-04-01 PROCEDURE — 94799 UNLISTED PULMONARY SVC/PX: CPT | Mod: XB

## 2024-04-01 PROCEDURE — 25000242 PHARM REV CODE 250 ALT 637 W/ HCPCS: Performed by: THORACIC SURGERY (CARDIOTHORACIC VASCULAR SURGERY)

## 2024-04-01 RX ORDER — METOPROLOL TARTRATE 25 MG/1
25 TABLET, FILM COATED ORAL 2 TIMES DAILY
Status: DISCONTINUED | OUTPATIENT
Start: 2024-04-01 | End: 2024-04-01

## 2024-04-01 RX ORDER — METOPROLOL TARTRATE 25 MG/1
12.5 TABLET ORAL 2 TIMES DAILY
Status: DISCONTINUED | OUTPATIENT
Start: 2024-04-01 | End: 2024-04-10

## 2024-04-01 RX ADMIN — MEDROXYPROGESTERONE ACETATE 10 MG: 5 TABLET ORAL at 09:04

## 2024-04-01 RX ADMIN — ENOXAPARIN SODIUM 40 MG: 40 INJECTION SUBCUTANEOUS at 05:04

## 2024-04-01 RX ADMIN — ACETYLCYSTEINE 4 ML: 100 INHALANT RESPIRATORY (INHALATION) at 08:04

## 2024-04-01 RX ADMIN — SODIUM CHLORIDE 1000 MG: 1 TABLET ORAL at 09:04

## 2024-04-01 RX ADMIN — OXYCODONE HYDROCHLORIDE 5 MG: 5 TABLET ORAL at 09:04

## 2024-04-01 RX ADMIN — SODIUM CHLORIDE: 9 INJECTION, SOLUTION INTRAVENOUS at 09:04

## 2024-04-01 RX ADMIN — SODIUM CHLORIDE 1000 MG: 1 TABLET ORAL at 02:04

## 2024-04-01 RX ADMIN — SODIUM CHLORIDE 1000 MG: 1 TABLET ORAL at 08:04

## 2024-04-01 RX ADMIN — POLYETHYLENE GLYCOL 3350 17 G: 17 POWDER, FOR SOLUTION ORAL at 09:04

## 2024-04-01 RX ADMIN — IPRATROPIUM BROMIDE 0.5 MG: 0.5 SOLUTION RESPIRATORY (INHALATION) at 03:04

## 2024-04-01 RX ADMIN — PIPERACILLIN SODIUM AND TAZOBACTAM SODIUM 4.5 G: 4; .5 INJECTION, POWDER, FOR SOLUTION INTRAVENOUS at 05:04

## 2024-04-01 RX ADMIN — LINEZOLID 600 MG: 600 TABLET, FILM COATED ORAL at 08:04

## 2024-04-01 RX ADMIN — METOPROLOL TARTRATE 12.5 MG: 25 TABLET, FILM COATED ORAL at 09:04

## 2024-04-01 RX ADMIN — OXYCODONE HYDROCHLORIDE 5 MG: 5 TABLET ORAL at 01:04

## 2024-04-01 RX ADMIN — ACETAMINOPHEN 650 MG: 325 TABLET ORAL at 05:04

## 2024-04-01 RX ADMIN — ACETYLCYSTEINE 4 ML: 100 INHALANT RESPIRATORY (INHALATION) at 12:04

## 2024-04-01 RX ADMIN — ALBUTEROL SULFATE 2.5 MG: 2.5 SOLUTION RESPIRATORY (INHALATION) at 12:04

## 2024-04-01 RX ADMIN — METOPROLOL TARTRATE 12.5 MG: 25 TABLET, FILM COATED ORAL at 08:04

## 2024-04-01 RX ADMIN — METHOCARBAMOL 500 MG: 500 TABLET ORAL at 09:04

## 2024-04-01 RX ADMIN — METHOCARBAMOL 500 MG: 500 TABLET ORAL at 08:04

## 2024-04-01 RX ADMIN — FAMOTIDINE 20 MG: 20 TABLET ORAL at 08:04

## 2024-04-01 RX ADMIN — LINEZOLID 600 MG: 600 TABLET, FILM COATED ORAL at 09:04

## 2024-04-01 RX ADMIN — METHOCARBAMOL 500 MG: 500 TABLET ORAL at 05:04

## 2024-04-01 RX ADMIN — BENZONATATE 100 MG: 100 CAPSULE ORAL at 09:04

## 2024-04-01 RX ADMIN — PIPERACILLIN SODIUM AND TAZOBACTAM SODIUM 4.5 G: 4; .5 INJECTION, POWDER, FOR SOLUTION INTRAVENOUS at 02:04

## 2024-04-01 RX ADMIN — ACETYLCYSTEINE 4 ML: 100 INHALANT RESPIRATORY (INHALATION) at 03:04

## 2024-04-01 RX ADMIN — ESTRADIOL 8 MG: 1 TABLET ORAL at 09:04

## 2024-04-01 RX ADMIN — FAMOTIDINE 20 MG: 20 TABLET ORAL at 09:04

## 2024-04-01 RX ADMIN — ALBUTEROL SULFATE 2.5 MG: 2.5 SOLUTION RESPIRATORY (INHALATION) at 08:04

## 2024-04-01 RX ADMIN — PIPERACILLIN SODIUM AND TAZOBACTAM SODIUM 4.5 G: 4; .5 INJECTION, POWDER, FOR SOLUTION INTRAVENOUS at 09:04

## 2024-04-01 RX ADMIN — METHOCARBAMOL 500 MG: 500 TABLET ORAL at 12:04

## 2024-04-01 NOTE — PROGRESS NOTES
[facility] Medicine  Progress Note    Patient Name: Mayela Molina  MRN: [id number]  Patient Class: IP- Inpatient   Admission Date: 3/25/2024  Length of Stay: 6 days  Attending Physician: Perla Gutierres MD  Primary Care Provider: Katie, Primary Doctor        Subjective:     Principal Problem:Pneumonia of left lung due to infectious organism        HPI:  28-year-old patient (male to female transgender) with no chronic medical conditions who presented to the emergency department with complaint of cough over the last 3 weeks, moderate severity, persistent.  Patient does have associated yellow sputum production, shortness for breath, and pleuritic chest pain.  No associated nausea, vomiting, diarrhea.  No complaints of fevers or chills although T-max in the emergency department recorded as 99.8.  Patient does not smoke cigarettes or vape.  No illicit drug use.  Abnormal labs in the emergency department include white blood cell count 30.46, hemoglobin 12.7, platelets 577, D-dimer 1.37, sodium 122, chloride 90, bicarb 21, glucose 121, albumin 2.3.  Last labs are from 2018 with no interim labs to compare with.  Patient does take spironolactone, Estrace, and Provera.  CT scan of chest with left-sided pneumonia with possible abscess formation.    Overview/Hospital Course:   Patient was admitted acute respiratory failure with left-sided pneumonia and possible abscess formation.  She initially was on nasal cannula and on morning after admission became acutely short of breath with a PO2 of 87% and tachycardic.  She was given a dose of adenosine which did not improve things however gradually her heart rate decreased.  Patient was admitted to the intensive care unit for respiratory failure tachycardia.  She was initially on high-flow nasal cannula and subsequently was able to wean to room air.  Cultures remained pending.  She was initially started on Rocephin and azithromycin and changed to Zosyn who  azithromycin vein.  Legionella antigen pending.  Continue with aggressive pulmonary care  With worsening CT scan, continued elevated WBC, continued elevated heart rate.  Patient underwent repeat CT scan of the chest which showed worsening of empyema.  She was seen and evaluated by Cardiothoracic surgery and underwent left VATS with decortication on 3/19.    Hyponatremia patient was admitted with hyponatremia she had fluid restrictions in place cortisol was normal, TSH normal, patient was on spironolactone and this was held.  She was being maintained on fluid restricted diet.     Encourage patient to deep breathe and walk in halls.  4/1 undergoing breathing treatment. Mom at bedside. Questions and concerns addressed. Encourage ambulation    Interval History: See hospital course for today      Review of Systems   Constitutional:  Positive for activity change (improving), appetite change and fatigue.   Respiratory:  Positive for cough.    Cardiovascular:  Positive for chest pain.   Neurological:  Positive for weakness.   Psychiatric/Behavioral:  Positive for dysphoric mood. Negative for agitation, behavioral problems and confusion.      Objective:     Vital Signs (Most Recent):  Temp: 97.9 °F (36.6 °C) (04/01/24 0714)  Pulse: 95 (04/01/24 1245)  Resp: 16 (04/01/24 1245)  BP: 111/64 (04/01/24 0714)  SpO2: 95 % (04/01/24 1245) Vital Signs (24h Range):  Temp:  [97.6 °F (36.4 °C)-98.2 °F (36.8 °C)] 97.9 °F (36.6 °C)  Pulse:  [] 95  Resp:  [16-20] 16  SpO2:  [95 %-99 %] 95 %  BP: (104-133)/(55-76) 111/64     Weight: 54.1 kg (119 lb 4.3 oz)  Body mass index is 17.61 kg/m².    Intake/Output Summary (Last 24 hours) at 4/1/2024 1253  Last data filed at 4/1/2024 1240  Gross per 24 hour   Intake 1170 ml   Output 1620 ml   Net -450 ml         Physical Exam  Vitals and nursing note reviewed. Exam conducted with a chaperone present (RT, family).   Constitutional:       General: She is not in acute distress.     Appearance: She  "is underweight. She is ill-appearing. She is not toxic-appearing.      Interventions: Face mask in place.   HENT:      Head: Normocephalic and atraumatic.   Cardiovascular:      Rate and Rhythm: Normal rate.   Pulmonary:      Effort: Pulmonary effort is normal. No respiratory distress.   Chest:       Abdominal:      Palpations: Abdomen is soft.      Tenderness: There is no abdominal tenderness.   Musculoskeletal:      Right lower leg: No edema.      Left lower leg: No edema.   Skin:     General: Skin is warm.      Coloration: Skin is pale.   Neurological:      Mental Status: She is alert.      Motor: Weakness present.   Psychiatric:         Mood and Affect: Affect is blunt.             Significant Labs: All pertinent labs within the past 24 hours have been reviewed.  Blood Culture: negative growth to date x 5 days  Negative mrsa   Body fluid growing streptococcus   CBC:   Recent Labs   Lab 03/31/24  0540 04/01/24  0449   WBC 26.76* 24.54*   HGB 7.4* 10.3*   HCT 22.1* 30.2*    385     CMP:   Recent Labs   Lab 03/31/24  0540 03/31/24  1649 04/01/24  0449   * 133* 133*   K 4.4 3.9 4.1    103 104   CO2 19* 19* 19*    106 90   BUN 24* 23* 22*   CREATININE 2.3* 2.6* 2.5*   CALCIUM 7.6* 8.1* 7.8*   ALBUMIN  --   --  1.8*   ANIONGAP 14 11 10       Significant Imaging: I have reviewed all pertinent imaging results/findings within the past 24 hours.  U/S: I have reviewed all pertinent results/findings within the past 24 hours and my personal findings are:  retroperitoneal no hydronephrosis    Assessment/Plan:      * Pneumonia of left lung due to infectious organism  -white blood cell count 30.46, lactic acid level 1.5, D-dimer 1.37, BNP 14, troponin negative, influenza a/B negative, COVID-19 negative  -blood cultures pending  -CTA of chest with "no pulmonary embolism, no dissection; dense consolidation in the left lower lobe extending to the left upper lobe with tree in bud opacities consistent with " "severe pneumonia; questionable areas of fluid density in the left lung base with foci of gas may relate to component of fluid density or possible liquefaction or possible abscess formation."  -continue IV Zosyn, vanco and IV azithromycin initiated in the emergency department  -check MRSA culture-negative, sputum culture-pending, HIV  -give IV fluids, O2 supplementation, p.r.n. albuterol inhaler  - repeat CT scan of chest today looks more like an empyema  -CVT consulted and patient underwent VATS with drainage      Hydropneumothorax  Status post VATS with left lung chest tubes  -culture positive for MRSA  -on Zosyn and Zyvox  CVT and pulmonary following chest tubes    Pulmonary abscess  Growing strep  On linezolid and zosyn    Tachycardia, paroxysmal  Beta-blocker started heart rate markedly improved.  Borderline blood pressure and heart rate underwent 100 we will DC    Pleuritic chest pain  Pleuritic chest pain related to left-sided pneumonia/empyema.  Cardiac enzymes negative.      Male-to-female transgender person  Resume home medications to include Estrace & Provera; hold spironolactone due to hyponatremia      Thrombocytosis  Current platelet count 486.  Likely reactive thrombocytosis related to sepsis with underlying pneumonia.      Normocytic anemia  Patient's anemia is currently controlled. Has not received any PRBCs to date.   Current CBC reviewed-   Lab Results   Component Value Date    HGB 10.3 (L) 04/01/2024    HCT 30.2 (L) 04/01/2024     Monitor serial CBC and transfuse if patient becomes hemodynamically unstable, symptomatic or H/H drops below 7/21.  Iron def anemia    Underweight  Current BMI 15.27, albumin 2.3.    Add nutritional supplements  Nutrition consult    Hyponatremia  Patient has hyponatremia which is uncontrolled,We will aim to correct the sodium by 4-6mEq in 24 hours. We will monitor sodium Every 6 hours x4. The hyponatremia is due to Dehydration/hypovolemia. We will treat the hyponatremia " "with IV fluids as follows:  Normal saline at 100 mL/hr. The patient's sodium results have been reviewed and are listed below.  Recent Labs   Lab 04/01/24  0449   *     Improving   -hold spironolactone    - TSH normal, cortisol appropriate, free water restrict  -status post 1 L normal saline IV fluid bolus given in the emergency department  Salt tablets begun  On fluids      Hypotension  Relative hypotension with blood pressure 99/56.  Patient received 1 L normal saline IV fluid bolus in the emergency department.   Hemodynamically stable    Blood pressure improving  On fluids    Sepsis  This patient does have evidence of infective focus  My overall impression is sepsis.  Source: Respiratory  Antibiotics given-   Antibiotics (72h ago, onward)      Start     Stop Route Frequency Ordered    03/30/24 0900  linezolid tablet 600 mg         -- Oral Every 12 hours 03/30/24 0731    03/28/24 1800  piperacillin-tazobactam (ZOSYN) 4.5 g in dextrose 5 % in water (D5W) 100 mL IVPB (MB+)         -- IV Every 8 hours (non-standard times) 03/28/24 1538        No results for input(s): "LACTATE", "POCLAC" in the last 72 hours.    Organ dysfunction indicated by  none    Fluid challenge Not needed - patient is not hypotensive      Post- resuscitation assessment No - Post resuscitation assessment not needed       Will Not start Pressors- Levophed for MAP of 65  Source control achieved by: broad spectrum antibiotics      VTE Risk Mitigation (From admission, onward)           Ordered     Place sequential compression device  Until discontinued        Comments: Can be discontinued when not in the bed.    03/30/24 1113     enoxaparin injection 40 mg  Every 24 hours         03/28/24 0912     IP VTE HIGH RISK PATIENT  Once         03/26/24 0043     Reason for No Pharmacological VTE Prophylaxis  Once        Question:  Reasons:  Answer:  Physician Provided (leave comment)  Comment:  pending pulmonary consult    03/26/24 0043     IP VTE HIGH " RISK PATIENT  Once         03/26/24 0043     Place sequential compression device  Until discontinued         03/26/24 0043                    Discharge Planning   RAVINDER:      Code Status: Full Code   Is the patient medically ready for discharge?:     Reason for patient still in hospital (select all that apply): Patient trending condition, Laboratory test, Treatment, and Consult recommendations  Discharge Plan A: Home                  Perla Gutireres MD  Department of Hospital Medicine   [facility] ([facility])

## 2024-04-01 NOTE — PROGRESS NOTES
Subjective:      Patient ID: Mayela Molina is a 28 y.o. adult.    Chief Complaint: Cough (Pt c/o cough/SOB  x3-4wks w/ pain from coughing. Denies sick contacts/CP. )    HPI: 28-year-old patient with no chronic medical problems who presented to the ED complaining of a cough x 3 weeks. Patient is a biological male in the transition to female who reports a cough with yellow sputum, left sided pleuritic pain, and dyspnea that increases with exertion  Scan showed a large left-sided hydropneumothorax and the consolidation of the left lower lobe.  She is afebrile and got IV antibiotics.  White cell count is coming down     Date of Procedure: 3/29/2024      Procedure: Procedure(s) (LRB):  VATS (VIDEO-ASSISTED THORACOSCOPIC SURGERY) (Left)  BLOCK, NERVE, INTERCOSTAL, 2 OR MORE (Left)     Surgeon(s) and Role:     * Josafat Sandoval MD - Primary     Assisting Surgeon: None     Pre-Operative Diagnosis: Pneumonia of left lower lobe due to infectious organism [J18.9]  Left empyema hydropneumothorax  Hepatitis-C  Supraventricular tachycardia     Post-Operative Diagnosis: Post-Op Diagnosis Codes:     * Pneumonia of left lower lobe due to infectious organism [J18.9]  Same  Patient underwent left thoracotomy and decortication, intraoperatively purulent secretion with a necrotic lung involving the left lower lobe with multiple abscesses found.  She was tachycardic overnight and looks volume contracted with a rising creatinine and low albumin.  Still having pain issues not controlled with current dosage of morphine and oxycodone.  Incentive spirometry is not being done because of the pain.  Not able to cough.  Poor appetite.    03/31/2024 the patient pain was better controlled overnight she still has a week off.  Not much secretions during coughing.  Serosanguineous fluid more anemia this morning no obvious signs of blood loss.  Making dark urine.  Incentive spirometry less than 500.  No air leak from the chest tube.   "Ambulated yesterday.  Appetite is improving.  04/01/2024 the patient is still having lot of pain issues and muscle spasm very ineffective cough incentive spirometry less than 500 chest tube draining minimal serous fluid still tachycardia.  Appetite is getting better.        Review of patient's allergies indicates:  No Known Allergies    History reviewed. No pertinent past medical history.    History reviewed. No pertinent family history.    Social History     Socioeconomic History    Marital status: Single   Tobacco Use    Smoking status: Never    Smokeless tobacco: Never   Substance and Sexual Activity    Alcohol use: Never    Drug use: Never    Sexual activity: Yes       History reviewed. No pertinent surgical history.    Review of Systems   Constitutional:  Positive for activity change, appetite change and fatigue.   HENT:  Negative for dental problem, nosebleeds and sore throat.    Eyes:  Negative for discharge and visual disturbance.   Respiratory:  Positive for cough, chest tightness and shortness of breath. Negative for stridor.    Cardiovascular:  Negative for leg swelling.   Gastrointestinal:  Negative for abdominal distention and abdominal pain.   Genitourinary:  Negative for difficulty urinating and dysuria.   Musculoskeletal:  Negative for arthralgias, back pain and joint swelling.   Allergic/Immunologic: Negative for environmental allergies.   Neurological:  Negative for dizziness, syncope and headaches.   Hematological:  Does not bruise/bleed easily.   Psychiatric/Behavioral:  Negative for behavioral problems.           Objective:   /64 (BP Location: Right arm, Patient Position: Lying)   Pulse 82   Temp 97.9 °F (36.6 °C) (Oral)   Resp 16   Ht 5' 9" (1.753 m)   Wt 54.1 kg (119 lb 4.3 oz)   SpO2 98%   BMI 17.61 kg/m²     US Retroperitoneal Complete  Narrative: EXAMINATION:  US RETROPERITONEAL COMPLETE    CLINICAL HISTORY:  Acute kidney insufficiency.    FINDINGS:  The kidneys demonstrate " mildly increased cortical echogenicity, slightly greater than liver, question medical renal disease sequela.    Normal uniform renal cortical thickness.  No cystic or solid renal mass.  No hydronephrosis.    Right kidney 12.8 cm.    Left kidney 14.3 cm.    The urinary bladder is unremarkable.  Impression: Mildly increased renal cortical echogenicity, question medical renal disease sequela.  No hydronephrosis.  Otherwise, unremarkable complete retroperitoneal ultrasound.    Electronically signed by: Dominik Sandhu MD  Date:    03/31/2024  Time:    10:32         Physical Exam  Vitals reviewed.   Constitutional:       Appearance: Normal appearance.   HENT:      Head: Normocephalic and atraumatic.      Mouth/Throat:      Mouth: Mucous membranes are moist.   Eyes:      Extraocular Movements: Extraocular movements intact.   Cardiovascular:      Rate and Rhythm: Normal rate and regular rhythm.      Pulses: Normal pulses.      Heart sounds: Normal heart sounds.   Pulmonary:      Effort: Pulmonary effort is normal.      Breath sounds: Rhonchi and rales present.   Abdominal:      Palpations: Abdomen is soft.   Musculoskeletal:         General: Normal range of motion.      Cervical back: Normal range of motion and neck supple.   Skin:     General: Skin is warm.      Capillary Refill: Capillary refill takes less than 2 seconds.   Neurological:      General: No focal deficit present.      Mental Status: She is alert and oriented to person, place, and time.   Psychiatric:         Mood and Affect: Mood normal.         Behavior: Behavior normal.         Assessment:     1. Hydropneumothorax    2. Tachycardia    3. Pneumonia of left lower lobe due to infectious organism    4. Leukocytosis, unspecified type    5. Hyponatremia    6. Chest pain          Plan   28-year-old female with a left sided hydropneumothorax status post thoracotomy and decortication with chest tube placement postop day 3.  Neuro Pain control as needed.  Activity  ambulate as tolerated patient chest tubes can be discontinued from the suction while ambulating chest tube to  water seal.  The chest tube will stay until her pneumonia is cleared to prevent further loculations and empyema formation.  Respiratory aggressive pulmonary toilet incentive spirometry.  Chest physiotherapy started with Mucomyst to loosen the secretions.  Cardiovascular beta-blocker for heart  rate control.  Anemia multifactorial   Renal creatinine is up nephrology on board prerenal picture maintenance fluid and 2 packed cells ordered to expand the volume.  Avoid all the nephrotoxic medications.  Malnutrition and hypoalbuminemia protein supplements ordered.  Consult nutrition dietitian  Infectious disease antibiotics as per the culture managed by hospital medicine team.  DVT and GI prophylaxis with Lovenox bilateral SCDs and Pepcid.  PTOT out of bed ambulate  Rest of the management by hospital medicine team.          Josafat Sandoval MD  [facility]  [place]

## 2024-04-01 NOTE — ASSESSMENT & PLAN NOTE
Relative hypotension with blood pressure 99/56.  Patient received 1 L normal saline IV fluid bolus in the emergency department.   Hemodynamically stable    Blood pressure improving  On fluids

## 2024-04-01 NOTE — ASSESSMENT & PLAN NOTE
Patient's anemia is currently controlled. Has not received any PRBCs to date.   Current CBC reviewed-   Lab Results   Component Value Date    HGB 10.3 (L) 04/01/2024    HCT 30.2 (L) 04/01/2024     Monitor serial CBC and transfuse if patient becomes hemodynamically unstable, symptomatic or H/H drops below 7/21.  Iron def anemia

## 2024-04-01 NOTE — PLAN OF CARE
A253/A253 DELORISPark Molina is a 28 y.o.male admitted on 3/25/2024 for Pneumonia of left lung due to infectious organism   Code Status: Full Code MRN: [id number]   Review of patient's allergies indicates:  No Known Allergies  History reviewed. No pertinent past medical history.   PRN meds    0.9%  NaCl infusion (for blood administration), , Q24H PRN  sodium chloride 0.9%, , PRN  acetaminophen, 650 mg, Q6H PRN  albuterol sulfate, 2.5 mg, Q6H PRN  benzonatate, 100 mg, TID PRN  bisacodyL, 10 mg, Daily PRN  glucagon (human recombinant), 1 mg, PRN  glucose, 16 g, PRN  glucose, 24 g, PRN  HYDROmorphone, 1 mg, QID PRN  ipratropium, 0.5 mg, QID PRN  melatonin, 6 mg, Nightly PRN  metoclopramide, 5 mg, Q6H PRN  naloxone, 0.02 mg, PRN  ondansetron, 8 mg, Q8H PRN  ondansetron, 4 mg, Q6H PRN  oxyCODONE, 10 mg, Q4H PRN  oxyCODONE, 5 mg, Q4H PRN  sodium chloride 0.9%, 10 mL, PRN  sodium chloride 0.9%, 10 mL, Q12H PRN  sodium chloride 0.9%, 10 mL, PRN  sodium chloride 0.9%, 10 mL, PRN      AVS Discharge instructions received and reviewed with pt and family at bedside.  Pt voiced understanding and all questions answered to satisfaction.  Stressed importance to making and keeping all follow up appointments.  Medications at bedside and reviewed with pt.  Tele monitor removed and brought to monitor tech.  IV d/c'd with tip intact, pressure dressing applied.  Pt will call when ready to be transported to front of hospital via w/c to be discharged home.      Orientation: oriented x 4  Ole Coma Scale Score: 15     Lead Monitored: Lead II Rhythm: normal sinus rhythm    Cardiac/Telemetry Box Number: [id number]  VTE Required Core Measure: Pharmacological prophylaxis initiated/maintained, (SCDs) Sequential compression device initiated/maintained Last Bowel Movement: 03/29/24  Diet Adult Regular (IDDSI Level 7) Standard Tray  Voiding Characteristics: ureteral catheter  Miki Score: 20  Fall Risk Score: 12  Acclionel []   Freq?       Lines/Drains/Airways       Drain  Duration                  Y Chest Tube 1 and 2 03/29/24 0903 1 Left Pleural 32 Fr. 2 Left Pleural 32 Fr. 3 days              Peripheral Intravenous Line  Duration                  Peripheral IV - Single Lumen 03/31/24 1104 20 G Left;Posterior Forearm 1 day                       Problem: Adult Inpatient Plan of Care  Goal: Plan of Care Review  Outcome: Met  Goal: Patient-Specific Goal (Individualized)  Outcome: Met  Goal: Absence of Hospital-Acquired Illness or Injury  Outcome: Met  Goal: Optimal Comfort and Wellbeing  Outcome: Met  Goal: Readiness for Transition of Care  Outcome: Met     Problem: Adjustment to Illness (Sepsis/Septic Shock)  Goal: Optimal Coping  Outcome: Met     Problem: Bleeding (Sepsis/Septic Shock)  Goal: Absence of Bleeding  Outcome: Met     Problem: Glycemic Control Impaired (Sepsis/Septic Shock)  Goal: Blood Glucose Level Within Desired Range  Outcome: Met     Problem: Infection Progression (Sepsis/Septic Shock)  Goal: Absence of Infection Signs and Symptoms  Outcome: Met     Problem: Nutrition Impaired (Sepsis/Septic Shock)  Goal: Optimal Nutrition Intake  Outcome: Met     Problem: Fluid Imbalance (Pneumonia)  Goal: Fluid Balance  Outcome: Met     Problem: Infection (Pneumonia)  Goal: Resolution of Infection Signs and Symptoms  Outcome: Met     Problem: Respiratory Compromise (Pneumonia)  Goal: Effective Oxygenation and Ventilation  Outcome: Met     Problem: Infection  Goal: Absence of Infection Signs and Symptoms  Outcome: Met     Problem: Skin Injury Risk Increased  Goal: Skin Health and Integrity  Outcome: Met     Problem: Bleeding (Lung Surgery)  Goal: Absence of Bleeding  Outcome: Met     Problem: Bowel Motility Impaired (Lung Surgery)  Goal: Effective Bowel Elimination  Outcome: Met     Problem: Fluid and Electrolyte Imbalance (Lung Surgery)  Goal: Fluid and Electrolyte Balance  Outcome: Met     Problem: Hemodynamic Instability (Lung Surgery)  Goal:  Effective Tissue Perfusion  Outcome: Met     Problem: Infection (Lung Surgery)  Goal: Absence of Infection Signs and Symptoms  Outcome: Met     Problem: Ongoing Anesthesia Effects (Lung Surgery)  Goal: Anesthesia/Sedation Recovery  Outcome: Met     Problem: Pain (Lung Surgery)  Goal: Acceptable Pain Control  Outcome: Met     Problem: Postoperative Nausea and Vomiting (Lung Surgery)  Goal: Nausea and Vomiting Relief  Outcome: Met     Problem: Postoperative Urinary Retention (Lung Surgery)  Goal: Effective Urinary Elimination  Outcome: Met     Problem: Respiratory Compromise (Lung Surgery)  Goal: Effective Oxygenation and Ventilation  Outcome: Met     Problem: Fall Injury Risk  Goal: Absence of Fall and Fall-Related Injury  Outcome: Met

## 2024-04-01 NOTE — PT/OT/SLP PROGRESS
Physical Therapy      Patient Name:  Mayela Molina   MRN:  [id number]    Chart review performed and attempted but patient not seen today secondary to Pain/eating lunch. Pt and mother asserting that she is ambulatory in room around the bed. Educated pt on benefits of ambulating increased distances in hallway to improve strength. ([id number])

## 2024-04-01 NOTE — SUBJECTIVE & OBJECTIVE
Interval History: See hospital course for today      Review of Systems   Constitutional:  Positive for activity change (improving), appetite change and fatigue.   Respiratory:  Positive for cough.    Cardiovascular:  Positive for chest pain.   Neurological:  Positive for weakness.   Psychiatric/Behavioral:  Positive for dysphoric mood. Negative for agitation, behavioral problems and confusion.      Objective:     Vital Signs (Most Recent):  Temp: 97.9 °F (36.6 °C) (04/01/24 0714)  Pulse: 95 (04/01/24 1245)  Resp: 16 (04/01/24 1245)  BP: 111/64 (04/01/24 0714)  SpO2: 95 % (04/01/24 1245) Vital Signs (24h Range):  Temp:  [97.6 °F (36.4 °C)-98.2 °F (36.8 °C)] 97.9 °F (36.6 °C)  Pulse:  [] 95  Resp:  [16-20] 16  SpO2:  [95 %-99 %] 95 %  BP: (104-133)/(55-76) 111/64     Weight: 54.1 kg (119 lb 4.3 oz)  Body mass index is 17.61 kg/m².    Intake/Output Summary (Last 24 hours) at 4/1/2024 1253  Last data filed at 4/1/2024 1240  Gross per 24 hour   Intake 1170 ml   Output 1620 ml   Net -450 ml         Physical Exam  Vitals and nursing note reviewed. Exam conducted with a chaperone present (RT, family).   Constitutional:       General: She is not in acute distress.     Appearance: She is underweight. She is ill-appearing. She is not toxic-appearing.      Interventions: Face mask in place.   HENT:      Head: Normocephalic and atraumatic.   Cardiovascular:      Rate and Rhythm: Normal rate.   Pulmonary:      Effort: Pulmonary effort is normal. No respiratory distress.   Chest:       Abdominal:      Palpations: Abdomen is soft.      Tenderness: There is no abdominal tenderness.   Musculoskeletal:      Right lower leg: No edema.      Left lower leg: No edema.   Skin:     General: Skin is warm.      Coloration: Skin is pale.   Neurological:      Mental Status: She is alert.      Motor: Weakness present.   Psychiatric:         Mood and Affect: Affect is blunt.             Significant Labs: All pertinent labs within the past  24 hours have been reviewed.  Blood Culture: negative growth to date x 5 days  Negative mrsa   Body fluid growing streptococcus   CBC:   Recent Labs   Lab 03/31/24  0540 04/01/24  0449   WBC 26.76* 24.54*   HGB 7.4* 10.3*   HCT 22.1* 30.2*    385     CMP:   Recent Labs   Lab 03/31/24  0540 03/31/24  1649 04/01/24  0449   * 133* 133*   K 4.4 3.9 4.1    103 104   CO2 19* 19* 19*    106 90   BUN 24* 23* 22*   CREATININE 2.3* 2.6* 2.5*   CALCIUM 7.6* 8.1* 7.8*   ALBUMIN  --   --  1.8*   ANIONGAP 14 11 10       Significant Imaging: I have reviewed all pertinent imaging results/findings within the past 24 hours.  U/S: I have reviewed all pertinent results/findings within the past 24 hours and my personal findings are:  retroperitoneal no hydronephrosis

## 2024-04-01 NOTE — ASSESSMENT & PLAN NOTE
Patient has hyponatremia which is uncontrolled,We will aim to correct the sodium by 4-6mEq in 24 hours. We will monitor sodium Every 6 hours x4. The hyponatremia is due to Dehydration/hypovolemia. We will treat the hyponatremia with IV fluids as follows:  Normal saline at 100 mL/hr. The patient's sodium results have been reviewed and are listed below.  Recent Labs   Lab 04/01/24  0449   *     Improving   -hold spironolactone    - TSH normal, cortisol appropriate, free water restrict  -status post 1 L normal saline IV fluid bolus given in the emergency department  Salt tablets begun  On fluids

## 2024-04-01 NOTE — CONSULTS
[facility] - Telemetry ([facility])  Nephrology  Consult Note    Patient Name: Mayela Molina  MRN: [id number]  Admission Date: 3/25/2024  Hospital Length of Stay: 6 days  Attending Provider: Perla Gutierres MD   Primary Care Physician: Katie, Primary Doctor  Principal Problem:Pneumonia of left lung due to infectious organism    Consults  Subjective:     HPI:  28-year-old admitted with necrotizing pneumonia and empyema.  Underwent thoracotomy and decortication on 03/29/2030.  Noted to have an acute rise in creatinine on 03/30.  Nephrology has been consulted for evaluation.  Patient was seen in her hospital room.  In bed resting comfortably.  Complains of some discomfort with deep inspiration and cough.  She has chest tubes in place.  No lower extremity edema or swelling.  No fevers or chills.  No nausea vomiting or diarrhea related.    04/01/2024:  Patient was seen in her hospital room.  In bed resting comfortably.  No acute distress noted.  Still has some difficulty with cough and deep inspiration.  No new complaints from overnight.    History reviewed. No pertinent past medical history.    History reviewed. No pertinent surgical history.    Review of patient's allergies indicates:  No Known Allergies  Current Facility-Administered Medications   Medication Frequency    0.9%  NaCl infusion (for blood administration) Q24H PRN    0.9%  NaCl infusion PRN    0.9%  NaCl infusion Continuous    acetaminophen tablet 650 mg Q6H PRN    acetylcysteine 100 mg/ml (10%) solution 4 mL QID WAKE    albuterol nebulizer solution 2.5 mg Q6H PRN    benzonatate capsule 100 mg TID PRN    bisacodyL suppository 10 mg Daily PRN    enoxaparin injection 40 mg Q24H (prophylaxis, 1700)    estradioL tablet 8 mg Daily    famotidine tablet 20 mg BID    glucagon (human recombinant) injection 1 mg PRN    glucose chewable tablet 16 g PRN    glucose chewable tablet 24 g PRN    HYDROmorphone injection 1 mg QID PRN    ipratropium 0.02 % nebulizer solution 0.5  mg QID PRN    linezolid tablet 600 mg Q12H    medroxyPROGESTERone tablet 10 mg Daily    melatonin tablet 6 mg Nightly PRN    methocarbamoL tablet 500 mg QID    metoclopramide injection 5 mg Q6H PRN    metoprolol tartrate (LOPRESSOR) split tablet 12.5 mg BID    naloxone 0.4 mg/mL injection 0.02 mg PRN    ondansetron disintegrating tablet 8 mg Q8H PRN    ondansetron injection 4 mg Q6H PRN    oxyCODONE immediate release tablet 10 mg Q4H PRN    oxyCODONE immediate release tablet 5 mg Q4H PRN    piperacillin-tazobactam (ZOSYN) 4.5 g in dextrose 5 % in water (D5W) 100 mL IVPB (MB+) Q8H    polyethylene glycol packet 17 g Daily    sodium chloride 0.9% flush 10 mL PRN    sodium chloride 0.9% flush 10 mL Q12H PRN    sodium chloride 0.9% flush 10 mL PRN    sodium chloride 0.9% flush 10 mL PRN    sodium chloride 0.9% flush 2 mL Q6H    sodium chloride oral tablet 1,000 mg TID     Family History    None       Tobacco Use    Smoking status: Never    Smokeless tobacco: Never   Substance and Sexual Activity    Alcohol use: Never    Drug use: Never    Sexual activity: Yes     Review of Systems   Constitutional:  Positive for fatigue.   HENT: Negative.     Respiratory:          Pain on deep inspiration and cough.   Cardiovascular: Negative.    Gastrointestinal: Negative.    Genitourinary: Negative.    Musculoskeletal: Negative.    Skin: Negative.      Objective:     Vital Signs (Most Recent):  Temp: 97.9 °F (36.6 °C) (04/01/24 0714)  Pulse: 91 (04/01/24 1006)  Resp: 18 (04/01/24 0938)  BP: 111/64 (04/01/24 0714)  SpO2: 98 % (04/01/24 0714) Vital Signs (24h Range):  Temp:  [97.6 °F (36.4 °C)-98.2 °F (36.8 °C)] 97.9 °F (36.6 °C)  Pulse:  [] 91  Resp:  [16-20] 18  SpO2:  [95 %-99 %] 98 %  BP: (101-133)/(55-76) 111/64     Weight: 54.1 kg (119 lb 4.3 oz) (04/01/24 8966)  Body mass index is 17.61 kg/m².  Body surface area is 1.62 meters squared.    I/O last 3 completed shifts:  In: 1050 [P.O.:400; Blood:650]  Out: 300  [Urine:300]    Physical Exam  Constitutional:       Appearance: Normal appearance.   HENT:      Head: Normocephalic and atraumatic.   Eyes:      General: No scleral icterus.     Extraocular Movements: Extraocular movements intact.      Pupils: Pupils are equal, round, and reactive to light.   Cardiovascular:      Pulses: Normal pulses.      Heart sounds: Normal heart sounds.      Comments: Decreased breath sounds over the left base.    Musculoskeletal:      Right lower leg: No edema.      Left lower leg: No edema.   Skin:     General: Skin is warm and dry.   Neurological:      General: No focal deficit present.      Mental Status: She is alert and oriented to person, place, and time.   Psychiatric:         Mood and Affect: Mood normal.         Behavior: Behavior normal.         Significant Labs:  BMP:   Recent Labs   Lab 03/28/24  0712 03/29/24  0708 04/01/24  0449      < > 90   *   < > 133*   K 4.2   < > 4.1   CL 95   < > 104   CO2 24   < > 19*   BUN 6   < > 22*   CREATININE 0.6   < > 2.5*   CALCIUM 7.9*   < > 7.8*   MG 1.9  --   --     < > = values in this interval not displayed.       CMP:   Recent Labs   Lab 03/29/24  0708 03/30/24  0439 04/01/24  0449   GLU 93   < > 90   CALCIUM 8.0*   < > 7.8*   ALBUMIN 1.9*   < > 1.8*   PROT 6.7  --   --    *   < > 133*   K 3.9   < > 4.1   CO2 21*   < > 19*   CL 97   < > 104   BUN 11   < > 22*   CREATININE 0.7   < > 2.5*   ALKPHOS 74  --   --    ALT 26  --   --    AST 32  --   --    BILITOT 0.6  --   --     < > = values in this interval not displayed.       All labs within the past 24 hours have been reviewed.    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 Acute kidney injury:  Likely multifactorial.  Given that creatinine bumped almost immediately post surgery there is likely postop ATN.  On review of the operative notes his quite a bit of puss encountered.      Most recent vancomycin dose was noted to be 41.  This raises the possibility of vancomycin toxicity contributing to acute kidney injury.  Vancomycin has subsequently been discontinued.    Urine studies are consistent with ATN.  Fractional excretion of sodium is greater than 2%.    Creatinine has stabilized overnight.  2.6 yesterday and 2.5 this morning.  The patient has nonoliguric.    2. Electrolytes:  Potassium is stable at 4.1.    3. Bicarbonate is slightly decreased at 19.  Secondary to acute kidney injury.  Supplemental bicarb does not need to be started at this point.    4. Volume:  Patient does not appear to be significantly volume overloaded.  On exam she appears to be somewhat volume depleted.    Gentle hydration may be warranted.  We will need to monitor strict I's and O's.        Thank you for your consult.     Fortino Kwong MD  Nephrology  [facility] - Telemetry ([facility])

## 2024-04-02 PROBLEM — D75.839 THROMBOCYTOSIS: Status: RESOLVED | Noted: 2024-03-26 | Resolved: 2024-04-02

## 2024-04-02 LAB
ALBUMIN SERPL BCP-MCNC: 1.8 G/DL (ref 3.5–5.2)
ANION GAP SERPL CALC-SCNC: 13 MMOL/L (ref 8–16)
BACTERIA SPEC ANAEROBE CULT: NORMAL
BASOPHILS # BLD AUTO: 0.06 K/UL (ref 0–0.2)
BASOPHILS NFR BLD: 0.3 % (ref 0–1.9)
BUN SERPL-MCNC: 22 MG/DL (ref 6–20)
CALCIUM SERPL-MCNC: 7.9 MG/DL (ref 8.7–10.5)
CHLORIDE SERPL-SCNC: 104 MMOL/L (ref 95–110)
CO2 SERPL-SCNC: 19 MMOL/L (ref 23–29)
CREAT SERPL-MCNC: 2.3 MG/DL (ref 0.5–1.4)
DIFFERENTIAL METHOD BLD: ABNORMAL
EOSINOPHIL # BLD AUTO: 0 K/UL (ref 0–0.5)
EOSINOPHIL NFR BLD: 0.1 % (ref 0–8)
ERYTHROCYTE [DISTWIDTH] IN BLOOD BY AUTOMATED COUNT: 15.8 % (ref 11.5–14.5)
EST. GFR  (NO RACE VARIABLE): 39 ML/MIN/1.73 M^2
GLUCOSE SERPL-MCNC: 90 MG/DL (ref 70–110)
HCT VFR BLD AUTO: 33.3 % (ref 40–54)
HGB BLD-MCNC: 10.7 G/DL (ref 14–18)
IMM GRANULOCYTES # BLD AUTO: 0.3 K/UL (ref 0–0.04)
IMM GRANULOCYTES NFR BLD AUTO: 1.4 % (ref 0–0.5)
LYMPHOCYTES # BLD AUTO: 1.6 K/UL (ref 1–4.8)
LYMPHOCYTES NFR BLD: 7.4 % (ref 18–48)
MCH RBC QN AUTO: 27.9 PG (ref 27–31)
MCHC RBC AUTO-ENTMCNC: 32.1 G/DL (ref 32–36)
MCV RBC AUTO: 87 FL (ref 82–98)
MONOCYTES # BLD AUTO: 1 K/UL (ref 0.3–1)
MONOCYTES NFR BLD: 5 % (ref 4–15)
NEUTROPHILS # BLD AUTO: 17.9 K/UL (ref 1.8–7.7)
NEUTROPHILS NFR BLD: 85.8 % (ref 38–73)
NRBC BLD-RTO: 0 /100 WBC
PHOSPHATE SERPL-MCNC: 3.1 MG/DL (ref 2.7–4.5)
PLATELET # BLD AUTO: 401 K/UL (ref 150–450)
PMV BLD AUTO: 8.4 FL (ref 9.2–12.9)
POTASSIUM SERPL-SCNC: 4.4 MMOL/L (ref 3.5–5.1)
RBC # BLD AUTO: 3.83 M/UL (ref 4.6–6.2)
SODIUM SERPL-SCNC: 136 MMOL/L (ref 136–145)
WBC # BLD AUTO: 20.87 K/UL (ref 3.9–12.7)

## 2024-04-02 PROCEDURE — 63600175 PHARM REV CODE 636 W HCPCS: Performed by: THORACIC SURGERY (CARDIOTHORACIC VASCULAR SURGERY)

## 2024-04-02 PROCEDURE — 25000003 PHARM REV CODE 250: Performed by: INTERNAL MEDICINE

## 2024-04-02 PROCEDURE — 94640 AIRWAY INHALATION TREATMENT: CPT

## 2024-04-02 PROCEDURE — 25000003 PHARM REV CODE 250: Performed by: THORACIC SURGERY (CARDIOTHORACIC VASCULAR SURGERY)

## 2024-04-02 PROCEDURE — 94761 N-INVAS EAR/PLS OXIMETRY MLT: CPT

## 2024-04-02 PROCEDURE — 85025 COMPLETE CBC W/AUTO DIFF WBC: CPT | Performed by: THORACIC SURGERY (CARDIOTHORACIC VASCULAR SURGERY)

## 2024-04-02 PROCEDURE — 97116 GAIT TRAINING THERAPY: CPT

## 2024-04-02 PROCEDURE — 25000003 PHARM REV CODE 250: Performed by: NURSE PRACTITIONER

## 2024-04-02 PROCEDURE — 36415 COLL VENOUS BLD VENIPUNCTURE: CPT | Performed by: INTERNAL MEDICINE

## 2024-04-02 PROCEDURE — 25000242 PHARM REV CODE 250 ALT 637 W/ HCPCS: Performed by: THORACIC SURGERY (CARDIOTHORACIC VASCULAR SURGERY)

## 2024-04-02 PROCEDURE — 63600175 PHARM REV CODE 636 W HCPCS: Performed by: INTERNAL MEDICINE

## 2024-04-02 PROCEDURE — 80069 RENAL FUNCTION PANEL: CPT | Performed by: INTERNAL MEDICINE

## 2024-04-02 PROCEDURE — 97530 THERAPEUTIC ACTIVITIES: CPT

## 2024-04-02 PROCEDURE — 21400001 HC TELEMETRY ROOM

## 2024-04-02 PROCEDURE — 99232 SBSQ HOSP IP/OBS MODERATE 35: CPT | Mod: ,,, | Performed by: INTERNAL MEDICINE

## 2024-04-02 RX ORDER — FAMOTIDINE 20 MG/1
20 TABLET, FILM COATED ORAL DAILY
Status: DISCONTINUED | OUTPATIENT
Start: 2024-04-03 | End: 2024-04-08

## 2024-04-02 RX ADMIN — PIPERACILLIN SODIUM AND TAZOBACTAM SODIUM 4.5 G: 4; .5 INJECTION, POWDER, FOR SOLUTION INTRAVENOUS at 01:04

## 2024-04-02 RX ADMIN — SODIUM CHLORIDE 1000 MG: 1 TABLET ORAL at 04:04

## 2024-04-02 RX ADMIN — OXYCODONE HYDROCHLORIDE 5 MG: 5 TABLET ORAL at 08:04

## 2024-04-02 RX ADMIN — POLYETHYLENE GLYCOL 3350 17 G: 17 POWDER, FOR SOLUTION ORAL at 08:04

## 2024-04-02 RX ADMIN — BENZONATATE 100 MG: 100 CAPSULE ORAL at 10:04

## 2024-04-02 RX ADMIN — ESTRADIOL 8 MG: 1 TABLET ORAL at 08:04

## 2024-04-02 RX ADMIN — SODIUM CHLORIDE: 9 INJECTION, SOLUTION INTRAVENOUS at 05:04

## 2024-04-02 RX ADMIN — METOPROLOL TARTRATE 12.5 MG: 25 TABLET, FILM COATED ORAL at 08:04

## 2024-04-02 RX ADMIN — SODIUM CHLORIDE: 9 INJECTION, SOLUTION INTRAVENOUS at 04:04

## 2024-04-02 RX ADMIN — PIPERACILLIN SODIUM AND TAZOBACTAM SODIUM 4.5 G: 4; .5 INJECTION, POWDER, FOR SOLUTION INTRAVENOUS at 06:04

## 2024-04-02 RX ADMIN — HYDROMORPHONE HYDROCHLORIDE 1 MG: 1 INJECTION, SOLUTION INTRAMUSCULAR; INTRAVENOUS; SUBCUTANEOUS at 01:04

## 2024-04-02 RX ADMIN — LINEZOLID 600 MG: 600 TABLET, FILM COATED ORAL at 08:04

## 2024-04-02 RX ADMIN — METHOCARBAMOL 500 MG: 500 TABLET ORAL at 12:04

## 2024-04-02 RX ADMIN — PIPERACILLIN SODIUM AND TAZOBACTAM SODIUM 4.5 G: 4; .5 INJECTION, POWDER, FOR SOLUTION INTRAVENOUS at 09:04

## 2024-04-02 RX ADMIN — ENOXAPARIN SODIUM 40 MG: 40 INJECTION SUBCUTANEOUS at 04:04

## 2024-04-02 RX ADMIN — OXYCODONE HYDROCHLORIDE 10 MG: 5 TABLET ORAL at 06:04

## 2024-04-02 RX ADMIN — ACETYLCYSTEINE 4 ML: 100 INHALANT RESPIRATORY (INHALATION) at 08:04

## 2024-04-02 RX ADMIN — SODIUM CHLORIDE 1000 MG: 1 TABLET ORAL at 08:04

## 2024-04-02 RX ADMIN — LINEZOLID 600 MG: 600 TABLET, FILM COATED ORAL at 09:04

## 2024-04-02 RX ADMIN — FAMOTIDINE 20 MG: 20 TABLET ORAL at 08:04

## 2024-04-02 RX ADMIN — METOPROLOL TARTRATE 12.5 MG: 25 TABLET, FILM COATED ORAL at 09:04

## 2024-04-02 RX ADMIN — MEDROXYPROGESTERONE ACETATE 10 MG: 5 TABLET ORAL at 08:04

## 2024-04-02 RX ADMIN — OXYCODONE HYDROCHLORIDE 10 MG: 5 TABLET ORAL at 10:04

## 2024-04-02 RX ADMIN — METHOCARBAMOL 500 MG: 500 TABLET ORAL at 09:04

## 2024-04-02 RX ADMIN — METHOCARBAMOL 500 MG: 500 TABLET ORAL at 08:04

## 2024-04-02 RX ADMIN — OXYCODONE HYDROCHLORIDE 10 MG: 5 TABLET ORAL at 12:04

## 2024-04-02 RX ADMIN — METHOCARBAMOL 500 MG: 500 TABLET ORAL at 04:04

## 2024-04-02 RX ADMIN — SODIUM CHLORIDE 1000 MG: 1 TABLET ORAL at 09:04

## 2024-04-02 NOTE — ASSESSMENT & PLAN NOTE
Patient has hyponatremia which is uncontrolled,We will aim to correct the sodium by 4-6mEq in 24 hours. We will monitor sodium Every 6 hours x4. The hyponatremia is due to Dehydration/hypovolemia. We will treat the hyponatremia with IV fluids as follows:  Normal saline at 100 mL/hr. The patient's sodium results have been reviewed and are listed below.  Recent Labs   Lab 04/02/24  0426        Improving   -hold spironolactone    - TSH normal, cortisol appropriate, free water restrict  -status post 1 L normal saline IV fluid bolus given in the emergency department  Salt tablets begun  On fluids    Resolved

## 2024-04-02 NOTE — ASSESSMENT & PLAN NOTE
"This patient does have evidence of infective focus  My overall impression is sepsis.  Source: Respiratory  Antibiotics given-   Antibiotics (72h ago, onward)      Start     Stop Route Frequency Ordered    03/30/24 0900  linezolid tablet 600 mg         -- Oral Every 12 hours 03/30/24 0731    03/28/24 1800  piperacillin-tazobactam (ZOSYN) 4.5 g in dextrose 5 % in water (D5W) 100 mL IVPB (MB+)         -- IV Every 8 hours (non-standard times) 03/28/24 1538        No results for input(s): "LACTATE", "POCLAC" in the last 72 hours.    Organ dysfunction indicated by  none    Fluid challenge Not needed - patient is not hypotensive      Post- resuscitation assessment No - Post resuscitation assessment not needed       Will Not start Pressors- Levophed for MAP of 65  Source control achieved by: broad spectrum antibiotics  Improving   Leukocytosis trending down  Chest tube drainage trending down  "

## 2024-04-02 NOTE — PROGRESS NOTES
[facility] Medicine  Progress Note    Patient Name: Mayela Molina  MRN: [id number]  Patient Class: IP- Inpatient   Admission Date: 3/25/2024  Length of Stay: 7 days  Attending Physician: Perla Gutierres MD  Primary Care Provider: Katie, Primary Doctor        Subjective:     Principal Problem:Pneumonia of left lung due to infectious organism        HPI:  28-year-old patient (male to female transgender) with no chronic medical conditions who presented to the emergency department with complaint of cough over the last 3 weeks, moderate severity, persistent.  Patient does have associated yellow sputum production, shortness for breath, and pleuritic chest pain.  No associated nausea, vomiting, diarrhea.  No complaints of fevers or chills although T-max in the emergency department recorded as 99.8.  Patient does not smoke cigarettes or vape.  No illicit drug use.  Abnormal labs in the emergency department include white blood cell count 30.46, hemoglobin 12.7, platelets 577, D-dimer 1.37, sodium 122, chloride 90, bicarb 21, glucose 121, albumin 2.3.  Last labs are from 2018 with no interim labs to compare with.  Patient does take spironolactone, Estrace, and Provera.  CT scan of chest with left-sided pneumonia with possible abscess formation.    Overview/Hospital Course:   Patient was admitted acute respiratory failure with left-sided pneumonia and possible abscess formation.  She initially was on nasal cannula and on morning after admission became acutely short of breath with a PO2 of 87% and tachycardic.  She was given a dose of adenosine which did not improve things however gradually her heart rate decreased.  Patient was admitted to the intensive care unit for respiratory failure tachycardia.  She was initially on high-flow nasal cannula and subsequently was able to wean to room air.  Cultures remained pending.  She was initially started on Rocephin and azithromycin and changed to Zosyn who  azithromycin vein.  Legionella antigen pending.  Continue with aggressive pulmonary care  With worsening CT scan, continued elevated WBC, continued elevated heart rate.  Patient underwent repeat CT scan of the chest which showed worsening of empyema.  She was seen and evaluated by Cardiothoracic surgery and underwent left VATS with decortication on 3/19.    Hyponatremia patient was admitted with hyponatremia she had fluid restrictions in place cortisol was normal, TSH normal, patient was on spironolactone and this was held.  She was being maintained on fluid restricted diet.     Encourage patient to deep breathe and walk in halls.  4/1 undergoing breathing treatment. Mom at bedside. Questions and concerns addressed. Encourage ambulation  4/2 questions and concerns addressed. Chest tube drainage 50cc yesterday. Ambulated with physical/occupational therapy. Continue intravenous fluids and intravenous antibiotic(s).     Interval History: See hospital course for today      Review of Systems   Constitutional:  Positive for activity change (improving) and fatigue. Negative for appetite change and fever.   Respiratory:  Positive for cough. Negative for shortness of breath.    Cardiovascular:  Positive for chest pain.   Gastrointestinal:  Negative for abdominal distention, nausea and vomiting.   Neurological:  Positive for weakness.     Objective:     Vital Signs (Most Recent):  Temp: 97.6 °F (36.4 °C) (04/02/24 0421)  Pulse: 96 (04/02/24 1533)  Resp: 18 (04/02/24 1237)  BP: 120/75 (04/02/24 1216)  SpO2: 97 % (04/02/24 1216) Vital Signs (24h Range):  Temp:  [97.5 °F (36.4 °C)-97.6 °F (36.4 °C)] 97.6 °F (36.4 °C)  Pulse:  [] 96  Resp:  [16-18] 18  SpO2:  [96 %-99 %] 97 %  BP: (102-120)/(57-75) 120/75     Weight: 54.7 kg (120 lb 9.5 oz)  Body mass index is 17.81 kg/m².    Intake/Output Summary (Last 24 hours) at 4/2/2024 9289  Last data filed at 4/2/2024 0993  Gross per 24 hour   Intake --   Output 980 ml   Net -980 ml  "        Physical Exam  Vitals and nursing note reviewed. Exam conducted with a chaperone present (nursing).   Constitutional:       General: She is not in acute distress.     Appearance: She is ill-appearing. She is not toxic-appearing.   HENT:      Head: Normocephalic and atraumatic.   Cardiovascular:      Rate and Rhythm: Normal rate.   Pulmonary:      Effort: Pulmonary effort is normal. No respiratory distress.   Chest:       Abdominal:      Palpations: Abdomen is soft.      Tenderness: There is no abdominal tenderness.   Skin:     General: Skin is warm.      Coloration: Skin is pale.   Neurological:      Mental Status: She is alert and oriented to person, place, and time.      Motor: Weakness present.             Significant Labs: All pertinent labs within the past 24 hours have been reviewed.  Body fluid culture streptococcus   CBC:   Recent Labs   Lab 04/01/24  0449 04/02/24  0426   WBC 24.54* 20.87*   HGB 10.3* 10.7*   HCT 30.2* 33.3*    401     CMP:   Recent Labs   Lab 03/31/24  1649 04/01/24 0449 04/02/24  0426   * 133* 136   K 3.9 4.1 4.4    104 104   CO2 19* 19* 19*    90 90   BUN 23* 22* 22*   CREATININE 2.6* 2.5* 2.3*   CALCIUM 8.1* 7.8* 7.9*   ALBUMIN  --  1.8* 1.8*   ANIONGAP 11 10 13       Significant Imaging: I have reviewed all pertinent imaging results/findings within the past 24 hours.    Assessment/Plan:      * Pneumonia of left lung due to infectious organism  -white blood cell count 30.46, lactic acid level 1.5, D-dimer 1.37, BNP 14, troponin negative, influenza a/B negative, COVID-19 negative  -blood cultures pending  -CTA of chest with "no pulmonary embolism, no dissection; dense consolidation in the left lower lobe extending to the left upper lobe with tree in bud opacities consistent with severe pneumonia; questionable areas of fluid density in the left lung base with foci of gas may relate to component of fluid density or possible liquefaction or possible " "abscess formation."  -continue IV Zosyn, vanco and IV azithromycin initiated in the emergency department  -check MRSA culture-negative, sputum culture-pending, HIV  -give IV fluids, O2 supplementation, p.r.n. albuterol inhaler  - repeat CT scan of chest today looks more like an empyema  -CVT consulted and patient underwent VATS with drainage    Chest tube drainage trending down  Continue antibiotic(s)   Encourage incentive spirometer and acapella and breathing treatments      Hydropneumothorax  Status post VATS with left lung chest tubes  -culture positive for MRSA  -on Zosyn and Zyvox  CVT and pulmonary following chest tubes    Pulmonary abscess  Growing strep  On linezolid and zosyn    Tachycardia, paroxysmal  Beta-blocker started heart rate markedly improved.  Borderline blood pressure and heart rate underwent 100 we will DC    Pleuritic chest pain  Pleuritic chest pain related to left-sided pneumonia/empyema.  Cardiac enzymes negative.      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 Has not received any PRBCs to date.   Current CBC reviewed-   Lab Results   Component Value Date    HGB 10.7 (L) 04/02/2024    HCT 33.3 (L) 04/02/2024     Monitor serial CBC and transfuse if patient becomes hemodynamically unstable, symptomatic or H/H drops below 7/21.  Iron def anemia    Underweight  Current BMI 15.27, albumin 2.3.    Add nutritional supplements  Nutrition consult    Hyponatremia  Patient has hyponatremia which is uncontrolled,We will aim to correct the sodium by 4-6mEq in 24 hours. We will monitor sodium Every 6 hours x4. The hyponatremia is due to Dehydration/hypovolemia. We will treat the hyponatremia with IV fluids as follows:  Normal saline at 100 mL/hr. The patient's sodium results have been reviewed and are listed below.  Recent Labs   Lab 04/02/24  0426   NA " "136     Improving   -hold spironolactone    - TSH normal, cortisol appropriate, free water restrict  -status post 1 L normal saline IV fluid bolus given in the emergency department  Salt tablets begun  On fluids    Resolved       Hypotension  Relative hypotension with blood pressure 99/56.  Patient received 1 L normal saline IV fluid bolus in the emergency department.   Hemodynamically stable    Blood pressure improving  On fluids    Sepsis  This patient does have evidence of infective focus  My overall impression is sepsis.  Source: Respiratory  Antibiotics given-   Antibiotics (72h ago, onward)      Start     Stop Route Frequency Ordered    03/30/24 0900  linezolid tablet 600 mg         -- Oral Every 12 hours 03/30/24 0731    03/28/24 1800  piperacillin-tazobactam (ZOSYN) 4.5 g in dextrose 5 % in water (D5W) 100 mL IVPB (MB+)         -- IV Every 8 hours (non-standard times) 03/28/24 1538        No results for input(s): "LACTATE", "POCLAC" in the last 72 hours.    Organ dysfunction indicated by  none    Fluid challenge Not needed - patient is not hypotensive      Post- resuscitation assessment No - Post resuscitation assessment not needed       Will Not start Pressors- Levophed for MAP of 65  Source control achieved by: broad spectrum antibiotics  Improving   Leukocytosis trending down  Chest tube drainage trending down      VTE Risk Mitigation (From admission, onward)           Ordered     Place sequential compression device  Until discontinued        Comments: Can be discontinued when not in the bed.    03/30/24 1113     enoxaparin injection 40 mg  Every 24 hours         03/28/24 0912     IP VTE HIGH RISK PATIENT  Once         03/26/24 0043     Reason for No Pharmacological VTE Prophylaxis  Once        Question:  Reasons:  Answer:  Physician Provided (leave comment)  Comment:  pending pulmonary consult    03/26/24 0043     IP VTE HIGH RISK PATIENT  Once         03/26/24 0043     Place sequential compression " device  Until discontinued         03/26/24 0043                    Discharge Planning   RAVINDER:      Code Status: Full Code   Is the patient medically ready for discharge?:     Reason for patient still in hospital (select all that apply): Patient trending condition, Laboratory test, Treatment, Imaging, Consult recommendations, and PT / OT recommendations  Discharge Plan A: Home                  Perla Gutierres MD  Department of Hospital Medicine   [facility] ([facility])

## 2024-04-02 NOTE — PLAN OF CARE
TX COMPLETED: facilitated bed mobility with SBA, transfers with SBA, ambulated 75 ft x 2 with SBA and no AD. Recommend continued PT services

## 2024-04-02 NOTE — PT/OT/SLP PROGRESS
Physical Therapy  Treatment    Mayela Molina   MRN: [id number]   Admitting Diagnosis: Pneumonia of left lung due to infectious organism    PT Received On: 03/30/24  PT Start Time: 1405     PT Stop Time: 1455    PT Total Time (min): 50 min       Billable Minutes:  Gait Training 30 and Therapeutic Activity 20    Treatment Type: Treatment  PT/PTA: PT     Number of PTA visits since last PT visit: 0       General Precautions: Standard, fall  Orthopedic Precautions: N/A  Braces: N/A  Respiratory Status: Room air    Spiritual, Cultural Beliefs, Caodaism Practices, Values that Affect Care: no    Subjective:  Communicated with nursing (Randi) and performed chart review via epic sysstem prior to session.  Pt agreeable to PT services     Pain/Comfort  Pain Rating 1: 2/10  Pain Addressed 1: Pre-medicate for activity, Reposition, Distraction  Pain Rating Post-Intervention 1: 2/10    Objective:   Patient found with: chest tube, peripheral IV    Functional Mobility:  Bed Mobility:    Sitting in chair upon PT arrival    Transfers:   Sit<>stand and stand pivot with SBA    Gait:    Facilitated gait activity 75 ft x 2 SBA with no AD, demonstrated slow pace, stiff/guarded posture, narrow CAROLINA, short steps with decreased foot clearance, intermittent standing rest breaks to catch breath and cough    Balance:   Dynamic Sit: FAIR+: Maintains balance through MINIMAL excursions of active trunk motion  Dynamic stand: FAIR+: Needs CLOSE SUPERVISION during gait and is able to right self with minor LOB       Treatment and Education:  Educated pt on benefits of consistent participation in PT services to meet functional goals. Educated pt on call don't fall policy and use of call button to alert nursing staff of needs (including to assist in/out of bed). Pt expressed understanding.      AM-PAC 6 CLICK MOBILITY  How much help from another person does this patient currently need?   1 = Unable, Total/Dependent Assistance  2 = A lot,  Maximum/Moderate Assistance  3 = A little, Minimum/Contact Guard/Supervision  4 = None, Modified Doss/Independent    Turning over in bed (including adjusting bedclothes, sheets and blankets)?: 3  Sitting down on and standing up from a chair with arms (e.g., wheelchair, bedside commode, etc.): 3  Moving from lying on back to sitting on the side of the bed?: 3  Moving to and from a bed to a chair (including a wheelchair)?: 3  Need to walk in hospital room?: 3  Climbing 3-5 steps with a railing?: 1  Basic Mobility Total Score: 16    AM-PAC Raw Score CMS G-Code Modifier Level of Impairment Assistance   6 % Total / Unable   7 - 9 CM 80 - 100% Maximal Assist   10 - 14 CL 60 - 80% Moderate Assist   15 - 19 CK 40 - 60% Moderate Assist   20 - 22 CJ 20 - 40% Minimal Assist   23 CI 1-20% SBA / CGA   24 CH 0% Independent/ Mod I     Patient left up in chair with all lines intact, call button in reach, and nursing notified.    Assessment:  Mayela Molina is a 28 y.o. adult with a medical diagnosis of Pneumonia of left lung due to infectious organism and presents with the deficits listed below. Complaints of tightness and pain with coughing but able to tolerate gait activity in and out of room with no LOB or distress. Reporting that it feels better to walk. Pt will benefit from continued PT services in order to progress toward baseline.    Rehab identified problem list/impairments: pain, decreased safety awareness, gait instability, impaired functional mobility, impaired endurance    Rehab potential is good.    Activity tolerance: Good    Discharge recommendations: Low Intensity Therapy      Barriers to discharge:      Equipment recommendations: none     GOALS:   Multidisciplinary Problems       Physical Therapy Goals          Problem: Physical Therapy    Goal Priority Disciplines Outcome Goal Variances Interventions   Physical Therapy Goal     PT, PT/OT Ongoing, Progressing     Description: Pt will perform  bed mobility independently in order to participate in EOB activity.  Pt will perform transfers independently in order to participate in OOB activity.  Pt will ambulate 450 ft I with no AD in order to participate in daily tasks.                        PLAN:    Patient to be seen 3 x/week to address the above listed problems via neuromuscular re-education, therapeutic exercises, therapeutic activities, gait training  Plan of Care expires: 04/13/24  Plan of Care reviewed with: patient         04/02/2024

## 2024-04-02 NOTE — SUBJECTIVE & OBJECTIVE
Interval History: See hospital course for today      Review of Systems   Constitutional:  Positive for activity change (improving) and fatigue. Negative for appetite change and fever.   Respiratory:  Positive for cough. Negative for shortness of breath.    Cardiovascular:  Positive for chest pain.   Gastrointestinal:  Negative for abdominal distention, nausea and vomiting.   Neurological:  Positive for weakness.     Objective:     Vital Signs (Most Recent):  Temp: 97.6 °F (36.4 °C) (04/02/24 0421)  Pulse: 96 (04/02/24 1533)  Resp: 18 (04/02/24 1237)  BP: 120/75 (04/02/24 1216)  SpO2: 97 % (04/02/24 1216) Vital Signs (24h Range):  Temp:  [97.5 °F (36.4 °C)-97.6 °F (36.4 °C)] 97.6 °F (36.4 °C)  Pulse:  [] 96  Resp:  [16-18] 18  SpO2:  [96 %-99 %] 97 %  BP: (102-120)/(57-75) 120/75     Weight: 54.7 kg (120 lb 9.5 oz)  Body mass index is 17.81 kg/m².    Intake/Output Summary (Last 24 hours) at 4/2/2024 1605  Last data filed at 4/2/2024 0926  Gross per 24 hour   Intake --   Output 980 ml   Net -980 ml         Physical Exam  Vitals and nursing note reviewed. Exam conducted with a chaperone present (nursing).   Constitutional:       General: She is not in acute distress.     Appearance: She is ill-appearing. She is not toxic-appearing.   HENT:      Head: Normocephalic and atraumatic.   Cardiovascular:      Rate and Rhythm: Normal rate.   Pulmonary:      Effort: Pulmonary effort is normal. No respiratory distress.   Chest:       Abdominal:      Palpations: Abdomen is soft.      Tenderness: There is no abdominal tenderness.   Skin:     General: Skin is warm.      Coloration: Skin is pale.   Neurological:      Mental Status: She is alert and oriented to person, place, and time.      Motor: Weakness present.             Significant Labs: All pertinent labs within the past 24 hours have been reviewed.  Body fluid culture streptococcus   CBC:   Recent Labs   Lab 04/01/24  0449 04/02/24  0426   WBC 24.54* 20.87*   HGB 10.3*  10.7*   HCT 30.2* 33.3*    401     CMP:   Recent Labs   Lab 03/31/24  1649 04/01/24  0449 04/02/24  0426   * 133* 136   K 3.9 4.1 4.4    104 104   CO2 19* 19* 19*    90 90   BUN 23* 22* 22*   CREATININE 2.6* 2.5* 2.3*   CALCIUM 8.1* 7.8* 7.9*   ALBUMIN  --  1.8* 1.8*   ANIONGAP 11 10 13       Significant Imaging: I have reviewed all pertinent imaging results/findings within the past 24 hours.

## 2024-04-02 NOTE — CONSULTS
[facility] ([facility])  Nephrology  Consult Note    Patient Name: Mayela Molina  MRN: [id number]  Admission Date: 3/25/2024  Hospital Length of Stay: 7 days  Attending Provider: Perla Gutierres MD   Primary Care Physician: Katie, Primary Doctor  Principal Problem:Pneumonia of left lung due to infectious organism    Consults  Subjective:     HPI:  28-year-old admitted with necrotizing pneumonia and empyema.  Underwent thoracotomy and decortication on 03/29/2030.  Noted to have an acute rise in creatinine on 03/30.  Nephrology has been consulted for evaluation.  Patient was seen in her hospital room.  In bed resting comfortably.  Complains of some discomfort with deep inspiration and cough.  She has chest tubes in place.  No lower extremity edema or swelling.  No fevers or chills.  No nausea vomiting or diarrhea related.    04/01/2024:  Patient was seen in her hospital room.  In bed resting comfortably.  No acute distress noted.  Still has some difficulty with cough and deep inspiration.  No new complaints from overnight.    04/02/2024: Patient was seen in her hospital room.  In bed resting comfortably.  No acute distress noted.  Continues to have some chest discomfort associated with deep inspiration and cough.    History reviewed. No pertinent past medical history.    Past Surgical History:   Procedure Laterality Date    INJECTION OF ANESTHETIC AGENT AROUND MULTIPLE INTERCOSTAL NERVES Left 3/29/2024    Procedure: BLOCK, NERVE, INTERCOSTAL, 2 OR MORE;  Surgeon: Josafat Sandoval MD;  Location: [facility] OR;  Service: Cardiothoracic;  Laterality: Left;    VIDEO-ASSISTED THORACOSCOPIC SURGERY (VATS) Left 3/29/2024    Procedure: VATS (VIDEO-ASSISTED THORACOSCOPIC SURGERY);  Surgeon: Josafat Sandoval MD;  Location: [facility] OR;  Service: Cardiothoracic;  Laterality: Left;       Review of patient's allergies indicates:  No Known Allergies  Current Facility-Administered Medications   Medication Frequency    0.9%  NaCl  infusion (for blood administration) Q24H PRN    0.9%  NaCl infusion PRN    0.9%  NaCl infusion Continuous    acetaminophen tablet 650 mg Q6H PRN    acetylcysteine 100 mg/ml (10%) solution 4 mL QID WAKE    albuterol nebulizer solution 2.5 mg Q6H PRN    benzonatate capsule 100 mg TID PRN    bisacodyL suppository 10 mg Daily PRN    enoxaparin injection 40 mg Q24H (prophylaxis, 1700)    estradioL tablet 8 mg Daily    famotidine tablet 20 mg BID    glucagon (human recombinant) injection 1 mg PRN    glucose chewable tablet 16 g PRN    glucose chewable tablet 24 g PRN    HYDROmorphone injection 1 mg QID PRN    ipratropium 0.02 % nebulizer solution 0.5 mg QID PRN    linezolid tablet 600 mg Q12H    medroxyPROGESTERone tablet 10 mg Daily    melatonin tablet 6 mg Nightly PRN    methocarbamoL tablet 500 mg QID    metoclopramide injection 5 mg Q6H PRN    metoprolol tartrate (LOPRESSOR) split tablet 12.5 mg BID    naloxone 0.4 mg/mL injection 0.02 mg PRN    ondansetron disintegrating tablet 8 mg Q8H PRN    ondansetron injection 4 mg Q6H PRN    oxyCODONE immediate release tablet 10 mg Q4H PRN    oxyCODONE immediate release tablet 5 mg Q4H PRN    piperacillin-tazobactam (ZOSYN) 4.5 g in dextrose 5 % in water (D5W) 100 mL IVPB (MB+) Q8H    polyethylene glycol packet 17 g Daily    sodium chloride 0.9% flush 10 mL PRN    sodium chloride 0.9% flush 10 mL Q12H PRN    sodium chloride 0.9% flush 10 mL PRN    sodium chloride 0.9% flush 10 mL PRN    sodium chloride 0.9% flush 2 mL Q6H    sodium chloride oral tablet 1,000 mg TID     Family History    None       Tobacco Use    Smoking status: Never    Smokeless tobacco: Never   Substance and Sexual Activity    Alcohol use: Never    Drug use: Never    Sexual activity: Yes     Review of Systems   Constitutional:  Positive for fatigue.   HENT: Negative.     Respiratory:          Pain on deep inspiration and cough.   Cardiovascular: Negative.    Gastrointestinal: Negative.    Genitourinary:  Negative.    Musculoskeletal: Negative.    Skin: Negative.      Objective:     Vital Signs (Most Recent):  Temp: 97.6 °F (36.4 °C) (04/02/24 0421)  Pulse: 84 (04/02/24 0751)  Resp: 16 (04/02/24 0751)  BP: 115/69 (04/02/24 0736)  SpO2: 97 % (04/02/24 0751) Vital Signs (24h Range):  Temp:  [97.5 °F (36.4 °C)-97.6 °F (36.4 °C)] 97.6 °F (36.4 °C)  Pulse:  [] 84  Resp:  [16-18] 16  SpO2:  [95 %-99 %] 97 %  BP: (102-115)/(57-69) 115/69     Weight: 54.7 kg (120 lb 9.5 oz) (04/02/24 0016)  Body mass index is 17.81 kg/m².  Body surface area is 1.63 meters squared.    I/O last 3 completed shifts:  In: 360 [P.O.:360]  Out: 1350 [Urine:1300; Chest Tube:50]    Physical Exam  Constitutional:       Appearance: Normal appearance.   HENT:      Head: Normocephalic and atraumatic.   Eyes:      General: No scleral icterus.     Extraocular Movements: Extraocular movements intact.      Pupils: Pupils are equal, round, and reactive to light.   Cardiovascular:      Pulses: Normal pulses.      Heart sounds: Normal heart sounds.      Comments: Decreased breath sounds over the left base.    Musculoskeletal:      Right lower leg: No edema.      Left lower leg: No edema.   Skin:     General: Skin is warm and dry.   Neurological:      General: No focal deficit present.      Mental Status: She is alert and oriented to person, place, and time.   Psychiatric:         Mood and Affect: Mood normal.         Behavior: Behavior normal.         Significant Labs:  BMP:   Recent Labs   Lab 03/28/24  0712 03/29/24  0708 04/02/24 0426      < > 90   *   < > 136   K 4.2   < > 4.4   CL 95   < > 104   CO2 24   < > 19*   BUN 6   < > 22*   CREATININE 0.6   < > 2.3*   CALCIUM 7.9*   < > 7.9*   MG 1.9  --   --     < > = values in this interval not displayed.       CMP:   Recent Labs   Lab 03/29/24  0708 03/30/24  0439 04/02/24  0426   GLU 93   < > 90   CALCIUM 8.0*   < > 7.9*   ALBUMIN 1.9*   < > 1.8*   PROT 6.7  --   --    *   < > 136   K  3.9   < > 4.4   CO2 21*   < > 19*   CL 97   < > 104   BUN 11   < > 22*   CREATININE 0.7   < > 2.3*   ALKPHOS 74  --   --    ALT 26  --   --    AST 32  --   --    BILITOT 0.6  --   --     < > = values in this interval not displayed.       All labs within the past 24 hours have been reviewed.    Significant Imaging:  Labs: Reviewed      Assessment/Plan:     Active Diagnoses:    Diagnosis Date Noted POA    PRINCIPAL PROBLEM:  Pneumonia of left lung due to infectious organism [J18.9] 03/26/2024 Yes    Pulmonary abscess [J85.2] 03/30/2024 Yes    Hydropneumothorax [J94.8] 03/30/2024 Yes    Sepsis [A41.9] 03/26/2024 Yes    Hypotension [I95.9] 03/26/2024 Yes    Hyponatremia [E87.1] 03/26/2024 Yes    Underweight [R63.6] 03/26/2024 Yes    Normocytic anemia [D64.9] 03/26/2024 Yes    Thrombocytosis [D75.839] 03/26/2024 Yes    Male-to-female transgender person [Z78.9] 03/26/2024 Yes    Pleuritic chest pain [R07.81] 03/26/2024 Yes    Tachycardia, paroxysmal [I47.9] 03/26/2024 No      Problems Resolved During this Admission:    Diagnosis Date Noted Date Resolved POA    Hepatitis C antibody positive in blood [R76.8] 03/27/2024 03/28/2024 Yes       Assessment and plan:    1. Acute kidney injury:  Likely multifactorial.  Given that creatinine bumped almost immediately post surgery there is likely postop ATN.  On review of the operative notes his quite a bit of puss encountered.      Most recent vancomycin dose was noted to be 41.  This raises the possibility of vancomycin toxicity contributing to acute kidney injury.  Vancomycin has subsequently been discontinued.    Urine studies are consistent with ATN.  Fractional excretion of sodium is greater than 2%.    Creatinine is slowly improving.  Decreasing from a peak of 2.6 down to 2.3.  Good urine output with 1.3 L reported over the past 24 hours.    2. Electrolytes:  Potassium is stable at 4.4.    3. Bicarbonate is stable at 19.  Secondary to acute kidney injury.  Supplemental bicarb  does not need to be started at this point.    4. Volume:  Patient does not appear to be significantly volume overloaded.  On exam she appears to be somewhat volume depleted.    Gentle hydration may be warranted.  We will need to monitor strict I's and O's.        Thank you for your consult.     Fortino Kwong MD  Nephrology  [facility] ([facility]

## 2024-04-02 NOTE — PROGRESS NOTES
[facility] - Tele[facility] ([facility])  Cardiothoracic Surgery  Progress Note    Patient Name: Mayela Molina  MRN: [id number]  Admission Date: 3/25/2024  Hospital Length of Stay: 7 days  Code Status: Full Code   Attending Physician: Perla Gutierres MD   Referring Provider: Self, Aaareferral  Principal Problem:Pneumonia of left lung due to infectious organism            Subjective:     Post-Op Info:  Procedure(s) (LRB):  VATS (VIDEO-ASSISTED THORACOSCOPIC SURGERY) (Left)  BLOCK, NERVE, INTERCOSTAL, 2 OR MORE (Left)   4 Days Post-Op     Interval History: The patient is postop day 4 status post VATS with drainage of empyema and decortication.    ROS  Medications:  Continuous Infusions:   sodium chloride 0.9% 125 mL/hr at 04/02/24 0529     Scheduled Meds:   acetylcysteine 100 mg/ml (10%)  4 mL Nebulization QID WAKE    enoxparin  40 mg Subcutaneous Q24H (prophylaxis, 1700)    estradioL  8 mg Oral Daily    famotidine  20 mg Oral BID    linezolid  600 mg Oral Q12H    medroxyPROGESTERone  10 mg Oral Daily    methocarbamoL  500 mg Oral QID    metoprolol tartrate  12.5 mg Oral BID    piperacillin-tazobactam (Zosyn) IV (PEDS and ADULTS) (extended infusion is not appropriate)  4.5 g Intravenous Q8H    polyethylene glycol  17 g Oral Daily    sodium chloride 0.9%  2 mL Intravenous Q6H    sodium chloride  1,000 mg Oral TID     PRN Meds:0.9%  NaCl infusion (for blood administration), sodium chloride 0.9%, acetaminophen, albuterol sulfate, benzonatate, bisacodyL, glucagon (human recombinant), glucose, glucose, HYDROmorphone, ipratropium, melatonin, metoclopramide, naloxone, ondansetron, ondansetron, oxyCODONE, oxyCODONE, sodium chloride 0.9%, sodium chloride 0.9%, sodium chloride 0.9%, sodium chloride 0.9%     Objective:     Vital Signs (Most Recent):  Temp: 97.6 °F (36.4 °C) (04/02/24 0421)  Pulse: 84 (04/02/24 0751)  Resp: 16 (04/02/24 0751)  BP: 115/69 (04/02/24 0736)  SpO2: 97 % (04/02/24 0751) Vital Signs (24h Range):  Temp:  [97.5  °F (36.4 °C)-97.6 °F (36.4 °C)] 97.6 °F (36.4 °C)  Pulse:  [] 84  Resp:  [16-18] 16  SpO2:  [95 %-99 %] 97 %  BP: (102-115)/(57-69) 115/69     Weight: 54.7 kg (120 lb 9.5 oz)  Body mass index is 17.81 kg/m².    SpO2: 97 %       Intake/Output - Last 3 Shifts         03/31 0700  04/01 0659 04/01 0700  04/02 0659 04/02 0700  04/03 0659    P.O. 400 360     Blood 650      Total Intake(mL/kg) 1050 (19.4) 360 (6.6)     Urine (mL/kg/hr) 300 (0.2) 1300 (1) 950 (4.5)    Stool  0     Chest Tube  50     Total Output 300 1350 950    Net +750 -990 -950           Urine Occurrence 2 x 2 x     Stool Occurrence 0 x 0 x             Lines/Drains/Airways       Drain  Duration                  Y Chest Tube 1 and 2 03/29/24 0903 1 Left Pleural 32 Fr. 2 Left Pleural 32 Fr. 4 days              Peripheral Intravenous Line  Duration                  Peripheral IV - Single Lumen 03/31/24 1104 20 G Left;Posterior Forearm 1 day                     Physical Exam  Constitutional:       Appearance: Normal appearance.   HENT:      Head: Normocephalic and atraumatic.      Nose: Nose normal.   Cardiovascular:      Pulses: Normal pulses.      Heart sounds: Normal heart sounds.   Pulmonary:      Effort: Pulmonary effort is normal.   Abdominal:      General: Abdomen is flat.      Palpations: Abdomen is soft.   Musculoskeletal:      Right lower leg: No edema.      Left lower leg: No edema.   Skin:     General: Skin is warm and dry.   Neurological:      Mental Status: She is alert and oriented to person, place, and time.   Psychiatric:         Behavior: Behavior normal.            Significant Labs:  All pertinent labs from the last 24 hours have been reviewed.    Significant Diagnostics:    Assessment/Plan:     Hydropneumothorax  04/02/2024   Patient is postop day 4 status post VATS procedure with drainage of empyema and decortication.  Continue broad-spectrum antibiotics with linezolid and Zosyn.  Patient's white count is trending downward with  white count of 20.  Patient is afebrile.  Chest tube output trending lower.  Continue pulmonary toileting continue incentive spirometer.  Patient is up and ambulating.  Increase as tolerated.        Mark Elliott MD  Cardiothoracic Surgery  [facility] - Tele[facility] ([facility])

## 2024-04-02 NOTE — HOSPITAL COURSE
04/02/2024   The patient is postop day 4 status post VATS with drainage of empyema and decortication.    04/03/2024   The patient is postop day 5 status post VATS procedure with drainage of empyema and decortication.  04/04/2024   The patient is postop day 6 status post VATS procedure with drainage of empyema and decortication.    04/05/2024   The patient is postop day 7 status post VATS procedure with drainage of empyema and decortication.    04/06/2024   The patient is postop day 8 status post VATS procedure with drainage of empyema and decortication.

## 2024-04-02 NOTE — PLAN OF CARE
A253/A253 DELORISPark Molina is a 28 y.o.male admitted on 3/25/2024 for Pneumonia of left lung due to infectious organism   Code Status: Full Code MRN: [id number]   Review of patient's allergies indicates:  No Known Allergies  History reviewed. No pertinent past medical history.   PRN meds    0.9%  NaCl infusion (for blood administration), , Q24H PRN  sodium chloride 0.9%, , PRN  acetaminophen, 650 mg, Q6H PRN  albuterol sulfate, 2.5 mg, Q6H PRN  benzonatate, 100 mg, TID PRN  bisacodyL, 10 mg, Daily PRN  glucagon (human recombinant), 1 mg, PRN  glucose, 16 g, PRN  glucose, 24 g, PRN  HYDROmorphone, 1 mg, QID PRN  ipratropium, 0.5 mg, QID PRN  melatonin, 6 mg, Nightly PRN  metoclopramide, 5 mg, Q6H PRN  naloxone, 0.02 mg, PRN  ondansetron, 8 mg, Q8H PRN  ondansetron, 4 mg, Q6H PRN  oxyCODONE, 10 mg, Q4H PRN  oxyCODONE, 5 mg, Q4H PRN  sodium chloride 0.9%, 10 mL, PRN  sodium chloride 0.9%, 10 mL, Q12H PRN  sodium chloride 0.9%, 10 mL, PRN  sodium chloride 0.9%, 10 mL, PRN      Chart check completed. Will continue plan of care.      Orientation: oriented x 4  Ole Coma Scale Score: 15     Lead Monitored: Lead II Rhythm: normal sinus rhythm    Cardiac/Telemetry Box Number: [id number]  VTE Required Core Measure: (SCDs) Sequential compression device initiated/maintained, Pharmacological prophylaxis initiated/maintained Last Bowel Movement: 04/01/24  Diet Adult Regular (IDDSI Level 7) Standard Tray  Voiding Characteristics: ureteral catheter  Miki Score: 20  Fall Risk Score: 12  Accucheck []   Freq?      Lines/Drains/Airways       Drain  Duration                  Y Chest Tube 1 and 2 03/29/24 0903 1 Left Pleural 32 Fr. 2 Left Pleural 32 Fr. 4 days              Peripheral Intravenous Line  Duration                  Peripheral IV - Single Lumen 03/31/24 1104 20 G Left;Posterior Forearm 2 days                       Problem: Fall Injury Risk  Goal: Absence of Fall and Fall-Related Injury  Outcome: Ongoing, Progressing      Problem: Respiratory Compromise (Pneumothorax)  Goal: Optimal Oxygenation and Ventilation  Outcome: Ongoing, Progressing     Problem: Pain Acute  Goal: Acceptable Pain Control and Functional Ability  Outcome: Ongoing, Progressing

## 2024-04-02 NOTE — PROGRESS NOTES
Pharmacist Renal Dose Adjustment Note    Mayela Molina is a 28 y.o. adult being treated with the medication famotidine    Patient Data:    Vital Signs (Most Recent):  Temp: 97.6 °F (36.4 °C) (04/02/24 0421)  Pulse: 103 (04/02/24 1216)  Resp: 18 (04/02/24 1237)  BP: 120/75 (04/02/24 1216)  SpO2: 97 % (04/02/24 1216) Vital Signs (72h Range):  Temp:  [97.5 °F (36.4 °C)-98.7 °F (37.1 °C)]   Pulse:  []   Resp:  [16-20]   BP: (101-133)/(55-76)   SpO2:  [94 %-99 %]      Recent Labs   Lab 03/31/24  1649 04/01/24  0449 04/02/24  0426   CREATININE 2.6* 2.5* 2.3*     Serum creatinine: 2.3 mg/dL (H) 04/02/24 0426  Aleisha for audrey  Estimated creatinine clearance: 32 mL/min (A) (Female)  Estimated creatinine clearance: 37.7 mL/min (A) (Male)    Medication:famotidine 20mg BID will be changed to famotidine 20mg daily for crcl < 50 ml/min    Pharmacist's Name: Therese Meneses  Pharmacist's Extension: [phone]

## 2024-04-02 NOTE — ASSESSMENT & PLAN NOTE
"-white blood cell count 30.46, lactic acid level 1.5, D-dimer 1.37, BNP 14, troponin negative, influenza a/B negative, COVID-19 negative  -blood cultures pending  -CTA of chest with "no pulmonary embolism, no dissection; dense consolidation in the left lower lobe extending to the left upper lobe with tree in bud opacities consistent with severe pneumonia; questionable areas of fluid density in the left lung base with foci of gas may relate to component of fluid density or possible liquefaction or possible abscess formation."  -continue IV Zosyn, vanco and IV azithromycin initiated in the emergency department  -check MRSA culture-negative, sputum culture-pending, HIV  -give IV fluids, O2 supplementation, p.r.n. albuterol inhaler  - repeat CT scan of chest today looks more like an empyema  -CVT consulted and patient underwent VATS with drainage    Chest tube drainage trending down  Continue antibiotic(s)   Encourage incentive spirometer and acapella and breathing treatments    "

## 2024-04-02 NOTE — ASSESSMENT & PLAN NOTE
Patient's anemia is currently controlled. Has not received any PRBCs to date.   Current CBC reviewed-   Lab Results   Component Value Date    HGB 10.7 (L) 04/02/2024    HCT 33.3 (L) 04/02/2024     Monitor serial CBC and transfuse if patient becomes hemodynamically unstable, symptomatic or H/H drops below 7/21.  Iron def anemia

## 2024-04-02 NOTE — SUBJECTIVE & OBJECTIVE
Interval History: The patient is postop day 4 status post VATS with drainage of empyema and decortication.    ROS  Medications:  Continuous Infusions:   sodium chloride 0.9% 125 mL/hr at 04/02/24 0529     Scheduled Meds:   acetylcysteine 100 mg/ml (10%)  4 mL Nebulization QID WAKE    enoxparin  40 mg Subcutaneous Q24H (prophylaxis, 1700)    estradioL  8 mg Oral Daily    famotidine  20 mg Oral BID    linezolid  600 mg Oral Q12H    medroxyPROGESTERone  10 mg Oral Daily    methocarbamoL  500 mg Oral QID    metoprolol tartrate  12.5 mg Oral BID    piperacillin-tazobactam (Zosyn) IV (PEDS and ADULTS) (extended infusion is not appropriate)  4.5 g Intravenous Q8H    polyethylene glycol  17 g Oral Daily    sodium chloride 0.9%  2 mL Intravenous Q6H    sodium chloride  1,000 mg Oral TID     PRN Meds:0.9%  NaCl infusion (for blood administration), sodium chloride 0.9%, acetaminophen, albuterol sulfate, benzonatate, bisacodyL, glucagon (human recombinant), glucose, glucose, HYDROmorphone, ipratropium, melatonin, metoclopramide, naloxone, ondansetron, ondansetron, oxyCODONE, oxyCODONE, sodium chloride 0.9%, sodium chloride 0.9%, sodium chloride 0.9%, sodium chloride 0.9%     Objective:     Vital Signs (Most Recent):  Temp: 97.6 °F (36.4 °C) (04/02/24 0421)  Pulse: 84 (04/02/24 0751)  Resp: 16 (04/02/24 0751)  BP: 115/69 (04/02/24 0736)  SpO2: 97 % (04/02/24 0751) Vital Signs (24h Range):  Temp:  [97.5 °F (36.4 °C)-97.6 °F (36.4 °C)] 97.6 °F (36.4 °C)  Pulse:  [] 84  Resp:  [16-18] 16  SpO2:  [95 %-99 %] 97 %  BP: (102-115)/(57-69) 115/69     Weight: 54.7 kg (120 lb 9.5 oz)  Body mass index is 17.81 kg/m².    SpO2: 97 %       Intake/Output - Last 3 Shifts         03/31 0700 04/01 0659 04/01 0700 04/02 0659 04/02 0700 04/03 0659    P.O. 400 360     Blood 650      Total Intake(mL/kg) 1050 (19.4) 360 (6.6)     Urine (mL/kg/hr) 300 (0.2) 1300 (1) 950 (4.5)    Stool  0     Chest Tube  50     Total Output 300 1350 950    Net  +750 -990 -950           Urine Occurrence 2 x 2 x     Stool Occurrence 0 x 0 x             Lines/Drains/Airways       Drain  Duration                  Y Chest Tube 1 and 2 03/29/24 0903 1 Left Pleural 32 Fr. 2 Left Pleural 32 Fr. 4 days              Peripheral Intravenous Line  Duration                  Peripheral IV - Single Lumen 03/31/24 1104 20 G Left;Posterior Forearm 1 day                     Physical Exam  Constitutional:       Appearance: Normal appearance.   HENT:      Head: Normocephalic and atraumatic.      Nose: Nose normal.   Cardiovascular:      Pulses: Normal pulses.      Heart sounds: Normal heart sounds.   Pulmonary:      Effort: Pulmonary effort is normal.   Abdominal:      General: Abdomen is flat.      Palpations: Abdomen is soft.   Musculoskeletal:      Right lower leg: No edema.      Left lower leg: No edema.   Skin:     General: Skin is warm and dry.   Neurological:      Mental Status: She is alert and oriented to person, place, and time.   Psychiatric:         Behavior: Behavior normal.            Significant Labs:  All pertinent labs from the last 24 hours have been reviewed.    Significant Diagnostics:

## 2024-04-03 PROBLEM — E44.0 MODERATE PROTEIN-CALORIE MALNUTRITION: Status: ACTIVE | Noted: 2024-04-03

## 2024-04-03 LAB
ALBUMIN SERPL BCP-MCNC: 1.6 G/DL (ref 3.5–5.2)
ANION GAP SERPL CALC-SCNC: 7 MMOL/L (ref 8–16)
BASOPHILS # BLD AUTO: 0.06 K/UL (ref 0–0.2)
BASOPHILS NFR BLD: 0.4 % (ref 0–1.9)
BUN SERPL-MCNC: 21 MG/DL (ref 6–20)
CALCIUM SERPL-MCNC: 7.7 MG/DL (ref 8.7–10.5)
CHLORIDE SERPL-SCNC: 108 MMOL/L (ref 95–110)
CO2 SERPL-SCNC: 18 MMOL/L (ref 23–29)
CREAT SERPL-MCNC: 2 MG/DL (ref 0.5–1.4)
DIFFERENTIAL METHOD BLD: ABNORMAL
EOSINOPHIL # BLD AUTO: 0.1 K/UL (ref 0–0.5)
EOSINOPHIL NFR BLD: 0.7 % (ref 0–8)
ERYTHROCYTE [DISTWIDTH] IN BLOOD BY AUTOMATED COUNT: 15.8 % (ref 11.5–14.5)
EST. GFR  (NO RACE VARIABLE): 46 ML/MIN/1.73 M^2
FINAL PATHOLOGIC DIAGNOSIS: NORMAL
GLUCOSE SERPL-MCNC: 86 MG/DL (ref 70–110)
GROSS: NORMAL
HCT VFR BLD AUTO: 30.6 % (ref 40–54)
HGB BLD-MCNC: 10 G/DL (ref 14–18)
IMM GRANULOCYTES # BLD AUTO: 0.14 K/UL (ref 0–0.04)
IMM GRANULOCYTES NFR BLD AUTO: 0.9 % (ref 0–0.5)
LYMPHOCYTES # BLD AUTO: 2.1 K/UL (ref 1–4.8)
LYMPHOCYTES NFR BLD: 14 % (ref 18–48)
Lab: NORMAL
MCH RBC QN AUTO: 28.5 PG (ref 27–31)
MCHC RBC AUTO-ENTMCNC: 32.7 G/DL (ref 32–36)
MCV RBC AUTO: 87 FL (ref 82–98)
MONOCYTES # BLD AUTO: 0.9 K/UL (ref 0.3–1)
MONOCYTES NFR BLD: 5.8 % (ref 4–15)
NEUTROPHILS # BLD AUTO: 11.6 K/UL (ref 1.8–7.7)
NEUTROPHILS NFR BLD: 78.2 % (ref 38–73)
NRBC BLD-RTO: 0 /100 WBC
PHOSPHATE SERPL-MCNC: 2.9 MG/DL (ref 2.7–4.5)
PLATELET # BLD AUTO: 343 K/UL (ref 150–450)
PMV BLD AUTO: 8.4 FL (ref 9.2–12.9)
POTASSIUM SERPL-SCNC: 4 MMOL/L (ref 3.5–5.1)
RBC # BLD AUTO: 3.51 M/UL (ref 4.6–6.2)
SODIUM SERPL-SCNC: 133 MMOL/L (ref 136–145)
WBC # BLD AUTO: 14.8 K/UL (ref 3.9–12.7)

## 2024-04-03 PROCEDURE — 25000003 PHARM REV CODE 250: Performed by: FAMILY MEDICINE

## 2024-04-03 PROCEDURE — 63600175 PHARM REV CODE 636 W HCPCS: Performed by: INTERNAL MEDICINE

## 2024-04-03 PROCEDURE — 25000003 PHARM REV CODE 250: Performed by: NURSE PRACTITIONER

## 2024-04-03 PROCEDURE — 99900035 HC TECH TIME PER 15 MIN (STAT)

## 2024-04-03 PROCEDURE — 94799 UNLISTED PULMONARY SVC/PX: CPT | Mod: XB

## 2024-04-03 PROCEDURE — 25000003 PHARM REV CODE 250: Performed by: THORACIC SURGERY (CARDIOTHORACIC VASCULAR SURGERY)

## 2024-04-03 PROCEDURE — A4216 STERILE WATER/SALINE, 10 ML: HCPCS | Performed by: THORACIC SURGERY (CARDIOTHORACIC VASCULAR SURGERY)

## 2024-04-03 PROCEDURE — 63600175 PHARM REV CODE 636 W HCPCS: Performed by: THORACIC SURGERY (CARDIOTHORACIC VASCULAR SURGERY)

## 2024-04-03 PROCEDURE — 25000003 PHARM REV CODE 250: Performed by: INTERNAL MEDICINE

## 2024-04-03 PROCEDURE — 94640 AIRWAY INHALATION TREATMENT: CPT

## 2024-04-03 PROCEDURE — 27000646 HC AEROBIKA DEVICE

## 2024-04-03 PROCEDURE — 85025 COMPLETE CBC W/AUTO DIFF WBC: CPT | Performed by: THORACIC SURGERY (CARDIOTHORACIC VASCULAR SURGERY)

## 2024-04-03 PROCEDURE — 99232 SBSQ HOSP IP/OBS MODERATE 35: CPT | Mod: ,,, | Performed by: INTERNAL MEDICINE

## 2024-04-03 PROCEDURE — 94664 DEMO&/EVAL PT USE INHALER: CPT

## 2024-04-03 PROCEDURE — 21400001 HC TELEMETRY ROOM

## 2024-04-03 PROCEDURE — 25000242 PHARM REV CODE 250 ALT 637 W/ HCPCS: Performed by: INTERNAL MEDICINE

## 2024-04-03 PROCEDURE — A4216 STERILE WATER/SALINE, 10 ML: HCPCS | Performed by: ANESTHESIOLOGY

## 2024-04-03 PROCEDURE — 80069 RENAL FUNCTION PANEL: CPT | Performed by: INTERNAL MEDICINE

## 2024-04-03 PROCEDURE — 94761 N-INVAS EAR/PLS OXIMETRY MLT: CPT

## 2024-04-03 PROCEDURE — 25000242 PHARM REV CODE 250 ALT 637 W/ HCPCS: Performed by: THORACIC SURGERY (CARDIOTHORACIC VASCULAR SURGERY)

## 2024-04-03 PROCEDURE — 25000003 PHARM REV CODE 250: Performed by: ANESTHESIOLOGY

## 2024-04-03 RX ADMIN — Medication 2 ML: at 06:04

## 2024-04-03 RX ADMIN — BENZONATATE 100 MG: 100 CAPSULE ORAL at 11:04

## 2024-04-03 RX ADMIN — METOPROLOL TARTRATE 12.5 MG: 25 TABLET, FILM COATED ORAL at 08:04

## 2024-04-03 RX ADMIN — HYDROMORPHONE HYDROCHLORIDE 1 MG: 1 INJECTION, SOLUTION INTRAMUSCULAR; INTRAVENOUS; SUBCUTANEOUS at 09:04

## 2024-04-03 RX ADMIN — ENOXAPARIN SODIUM 40 MG: 40 INJECTION SUBCUTANEOUS at 05:04

## 2024-04-03 RX ADMIN — LINEZOLID 600 MG: 600 TABLET, FILM COATED ORAL at 09:04

## 2024-04-03 RX ADMIN — MEDROXYPROGESTERONE ACETATE 10 MG: 5 TABLET ORAL at 10:04

## 2024-04-03 RX ADMIN — ALBUTEROL SULFATE 2.5 MG: 2.5 SOLUTION RESPIRATORY (INHALATION) at 03:04

## 2024-04-03 RX ADMIN — SODIUM CHLORIDE 1000 MG: 1 TABLET ORAL at 09:04

## 2024-04-03 RX ADMIN — LINEZOLID 600 MG: 600 TABLET, FILM COATED ORAL at 08:04

## 2024-04-03 RX ADMIN — SODIUM CHLORIDE 1000 MG: 1 TABLET ORAL at 04:04

## 2024-04-03 RX ADMIN — PIPERACILLIN SODIUM AND TAZOBACTAM SODIUM 4.5 G: 4; .5 INJECTION, POWDER, FOR SOLUTION INTRAVENOUS at 05:04

## 2024-04-03 RX ADMIN — PIPERACILLIN SODIUM AND TAZOBACTAM SODIUM 4.5 G: 4; .5 INJECTION, POWDER, FOR SOLUTION INTRAVENOUS at 01:04

## 2024-04-03 RX ADMIN — ACETYLCYSTEINE 4 ML: 100 INHALANT RESPIRATORY (INHALATION) at 08:04

## 2024-04-03 RX ADMIN — OXYCODONE HYDROCHLORIDE 10 MG: 5 TABLET ORAL at 03:04

## 2024-04-03 RX ADMIN — ONDANSETRON 4 MG: 2 INJECTION INTRAMUSCULAR; INTRAVENOUS at 05:04

## 2024-04-03 RX ADMIN — FAMOTIDINE 20 MG: 20 TABLET ORAL at 09:04

## 2024-04-03 RX ADMIN — HYDROMORPHONE HYDROCHLORIDE 1 MG: 1 INJECTION, SOLUTION INTRAMUSCULAR; INTRAVENOUS; SUBCUTANEOUS at 05:04

## 2024-04-03 RX ADMIN — ACETYLCYSTEINE 4 ML: 100 INHALANT RESPIRATORY (INHALATION) at 03:04

## 2024-04-03 RX ADMIN — PIPERACILLIN SODIUM AND TAZOBACTAM SODIUM 4.5 G: 4; .5 INJECTION, POWDER, FOR SOLUTION INTRAVENOUS at 10:04

## 2024-04-03 RX ADMIN — SODIUM CHLORIDE 1000 MG: 1 TABLET ORAL at 08:04

## 2024-04-03 RX ADMIN — ESTRADIOL 8 MG: 1 TABLET ORAL at 09:04

## 2024-04-03 RX ADMIN — OXYCODONE HYDROCHLORIDE 10 MG: 5 TABLET ORAL at 11:04

## 2024-04-03 RX ADMIN — ACETAMINOPHEN 650 MG: 325 TABLET ORAL at 03:04

## 2024-04-03 RX ADMIN — METOPROLOL TARTRATE 12.5 MG: 25 TABLET, FILM COATED ORAL at 09:04

## 2024-04-03 RX ADMIN — Medication 10 ML: at 12:04

## 2024-04-03 RX ADMIN — METHOCARBAMOL 500 MG: 500 TABLET ORAL at 05:04

## 2024-04-03 RX ADMIN — METHOCARBAMOL 500 MG: 500 TABLET ORAL at 08:04

## 2024-04-03 RX ADMIN — METHOCARBAMOL 500 MG: 500 TABLET ORAL at 09:04

## 2024-04-03 RX ADMIN — HYDROMORPHONE HYDROCHLORIDE 1 MG: 1 INJECTION, SOLUTION INTRAMUSCULAR; INTRAVENOUS; SUBCUTANEOUS at 01:04

## 2024-04-03 RX ADMIN — METHOCARBAMOL 500 MG: 500 TABLET ORAL at 12:04

## 2024-04-03 RX ADMIN — ALBUTEROL SULFATE 2.5 MG: 2.5 SOLUTION RESPIRATORY (INHALATION) at 08:04

## 2024-04-03 RX ADMIN — SODIUM CHLORIDE: 9 INJECTION, SOLUTION INTRAVENOUS at 10:04

## 2024-04-03 NOTE — SUBJECTIVE & OBJECTIVE
Interval History:  The patient is postop day 5 status post VATS procedure with drainage of empyema and decortication.    ROS  Medications:  Continuous Infusions:   sodium chloride 0.9% 125 mL/hr at 04/03/24 1009     Scheduled Meds:   acetylcysteine 100 mg/ml (10%)  4 mL Nebulization QID WAKE    enoxparin  40 mg Subcutaneous Q24H (prophylaxis, 1700)    estradioL  8 mg Oral Daily    famotidine  20 mg Oral Daily    linezolid  600 mg Oral Q12H    medroxyPROGESTERone  10 mg Oral Daily    methocarbamoL  500 mg Oral QID    metoprolol tartrate  12.5 mg Oral BID    piperacillin-tazobactam (Zosyn) IV (PEDS and ADULTS) (extended infusion is not appropriate)  4.5 g Intravenous Q8H    polyethylene glycol  17 g Oral Daily    sodium chloride 0.9%  2 mL Intravenous Q6H    sodium chloride  1,000 mg Oral TID     PRN Meds:0.9%  NaCl infusion (for blood administration), sodium chloride 0.9%, acetaminophen, albuterol sulfate, benzonatate, bisacodyL, glucagon (human recombinant), glucose, glucose, HYDROmorphone, ipratropium, melatonin, metoclopramide, naloxone, ondansetron, ondansetron, oxyCODONE, oxyCODONE, sodium chloride 0.9%, sodium chloride 0.9%, sodium chloride 0.9%, sodium chloride 0.9%     Objective:     Vital Signs (Most Recent):  Temp: 97.6 °F (36.4 °C) (04/03/24 0735)  Pulse: 88 (04/03/24 1542)  Resp: 18 (04/03/24 1542)  BP: 108/68 (04/03/24 0951)  SpO2: 99 % (04/03/24 1542) Vital Signs (24h Range):  Temp:  [97.6 °F (36.4 °C)-98.3 °F (36.8 °C)] 97.6 °F (36.4 °C)  Pulse:  [] 88  Resp:  [14-18] 18  SpO2:  [97 %-100 %] 99 %  BP: (106-130)/(59-68) 108/68     Weight: 55.6 kg (122 lb 9.2 oz)  Body mass index is 18.1 kg/m².    SpO2: 99 %       Intake/Output - Last 3 Shifts         04/01 0700 04/02 0659 04/02 0700 04/03 0659 04/03 0700  04/04 0659    P.O. 360 680     I.V. (mL/kg) 0 (0) 1429.2 (25.7)     Blood       IV Piggyback 0 100.4     Total Intake(mL/kg) 360 (6.6) 2209.5 (39.7)     Urine (mL/kg/hr) 1300 (1) 1850 (1.4)      Stool 0 0     Chest Tube 50 10     Total Output 1350 1860     Net -990 +349.5            Urine Occurrence 2 x 2 x     Stool Occurrence 0 x 0 x             Lines/Drains/Airways       Drain  Duration                  Y Chest Tube 1 and 2 03/29/24 0903 1 Left Pleural 32 Fr. 2 Left Pleural 32 Fr. 5 days              Peripheral Intravenous Line  Duration                  Peripheral IV - Single Lumen 03/31/24 1104 20 G Left;Posterior Forearm 3 days                     Physical Exam  Constitutional:       Appearance: Normal appearance.   HENT:      Head: Normocephalic and atraumatic.      Nose: Nose normal.   Cardiovascular:      Rate and Rhythm: Normal rate.      Heart sounds: Normal heart sounds.   Pulmonary:      Effort: Pulmonary effort is normal.   Abdominal:      General: Abdomen is flat. Bowel sounds are normal.      Palpations: Abdomen is soft.   Musculoskeletal:      Right lower leg: No edema.      Left lower leg: No edema.   Skin:     General: Skin is warm and dry.   Neurological:      Mental Status: She is alert and oriented to person, place, and time.   Psychiatric:         Behavior: Behavior normal.            Significant Labs:  All pertinent labs from the last 24 hours have been reviewed.    Significant Diagnostics:  I have reviewed and interpreted all pertinent imaging results/findings within the past 24 hours.

## 2024-04-03 NOTE — SUBJECTIVE & OBJECTIVE
Interval History: See hospital course for today      Review of Systems   Constitutional:  Positive for activity change (improving) and fatigue. Negative for appetite change (improving, eating александр) and fever.   Respiratory:  Positive for cough and chest tightness.    Gastrointestinal:  Negative for abdominal pain, nausea and vomiting.   Musculoskeletal:  Positive for myalgias.   Neurological:  Positive for weakness.   Psychiatric/Behavioral:  Positive for dysphoric mood. Negative for agitation, behavioral problems, confusion and decreased concentration. The patient is not nervous/anxious.      Objective:     Vital Signs (Most Recent):  Temp: 97.6 °F (36.4 °C) (04/03/24 0735)  Pulse: 87 (04/03/24 0807)  Resp: 14 (04/03/24 1106)  BP: 108/68 (04/03/24 0951)  SpO2: 100 % (04/03/24 0807) Vital Signs (24h Range):  Temp:  [97.6 °F (36.4 °C)-98.3 °F (36.8 °C)] 97.6 °F (36.4 °C)  Pulse:  [] 87  Resp:  [14-18] 14  SpO2:  [97 %-100 %] 100 %  BP: (106-130)/(59-68) 108/68     Weight: 55.6 kg (122 lb 9.2 oz)  Body mass index is 18.1 kg/m².    Intake/Output Summary (Last 24 hours) at 4/3/2024 1228  Last data filed at 4/2/2024 2000  Gross per 24 hour   Intake 1969.53 ml   Output 910 ml   Net 1059.53 ml         Physical Exam  Vitals and nursing note reviewed. Exam conducted with a chaperone present (visitor).   Constitutional:       General: She is not in acute distress.     Appearance: She is underweight. She is ill-appearing. She is not toxic-appearing.   HENT:      Head: Normocephalic and atraumatic.   Cardiovascular:      Rate and Rhythm: Normal rate.   Pulmonary:      Effort: Pulmonary effort is normal. No respiratory distress.   Chest:       Abdominal:      Palpations: Abdomen is soft.      Tenderness: There is no abdominal tenderness.   Musculoskeletal:      Right lower leg: No edema.      Left lower leg: No edema.   Skin:     General: Skin is warm.      Coloration: Skin is pale.   Neurological:      Mental Status:  She is alert.      Motor: Weakness present.             Significant Labs: All pertinent labs within the past 24 hours have been reviewed.  Blood Culture: negative growth to date x 5 days  CBC:   Recent Labs   Lab 04/02/24 0426 04/03/24 0425   WBC 20.87* 14.80*   HGB 10.7* 10.0*   HCT 33.3* 30.6*    343     CMP:   Recent Labs   Lab 04/02/24 0426 04/03/24 0425    133*   K 4.4 4.0    108   CO2 19* 18*   GLU 90 86   BUN 22* 21*   CREATININE 2.3* 2.0*   CALCIUM 7.9* 7.7*   ALBUMIN 1.8* 1.6*   ANIONGAP 13 7*     Mrsa negative; body fluid growing streptococcus    Significant Imaging: I have reviewed all pertinent imaging results/findings within the past 24 hours.  CXR: I have reviewed all pertinent results/findings within the past 24 hours and my personal findings are:  no interval adverse change

## 2024-04-03 NOTE — ASSESSMENT & PLAN NOTE
Malnutrition Type:  Context: acute on chronic illness  Level: moderate    Related to (etiology):   Physiological causes increasing nutrient needs d/t chronic illness    Signs and Symptoms (as evidenced by):   BMI < 18.5  Underweight with loss of fat and muscle  Unable or unwilling to eat sufficient energy/protein to maintain a healthy weight  Food avoidance/ lack of interest in food  Sepsis, pneumonia    Malnutrition Characteristic Summary:  Energy Intake (Malnutrition): less than or equal to 75% for greater than or equal to 1 month  Subcutaneous Fat (Malnutrition): moderate depletion  Muscle Mass (Malnutrition): moderate depletion      Interventions/Recommendations (treatment strategy):  1. General healthful diet  2. Commercial beverage  3. Vitamin and mineral supplement therapy   4. PO intake encouragement   5. Collaboration with other providers     Nutrition Diagnosis Status:   New   lightheaded

## 2024-04-03 NOTE — PLAN OF CARE
[facility] - Tele[facility] (Hospital)  Discharge Reassessment    Primary Care Provider: No, Primary Doctor    Expected Discharge Date:     Reassessment (most recent)       Discharge Reassessment - 04/03/24 1330          Discharge Reassessment    Assessment Type Discharge Planning Reassessment     Did the patient's condition or plan change since previous assessment? Yes     Discharge Plan discussed with: Patient     Communicated RAVINDER with patient/caregiver Date not available/Unable to determine     Discharge Plan A Home     Discharge Plan B Home     DME Needed Upon Discharge  none     Transition of Care Barriers Unisured     Why the patient remains in the hospital Requires continued medical care        Post-Acute Status    Discharge Delays None known at this time                   Pt remains hospitalized d/t continued medical care. Pt being followed by nephrology and CTS. Chest tube remains in place. Discharge disposition for home once medically cleared.     Patient uninsured and over income for Medicaid.   Pt has PCP follow up scheduled with [facility]: April 22, 2024 @ 220PM    SW to remain available

## 2024-04-03 NOTE — ASSESSMENT & PLAN NOTE
Patient's anemia is currently controlled. Has not received any PRBCs to date.   Current CBC reviewed-   Lab Results   Component Value Date    HGB 10.0 (L) 04/03/2024    HCT 30.6 (L) 04/03/2024     Monitor serial CBC and transfuse if patient becomes hemodynamically unstable, symptomatic or H/H drops below 7/21.  Iron def anemia

## 2024-04-03 NOTE — ASSESSMENT & PLAN NOTE
04/02/2024   Patient is postop day 4 status post VATS procedure with drainage of empyema and decortication.  Continue broad-spectrum antibiotics with linezolid and Zosyn.  Patient's white count is trending downward with white count of 20.  Patient is afebrile.  Chest tube output trending lower.  Continue pulmonary toileting continue incentive spirometer.  Patient is up and ambulating.  Increase as tolerated.    04/03/2024   The patient is postop day 5 status post VATS procedure with drainage of empyema and decortication.  Patient is on broad-spectrum antibiotics linezolid and Zosyn.  Is trending down.  Patient is afebrile.  Continue pulmonary toileting.  Chest x-ray shows continued patchy infiltrate left middle and lower lobes.  Continue chest tube to drainage.

## 2024-04-03 NOTE — ASSESSMENT & PLAN NOTE
Nutrition consulted. Most recent weight and BMI monitored-     Measurements:  Wt Readings from Last 1 Encounters:   04/03/24 55.6 kg (122 lb 9.2 oz)   Body mass index is 18.1 kg/m².    Patient has been screened and assessed by RD.    Malnutrition Type:  Context: acute illness or injury, chronic illness  Level: moderate    Malnutrition Characteristic Summary:  Energy Intake (Malnutrition): less than or equal to 75% for greater than or equal to 1 month  Subcutaneous Fat (Malnutrition): moderate depletion  Muscle Mass (Malnutrition): moderate depletion    Interventions/Recommendations (treatment strategy):  1. Recommend pt continues on Regular diet  2. Recommend pt continue Boost plus TID as medically appropriate 3. Recommend Rob BID for wound healing 4. Encourage PO intake of meals, snacks and supplements 5. Weigh twice weekly

## 2024-04-03 NOTE — CONSULTS
[facility] - Telemetry ([facility])  Nephrology  Consult Note    Patient Name: Maylea Molina  MRN: [id number]  Admission Date: 3/25/2024  Hospital Length of Stay: 8 days  Attending Provider: Perla Gutierres MD   Primary Care Physician: Katie, Primary Doctor  Principal Problem:Pneumonia of left lung due to infectious organism    Consults  Subjective:     HPI:  28-year-old admitted with necrotizing pneumonia and empyema.  Underwent thoracotomy and decortication on 03/29/2030.  Noted to have an acute rise in creatinine on 03/30.  Nephrology has been consulted for evaluation.  Patient was seen in her hospital room.  In bed resting comfortably.  Complains of some discomfort with deep inspiration and cough.  She has chest tubes in place.  No lower extremity edema or swelling.  No fevers or chills.  No nausea vomiting or diarrhea related.    04/01/2024:  Patient was seen in her hospital room.  In bed resting comfortably.  No acute distress noted.  Still has some difficulty with cough and deep inspiration.  No new complaints from overnight.    04/02/2024: Patient was seen in her hospital room.  In bed resting comfortably.  No acute distress noted.  Continues to have some chest discomfort associated with deep inspiration and cough.    04/03/2024:  Patient was seen in her hospital room.  In bed resting comfortably.  No acute distress noted.    History reviewed. No pertinent past medical history.    Past Surgical History:   Procedure Laterality Date    INJECTION OF ANESTHETIC AGENT AROUND MULTIPLE INTERCOSTAL NERVES Left 3/29/2024    Procedure: BLOCK, NERVE, INTERCOSTAL, 2 OR MORE;  Surgeon: Josafat Sandoval MD;  Location: [facility] OR;  Service: Cardiothoracic;  Laterality: Left;    VIDEO-ASSISTED THORACOSCOPIC SURGERY (VATS) Left 3/29/2024    Procedure: VATS (VIDEO-ASSISTED THORACOSCOPIC SURGERY);  Surgeon: Josafat Sandoval MD;  Location: [facility] OR;  Service: Cardiothoracic;  Laterality: Left;       Review of patient's allergies  indicates:  No Known Allergies  Current Facility-Administered Medications   Medication Frequency    0.9%  NaCl infusion (for blood administration) Q24H PRN    0.9%  NaCl infusion PRN    0.9%  NaCl infusion Continuous    acetaminophen tablet 650 mg Q6H PRN    acetylcysteine 100 mg/ml (10%) solution 4 mL QID WAKE    albuterol nebulizer solution 2.5 mg Q6H PRN    benzonatate capsule 100 mg TID PRN    bisacodyL suppository 10 mg Daily PRN    enoxaparin injection 40 mg Q24H (prophylaxis, 1700)    estradioL tablet 8 mg Daily    famotidine tablet 20 mg Daily    glucagon (human recombinant) injection 1 mg PRN    glucose chewable tablet 16 g PRN    glucose chewable tablet 24 g PRN    HYDROmorphone injection 1 mg QID PRN    ipratropium 0.02 % nebulizer solution 0.5 mg QID PRN    linezolid tablet 600 mg Q12H    medroxyPROGESTERone tablet 10 mg Daily    melatonin tablet 6 mg Nightly PRN    methocarbamoL tablet 500 mg QID    metoclopramide injection 5 mg Q6H PRN    metoprolol tartrate (LOPRESSOR) split tablet 12.5 mg BID    naloxone 0.4 mg/mL injection 0.02 mg PRN    ondansetron disintegrating tablet 8 mg Q8H PRN    ondansetron injection 4 mg Q6H PRN    oxyCODONE immediate release tablet 10 mg Q4H PRN    oxyCODONE immediate release tablet 5 mg Q4H PRN    piperacillin-tazobactam (ZOSYN) 4.5 g in dextrose 5 % in water (D5W) 100 mL IVPB (MB+) Q8H    polyethylene glycol packet 17 g Daily    sodium chloride 0.9% flush 10 mL PRN    sodium chloride 0.9% flush 10 mL Q12H PRN    sodium chloride 0.9% flush 10 mL PRN    sodium chloride 0.9% flush 10 mL PRN    sodium chloride 0.9% flush 2 mL Q6H    sodium chloride oral tablet 1,000 mg TID     Family History    None       Tobacco Use    Smoking status: Never    Smokeless tobacco: Never   Substance and Sexual Activity    Alcohol use: Never    Drug use: Never    Sexual activity: Yes     Review of Systems   Constitutional:  Positive for fatigue.   HENT: Negative.     Respiratory:          Pain  on deep inspiration and cough.   Cardiovascular: Negative.    Gastrointestinal: Negative.    Genitourinary: Negative.    Musculoskeletal: Negative.    Skin: Negative.      Objective:     Vital Signs (Most Recent):  Temp: 97.6 °F (36.4 °C) (04/03/24 0735)  Pulse: 87 (04/03/24 0807)  Resp: 14 (04/03/24 0907)  BP: 108/68 (04/03/24 0951)  SpO2: 100 % (04/03/24 0807) Vital Signs (24h Range):  Temp:  [97.6 °F (36.4 °C)-98.3 °F (36.8 °C)] 97.6 °F (36.4 °C)  Pulse:  [] 87  Resp:  [14-18] 14  SpO2:  [97 %-100 %] 100 %  BP: (106-130)/(59-75) 108/68     Weight: 55.6 kg (122 lb 9.2 oz) (04/02/24 2357)  Body mass index is 18.1 kg/m².  Body surface area is 1.65 meters squared.    I/O last 3 completed shifts:  In: 2209.5 [P.O.:680; I.V.:1429.2; IV Piggyback:100.4]  Out: 1860 [Urine:1850; Chest Tube:10]    Physical Exam  Constitutional:       Appearance: Normal appearance.   HENT:      Head: Normocephalic and atraumatic.   Eyes:      General: No scleral icterus.     Extraocular Movements: Extraocular movements intact.      Pupils: Pupils are equal, round, and reactive to light.   Cardiovascular:      Pulses: Normal pulses.      Heart sounds: Normal heart sounds.      Comments: Decreased breath sounds over the left base.    Musculoskeletal:      Right lower leg: No edema.      Left lower leg: No edema.   Skin:     General: Skin is warm and dry.   Neurological:      General: No focal deficit present.      Mental Status: She is alert and oriented to person, place, and time.   Psychiatric:         Mood and Affect: Mood normal.         Behavior: Behavior normal.         Significant Labs:  BMP:   Recent Labs   Lab 03/28/24  0712 03/29/24  0708 04/03/24  0425      < > 86   *   < > 133*   K 4.2   < > 4.0   CL 95   < > 108   CO2 24   < > 18*   BUN 6   < > 21*   CREATININE 0.6   < > 2.0*   CALCIUM 7.9*   < > 7.7*   MG 1.9  --   --     < > = values in this interval not displayed.       CMP:   Recent Labs   Lab  03/29/24  0708 03/30/24  0439 04/03/24  0425   GLU 93   < > 86   CALCIUM 8.0*   < > 7.7*   ALBUMIN 1.9*   < > 1.6*   PROT 6.7  --   --    *   < > 133*   K 3.9   < > 4.0   CO2 21*   < > 18*   CL 97   < > 108   BUN 11   < > 21*   CREATININE 0.7   < > 2.0*   ALKPHOS 74  --   --    ALT 26  --   --    AST 32  --   --    BILITOT 0.6  --   --     < > = values in this interval not displayed.       All labs within the past 24 hours have been reviewed.    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 Acute kidney injury:  Likely multifactorial.  Given that creatinine bumped almost immediately post surgery there is likely postop ATN.  On review of the operative notes his quite a bit of puss encountered.      Vancomycin was elevated at a proximally 41 several days ago.  Vancomycin has been discontinued.    Urine studies were consistent with ATN.  Fractional excretion of sodium is greater than 2%.    Creatinine is slowly improving.  Down to 2.0 from 2.3 yesterday.  Good urine output with 1.9 L reported over the past 24 hours.    2. Electrolytes:  Potassium is stable at 4.4.    3. Bicarbonate  is stable at 18.  Secondary to acute kidney injury.  Supplemental bicarb does not need to be started at this point.    4. Volume:  Patient does not appear to be significantly volume overloaded.  On exam she appears to be somewhat volume depleted.    Gentle hydration may be warranted.  We will need to monitor strict I's and O's.        Thank you for your consult.     Fortino Kwong MD  Nephrology  [facility] - Telemetry ([facility])

## 2024-04-03 NOTE — PROGRESS NOTES
[facility] Medicine  Progress Note    Patient Name: Mayela Molina  MRN: [id number]  Patient Class: IP- Inpatient   Admission Date: 3/25/2024  Length of Stay: 8 days  Attending Physician: Perla Gutierres MD  Primary Care Provider: Katie, Primary Doctor        Subjective:     Principal Problem:Pneumonia of left lung due to infectious organism        HPI:  28-year-old patient (male to female transgender) with no chronic medical conditions who presented to the emergency department with complaint of cough over the last 3 weeks, moderate severity, persistent.  Patient does have associated yellow sputum production, shortness for breath, and pleuritic chest pain.  No associated nausea, vomiting, diarrhea.  No complaints of fevers or chills although T-max in the emergency department recorded as 99.8.  Patient does not smoke cigarettes or vape.  No illicit drug use.  Abnormal labs in the emergency department include white blood cell count 30.46, hemoglobin 12.7, platelets 577, D-dimer 1.37, sodium 122, chloride 90, bicarb 21, glucose 121, albumin 2.3.  Last labs are from 2018 with no interim labs to compare with.  Patient does take spironolactone, Estrace, and Provera.  CT scan of chest with left-sided pneumonia with possible abscess formation.    Overview/Hospital Course:   Patient was admitted acute respiratory failure with left-sided pneumonia and possible abscess formation.  She initially was on nasal cannula and on morning after admission became acutely short of breath with a PO2 of 87% and tachycardic.  She was given a dose of adenosine which did not improve things however gradually her heart rate decreased.  Patient was admitted to the intensive care unit for respiratory failure tachycardia.  She was initially on high-flow nasal cannula and subsequently was able to wean to room air.  Cultures remained pending.  She was initially started on Rocephin and azithromycin and changed to Zosyn who  azithromycin vein.  Legionella antigen pending.  Continue with aggressive pulmonary care  With worsening CT scan, continued elevated WBC, continued elevated heart rate.  Patient underwent repeat CT scan of the chest which showed worsening of empyema.  She was seen and evaluated by Cardiothoracic surgery and underwent left VATS with decortication on 3/19.    Hyponatremia patient was admitted with hyponatremia she had fluid restrictions in place cortisol was normal, TSH normal, patient was on spironolactone and this was held.  She was being maintained on fluid restricted diet.     Encourage patient to deep breathe and walk in halls.  4/1 undergoing breathing treatment. Mom at bedside. Questions and concerns addressed. Encourage ambulation  4/2 questions and concerns addressed. Chest tube drainage 50cc yesterday. Ambulated with physical/occupational therapy. Continue intravenous fluids and intravenous antibiotic(s).   4/3 leukocytosis trending down. chest tube drainage 10cc/24 yesterday. Repeat chest x-ray with no adverse interval change. Patient wishes to ambulate more. Continue current care.    Interval History: See hospital course for today      Review of Systems   Constitutional:  Positive for activity change (improving) and fatigue. Negative for appetite change (improving, eating александр) and fever.   Respiratory:  Positive for cough and chest tightness.    Gastrointestinal:  Negative for abdominal pain, nausea and vomiting.   Musculoskeletal:  Positive for myalgias.   Neurological:  Positive for weakness.   Psychiatric/Behavioral:  Positive for dysphoric mood. Negative for agitation, behavioral problems, confusion and decreased concentration. The patient is not nervous/anxious.      Objective:     Vital Signs (Most Recent):  Temp: 97.6 °F (36.4 °C) (04/03/24 0735)  Pulse: 87 (04/03/24 0807)  Resp: 14 (04/03/24 1106)  BP: 108/68 (04/03/24 0951)  SpO2: 100 % (04/03/24 0807) Vital Signs (24h Range):  Temp:  [97.6 °F  (36.4 °C)-98.3 °F (36.8 °C)] 97.6 °F (36.4 °C)  Pulse:  [] 87  Resp:  [14-18] 14  SpO2:  [97 %-100 %] 100 %  BP: (106-130)/(59-68) 108/68     Weight: 55.6 kg (122 lb 9.2 oz)  Body mass index is 18.1 kg/m².    Intake/Output Summary (Last 24 hours) at 4/3/2024 1228  Last data filed at 4/2/2024 2000  Gross per 24 hour   Intake 1969.53 ml   Output 910 ml   Net 1059.53 ml         Physical Exam  Vitals and nursing note reviewed. Exam conducted with a chaperone present (visitor).   Constitutional:       General: She is not in acute distress.     Appearance: She is underweight. She is ill-appearing. She is not toxic-appearing.   HENT:      Head: Normocephalic and atraumatic.   Cardiovascular:      Rate and Rhythm: Normal rate.   Pulmonary:      Effort: Pulmonary effort is normal. No respiratory distress.   Chest:       Abdominal:      Palpations: Abdomen is soft.      Tenderness: There is no abdominal tenderness.   Musculoskeletal:      Right lower leg: No edema.      Left lower leg: No edema.   Skin:     General: Skin is warm.      Coloration: Skin is pale.   Neurological:      Mental Status: She is alert.      Motor: Weakness present.             Significant Labs: All pertinent labs within the past 24 hours have been reviewed.  Blood Culture: negative growth to date x 5 days  CBC:   Recent Labs   Lab 04/02/24  0426 04/03/24  0425   WBC 20.87* 14.80*   HGB 10.7* 10.0*   HCT 33.3* 30.6*    343     CMP:   Recent Labs   Lab 04/02/24  0426 04/03/24  0425    133*   K 4.4 4.0    108   CO2 19* 18*   GLU 90 86   BUN 22* 21*   CREATININE 2.3* 2.0*   CALCIUM 7.9* 7.7*   ALBUMIN 1.8* 1.6*   ANIONGAP 13 7*     Mrsa negative; body fluid growing streptococcus    Significant Imaging: I have reviewed all pertinent imaging results/findings within the past 24 hours.  CXR: I have reviewed all pertinent results/findings within the past 24 hours and my personal findings are:  no interval adverse  "change    Assessment/Plan:      * Pneumonia of left lung due to infectious organism  -white blood cell count 30.46, lactic acid level 1.5, D-dimer 1.37, BNP 14, troponin negative, influenza a/B negative, COVID-19 negative  -blood cultures pending  -CTA of chest with "no pulmonary embolism, no dissection; dense consolidation in the left lower lobe extending to the left upper lobe with tree in bud opacities consistent with severe pneumonia; questionable areas of fluid density in the left lung base with foci of gas may relate to component of fluid density or possible liquefaction or possible abscess formation."  -continue IV Zosyn, vanco and IV azithromycin initiated in the emergency department  -check MRSA culture-negative, sputum culture-pending, HIV  -give IV fluids, O2 supplementation, p.r.n. albuterol inhaler  - repeat CT scan of chest today looks more like an empyema  -CVT consulted and patient underwent VATS with drainage    Chest tube drainage trending down  Continue antibiotic(s)   Encourage incentive spirometer and acapella and breathing treatments      Moderate protein-calorie malnutrition  Nutrition consulted. Most recent weight and BMI monitored-     Measurements:  Wt Readings from Last 1 Encounters:   04/03/24 55.6 kg (122 lb 9.2 oz)   Body mass index is 18.1 kg/m².    Patient has been screened and assessed by RD.    Malnutrition Type:  Context: acute illness or injury, chronic illness  Level: moderate    Malnutrition Characteristic Summary:  Energy Intake (Malnutrition): less than or equal to 75% for greater than or equal to 1 month  Subcutaneous Fat (Malnutrition): moderate depletion  Muscle Mass (Malnutrition): moderate depletion    Interventions/Recommendations (treatment strategy):  1. Recommend pt continues on Regular diet  2. Recommend pt continue Boost plus TID as medically appropriate 3. Recommend Rob BID for wound healing 4. Encourage PO intake of meals, snacks and supplements 5. Weigh twice " weekly      Hydropneumothorax  Status post VATS with left lung chest tubes  -culture positive for MRSA  -on Zosyn and Zyvox  CVT and pulmonary following chest tubes    Pulmonary abscess  Growing strep  On linezolid and zosyn    Tachycardia, paroxysmal  Beta-blocker started heart rate markedly improved.  Borderline blood pressure and heart rate underwent 100 we will DC    Pleuritic chest pain  Pleuritic chest pain related to left-sided pneumonia/empyema.  Cardiac enzymes negative.      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 Has not received any PRBCs to date.   Current CBC reviewed-   Lab Results   Component Value Date    HGB 10.0 (L) 04/03/2024    HCT 30.6 (L) 04/03/2024     Monitor serial CBC and transfuse if patient becomes hemodynamically unstable, symptomatic or H/H drops below 7/21.  Iron def anemia    Underweight  Current BMI 15.27, albumin 2.3.    Add nutritional supplements  Nutrition consult    Hyponatremia  Patient has hyponatremia which is uncontrolled,We will aim to correct the sodium by 4-6mEq in 24 hours. We will monitor sodium Every 6 hours x4. The hyponatremia is due to Dehydration/hypovolemia. We will treat the hyponatremia with IV fluids as follows:  Normal saline at 100 mL/hr. The patient's sodium results have been reviewed and are listed below.  Recent Labs   Lab 04/03/24  0425   *     Improving   -hold spironolactone    - TSH normal, cortisol appropriate, free water restrict  -status post 1 L normal saline IV fluid bolus given in the emergency department    On fluids        Hypotension  Relative hypotension with blood pressure 99/56.  Patient received 1 L normal saline IV fluid bolus in the emergency department.   Hemodynamically stable    Blood pressure improving  On fluids    Sepsis  This patient does have evidence of infective  "focus  My overall impression is sepsis.  Source: Respiratory  Antibiotics given-   Antibiotics (72h ago, onward)      Start     Stop Route Frequency Ordered    03/30/24 0900  linezolid tablet 600 mg         -- Oral Every 12 hours 03/30/24 0731    03/28/24 1800  piperacillin-tazobactam (ZOSYN) 4.5 g in dextrose 5 % in water (D5W) 100 mL IVPB (MB+)         -- IV Every 8 hours (non-standard times) 03/28/24 1538        No results for input(s): "LACTATE", "POCLAC" in the last 72 hours.    Organ dysfunction indicated by  none    Fluid challenge Not needed - patient is not hypotensive      Post- resuscitation assessment No - Post resuscitation assessment not needed       Will Not start Pressors- Levophed for MAP of 65  Source control achieved by: broad spectrum antibiotics  Improving   Leukocytosis trending down  Chest tube drainage trending down      VTE Risk Mitigation (From admission, onward)           Ordered     Place sequential compression device  Until discontinued        Comments: Can be discontinued when not in the bed.    03/30/24 1113     enoxaparin injection 40 mg  Every 24 hours         03/28/24 0912     IP VTE HIGH RISK PATIENT  Once         03/26/24 0043     Reason for No Pharmacological VTE Prophylaxis  Once        Question:  Reasons:  Answer:  Physician Provided (leave comment)  Comment:  pending pulmonary consult    03/26/24 0043     IP VTE HIGH RISK PATIENT  Once         03/26/24 0043     Place sequential compression device  Until discontinued         03/26/24 0043                    Discharge Planning   RAVINDER:      Code Status: Full Code   Is the patient medically ready for discharge?:     Reason for patient still in hospital (select all that apply): Patient trending condition, Laboratory test, Treatment, Imaging, and Consult recommendations  Discharge Plan A: Home                  Perla Gutierres MD  Department of Hospital Medicine   [facility]    "

## 2024-04-03 NOTE — ASSESSMENT & PLAN NOTE
Patient has hyponatremia which is uncontrolled,We will aim to correct the sodium by 4-6mEq in 24 hours. We will monitor sodium Every 6 hours x4. The hyponatremia is due to Dehydration/hypovolemia. We will treat the hyponatremia with IV fluids as follows:  Normal saline at 100 mL/hr. The patient's sodium results have been reviewed and are listed below.  Recent Labs   Lab 04/03/24  0425   *     Improving   -hold spironolactone    - TSH normal, cortisol appropriate, free water restrict  -status post 1 L normal saline IV fluid bolus given in the emergency department    On fluids

## 2024-04-03 NOTE — PROGRESS NOTES
[place] - Telemetry ([facility])  Cardiothoracic Surgery  Progress Note    Patient Name: Mayela Molina  MRN: [id number]  Admission Date: 3/25/2024  Hospital Length of Stay: 8 days  Code Status: Full Code   Attending Physician: Perla Gutierres MD   Referring Provider: Self, Aaareferral  Principal Problem:Pneumonia of left lung due to infectious organism            Subjective:     Post-Op Info:  Procedure(s) (LRB):  VATS (VIDEO-ASSISTED THORACOSCOPIC SURGERY) (Left)  BLOCK, NERVE, INTERCOSTAL, 2 OR MORE (Left)   5 Days Post-Op     Interval History:  The patient is postop day 5 status post VATS procedure with drainage of empyema and decortication.    ROS  Medications:  Continuous Infusions:   sodium chloride 0.9% 125 mL/hr at 04/03/24 1009     Scheduled Meds:   acetylcysteine 100 mg/ml (10%)  4 mL Nebulization QID WAKE    enoxparin  40 mg Subcutaneous Q24H (prophylaxis, 1700)    estradioL  8 mg Oral Daily    famotidine  20 mg Oral Daily    linezolid  600 mg Oral Q12H    medroxyPROGESTERone  10 mg Oral Daily    methocarbamoL  500 mg Oral QID    metoprolol tartrate  12.5 mg Oral BID    piperacillin-tazobactam (Zosyn) IV (PEDS and ADULTS) (extended infusion is not appropriate)  4.5 g Intravenous Q8H    polyethylene glycol  17 g Oral Daily    sodium chloride 0.9%  2 mL Intravenous Q6H    sodium chloride  1,000 mg Oral TID     PRN Meds:0.9%  NaCl infusion (for blood administration), sodium chloride 0.9%, acetaminophen, albuterol sulfate, benzonatate, bisacodyL, glucagon (human recombinant), glucose, glucose, HYDROmorphone, ipratropium, melatonin, metoclopramide, naloxone, ondansetron, ondansetron, oxyCODONE, oxyCODONE, sodium chloride 0.9%, sodium chloride 0.9%, sodium chloride 0.9%, sodium chloride 0.9%     Objective:     Vital Signs (Most Recent):  Temp: 97.6 °F (36.4 °C) (04/03/24 0735)  Pulse: 88 (04/03/24 1542)  Resp: 18 (04/03/24 1542)  BP: 108/68 (04/03/24 0951)  SpO2: 99 % (04/03/24 1542) Vital Signs (24h  Range):  Temp:  [97.6 °F (36.4 °C)-98.3 °F (36.8 °C)] 97.6 °F (36.4 °C)  Pulse:  [] 88  Resp:  [14-18] 18  SpO2:  [97 %-100 %] 99 %  BP: (106-130)/(59-68) 108/68     Weight: 55.6 kg (122 lb 9.2 oz)  Body mass index is 18.1 kg/m².    SpO2: 99 %       Intake/Output - Last 3 Shifts         04/01 0700  04/02 0659 04/02 0700  04/03 0659 04/03 0700  04/04 0659    P.O. 360 680     I.V. (mL/kg) 0 (0) 1429.2 (25.7)     Blood       IV Piggyback 0 100.4     Total Intake(mL/kg) 360 (6.6) 2209.5 (39.7)     Urine (mL/kg/hr) 1300 (1) 1850 (1.4)     Stool 0 0     Chest Tube 50 10     Total Output 1350 1860     Net -990 +349.5            Urine Occurrence 2 x 2 x     Stool Occurrence 0 x 0 x             Lines/Drains/Airways       Drain  Duration                  Y Chest Tube 1 and 2 03/29/24 0903 1 Left Pleural 32 Fr. 2 Left Pleural 32 Fr. 5 days              Peripheral Intravenous Line  Duration                  Peripheral IV - Single Lumen 03/31/24 1104 20 G Left;Posterior Forearm 3 days                     Physical Exam  Constitutional:       Appearance: Normal appearance.   HENT:      Head: Normocephalic and atraumatic.      Nose: Nose normal.   Cardiovascular:      Rate and Rhythm: Normal rate.      Heart sounds: Normal heart sounds.   Pulmonary:      Effort: Pulmonary effort is normal.   Abdominal:      General: Abdomen is flat. Bowel sounds are normal.      Palpations: Abdomen is soft.   Musculoskeletal:      Right lower leg: No edema.      Left lower leg: No edema.   Skin:     General: Skin is warm and dry.   Neurological:      Mental Status: She is alert and oriented to person, place, and time.   Psychiatric:         Behavior: Behavior normal.            Significant Labs:  All pertinent labs from the last 24 hours have been reviewed.    Significant Diagnostics:  I have reviewed and interpreted all pertinent imaging results/findings within the past 24 hours.  Assessment/Plan:     Hydropneumothorax  04/02/2024   Patient  is postop day 4 status post VATS procedure with drainage of empyema and decortication.  Continue broad-spectrum antibiotics with linezolid and Zosyn.  Patient's white count is trending downward with white count of 20.  Patient is afebrile.  Chest tube output trending lower.  Continue pulmonary toileting continue incentive spirometer.  Patient is up and ambulating.  Increase as tolerated.    04/03/2024   The patient is postop day 5 status post VATS procedure with drainage of empyema and decortication.  Patient is on broad-spectrum antibiotics linezolid and Zosyn.  Is trending down.  Patient is afebrile.  Continue pulmonary toileting.  Chest x-ray shows continued patchy infiltrate left middle and lower lobes.  Continue chest tube to drainage.        Mark Elliott MD  Cardiothoracic Surgery  '[place] - Telemetry ([facility])

## 2024-04-03 NOTE — CONSULTS
[facility] - Tele[facility] ([facility])  Adult Nutrition  Consult Note    SUMMARY     Recommendations    Recommendation/Intervention:   1. Recommend pt continues on Regular diet    2. Recommend pt continue Boost plus TID as medically appropriate   3. Recommend Rob BID for wound healing   4. Encourage PO intake of meals, snacks and supplements   5. Weigh twice weekly    Goals:   1. Pt will tolerate and consume > 75% EEN and EPN prior to RD follow up   2. Pt will consume Rob BID prior to RD follow up  Nutrition Goal Status: new  Communication of RD Recs: other (comment) (POC, sticky note)    Assessment and Plan    Endocrine  Moderate protein-calorie malnutrition  Malnutrition Type:  Context: acute on chronic illness  Level: moderate    Related to (etiology):   Physiological causes increasing nutrient needs d/t chronic illness    Signs and Symptoms (as evidenced by):   BMI < 18.5  Underweight with loss of fat and muscle  Unable or unwilling to eat sufficient energy/protein to maintain a healthy weight  Food avoidance/ lack of interest in food  Sepsis, pneumonia    Malnutrition Characteristic Summary:  Energy Intake (Malnutrition): less than or equal to 75% for greater than or equal to 1 month  Subcutaneous Fat (Malnutrition): moderate depletion  Muscle Mass (Malnutrition): moderate depletion      Interventions/Recommendations (treatment strategy):  1. General healthful diet  2. Commercial beverage  3. Vitamin and mineral supplement therapy   4. PO intake encouragement   5. Collaboration with other providers     Nutrition Diagnosis Status:   New         Malnutrition Assessment  Malnutrition Context: acute illness or injury, chronic illness  Malnutrition Level: moderate  Skin (Micronutrient): pallor, wounds unhealed       Energy Intake (Malnutrition): less than or equal to 75% for greater than or equal to 1 month  Subcutaneous Fat (Malnutrition): moderate depletion  Muscle Mass (Malnutrition): moderate depletion   Orbital  "Region (Subcutaneous Fat Loss): moderate depletion (Slightly darker circles; Somewhat hallow look)  Upper Arm Region (Subcutaneous Fat Loss): moderate depletion (Some depth pinch but not ample; Fingers almost touch)   Clavicle Bone Region (Muscle Loss): moderate depletion (Visible, some protrusion)  Clavicle and Acromion Bone Region (Muscle Loss): moderate depletion (Acromion process slightly protrudes)  Patellar Region (Muscle Loss): moderate depletion (Knee cap less prominent, more rounded)  Anterior Thigh Region (Muscle Loss): moderate depletion (Mild depression on inner thigh)  Posterior Calf Region (Muscle Loss): moderate depletion (Not well developed)                 Reason for Assessment    Reason For Assessment: consult, low BMI (severe malnutrition)  Diagnosis:  (Pneumonia of left lung due to infectious organism)  Relevant Medical History: Sepsis, Hypotension, Hyponatremia, Underweight, Normocytic anemia, 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 Female admitted for Pneumonia of left lung due to infectious organism. Pt currently on Regular diet + Boost plus TID. H&P noted that the pt presented to the ED c/o moderate severity, persistent cough x 3 weeks, associated sx include  yellow sputum production, SOB, and pleuritic chest pain, denied N/V/D, CT scan of chest with left-sided pneumonia with possible abscess formation. EMR noted pt is postop day 4 S/p VATS with drainage of empyema and decortication, 50 cc drainage on 4/1, pt ambulating. Visited pt at bedside, pt sitting up, noted breakfast tray untouched, pt stated that she plans on eating later. Pt reported that she is a "picky eater" and does have an appetite but it depends on what it is, stated she does like some of the food here but cousin brought subway that she ate a 6 inch snadwhich and plans to order food from outside if she doesn't like " "the food, pt confirm that she does have a menu and her orders are being taken and that she will drink the ONS as long as it is cold pt prefers chocolate, informed food and nutrition. Pt confirmed that she is not experiencing any N/V/D, abd pain, chewing difficulties, reported some difficulty swallowing burger yesterday d/t SOB. Pt reported that her UBW is 109 lbs PTA, stated that it is very difficult for her to gain weight and UBW was 100 lbs for a long time, actively trying to gain weight, encouraged PO intake of meals, snacks and supplements, pt expressed understanding, pt confirmed that she has access to adequate and healthy food at home. NFPE performed, moderate malnutrition noted. Reviewed chart: LBM 4/1; Skin: pale, incision L chest WDL; Miki score: 20 (no risk); Edema: None. Labs, meds, weight reviewed. Weight charted 4/2 122 lbs (BMI 18.10, protein-energy malnutrition grade I), no previous weights charted. RD will continue to follow and monitor pt's nutritional status during admit.    Nutrition Discharge Planning: General healthful diet, High calorie and protein + Boost plus as warranted + Rob BID    Nutrition Risk Screen    Nutrition Risk Screen: no indicators present    Nutrition Related Social Determinants of Health: SDOH: Adequate food in home environment and None Identified      Nutrition/Diet History    Patient Reported Diet/Restrictions/Preferences: general  Spiritual, Cultural Beliefs, Bahai Practices, Values that Affect Care: no  Food Allergies: NKFA  Factors Affecting Nutritional Intake: None identified at this time    Anthropometrics    Temp: 97.6 °F (36.4 °C)  Height Method: Stated  Height: 5' 9" (175.3 cm)  Height (inches): 69 in  Weight Method: Bed Scale  Weight: 55.6 kg (122 lb 9.2 oz)  Weight (lb): 122.58 lb  Ideal Body Weight (IBW), Male: 160 lb  % Ideal Body Weight, Male (lb): 76.61 %  % Ideal Body Weight Malnutrition: 70-79%: moderate deficit  BMI (Calculated): 18.1  BMI Grade: 17 " "- 18.4 protein-energy malnutrition grade I     Wt Readings from Last 15 Encounters:   04/03/24 55.6 kg (122 lb 9.2 oz)     Lab/Procedures/Meds    Pertinent Labs Reviewed: reviewed  Pertinent Labs Comments: Na (L), Calcium (L), Albumin (L), Anion gap (L), BUN (H), Cr (H), eGFR 46  Pertinent Medications Reviewed: reviewed  Pertinent Medications Comments: sodium chloride, polyethylene glycol, Zosyn, Lopressor, methocarbumol, medroxy progesterone, linezolid, famotidine, estradiol, enoxaparin, acetylcysteine  BMP  Lab Results   Component Value Date     (L) 04/03/2024    K 4.0 04/03/2024     04/03/2024    CO2 18 (L) 04/03/2024    BUN 21 (H) 04/03/2024    CREATININE 2.0 (H) 04/03/2024    CALCIUM 7.7 (L) 04/03/2024    ANIONGAP 7 (L) 04/03/2024    EGFRNORACEVR 46 (A) 04/03/2024     Lab Results   Component Value Date    ALBUMIN 1.6 (L) 04/03/2024     Lab Results   Component Value Date    CALCIUM 7.7 (L) 04/03/2024    PHOS 2.9 04/03/2024     Lab Results   Component Value Date    ALT 26 03/29/2024    AST 32 03/29/2024    ALKPHOS 74 03/29/2024    BILITOT 0.6 03/29/2024     No results for input(s): "POCTGLUCOSE" in the last 24 hours.  Lab Results   Component Value Date    WBC 14.80 (H) 04/03/2024    HGB 10.0 (L) 04/03/2024    HCT 30.6 (L) 04/03/2024    MCV 87 04/03/2024     04/03/2024       Scheduled Meds:   acetylcysteine 100 mg/ml (10%)  4 mL Nebulization QID WAKE    enoxparin  40 mg Subcutaneous Q24H (prophylaxis, 1700)    estradioL  8 mg Oral Daily    famotidine  20 mg Oral Daily    linezolid  600 mg Oral Q12H    medroxyPROGESTERone  10 mg Oral Daily    methocarbamoL  500 mg Oral QID    metoprolol tartrate  12.5 mg Oral BID    piperacillin-tazobactam (Zosyn) IV (PEDS and ADULTS) (extended infusion is not appropriate)  4.5 g Intravenous Q8H    polyethylene glycol  17 g Oral Daily    sodium chloride 0.9%  2 mL Intravenous Q6H    sodium chloride  1,000 mg Oral TID     Continuous Infusions:   sodium chloride " 0.9% 125 mL/hr at 04/03/24 1009     PRN Meds:.0.9%  NaCl infusion (for blood administration), sodium chloride 0.9%, acetaminophen, albuterol sulfate, benzonatate, bisacodyL, glucagon (human recombinant), glucose, glucose, HYDROmorphone, ipratropium, melatonin, metoclopramide, naloxone, ondansetron, ondansetron, oxyCODONE, oxyCODONE, sodium chloride 0.9%, sodium chloride 0.9%, sodium chloride 0.9%, sodium chloride 0.9%    Physical Findings/Assessment         Estimated/Assessed Needs    Weight Used For Calorie Calculations: 55.6 kg (122 lb 9.2 oz)  Energy Calorie Requirements (kcal): 5061-0707 kcals (30-35 kcals/kg ABW (Underweight, BMI 18.10)  Energy Need Method: Kcal/kg  Protein Requirements: 44-55 g (0.8-1.0 g/kg ABW (DAVIAN, no dialysis)  Weight Used For Protein Calculations: 55.6 kg (122 lb 9.2 oz)  Fluid Requirements (mL): 500 mL + total output (DAVIAN)  Estimated Fluid Requirement Method: other (see comments)  RDA Method (mL): 1668  CHO Requirement: 208-243 g (4517-8199 kcals/8)      Nutrition Prescription Ordered    Current Diet Order: Regular diet  Oral Nutrition Supplement: chocolate Boost plus TID    Evaluation of Received Nutrient/Fluid Intake  I/O: (Net since admit)   4/3: 1860 mL    Energy Calories Required: meeting needs  Protein Required: meeting needs  Fluid Required: meeting needs  Total Fluid Intake (mL): 2209.5  Total Fluid Output (mL): 1860  Tolerance: tolerating  % Intake of Estimated Energy Needs: 50 - 75 %  % Meal Intake: 50 - 75 %    Nutrition Risk    Level of Risk/Frequency of Follow-up: low (F/u x 1 weekly)       Monitor and Evaluation    Food and Nutrient Intake: energy intake, food and beverage intake  Food and Nutrient Adminstration: diet order  Knowledge/Beliefs/Attitudes: food and nutrition knowledge/skill, beliefs and attitudes  Anthropometric Measurements: weight, weight change, body mass index  Biochemical Data, Medical Tests and Procedures: electrolyte and renal panel       Nutrition  Follow-Up    RD Follow-up?: Yes  Carmina Downey, Registration Eligible, Provisional LDN

## 2024-04-04 LAB
ALBUMIN SERPL BCP-MCNC: 1.7 G/DL (ref 3.5–5.2)
ANION GAP SERPL CALC-SCNC: 9 MMOL/L (ref 8–16)
BASOPHILS # BLD AUTO: 0.03 K/UL (ref 0–0.2)
BASOPHILS NFR BLD: 0.2 % (ref 0–1.9)
BUN SERPL-MCNC: 21 MG/DL (ref 6–20)
CALCIUM SERPL-MCNC: 7.8 MG/DL (ref 8.7–10.5)
CHLORIDE SERPL-SCNC: 109 MMOL/L (ref 95–110)
CO2 SERPL-SCNC: 19 MMOL/L (ref 23–29)
CREAT SERPL-MCNC: 1.9 MG/DL (ref 0.5–1.4)
DIFFERENTIAL METHOD BLD: ABNORMAL
EOSINOPHIL # BLD AUTO: 0.1 K/UL (ref 0–0.5)
EOSINOPHIL NFR BLD: 0.7 % (ref 0–8)
ERYTHROCYTE [DISTWIDTH] IN BLOOD BY AUTOMATED COUNT: 15.6 % (ref 11.5–14.5)
EST. GFR  (NO RACE VARIABLE): 49 ML/MIN/1.73 M^2
GLUCOSE SERPL-MCNC: 81 MG/DL (ref 70–110)
HCT VFR BLD AUTO: 29.7 % (ref 40–54)
HGB BLD-MCNC: 9.7 G/DL (ref 14–18)
IMM GRANULOCYTES # BLD AUTO: 0.08 K/UL (ref 0–0.04)
IMM GRANULOCYTES NFR BLD AUTO: 0.6 % (ref 0–0.5)
LYMPHOCYTES # BLD AUTO: 1.7 K/UL (ref 1–4.8)
LYMPHOCYTES NFR BLD: 14 % (ref 18–48)
MCH RBC QN AUTO: 28.5 PG (ref 27–31)
MCHC RBC AUTO-ENTMCNC: 32.7 G/DL (ref 32–36)
MCV RBC AUTO: 87 FL (ref 82–98)
MONOCYTES # BLD AUTO: 0.5 K/UL (ref 0.3–1)
MONOCYTES NFR BLD: 4.4 % (ref 4–15)
NEUTROPHILS # BLD AUTO: 9.9 K/UL (ref 1.8–7.7)
NEUTROPHILS NFR BLD: 80.1 % (ref 38–73)
NRBC BLD-RTO: 0 /100 WBC
PHOSPHATE SERPL-MCNC: 3.5 MG/DL (ref 2.7–4.5)
PLATELET # BLD AUTO: 318 K/UL (ref 150–450)
PMV BLD AUTO: 8.1 FL (ref 9.2–12.9)
POTASSIUM SERPL-SCNC: 3.5 MMOL/L (ref 3.5–5.1)
RBC # BLD AUTO: 3.4 M/UL (ref 4.6–6.2)
SODIUM SERPL-SCNC: 137 MMOL/L (ref 136–145)
WBC # BLD AUTO: 12.31 K/UL (ref 3.9–12.7)

## 2024-04-04 PROCEDURE — 94640 AIRWAY INHALATION TREATMENT: CPT

## 2024-04-04 PROCEDURE — 94761 N-INVAS EAR/PLS OXIMETRY MLT: CPT

## 2024-04-04 PROCEDURE — 25000003 PHARM REV CODE 250: Performed by: INTERNAL MEDICINE

## 2024-04-04 PROCEDURE — 27000646 HC AEROBIKA DEVICE

## 2024-04-04 PROCEDURE — A4216 STERILE WATER/SALINE, 10 ML: HCPCS | Performed by: INTERNAL MEDICINE

## 2024-04-04 PROCEDURE — 85025 COMPLETE CBC W/AUTO DIFF WBC: CPT | Performed by: THORACIC SURGERY (CARDIOTHORACIC VASCULAR SURGERY)

## 2024-04-04 PROCEDURE — 99232 SBSQ HOSP IP/OBS MODERATE 35: CPT | Mod: ,,, | Performed by: INTERNAL MEDICINE

## 2024-04-04 PROCEDURE — 25000242 PHARM REV CODE 250 ALT 637 W/ HCPCS: Performed by: THORACIC SURGERY (CARDIOTHORACIC VASCULAR SURGERY)

## 2024-04-04 PROCEDURE — A4216 STERILE WATER/SALINE, 10 ML: HCPCS | Performed by: THORACIC SURGERY (CARDIOTHORACIC VASCULAR SURGERY)

## 2024-04-04 PROCEDURE — 25000003 PHARM REV CODE 250: Performed by: THORACIC SURGERY (CARDIOTHORACIC VASCULAR SURGERY)

## 2024-04-04 PROCEDURE — 25000003 PHARM REV CODE 250: Performed by: FAMILY MEDICINE

## 2024-04-04 PROCEDURE — 94664 DEMO&/EVAL PT USE INHALER: CPT

## 2024-04-04 PROCEDURE — 80069 RENAL FUNCTION PANEL: CPT | Performed by: INTERNAL MEDICINE

## 2024-04-04 PROCEDURE — 63600175 PHARM REV CODE 636 W HCPCS: Performed by: INTERNAL MEDICINE

## 2024-04-04 PROCEDURE — 36415 COLL VENOUS BLD VENIPUNCTURE: CPT | Performed by: INTERNAL MEDICINE

## 2024-04-04 PROCEDURE — 21400001 HC TELEMETRY ROOM

## 2024-04-04 PROCEDURE — A4216 STERILE WATER/SALINE, 10 ML: HCPCS | Performed by: ANESTHESIOLOGY

## 2024-04-04 PROCEDURE — 25000242 PHARM REV CODE 250 ALT 637 W/ HCPCS: Performed by: INTERNAL MEDICINE

## 2024-04-04 PROCEDURE — 25000003 PHARM REV CODE 250: Performed by: NURSE PRACTITIONER

## 2024-04-04 PROCEDURE — 25000003 PHARM REV CODE 250: Performed by: ANESTHESIOLOGY

## 2024-04-04 PROCEDURE — 99900035 HC TECH TIME PER 15 MIN (STAT)

## 2024-04-04 RX ADMIN — OXYCODONE HYDROCHLORIDE 10 MG: 5 TABLET ORAL at 08:04

## 2024-04-04 RX ADMIN — ALBUTEROL SULFATE 2.5 MG: 2.5 SOLUTION RESPIRATORY (INHALATION) at 07:04

## 2024-04-04 RX ADMIN — ALBUTEROL SULFATE 2.5 MG: 2.5 SOLUTION RESPIRATORY (INHALATION) at 05:04

## 2024-04-04 RX ADMIN — FAMOTIDINE 20 MG: 20 TABLET ORAL at 09:04

## 2024-04-04 RX ADMIN — LINEZOLID 600 MG: 600 TABLET, FILM COATED ORAL at 09:04

## 2024-04-04 RX ADMIN — METOPROLOL TARTRATE 12.5 MG: 25 TABLET, FILM COATED ORAL at 09:04

## 2024-04-04 RX ADMIN — METHOCARBAMOL 500 MG: 500 TABLET ORAL at 09:04

## 2024-04-04 RX ADMIN — SODIUM CHLORIDE 1000 MG: 1 TABLET ORAL at 09:04

## 2024-04-04 RX ADMIN — SODIUM CHLORIDE: 9 INJECTION, SOLUTION INTRAVENOUS at 09:04

## 2024-04-04 RX ADMIN — SODIUM CHLORIDE 1000 MG: 1 TABLET ORAL at 02:04

## 2024-04-04 RX ADMIN — METHOCARBAMOL 500 MG: 500 TABLET ORAL at 12:04

## 2024-04-04 RX ADMIN — SODIUM CHLORIDE: 9 INJECTION, SOLUTION INTRAVENOUS at 02:04

## 2024-04-04 RX ADMIN — PIPERACILLIN SODIUM AND TAZOBACTAM SODIUM 4.5 G: 4; .5 INJECTION, POWDER, FOR SOLUTION INTRAVENOUS at 02:04

## 2024-04-04 RX ADMIN — Medication 2 ML: at 12:04

## 2024-04-04 RX ADMIN — ACETYLCYSTEINE 4 ML: 100 INHALANT RESPIRATORY (INHALATION) at 05:04

## 2024-04-04 RX ADMIN — PIPERACILLIN SODIUM AND TAZOBACTAM SODIUM 4.5 G: 4; .5 INJECTION, POWDER, FOR SOLUTION INTRAVENOUS at 10:04

## 2024-04-04 RX ADMIN — Medication 10 ML: at 12:04

## 2024-04-04 RX ADMIN — OXYCODONE HYDROCHLORIDE 10 MG: 5 TABLET ORAL at 09:04

## 2024-04-04 RX ADMIN — ESTRADIOL 8 MG: 1 TABLET ORAL at 09:04

## 2024-04-04 RX ADMIN — Medication 2 ML: at 05:04

## 2024-04-04 RX ADMIN — MEDROXYPROGESTERONE ACETATE 10 MG: 5 TABLET ORAL at 09:04

## 2024-04-04 RX ADMIN — ACETYLCYSTEINE 4 ML: 100 INHALANT RESPIRATORY (INHALATION) at 07:04

## 2024-04-04 RX ADMIN — ENOXAPARIN SODIUM 40 MG: 40 INJECTION SUBCUTANEOUS at 05:04

## 2024-04-04 RX ADMIN — PIPERACILLIN SODIUM AND TAZOBACTAM SODIUM 4.5 G: 4; .5 INJECTION, POWDER, FOR SOLUTION INTRAVENOUS at 05:04

## 2024-04-04 RX ADMIN — METHOCARBAMOL 500 MG: 500 TABLET ORAL at 05:04

## 2024-04-04 RX ADMIN — OXYCODONE HYDROCHLORIDE 10 MG: 5 TABLET ORAL at 02:04

## 2024-04-04 NOTE — ASSESSMENT & PLAN NOTE
Beta-blocker started heart rate markedly improved.  Borderline blood pressure and heart rate underwent 100 we will DC    Resolved on beta blocker

## 2024-04-04 NOTE — ASSESSMENT & PLAN NOTE
Current BMI 15.27, albumin 2.3.    Add nutritional supplements  Nutrition consult  Appetite improving

## 2024-04-04 NOTE — ASSESSMENT & PLAN NOTE
"-white blood cell count 30.46, lactic acid level 1.5, D-dimer 1.37, BNP 14, troponin negative, influenza a/B negative, COVID-19 negative  -blood cultures pending  -CTA of chest with "no pulmonary embolism, no dissection; dense consolidation in the left lower lobe extending to the left upper lobe with tree in bud opacities consistent with severe pneumonia; questionable areas of fluid density in the left lung base with foci of gas may relate to component of fluid density or possible liquefaction or possible abscess formation."  -continue IV Zosyn, vanco and IV azithromycin initiated in the emergency department  -check MRSA culture-negative, sputum culture-pending, HIV  -give IV fluids, O2 supplementation, p.r.n. albuterol inhaler  - repeat CT scan of chest today looks more like an empyema  -CVT consulted and patient underwent VATS with drainage    Chest tube drainage minimal  Continue antibiotic(s)   Encourage incentive spirometer and acapella and breathing treatments  Ambulate     "

## 2024-04-04 NOTE — ASSESSMENT & PLAN NOTE
"This patient does have evidence of infective focus  My overall impression is sepsis.  Source: Respiratory  Antibiotics given-   Antibiotics (72h ago, onward)      Start     Stop Route Frequency Ordered    03/30/24 0900  linezolid tablet 600 mg         -- Oral Every 12 hours 03/30/24 0731    03/28/24 1800  piperacillin-tazobactam (ZOSYN) 4.5 g in dextrose 5 % in water (D5W) 100 mL IVPB (MB+)         -- IV Every 8 hours (non-standard times) 03/28/24 1538        No results for input(s): "LACTATE", "POCLAC" in the last 72 hours.    Organ dysfunction indicated by  none    Fluid challenge Not needed - patient is not hypotensive      Post- resuscitation assessment No - Post resuscitation assessment not needed       Will Not start Pressors- Levophed for MAP of 65  Source control achieved by: broad spectrum antibiotics  Improving   Leukocytosis trending down  Chest tube drainage trending down    Resolved   "

## 2024-04-04 NOTE — PROGRESS NOTES
[facility] - Telemetry ([facility])  Cardiothoracic Surgery  Progress Note    Patient Name: Mayela Molina  MRN: [id number]  Admission Date: 3/25/2024  Hospital Length of Stay: 9 days  Code Status: Full Code   Attending Physician: Perla Gutierres MD   Referring Provider: Self, Aaareferral  Principal Problem:Pneumonia of left lung due to infectious organism            Subjective:     Post-Op Info:  Procedure(s) (LRB):  VATS (VIDEO-ASSISTED THORACOSCOPIC SURGERY) (Left)  BLOCK, NERVE, INTERCOSTAL, 2 OR MORE (Left)   6 Days Post-Op     Interval History: The patient is postop day 6 status post VATS procedure with drainage of empyema and decortication     ROS  Medications:  Continuous Infusions:   sodium chloride 0.9% 125 mL/hr at 04/04/24 0214     Scheduled Meds:   acetylcysteine 100 mg/ml (10%)  4 mL Nebulization QID WAKE    enoxparin  40 mg Subcutaneous Q24H (prophylaxis, 1700)    estradioL  8 mg Oral Daily    famotidine  20 mg Oral Daily    linezolid  600 mg Oral Q12H    medroxyPROGESTERone  10 mg Oral Daily    methocarbamoL  500 mg Oral QID    metoprolol tartrate  12.5 mg Oral BID    piperacillin-tazobactam (Zosyn) IV (PEDS and ADULTS) (extended infusion is not appropriate)  4.5 g Intravenous Q8H    polyethylene glycol  17 g Oral Daily    sodium chloride 0.9%  2 mL Intravenous Q6H    sodium chloride  1,000 mg Oral TID     PRN Meds:0.9%  NaCl infusion (for blood administration), sodium chloride 0.9%, acetaminophen, albuterol sulfate, benzonatate, bisacodyL, glucagon (human recombinant), glucose, glucose, HYDROmorphone, ipratropium, melatonin, metoclopramide, naloxone, ondansetron, ondansetron, oxyCODONE, oxyCODONE, sodium chloride 0.9%, sodium chloride 0.9%, sodium chloride 0.9%, sodium chloride 0.9%     Objective:     Vital Signs (Most Recent):  Temp: 96.1 °F (35.6 °C) (04/04/24 0750)  Pulse: 71 (04/04/24 0752)  Resp: 18 (04/04/24 0823)  BP: 124/77 (04/04/24 0750)  SpO2: 96 % (04/04/24 0752) Vital Signs (24h  Range):  Temp:  [96.1 °F (35.6 °C)-98.4 °F (36.9 °C)] 96.1 °F (35.6 °C)  Pulse:  [66-98] 71  Resp:  [14-20] 18  SpO2:  [95 %-99 %] 96 %  BP: (120-128)/(64-77) 124/77     Weight: 55.1 kg (121 lb 7.6 oz)  Body mass index is 17.94 kg/m².    SpO2: 96 %       Intake/Output - Last 3 Shifts         04/02 0700  04/03 0659 04/03 0700  04/04 0659 04/04 0700  04/05 0659    P.O. 680      I.V. (mL/kg) 1429.2 (25.7)      IV Piggyback 100.4      Total Intake(mL/kg) 2209.5 (39.7)      Urine (mL/kg/hr) 1850 (1.4)  1 (0)    Stool 0      Chest Tube 10      Total Output 1860  1    Net +349.5  -1           Urine Occurrence 2 x 1 x     Stool Occurrence 0 x              Lines/Drains/Airways       Drain  Duration                  Y Chest Tube 1 and 2 03/29/24 0903 1 Left Pleural 32 Fr. 2 Left Pleural 32 Fr. 6 days              Peripheral Intravenous Line  Duration                  Peripheral IV - Single Lumen 03/31/24 1104 20 G Left;Posterior Forearm 3 days                     Physical Exam  Constitutional:       Appearance: Normal appearance.   HENT:      Head: Normocephalic and atraumatic.   Cardiovascular:      Rate and Rhythm: Normal rate and regular rhythm.      Heart sounds: Normal heart sounds.   Pulmonary:      Breath sounds: Normal breath sounds.   Abdominal:      General: Abdomen is flat.      Palpations: Abdomen is soft.   Musculoskeletal:      Right lower leg: No edema.      Left lower leg: No edema.   Skin:     General: Skin is warm and dry.   Neurological:      Mental Status: She is alert and oriented to person, place, and time.   Psychiatric:         Behavior: Behavior normal.            Significant Labs:  All pertinent labs from the last 24 hours have been reviewed.    Significant Diagnostics:  I have reviewed all pertinent imaging results/findings within the past 24 hours.  Assessment/Plan:     Hydropneumothorax  04/02/2024   Patient is postop day 4 status post VATS procedure with drainage of empyema and decortication.   Continue broad-spectrum antibiotics with linezolid and Zosyn.  Patient's white count is trending downward with white count of 20.  Patient is afebrile.  Chest tube output trending lower.  Continue pulmonary toileting continue incentive spirometer.  Patient is up and ambulating.  Increase as tolerated.    04/03/2024   The patient is postop day 5 status post VATS procedure with drainage of empyema and decortication.  Patient is on broad-spectrum antibiotics linezolid and Zosyn.  Is trending down.  Patient is afebrile.  Continue pulmonary toileting.  Chest x-ray shows continued patchy infiltrate left middle and lower lobes.  Continue chest tube to drainage.    04/03/2024   The patient is postop day 6 status post VATS procedure with drainage of empyema and decortication.  Patient is on broad-spectrum antibiotics.  Continue linezolid and Zosyn.  Patient is afebrile.  Continue pulmonary toileting continue incentive spirometer..  Chest tube output has been minimal.  Will repeat chest x-ray to access extent of left lung consolidation.        Mark Elliott MD  Cardiothoracic Surgery  [facility] - Telemetry ([facility])

## 2024-04-04 NOTE — ASSESSMENT & PLAN NOTE
Patient has hyponatremia which is uncontrolled,We will aim to correct the sodium by 4-6mEq in 24 hours. We will monitor sodium Every 6 hours x4. The hyponatremia is due to Dehydration/hypovolemia. We will treat the hyponatremia with IV fluids as follows:  Normal saline at 100 mL/hr. The patient's sodium results have been reviewed and are listed below.  Recent Labs   Lab 04/04/24  0536        Improving   -hold spironolactone    - TSH normal, cortisol appropriate, free water restrict  -status post 1 L normal saline IV fluid bolus given in the emergency department    Resolved   Consider resuming spironolactone

## 2024-04-04 NOTE — ASSESSMENT & PLAN NOTE
04/02/2024   Patient is postop day 4 status post VATS procedure with drainage of empyema and decortication.  Continue broad-spectrum antibiotics with linezolid and Zosyn.  Patient's white count is trending downward with white count of 20.  Patient is afebrile.  Chest tube output trending lower.  Continue pulmonary toileting continue incentive spirometer.  Patient is up and ambulating.  Increase as tolerated.    04/03/2024   The patient is postop day 5 status post VATS procedure with drainage of empyema and decortication.  Patient is on broad-spectrum antibiotics linezolid and Zosyn.  Is trending down.  Patient is afebrile.  Continue pulmonary toileting.  Chest x-ray shows continued patchy infiltrate left middle and lower lobes.  Continue chest tube to drainage.    04/03/2024   The patient is postop day 6 status post VATS procedure with drainage of empyema and decortication.  Patient is on broad-spectrum antibiotics.  Continue linezolid and Zosyn.  Patient is afebrile.  Continue pulmonary toileting continue incentive spirometer..  Chest tube output has been minimal.  Will repeat chest x-ray to access extent of left lung consolidation.

## 2024-04-04 NOTE — ASSESSMENT & PLAN NOTE
Patient's anemia is currently controlled. Has not received any PRBCs to date.   Current CBC reviewed-   Lab Results   Component Value Date    HGB 9.7 (L) 04/04/2024    HCT 29.7 (L) 04/04/2024     Monitor serial CBC and transfuse if patient becomes hemodynamically unstable, symptomatic or H/H drops below 7/21.  Iron def anemia

## 2024-04-04 NOTE — ASSESSMENT & PLAN NOTE
Nutrition consulted. Most recent weight and BMI monitored-     Measurements:  Wt Readings from Last 1 Encounters:   04/03/24 55.1 kg (121 lb 7.6 oz)   Body mass index is 17.94 kg/m².    Patient has been screened and assessed by RD.    Malnutrition Type:  Context: acute illness or injury, chronic illness  Level: moderate    Malnutrition Characteristic Summary:  Energy Intake (Malnutrition): less than or equal to 75% for greater than or equal to 1 month  Subcutaneous Fat (Malnutrition): moderate depletion  Muscle Mass (Malnutrition): moderate depletion    Interventions/Recommendations (treatment strategy):  1. Recommend pt continues on Regular diet  2. Recommend pt continue Boost plus TID as medically appropriate 3. Recommend Rob BID for wound healing 4. Encourage PO intake of meals, snacks and supplements 5. Weigh twice weekly     Universal Safety Interventions

## 2024-04-04 NOTE — CONSULTS
[facility] Tele[facility] ([facility])  Nephrology  Consult Note    Patient Name: Mayela Molina  MRN: [id number]  Admission Date: 3/25/2024  Hospital Length of Stay: 9 days  Attending Provider: Perla Gutierres MD   Primary Care Physician: Katie, Primary Doctor  Principal Problem:Pneumonia of left lung due to infectious organism    Consults  Subjective:     HPI:  28-year-old admitted with necrotizing pneumonia and empyema.  Underwent thoracotomy and decortication on 03/29/2030.  Noted to have an acute rise in creatinine on 03/30.  Nephrology has been consulted for evaluation.  Patient was seen in her hospital room.  In bed resting comfortably.  Complains of some discomfort with deep inspiration and cough.  She has chest tubes in place.  No lower extremity edema or swelling.  No fevers or chills.  No nausea vomiting or diarrhea related.    04/01/2024:  Patient was seen in her hospital room.  In bed resting comfortably.  No acute distress noted.  Still has some difficulty with cough and deep inspiration.  No new complaints from overnight.    04/02/2024: Patient was seen in her hospital room.  In bed resting comfortably.  No acute distress noted.  Continues to have some chest discomfort associated with deep inspiration and cough.    04/03/2024:  Patient was seen in her hospital room.  In bed resting comfortably.  No acute distress noted.    04/04/2024:  Patient was seen in her hospital room.  In bed resting comfortably.  No acute distress noted.  No new complaints or issues from overnight.    History reviewed. No pertinent past medical history.    Past Surgical History:   Procedure Laterality Date    INJECTION OF ANESTHETIC AGENT AROUND MULTIPLE INTERCOSTAL NERVES Left 3/29/2024    Procedure: BLOCK, NERVE, INTERCOSTAL, 2 OR MORE;  Surgeon: Josafat Sandoval MD;  Location: [facility];  Service: Cardiothoracic;  Laterality: Left;    VIDEO-ASSISTED THORACOSCOPIC SURGERY (VATS) Left 3/29/2024    Procedure: VATS (VIDEO-ASSISTED  THORACOSCOPIC SURGERY);  Surgeon: Josafat Sandoval MD;  Location: [facility];  Service: Cardiothoracic;  Laterality: Left;       Review of patient's allergies indicates:  No Known Allergies  Current Facility-Administered Medications   Medication Frequency    0.9%  NaCl infusion (for blood administration) Q24H PRN    0.9%  NaCl infusion PRN    0.9%  NaCl infusion Continuous    acetaminophen tablet 650 mg Q6H PRN    acetylcysteine 100 mg/ml (10%) solution 4 mL QID WAKE    albuterol nebulizer solution 2.5 mg Q6H PRN    benzonatate capsule 100 mg TID PRN    bisacodyL suppository 10 mg Daily PRN    enoxaparin injection 40 mg Q24H (prophylaxis, 1700)    estradioL tablet 8 mg Daily    famotidine tablet 20 mg Daily    glucagon (human recombinant) injection 1 mg PRN    glucose chewable tablet 16 g PRN    glucose chewable tablet 24 g PRN    HYDROmorphone injection 1 mg QID PRN    ipratropium 0.02 % nebulizer solution 0.5 mg QID PRN    linezolid tablet 600 mg Q12H    medroxyPROGESTERone tablet 10 mg Daily    melatonin tablet 6 mg Nightly PRN    methocarbamoL tablet 500 mg QID    metoclopramide injection 5 mg Q6H PRN    metoprolol tartrate (LOPRESSOR) split tablet 12.5 mg BID    naloxone 0.4 mg/mL injection 0.02 mg PRN    ondansetron disintegrating tablet 8 mg Q8H PRN    ondansetron injection 4 mg Q6H PRN    oxyCODONE immediate release tablet 10 mg Q4H PRN    oxyCODONE immediate release tablet 5 mg Q4H PRN    piperacillin-tazobactam (ZOSYN) 4.5 g in dextrose 5 % in water (D5W) 100 mL IVPB (MB+) Q8H    polyethylene glycol packet 17 g Daily    sodium chloride 0.9% flush 10 mL PRN    sodium chloride 0.9% flush 10 mL Q12H PRN    sodium chloride 0.9% flush 10 mL PRN    sodium chloride 0.9% flush 10 mL PRN    sodium chloride 0.9% flush 2 mL Q6H    sodium chloride oral tablet 1,000 mg TID     Family History    None       Tobacco Use    Smoking status: Never    Smokeless tobacco: Never   Substance and Sexual Activity    Alcohol use:  Never    Drug use: Never    Sexual activity: Yes     Review of Systems   Constitutional:  Positive for fatigue.   HENT: Negative.     Respiratory:          Pain on deep inspiration and cough.   Cardiovascular: Negative.    Gastrointestinal: Negative.    Genitourinary: Negative.    Musculoskeletal: Negative.    Skin: Negative.      Objective:     Vital Signs (Most Recent):  Temp: 98.7 °F (37.1 °C) (04/04/24 1132)  Pulse: 72 (04/04/24 1132)  Resp: 16 (04/04/24 1132)  BP: 113/72 (04/04/24 1132)  SpO2: 95 % (04/04/24 1132) Vital Signs (24h Range):  Temp:  [96.1 °F (35.6 °C)-98.7 °F (37.1 °C)] 98.7 °F (37.1 °C)  Pulse:  [66-98] 72  Resp:  [14-20] 16  SpO2:  [95 %-99 %] 95 %  BP: (113-128)/(64-77) 113/72     Weight: 55.1 kg (121 lb 7.6 oz) (04/03/24 2344)  Body mass index is 17.94 kg/m².  Body surface area is 1.64 meters squared.    No intake/output data recorded.    Physical Exam  Constitutional:       Appearance: Normal appearance.   HENT:      Head: Normocephalic and atraumatic.   Eyes:      General: No scleral icterus.     Extraocular Movements: Extraocular movements intact.      Pupils: Pupils are equal, round, and reactive to light.   Cardiovascular:      Pulses: Normal pulses.      Heart sounds: Normal heart sounds.      Comments: Decreased breath sounds over the left base.    Musculoskeletal:      Right lower leg: No edema.      Left lower leg: No edema.   Skin:     General: Skin is warm and dry.   Neurological:      General: No focal deficit present.      Mental Status: She is alert and oriented to person, place, and time.   Psychiatric:         Mood and Affect: Mood normal.         Behavior: Behavior normal.         Significant Labs:  BMP:   Recent Labs   Lab 04/04/24  0536   GLU 81      K 3.5      CO2 19*   BUN 21*   CREATININE 1.9*   CALCIUM 7.8*       CMP:   Recent Labs   Lab 03/29/24  0708 03/30/24  0439 04/04/24  0536   GLU 93   < > 81   CALCIUM 8.0*   < > 7.8*   ALBUMIN 1.9*   < > 1.7*   PROT  6.7  --   --    *   < > 137   K 3.9   < > 3.5   CO2 21*   < > 19*   CL 97   < > 109   BUN 11   < > 21*   CREATININE 0.7   < > 1.9*   ALKPHOS 74  --   --    ALT 26  --   --    AST 32  --   --    BILITOT 0.6  --   --     < > = values in this interval not displayed.       All labs within the past 24 hours have been reviewed.    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 Acute kidney injury:  Likely multifactorial.  Given that creatinine bumped almost immediately post surgery there is likely postop ATN.  On review of the operative notes his quite a bit of puss encountered.      Vancomycin was elevated at a proximally 41 several days ago.  Vancomycin has been discontinued.    Urine studies were consistent with ATN.  Fractional excretion of sodium is greater than 2%.    Creatinine has remained essentially stable overnight at 1.9.  Unfortunately urine output is not being recorded accurately.    2. Electrolytes:  Potassium is stable at 4.4.    3. Bicarbonate is stable at 18.  Secondary to acute kidney injury.   Supplemental bicarb does not need to be started at this point.    4. Volume:  Patient does not appear to be significantly volume overloaded.  On exam she appears to be somewhat volume depleted.    Gentle hydration may be warranted.  We will need to monitor strict I's and O's.        Thank you for your consult.     Fortino Kwong MD  Nephrology  [facility] - Tele[facility] ([facility])

## 2024-04-04 NOTE — SUBJECTIVE & OBJECTIVE
Interval History: See hospital course for today      Review of Systems   Constitutional:  Positive for activity change (improving), appetite change (improving) and fatigue. Negative for fever.   Respiratory:  Positive for cough. Negative for shortness of breath.    Cardiovascular:  Positive for chest pain.   Gastrointestinal:  Positive for nausea. Negative for abdominal distention, abdominal pain and vomiting.   Musculoskeletal:  Positive for myalgias.   Skin:  Positive for wound.   Neurological:  Positive for weakness.   Psychiatric/Behavioral:  Positive for dysphoric mood. Negative for agitation, behavioral problems, confusion and decreased concentration. The patient is not nervous/anxious.      Objective:     Vital Signs (Most Recent):  Temp: 98.7 °F (37.1 °C) (04/04/24 1132)  Pulse: 72 (04/04/24 1132)  Resp: 16 (04/04/24 1132)  BP: 113/72 (04/04/24 1132)  SpO2: 95 % (04/04/24 1132) Vital Signs (24h Range):  Temp:  [96.1 °F (35.6 °C)-98.7 °F (37.1 °C)] 98.7 °F (37.1 °C)  Pulse:  [66-98] 72  Resp:  [14-20] 16  SpO2:  [95 %-99 %] 95 %  BP: (113-128)/(64-77) 113/72     Weight: 55.1 kg (121 lb 7.6 oz)  Body mass index is 17.94 kg/m².    Intake/Output Summary (Last 24 hours) at 4/4/2024 1442  Last data filed at 4/4/2024 1025  Gross per 24 hour   Intake 240 ml   Output 2 ml   Net 238 ml         Physical Exam  Vitals and nursing note reviewed. Exam conducted with a chaperone present (nursing).   Constitutional:       General: She is not in acute distress.     Appearance: She is underweight. She is ill-appearing. She is not toxic-appearing.   HENT:      Head: Normocephalic and atraumatic.   Cardiovascular:      Rate and Rhythm: Normal rate.   Pulmonary:      Effort: Pulmonary effort is normal. No tachypnea or respiratory distress.      Breath sounds: Decreased air movement present.   Chest:      Chest wall: Tenderness present.       Abdominal:      Palpations: Abdomen is soft.      Tenderness: There is no abdominal  tenderness.   Skin:     General: Skin is warm.      Coloration: Skin is pale.   Neurological:      Mental Status: She is alert and oriented to person, place, and time.      Motor: Weakness present.   Psychiatric:         Mood and Affect: Mood is depressed. Mood is not anxious. Affect is tearful.         Speech: Speech normal.         Behavior: Behavior is cooperative.           Significant Labs: All pertinent labs within the past 24 hours have been reviewed.  Blood Culture: negative growth to date x 5 days  Negative mrsa   Body fluid culture and gram stain: streptococcus    CBC:   Recent Labs   Lab 04/03/24  0425 04/04/24  0536   WBC 14.80* 12.31   HGB 10.0* 9.7*   HCT 30.6* 29.7*    318     CMP:   Recent Labs   Lab 04/03/24  0425 04/04/24  0536   * 137   K 4.0 3.5    109   CO2 18* 19*   GLU 86 81   BUN 21* 21*   CREATININE 2.0* 1.9*   CALCIUM 7.7* 7.8*   ALBUMIN 1.6* 1.7*   ANIONGAP 7* 9       Significant Imaging: I have reviewed all pertinent imaging results/findings within the past 24 hours.

## 2024-04-04 NOTE — PROGRESS NOTES
[facility] Medicine  Progress Note    Patient Name: Mayela Molina  MRN: [id number]  Patient Class: IP- Inpatient   Admission Date: 3/25/2024  Length of Stay: 9 days  Attending Physician: Perla Gutierres MD  Primary Care Provider: Katie, Primary Doctor        Subjective:     Principal Problem:Pneumonia of left lung due to infectious organism        HPI:  28-year-old patient (male to female transgender) with no chronic medical conditions who presented to the emergency department with complaint of cough over the last 3 weeks, moderate severity, persistent.  Patient does have associated yellow sputum production, shortness for breath, and pleuritic chest pain.  No associated nausea, vomiting, diarrhea.  No complaints of fevers or chills although T-max in the emergency department recorded as 99.8.  Patient does not smoke cigarettes or vape.  No illicit drug use.  Abnormal labs in the emergency department include white blood cell count 30.46, hemoglobin 12.7, platelets 577, D-dimer 1.37, sodium 122, chloride 90, bicarb 21, glucose 121, albumin 2.3.  Last labs are from 2018 with no interim labs to compare with.  Patient does take spironolactone, Estrace, and Provera.  CT scan of chest with left-sided pneumonia with possible abscess formation.    Overview/Hospital Course:   Patient was admitted acute respiratory failure with left-sided pneumonia and possible abscess formation.  She initially was on nasal cannula and on morning after admission became acutely short of breath with a PO2 of 87% and tachycardic.  She was given a dose of adenosine which did not improve things however gradually her heart rate decreased.  Patient was admitted to the intensive care unit for respiratory failure tachycardia.  She was initially on high-flow nasal cannula and subsequently was able to wean to room air.  Cultures remained pending.  She was initially started on Rocephin and azithromycin and changed to Zosyn who  azithromycin vein.  Legionella antigen pending.  Continue with aggressive pulmonary care  With worsening CT scan, continued elevated WBC, continued elevated heart rate.  Patient underwent repeat CT scan of the chest which showed worsening of empyema.  She was seen and evaluated by Cardiothoracic surgery and underwent left VATS with decortication on 3/19.    Hyponatremia patient was admitted with hyponatremia she had fluid restrictions in place cortisol was normal, TSH normal, patient was on spironolactone and this was held.  She was being maintained on fluid restricted diet.     Encourage patient to deep breathe and walk in halls.  4/1 undergoing breathing treatment. Mom at bedside. Questions and concerns addressed. Encourage ambulation  4/2 questions and concerns addressed. Chest tube drainage 50cc yesterday. Ambulated with physical/occupational therapy. Continue intravenous fluids and intravenous antibiotic(s).   4/3 leukocytosis trending down. chest tube drainage 10cc/24 yesterday. Repeat chest x-ray with no adverse interval change. Patient wishes to ambulate more. Continue current care.  4/4 minimal chest tube output last 2 days. Leukocytosis resolved. Acute kidney injury improving. Ambulating with physical/occupational therapy. Repeat chest x-ray reviewed. Appetite improving    Interval History: See hospital course for today      Review of Systems   Constitutional:  Positive for activity change (improving), appetite change (improving) and fatigue. Negative for fever.   Respiratory:  Positive for cough. Negative for shortness of breath.    Cardiovascular:  Positive for chest pain.   Gastrointestinal:  Positive for nausea. Negative for abdominal distention, abdominal pain and vomiting.   Musculoskeletal:  Positive for myalgias.   Skin:  Positive for wound.   Neurological:  Positive for weakness.   Psychiatric/Behavioral:  Positive for dysphoric mood. Negative for agitation, behavioral problems, confusion and  decreased concentration. The patient is not nervous/anxious.      Objective:     Vital Signs (Most Recent):  Temp: 98.7 °F (37.1 °C) (04/04/24 1132)  Pulse: 72 (04/04/24 1132)  Resp: 16 (04/04/24 1132)  BP: 113/72 (04/04/24 1132)  SpO2: 95 % (04/04/24 1132) Vital Signs (24h Range):  Temp:  [96.1 °F (35.6 °C)-98.7 °F (37.1 °C)] 98.7 °F (37.1 °C)  Pulse:  [66-98] 72  Resp:  [14-20] 16  SpO2:  [95 %-99 %] 95 %  BP: (113-128)/(64-77) 113/72     Weight: 55.1 kg (121 lb 7.6 oz)  Body mass index is 17.94 kg/m².    Intake/Output Summary (Last 24 hours) at 4/4/2024 1442  Last data filed at 4/4/2024 1025  Gross per 24 hour   Intake 240 ml   Output 2 ml   Net 238 ml         Physical Exam  Vitals and nursing note reviewed. Exam conducted with a chaperone present (nursing).   Constitutional:       General: She is not in acute distress.     Appearance: She is underweight. She is ill-appearing. She is not toxic-appearing.   HENT:      Head: Normocephalic and atraumatic.   Cardiovascular:      Rate and Rhythm: Normal rate.   Pulmonary:      Effort: Pulmonary effort is normal. No tachypnea or respiratory distress.      Breath sounds: Decreased air movement present.   Chest:      Chest wall: Tenderness present.       Abdominal:      Palpations: Abdomen is soft.      Tenderness: There is no abdominal tenderness.   Skin:     General: Skin is warm.      Coloration: Skin is pale.   Neurological:      Mental Status: She is alert and oriented to person, place, and time.      Motor: Weakness present.   Psychiatric:         Mood and Affect: Mood is depressed. Mood is not anxious. Affect is tearful.         Speech: Speech normal.         Behavior: Behavior is cooperative.           Significant Labs: All pertinent labs within the past 24 hours have been reviewed.  Blood Culture: negative growth to date x 5 days  Negative mrsa   Body fluid culture and gram stain: streptococcus    CBC:   Recent Labs   Lab 04/03/24  0425 04/04/24  0536   WBC  "14.80* 12.31   HGB 10.0* 9.7*   HCT 30.6* 29.7*    318     CMP:   Recent Labs   Lab 04/03/24  0425 04/04/24  0536   * 137   K 4.0 3.5    109   CO2 18* 19*   GLU 86 81   BUN 21* 21*   CREATININE 2.0* 1.9*   CALCIUM 7.7* 7.8*   ALBUMIN 1.6* 1.7*   ANIONGAP 7* 9       Significant Imaging: I have reviewed all pertinent imaging results/findings within the past 24 hours.    Assessment/Plan:      * Pneumonia of left lung due to infectious organism  -white blood cell count 30.46, lactic acid level 1.5, D-dimer 1.37, BNP 14, troponin negative, influenza a/B negative, COVID-19 negative  -blood cultures pending  -CTA of chest with "no pulmonary embolism, no dissection; dense consolidation in the left lower lobe extending to the left upper lobe with tree in bud opacities consistent with severe pneumonia; questionable areas of fluid density in the left lung base with foci of gas may relate to component of fluid density or possible liquefaction or possible abscess formation."  -continue IV Zosyn, vanco and IV azithromycin initiated in the emergency department  -check MRSA culture-negative, sputum culture-pending, HIV  -give IV fluids, O2 supplementation, p.r.n. albuterol inhaler  - repeat CT scan of chest today looks more like an empyema  -CVT consulted and patient underwent VATS with drainage    Chest tube drainage minimal  Continue antibiotic(s)   Encourage incentive spirometer and acapella and breathing treatments  Ambulate       Moderate protein-calorie malnutrition  Nutrition consulted. Most recent weight and BMI monitored-     Measurements:  Wt Readings from Last 1 Encounters:   04/03/24 55.1 kg (121 lb 7.6 oz)   Body mass index is 17.94 kg/m².    Patient has been screened and assessed by RD.    Malnutrition Type:  Context: acute illness or injury, chronic illness  Level: moderate    Malnutrition Characteristic Summary:  Energy Intake (Malnutrition): less than or equal to 75% for greater than or equal to " 1 month  Subcutaneous Fat (Malnutrition): moderate depletion  Muscle Mass (Malnutrition): moderate depletion    Interventions/Recommendations (treatment strategy):  1. Recommend pt continues on Regular diet  2. Recommend pt continue Boost plus TID as medically appropriate 3. Recommend Rob BID for wound healing 4. Encourage PO intake of meals, snacks and supplements 5. Weigh twice weekly      Hydropneumothorax  Status post VATS with left lung chest tubes  -culture positive for MRSA  -on Zosyn and Zyvox  CVT and pulmonary following chest tubes    Pulmonary abscess  Growing strep  On linezolid and zosyn    Tachycardia, paroxysmal  Beta-blocker started heart rate markedly improved.  Borderline blood pressure and heart rate underwent 100 we will DC    Resolved on beta blocker     Pleuritic chest pain  Pleuritic chest pain related to left-sided pneumonia/empyema.  Cardiac enzymes negative.      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 Has not received any PRBCs to date.   Current CBC reviewed-   Lab Results   Component Value Date    HGB 9.7 (L) 04/04/2024    HCT 29.7 (L) 04/04/2024     Monitor serial CBC and transfuse if patient becomes hemodynamically unstable, symptomatic or H/H drops below 7/21.  Iron def anemia    Underweight  Current BMI 15.27, albumin 2.3.    Add nutritional supplements  Nutrition consult  Appetite improving    Hyponatremia  Patient has hyponatremia which is uncontrolled,We will aim to correct the sodium by 4-6mEq in 24 hours. We will monitor sodium Every 6 hours x4. The hyponatremia is due to Dehydration/hypovolemia. We will treat the hyponatremia with IV fluids as follows:  Normal saline at 100 mL/hr. The patient's sodium results have been reviewed and are listed below.  Recent Labs   Lab 04/04/24  0536        Improving   -hold  "spironolactone    - TSH normal, cortisol appropriate, free water restrict  -status post 1 L normal saline IV fluid bolus given in the emergency department    Resolved   Consider resuming spironolactone         Hypotension  Relative hypotension with blood pressure 99/56.  Patient received 1 L normal saline IV fluid bolus in the emergency department.   Hemodynamically stable    Blood pressure improving  On fluids    Sepsis  This patient does have evidence of infective focus  My overall impression is sepsis.  Source: Respiratory  Antibiotics given-   Antibiotics (72h ago, onward)      Start     Stop Route Frequency Ordered    03/30/24 0900  linezolid tablet 600 mg         -- Oral Every 12 hours 03/30/24 0731    03/28/24 1800  piperacillin-tazobactam (ZOSYN) 4.5 g in dextrose 5 % in water (D5W) 100 mL IVPB (MB+)         -- IV Every 8 hours (non-standard times) 03/28/24 1538        No results for input(s): "LACTATE", "POCLAC" in the last 72 hours.    Organ dysfunction indicated by  none    Fluid challenge Not needed - patient is not hypotensive      Post- resuscitation assessment No - Post resuscitation assessment not needed       Will Not start Pressors- Levophed for MAP of 65  Source control achieved by: broad spectrum antibiotics  Improving   Leukocytosis trending down  Chest tube drainage trending down    Resolved       VTE Risk Mitigation (From admission, onward)           Ordered     Place sequential compression device  Until discontinued        Comments: Can be discontinued when not in the bed.    03/30/24 1113     enoxaparin injection 40 mg  Every 24 hours         03/28/24 0912     IP VTE HIGH RISK PATIENT  Once         03/26/24 0043     Reason for No Pharmacological VTE Prophylaxis  Once        Question:  Reasons:  Answer:  Physician Provided (leave comment)  Comment:  pending pulmonary consult    03/26/24 0043     IP VTE HIGH RISK PATIENT  Once         03/26/24 0043     Place sequential compression device  " Until discontinued         03/26/24 0043                    Discharge Planning   RAVINDER:      Code Status: Full Code   Is the patient medically ready for discharge?:     Reason for patient still in hospital (select all that apply): Patient trending condition, Laboratory test, Treatment, Imaging, Consult recommendations, PT / OT recommendations, and Pending disposition  Discharge Plan A: Home   Discharge Delays: None known at this time              Perla Gutierres MD  Department of Hospital Medicine   [facility])

## 2024-04-04 NOTE — PT/OT/SLP PROGRESS
Physical Therapy      Patient Name:  Mayela Molina   MRN:  [id number]    Patient not seen today secondary to patient reporting feeling tired and declined participating. Will continue efforts.    Carey Chu, PT  04/04/24  13:15

## 2024-04-04 NOTE — SUBJECTIVE & OBJECTIVE
Interval History: The patient is postop day 6 status post VATS procedure with drainage of empyema and decortication     ROS  Medications:  Continuous Infusions:   sodium chloride 0.9% 125 mL/hr at 04/04/24 0214     Scheduled Meds:   acetylcysteine 100 mg/ml (10%)  4 mL Nebulization QID WAKE    enoxparin  40 mg Subcutaneous Q24H (prophylaxis, 1700)    estradioL  8 mg Oral Daily    famotidine  20 mg Oral Daily    linezolid  600 mg Oral Q12H    medroxyPROGESTERone  10 mg Oral Daily    methocarbamoL  500 mg Oral QID    metoprolol tartrate  12.5 mg Oral BID    piperacillin-tazobactam (Zosyn) IV (PEDS and ADULTS) (extended infusion is not appropriate)  4.5 g Intravenous Q8H    polyethylene glycol  17 g Oral Daily    sodium chloride 0.9%  2 mL Intravenous Q6H    sodium chloride  1,000 mg Oral TID     PRN Meds:0.9%  NaCl infusion (for blood administration), sodium chloride 0.9%, acetaminophen, albuterol sulfate, benzonatate, bisacodyL, glucagon (human recombinant), glucose, glucose, HYDROmorphone, ipratropium, melatonin, metoclopramide, naloxone, ondansetron, ondansetron, oxyCODONE, oxyCODONE, sodium chloride 0.9%, sodium chloride 0.9%, sodium chloride 0.9%, sodium chloride 0.9%     Objective:     Vital Signs (Most Recent):  Temp: 96.1 °F (35.6 °C) (04/04/24 0750)  Pulse: 71 (04/04/24 0752)  Resp: 18 (04/04/24 0823)  BP: 124/77 (04/04/24 0750)  SpO2: 96 % (04/04/24 0752) Vital Signs (24h Range):  Temp:  [96.1 °F (35.6 °C)-98.4 °F (36.9 °C)] 96.1 °F (35.6 °C)  Pulse:  [66-98] 71  Resp:  [14-20] 18  SpO2:  [95 %-99 %] 96 %  BP: (120-128)/(64-77) 124/77     Weight: 55.1 kg (121 lb 7.6 oz)  Body mass index is 17.94 kg/m².    SpO2: 96 %       Intake/Output - Last 3 Shifts         04/02 0700 04/03 0659 04/03 0700 04/04 0659 04/04 0700  04/05 0659    P.O. 680      I.V. (mL/kg) 1429.2 (25.7)      IV Piggyback 100.4      Total Intake(mL/kg) 2209.5 (39.7)      Urine (mL/kg/hr) 1850 (1.4)  1 (0)    Stool 0      Chest Tube 10       Total Output 1860  1    Net +349.5  -1           Urine Occurrence 2 x 1 x     Stool Occurrence 0 x              Lines/Drains/Airways       Drain  Duration                  Y Chest Tube 1 and 2 03/29/24 0903 1 Left Pleural 32 Fr. 2 Left Pleural 32 Fr. 6 days              Peripheral Intravenous Line  Duration                  Peripheral IV - Single Lumen 03/31/24 1104 20 G Left;Posterior Forearm 3 days                     Physical Exam  Constitutional:       Appearance: Normal appearance.   HENT:      Head: Normocephalic and atraumatic.   Cardiovascular:      Rate and Rhythm: Normal rate and regular rhythm.      Heart sounds: Normal heart sounds.   Pulmonary:      Breath sounds: Normal breath sounds.   Abdominal:      General: Abdomen is flat.      Palpations: Abdomen is soft.   Musculoskeletal:      Right lower leg: No edema.      Left lower leg: No edema.   Skin:     General: Skin is warm and dry.   Neurological:      Mental Status: She is alert and oriented to person, place, and time.   Psychiatric:         Behavior: Behavior normal.            Significant Labs:  All pertinent labs from the last 24 hours have been reviewed.    Significant Diagnostics:  I have reviewed all pertinent imaging results/findings within the past 24 hours.

## 2024-04-05 PROBLEM — N17.9 AKI (ACUTE KIDNEY INJURY): Status: ACTIVE | Noted: 2024-04-05

## 2024-04-05 LAB
ALBUMIN SERPL BCP-MCNC: 1.9 G/DL (ref 3.5–5.2)
ANION GAP SERPL CALC-SCNC: 8 MMOL/L (ref 8–16)
BASOPHILS # BLD AUTO: 0.05 K/UL (ref 0–0.2)
BASOPHILS NFR BLD: 0.4 % (ref 0–1.9)
BUN SERPL-MCNC: 18 MG/DL (ref 6–20)
CALCIUM SERPL-MCNC: 7.7 MG/DL (ref 8.7–10.5)
CHLORIDE SERPL-SCNC: 110 MMOL/L (ref 95–110)
CO2 SERPL-SCNC: 18 MMOL/L (ref 23–29)
CREAT SERPL-MCNC: 1.9 MG/DL (ref 0.5–1.4)
DIFFERENTIAL METHOD BLD: ABNORMAL
EOSINOPHIL # BLD AUTO: 0.1 K/UL (ref 0–0.5)
EOSINOPHIL NFR BLD: 0.9 % (ref 0–8)
ERYTHROCYTE [DISTWIDTH] IN BLOOD BY AUTOMATED COUNT: 15.8 % (ref 11.5–14.5)
EST. GFR  (NO RACE VARIABLE): 49 ML/MIN/1.73 M^2
GLUCOSE SERPL-MCNC: 87 MG/DL (ref 70–110)
HCT VFR BLD AUTO: 30.8 % (ref 40–54)
HGB BLD-MCNC: 9.8 G/DL (ref 14–18)
IMM GRANULOCYTES # BLD AUTO: 0.08 K/UL (ref 0–0.04)
IMM GRANULOCYTES NFR BLD AUTO: 0.6 % (ref 0–0.5)
LYMPHOCYTES # BLD AUTO: 2 K/UL (ref 1–4.8)
LYMPHOCYTES NFR BLD: 15.4 % (ref 18–48)
MCH RBC QN AUTO: 27.9 PG (ref 27–31)
MCHC RBC AUTO-ENTMCNC: 31.8 G/DL (ref 32–36)
MCV RBC AUTO: 88 FL (ref 82–98)
MONOCYTES # BLD AUTO: 0.6 K/UL (ref 0.3–1)
MONOCYTES NFR BLD: 4.9 % (ref 4–15)
NEUTROPHILS # BLD AUTO: 10.1 K/UL (ref 1.8–7.7)
NEUTROPHILS NFR BLD: 77.8 % (ref 38–73)
NRBC BLD-RTO: 0 /100 WBC
PHOSPHATE SERPL-MCNC: 2.9 MG/DL (ref 2.7–4.5)
PLATELET # BLD AUTO: 299 K/UL (ref 150–450)
PMV BLD AUTO: 8.3 FL (ref 9.2–12.9)
POTASSIUM SERPL-SCNC: 4.1 MMOL/L (ref 3.5–5.1)
RBC # BLD AUTO: 3.51 M/UL (ref 4.6–6.2)
SODIUM SERPL-SCNC: 136 MMOL/L (ref 136–145)
WBC # BLD AUTO: 13 K/UL (ref 3.9–12.7)

## 2024-04-05 PROCEDURE — 25000003 PHARM REV CODE 250: Performed by: INTERNAL MEDICINE

## 2024-04-05 PROCEDURE — 63600175 PHARM REV CODE 636 W HCPCS: Performed by: INTERNAL MEDICINE

## 2024-04-05 PROCEDURE — 25000003 PHARM REV CODE 250: Performed by: ANESTHESIOLOGY

## 2024-04-05 PROCEDURE — 21400001 HC TELEMETRY ROOM

## 2024-04-05 PROCEDURE — A4216 STERILE WATER/SALINE, 10 ML: HCPCS | Performed by: ANESTHESIOLOGY

## 2024-04-05 PROCEDURE — 80069 RENAL FUNCTION PANEL: CPT | Performed by: INTERNAL MEDICINE

## 2024-04-05 PROCEDURE — 94664 DEMO&/EVAL PT USE INHALER: CPT

## 2024-04-05 PROCEDURE — 25000003 PHARM REV CODE 250: Performed by: THORACIC SURGERY (CARDIOTHORACIC VASCULAR SURGERY)

## 2024-04-05 PROCEDURE — 99900035 HC TECH TIME PER 15 MIN (STAT)

## 2024-04-05 PROCEDURE — 25000003 PHARM REV CODE 250: Performed by: FAMILY MEDICINE

## 2024-04-05 PROCEDURE — 99233 SBSQ HOSP IP/OBS HIGH 50: CPT | Mod: ,,, | Performed by: INTERNAL MEDICINE

## 2024-04-05 PROCEDURE — 25000003 PHARM REV CODE 250: Performed by: NURSE PRACTITIONER

## 2024-04-05 PROCEDURE — 97116 GAIT TRAINING THERAPY: CPT

## 2024-04-05 PROCEDURE — 85025 COMPLETE CBC W/AUTO DIFF WBC: CPT | Performed by: THORACIC SURGERY (CARDIOTHORACIC VASCULAR SURGERY)

## 2024-04-05 RX ORDER — SPIRONOLACTONE 25 MG/1
25 TABLET ORAL 2 TIMES DAILY
Status: DISCONTINUED | OUTPATIENT
Start: 2024-04-05 | End: 2024-04-11

## 2024-04-05 RX ADMIN — PIPERACILLIN SODIUM AND TAZOBACTAM SODIUM 4.5 G: 4; .5 INJECTION, POWDER, FOR SOLUTION INTRAVENOUS at 02:04

## 2024-04-05 RX ADMIN — Medication 2 ML: at 12:04

## 2024-04-05 RX ADMIN — SODIUM CHLORIDE 1000 MG: 1 TABLET ORAL at 08:04

## 2024-04-05 RX ADMIN — LINEZOLID 600 MG: 600 TABLET, FILM COATED ORAL at 08:04

## 2024-04-05 RX ADMIN — ESTRADIOL 8 MG: 1 TABLET ORAL at 08:04

## 2024-04-05 RX ADMIN — SODIUM CHLORIDE 1000 MG: 1 TABLET ORAL at 09:04

## 2024-04-05 RX ADMIN — MEDROXYPROGESTERONE ACETATE 10 MG: 5 TABLET ORAL at 08:04

## 2024-04-05 RX ADMIN — OXYCODONE HYDROCHLORIDE 10 MG: 5 TABLET ORAL at 08:04

## 2024-04-05 RX ADMIN — Medication 2 ML: at 05:04

## 2024-04-05 RX ADMIN — ENOXAPARIN SODIUM 40 MG: 40 INJECTION SUBCUTANEOUS at 05:04

## 2024-04-05 RX ADMIN — SPIRONOLACTONE 25 MG: 25 TABLET, FILM COATED ORAL at 09:04

## 2024-04-05 RX ADMIN — OXYCODONE HYDROCHLORIDE 10 MG: 5 TABLET ORAL at 10:04

## 2024-04-05 RX ADMIN — OXYCODONE HYDROCHLORIDE 10 MG: 5 TABLET ORAL at 04:04

## 2024-04-05 RX ADMIN — LINEZOLID 600 MG: 600 TABLET, FILM COATED ORAL at 09:04

## 2024-04-05 RX ADMIN — METHOCARBAMOL 500 MG: 500 TABLET ORAL at 08:04

## 2024-04-05 RX ADMIN — METOPROLOL TARTRATE 12.5 MG: 25 TABLET, FILM COATED ORAL at 08:04

## 2024-04-05 RX ADMIN — PIPERACILLIN SODIUM AND TAZOBACTAM SODIUM 4.5 G: 4; .5 INJECTION, POWDER, FOR SOLUTION INTRAVENOUS at 09:04

## 2024-04-05 RX ADMIN — METHOCARBAMOL 500 MG: 500 TABLET ORAL at 09:04

## 2024-04-05 RX ADMIN — METHOCARBAMOL 500 MG: 500 TABLET ORAL at 12:04

## 2024-04-05 RX ADMIN — PIPERACILLIN SODIUM AND TAZOBACTAM SODIUM 4.5 G: 4; .5 INJECTION, POWDER, FOR SOLUTION INTRAVENOUS at 05:04

## 2024-04-05 RX ADMIN — FAMOTIDINE 20 MG: 20 TABLET ORAL at 08:04

## 2024-04-05 RX ADMIN — METHOCARBAMOL 500 MG: 500 TABLET ORAL at 05:04

## 2024-04-05 RX ADMIN — OXYCODONE HYDROCHLORIDE 10 MG: 5 TABLET ORAL at 05:04

## 2024-04-05 RX ADMIN — SODIUM CHLORIDE 1000 MG: 1 TABLET ORAL at 02:04

## 2024-04-05 RX ADMIN — METOPROLOL TARTRATE 12.5 MG: 25 TABLET, FILM COATED ORAL at 09:04

## 2024-04-05 RX ADMIN — POLYETHYLENE GLYCOL 3350 17 G: 17 POWDER, FOR SOLUTION ORAL at 08:04

## 2024-04-05 RX ADMIN — OXYCODONE HYDROCHLORIDE 10 MG: 5 TABLET ORAL at 01:04

## 2024-04-05 NOTE — ASSESSMENT & PLAN NOTE
Patient has hyponatremia which is uncontrolled,We will aim to correct the sodium by 4-6mEq in 24 hours. We will monitor sodium Every 6 hours x4. The hyponatremia is due to Dehydration/hypovolemia. We will treat the hyponatremia with IV fluids as follows:  Normal saline at 100 mL/hr. The patient's sodium results have been reviewed and are listed below.  Recent Labs   Lab 04/05/24  0418        Improving   -hold spironolactone    - TSH normal, cortisol appropriate, free water restrict  -status post 1 L normal saline IV fluid bolus given in the emergency department    Resolved   Resume spironolactone  Monitor k

## 2024-04-05 NOTE — ASSESSMENT & PLAN NOTE
Relative hypotension with blood pressure 99/56.  Patient received 1 L normal saline IV fluid bolus in the emergency department.   Hemodynamically stable    Blood pressure improving  Resume spironolactone, lower dose  On fluids

## 2024-04-05 NOTE — SUBJECTIVE & OBJECTIVE
Interval History:  The patient is postop day 7 status post VATS procedure with drainage of empyema and decortication.    ROS  Medications:  Continuous Infusions:   sodium chloride 0.9% 125 mL/hr at 04/04/24 2118     Scheduled Meds:   enoxparin  40 mg Subcutaneous Q24H (prophylaxis, 1700)    estradioL  8 mg Oral Daily    famotidine  20 mg Oral Daily    linezolid  600 mg Oral Q12H    medroxyPROGESTERone  10 mg Oral Daily    methocarbamoL  500 mg Oral QID    metoprolol tartrate  12.5 mg Oral BID    piperacillin-tazobactam (Zosyn) IV (PEDS and ADULTS) (extended infusion is not appropriate)  4.5 g Intravenous Q8H    polyethylene glycol  17 g Oral Daily    sodium chloride 0.9%  2 mL Intravenous Q6H    sodium chloride  1,000 mg Oral TID     PRN Meds:0.9%  NaCl infusion (for blood administration), sodium chloride 0.9%, acetaminophen, albuterol sulfate, benzonatate, bisacodyL, glucagon (human recombinant), glucose, glucose, HYDROmorphone, ipratropium, melatonin, metoclopramide, naloxone, ondansetron, ondansetron, oxyCODONE, oxyCODONE, sodium chloride 0.9%, sodium chloride 0.9%, sodium chloride 0.9%, sodium chloride 0.9%     Objective:     Vital Signs (Most Recent):  Temp: 98.3 °F (36.8 °C) (04/05/24 0733)  Pulse: 72 (04/05/24 0733)  Resp: 17 (04/05/24 0845)  BP: 114/64 (04/05/24 0733)  SpO2: 95 % (04/05/24 0733) Vital Signs (24h Range):  Temp:  [98.1 °F (36.7 °C)-98.7 °F (37.1 °C)] 98.3 °F (36.8 °C)  Pulse:  [] 72  Resp:  [16-18] 17  SpO2:  [93 %-98 %] 95 %  BP: (113-144)/(64-72) 114/64     Weight: 55.1 kg (121 lb 7.6 oz)  Body mass index is 17.94 kg/m².    SpO2: 95 %       Intake/Output - Last 3 Shifts         04/03 0700 04/04 0659 04/04 0700 04/05 0659 04/05 0700 04/06 0659    P.O.  480     I.V. (mL/kg)  2 (0)     IV Piggyback       Total Intake(mL/kg)  482 (8.7)     Urine (mL/kg/hr)  3 (0)     Stool       Chest Tube   0    Total Output  3 0    Net  +479 0           Urine Occurrence 1 x               Lines/Drains/Airways       Drain  Duration                  Y Chest Tube 1 and 2 03/29/24 0903 1 Left Pleural 32 Fr. 2 Left Pleural 32 Fr. 6 days              Peripheral Intravenous Line  Duration                  Peripheral IV - Single Lumen 03/31/24 1104 20 G Left;Posterior Forearm 4 days                     Physical Exam  Constitutional:       Appearance: Normal appearance.   HENT:      Head: Normocephalic and atraumatic.      Nose: Nose normal.   Cardiovascular:      Rate and Rhythm: Normal rate and regular rhythm.   Pulmonary:      Effort: Pulmonary effort is normal.   Abdominal:      General: Abdomen is flat. Bowel sounds are normal.      Palpations: Abdomen is soft.   Musculoskeletal:      Right lower leg: No edema.      Left lower leg: No edema.   Skin:     General: Skin is warm and dry.   Neurological:      Mental Status: She is alert and oriented to person, place, and time.   Psychiatric:         Behavior: Behavior normal.            Significant Labs:  All pertinent labs from the last 24 hours have been reviewed.    Significant Diagnostics:  I have reviewed all pertinent imaging results/findings within the past 24 hours.

## 2024-04-05 NOTE — SUBJECTIVE & OBJECTIVE
Interval History: See hospital course for today      Review of Systems   Constitutional:  Positive for activity change (improving) and appetite change (improving). Negative for fever.   Respiratory:  Negative for shortness of breath.    Gastrointestinal:  Positive for abdominal pain. Negative for nausea and vomiting.   Skin:  Positive for wound.   Neurological:  Positive for weakness.   Psychiatric/Behavioral:  Positive for dysphoric mood. Negative for agitation, behavioral problems, confusion and decreased concentration. The patient is not nervous/anxious.      Objective:     Vital Signs (Most Recent):  Temp: 97.8 °F (36.6 °C) (04/05/24 1543)  Pulse: 74 (04/05/24 1543)  Resp: 19 (04/05/24 1543)  BP: 119/70 (04/05/24 1543)  SpO2: 98 % (04/05/24 1543) Vital Signs (24h Range):  Temp:  [97.8 °F (36.6 °C)-98.3 °F (36.8 °C)] 97.8 °F (36.6 °C)  Pulse:  [] 74  Resp:  [16-19] 19  SpO2:  [93 %-98 %] 98 %  BP: (114-144)/(64-72) 119/70     Weight: 55.1 kg (121 lb 7.6 oz)  Body mass index is 17.94 kg/m².    Intake/Output Summary (Last 24 hours) at 4/5/2024 1629  Last data filed at 4/5/2024 1200  Gross per 24 hour   Intake 242 ml   Output 1 ml   Net 241 ml         Physical Exam  Vitals and nursing note reviewed. Exam conducted with a chaperone present (visitor, nursing).   Constitutional:       General: She is not in acute distress.     Appearance: She is ill-appearing. She is not toxic-appearing.   HENT:      Head: Normocephalic and atraumatic.   Cardiovascular:      Rate and Rhythm: Normal rate.   Pulmonary:      Effort: Pulmonary effort is normal. No respiratory distress.   Chest:      Chest wall: Tenderness present.   Abdominal:      Palpations: Abdomen is soft.      Tenderness: There is no abdominal tenderness.   Musculoskeletal:      Right lower leg: No edema.      Left lower leg: No edema.   Skin:     General: Skin is warm.      Coloration: Skin is pale.   Neurological:      Mental Status: She is alert and oriented  to person, place, and time.      Motor: Weakness present.   Psychiatric:         Mood and Affect: Mood is depressed.         Speech: Speech normal.             Significant Labs: All pertinent labs within the past 24 hours have been reviewed.  Blood Culture: negative growth to date x 5 days  CBC:   Recent Labs   Lab 04/04/24  0536 04/05/24 0418   WBC 12.31 13.00*   HGB 9.7* 9.8*   HCT 29.7* 30.8*    299     CMP:   Recent Labs   Lab 04/04/24  0536 04/05/24 0418    136   K 3.5 4.1    110   CO2 19* 18*   GLU 81 87   BUN 21* 18   CREATININE 1.9* 1.9*   CALCIUM 7.8* 7.7*   ALBUMIN 1.7* 1.9*   ANIONGAP 9 8       Prealbumin: pending  Mrsa negative   AFB negative     Significant Imaging: I have reviewed all pertinent imaging results/findings within the past 24 hours.  CT: I have reviewed all pertinent results/findings within the past 24 hours and my personal findings are:  chest-pending  CXR: I have reviewed all pertinent results/findings within the past 24 hours and my personal findings are:  no adverse interval change

## 2024-04-05 NOTE — PROGRESS NOTES
[facility] - Telemetry ([facility])  Cardiothoracic Surgery  Progress Note    Patient Name: Mayela Molina  MRN: [id number]  Admission Date: 3/25/2024  Hospital Length of Stay: 10 days  Code Status: Full Code   Attending Physician: Perla Gutierres MD   Referring Provider: Self, Aaareferral  Principal Problem:Pneumonia of left lung due to infectious organism            Subjective:     Post-Op Info:  Procedure(s) (LRB):  VATS (VIDEO-ASSISTED THORACOSCOPIC SURGERY) (Left)  BLOCK, NERVE, INTERCOSTAL, 2 OR MORE (Left)   7 Days Post-Op     Interval History:  The patient is postop day 7 status post VATS procedure with drainage of empyema and decortication.    ROS  Medications:  Continuous Infusions:   sodium chloride 0.9% 125 mL/hr at 04/04/24 2118     Scheduled Meds:   enoxparin  40 mg Subcutaneous Q24H (prophylaxis, 1700)    estradioL  8 mg Oral Daily    famotidine  20 mg Oral Daily    linezolid  600 mg Oral Q12H    medroxyPROGESTERone  10 mg Oral Daily    methocarbamoL  500 mg Oral QID    metoprolol tartrate  12.5 mg Oral BID    piperacillin-tazobactam (Zosyn) IV (PEDS and ADULTS) (extended infusion is not appropriate)  4.5 g Intravenous Q8H    polyethylene glycol  17 g Oral Daily    sodium chloride 0.9%  2 mL Intravenous Q6H    sodium chloride  1,000 mg Oral TID     PRN Meds:0.9%  NaCl infusion (for blood administration), sodium chloride 0.9%, acetaminophen, albuterol sulfate, benzonatate, bisacodyL, glucagon (human recombinant), glucose, glucose, HYDROmorphone, ipratropium, melatonin, metoclopramide, naloxone, ondansetron, ondansetron, oxyCODONE, oxyCODONE, sodium chloride 0.9%, sodium chloride 0.9%, sodium chloride 0.9%, sodium chloride 0.9%     Objective:     Vital Signs (Most Recent):  Temp: 98.3 °F (36.8 °C) (04/05/24 0733)  Pulse: 72 (04/05/24 0733)  Resp: 17 (04/05/24 0845)  BP: 114/64 (04/05/24 0733)  SpO2: 95 % (04/05/24 0733) Vital Signs (24h Range):  Temp:  [98.1 °F (36.7 °C)-98.7 °F (37.1 °C)] 98.3 °F (36.8  °C)  Pulse:  [] 72  Resp:  [16-18] 17  SpO2:  [93 %-98 %] 95 %  BP: (113-144)/(64-72) 114/64     Weight: 55.1 kg (121 lb 7.6 oz)  Body mass index is 17.94 kg/m².    SpO2: 95 %       Intake/Output - Last 3 Shifts         04/03 0700  04/04 0659 04/04 0700  04/05 0659 04/05 0700  04/06 0659    P.O.  480     I.V. (mL/kg)  2 (0)     IV Piggyback       Total Intake(mL/kg)  482 (8.7)     Urine (mL/kg/hr)  3 (0)     Stool       Chest Tube   0    Total Output  3 0    Net  +479 0           Urine Occurrence 1 x              Lines/Drains/Airways       Drain  Duration                  Y Chest Tube 1 and 2 03/29/24 0903 1 Left Pleural 32 Fr. 2 Left Pleural 32 Fr. 6 days              Peripheral Intravenous Line  Duration                  Peripheral IV - Single Lumen 03/31/24 1104 20 G Left;Posterior Forearm 4 days                     Physical Exam  Constitutional:       Appearance: Normal appearance.   HENT:      Head: Normocephalic and atraumatic.      Nose: Nose normal.   Cardiovascular:      Rate and Rhythm: Normal rate and regular rhythm.   Pulmonary:      Effort: Pulmonary effort is normal.   Abdominal:      General: Abdomen is flat. Bowel sounds are normal.      Palpations: Abdomen is soft.   Musculoskeletal:      Right lower leg: No edema.      Left lower leg: No edema.   Skin:     General: Skin is warm and dry.   Neurological:      Mental Status: She is alert and oriented to person, place, and time.   Psychiatric:         Behavior: Behavior normal.            Significant Labs:  All pertinent labs from the last 24 hours have been reviewed.    Significant Diagnostics:  I have reviewed all pertinent imaging results/findings within the past 24 hours.  Assessment/Plan:     Hydropneumothorax  04/02/2024   Patient is postop day 4 status post VATS procedure with drainage of empyema and decortication.  Continue broad-spectrum antibiotics with linezolid and Zosyn.  Patient's white count is trending downward with white count of  20.  Patient is afebrile.  Chest tube output trending lower.  Continue pulmonary toileting continue incentive spirometer.  Patient is up and ambulating.  Increase as tolerated.    04/03/2024   The patient is postop day 5 status post VATS procedure with drainage of empyema and decortication.  Patient is on broad-spectrum antibiotics linezolid and Zosyn.  Is trending down.  Patient is afebrile.  Continue pulmonary toileting.  Chest x-ray shows continued patchy infiltrate left middle and lower lobes.  Continue chest tube to drainage.    04/04/2024   The patient is postop day 6 status post VATS procedure with drainage of empyema and decortication.  Patient is on broad-spectrum antibiotics.  Continue linezolid and Zosyn.  Patient is afebrile.  Continue pulmonary toileting continue incentive spirometer..  Chest tube output has been minimal.  Will repeat chest x-ray to access extent of left lung consolidation.    04/05/2024   The patient is postop day 7 status post VATS procedure with drainage of empyema and decortication patient continues on antibiotics.  Patient is afebrile.  White count is slightly elevated at toileting.  Continue incentive spirometer.  Continue pulmonary toileting.  Chest tube output has been minimal.  .  Chest x-ray shows dense left lower lobe infiltrate which is unchanged. Continue chest tube as per patient's primary surgeon        Mark Elliott MD  Cardiothoracic Surgery  [facility] ([facility])

## 2024-04-05 NOTE — PROGRESS NOTES
[facility]  Progress Note    Patient Name: Mayela Molina  MRN: [id number]  Patient Class: IP- Inpatient   Admission Date: 3/25/2024  Length of Stay: 10 days  Attending Physician: Perla Gutierres MD  Primary Care Provider: Katie, Primary Doctor        Subjective:     Principal Problem:Pneumonia of left lung due to infectious organism        HPI:  28-year-old patient (male to female transgender) with no chronic medical conditions who presented to the emergency department with complaint of cough over the last 3 weeks, moderate severity, persistent.  Patient does have associated yellow sputum production, shortness for breath, and pleuritic chest pain.  No associated nausea, vomiting, diarrhea.  No complaints of fevers or chills although T-max in the emergency department recorded as 99.8.  Patient does not smoke cigarettes or vape.  No illicit drug use.  Abnormal labs in the emergency department include white blood cell count 30.46, hemoglobin 12.7, platelets 577, D-dimer 1.37, sodium 122, chloride 90, bicarb 21, glucose 121, albumin 2.3.  Last labs are from 2018 with no interim labs to compare with.  Patient does take spironolactone, Estrace, and Provera.  CT scan of chest with left-sided pneumonia with possible abscess formation.    Overview/Hospital Course:   Patient was admitted acute respiratory failure with left-sided pneumonia and possible abscess formation.  She initially was on nasal cannula and on morning after admission became acutely short of breath with a PO2 of 87% and tachycardic.  She was given a dose of adenosine which did not improve things however gradually her heart rate decreased.  Patient was admitted to the intensive care unit for respiratory failure tachycardia.  She was initially on high-flow nasal cannula and subsequently was able to wean to room air.  Cultures remained pending.  She was initially started on Rocephin and azithromycin and changed to Zosyn  who azithromycin vein.  Legionella antigen pending.  Continue with aggressive pulmonary care  With worsening CT scan, continued elevated WBC, continued elevated heart rate.  Patient underwent repeat CT scan of the chest which showed worsening of empyema.  She was seen and evaluated by Cardiothoracic surgery and underwent left VATS with decortication on 3/19.    Hyponatremia patient was admitted with hyponatremia she had fluid restrictions in place cortisol was normal, TSH normal, patient was on spironolactone and this was held.  She was being maintained on fluid restricted diet.     Encourage patient to deep breathe and walk in halls.  4/1 undergoing breathing treatment. Mom at bedside. Questions and concerns addressed. Encourage ambulation  4/2 questions and concerns addressed. Chest tube drainage 50cc yesterday. Ambulated with physical/occupational therapy. Continue intravenous fluids and intravenous antibiotic(s).   4/3 leukocytosis trending down. chest tube drainage 10cc/24 yesterday. Repeat chest x-ray with no adverse interval change. Patient wishes to ambulate more. Continue current care.  4/4 minimal chest tube output last 2 days. Leukocytosis resolved. Acute kidney injury improving. Ambulating with physical/occupational therapy. Repeat chest x-ray reviewed. Appetite improving  4/5 primary CTS recommending repeat ct chest noncontrast and prealbumin. Chest tube to water seal per primary surgeon. Discussed restarting spironolactone at lower dose with nephrology. Patient ambulating with physical/occupational therapy.     Interval History: See hospital course for today      Review of Systems   Constitutional:  Positive for activity change (improving) and appetite change (improving). Negative for fever.   Respiratory:  Negative for shortness of breath.    Gastrointestinal:  Positive for abdominal pain. Negative for nausea and vomiting.   Skin:  Positive for wound.   Neurological:  Positive for weakness.    Psychiatric/Behavioral:  Positive for dysphoric mood. Negative for agitation, behavioral problems, confusion and decreased concentration. The patient is not nervous/anxious.      Objective:     Vital Signs (Most Recent):  Temp: 97.8 °F (36.6 °C) (04/05/24 1543)  Pulse: 74 (04/05/24 1543)  Resp: 19 (04/05/24 1543)  BP: 119/70 (04/05/24 1543)  SpO2: 98 % (04/05/24 1543) Vital Signs (24h Range):  Temp:  [97.8 °F (36.6 °C)-98.3 °F (36.8 °C)] 97.8 °F (36.6 °C)  Pulse:  [] 74  Resp:  [16-19] 19  SpO2:  [93 %-98 %] 98 %  BP: (114-144)/(64-72) 119/70     Weight: 55.1 kg (121 lb 7.6 oz)  Body mass index is 17.94 kg/m².    Intake/Output Summary (Last 24 hours) at 4/5/2024 1629  Last data filed at 4/5/2024 1200  Gross per 24 hour   Intake 242 ml   Output 1 ml   Net 241 ml         Physical Exam  Vitals and nursing note reviewed. Exam conducted with a chaperone present (visitor, nursing).   Constitutional:       General: She is not in acute distress.     Appearance: She is ill-appearing. She is not toxic-appearing.   HENT:      Head: Normocephalic and atraumatic.   Cardiovascular:      Rate and Rhythm: Normal rate.   Pulmonary:      Effort: Pulmonary effort is normal. No respiratory distress.   Chest:      Chest wall: Tenderness present.   Abdominal:      Palpations: Abdomen is soft.      Tenderness: There is no abdominal tenderness.   Musculoskeletal:      Right lower leg: No edema.      Left lower leg: No edema.   Skin:     General: Skin is warm.      Coloration: Skin is pale.   Neurological:      Mental Status: She is alert and oriented to person, place, and time.      Motor: Weakness present.   Psychiatric:         Mood and Affect: Mood is depressed.         Speech: Speech normal.             Significant Labs: All pertinent labs within the past 24 hours have been reviewed.  Blood Culture: negative growth to date x 5 days  CBC:   Recent Labs   Lab 04/04/24  0536 04/05/24  0418   WBC 12.31 13.00*   HGB 9.7* 9.8*   HCT  "29.7* 30.8*    299     CMP:   Recent Labs   Lab 04/04/24  0536 04/05/24  0418    136   K 3.5 4.1    110   CO2 19* 18*   GLU 81 87   BUN 21* 18   CREATININE 1.9* 1.9*   CALCIUM 7.8* 7.7*   ALBUMIN 1.7* 1.9*   ANIONGAP 9 8       Prealbumin: pending  Mrsa negative   AFB negative     Significant Imaging: I have reviewed all pertinent imaging results/findings within the past 24 hours.  CT: I have reviewed all pertinent results/findings within the past 24 hours and my personal findings are:  chest-pending  CXR: I have reviewed all pertinent results/findings within the past 24 hours and my personal findings are:  no adverse interval change    Assessment/Plan:      * Pneumonia of left lung due to infectious organism  -white blood cell count 30.46, lactic acid level 1.5, D-dimer 1.37, BNP 14, troponin negative, influenza a/B negative, COVID-19 negative  -blood cultures pending  -CTA of chest with "no pulmonary embolism, no dissection; dense consolidation in the left lower lobe extending to the left upper lobe with tree in bud opacities consistent with severe pneumonia; questionable areas of fluid density in the left lung base with foci of gas may relate to component of fluid density or possible liquefaction or possible abscess formation."  -continue IV Zosyn, vanco and IV azithromycin initiated in the emergency department  -check MRSA culture-negative, sputum culture-pending, HIV  -give IV fluids, O2 supplementation, p.r.n. albuterol inhaler  - repeat CT scan of chest today looks more like an empyema  -CVT consulted and patient underwent VATS with drainage    Chest tube to water seal per primary surgeon   Continue antibiotic(s)   Encourage incentive spirometer and acapella and breathing treatments  Ambulate   Repeat ct chest pending      Moderate protein-calorie malnutrition  Nutrition consulted. Most recent weight and BMI monitored-     Measurements:  Wt Readings from Last 1 Encounters:   04/03/24 55.1 kg " (121 lb 7.6 oz)   Body mass index is 17.94 kg/m².    Patient has been screened and assessed by RD.    Malnutrition Type:  Context: acute illness or injury, chronic illness  Level: moderate    Malnutrition Characteristic Summary:  Energy Intake (Malnutrition): less than or equal to 75% for greater than or equal to 1 month  Subcutaneous Fat (Malnutrition): moderate depletion  Muscle Mass (Malnutrition): moderate depletion    Interventions/Recommendations (treatment strategy):  1. Recommend pt continues on Regular diet  2. Recommend pt continue Boost plus TID as medically appropriate 3. Recommend Rob BID for wound healing 4. Encourage PO intake of meals, snacks and supplements 5. Weigh twice weekly      Hydropneumothorax  Status post VATS with left lung chest tubes  -culture positive for MRSA  -on Zosyn and Zyvox  CVT and pulmonary following chest tubes    Pulmonary abscess  Growing strep  On linezolid and zosyn  Repeat ct chest pending    Tachycardia, paroxysmal  Beta-blocker started heart rate markedly improved.  Borderline blood pressure and heart rate underwent 100 we will DC    Resolved on beta blocker     Pleuritic chest pain  Pleuritic chest pain related to left-sided pneumonia/empyema.  Cardiac enzymes negative.      Male-to-female transgender person  Resume home medications to include Estrace & Provera; hold spironolactone due to hyponatremia    Resume spironolactone at lower dose      Normocytic anemia  Patient's anemia is currently controlled. Has not received any PRBCs to date.   Current CBC reviewed-   Lab Results   Component Value Date    HGB 9.8 (L) 04/05/2024    HCT 30.8 (L) 04/05/2024     Monitor serial CBC and transfuse if patient becomes hemodynamically unstable, symptomatic or H/H drops below 7/21.  Iron def anemia    Underweight  Current BMI 15.27, albumin 2.3.    Add nutritional supplements  Nutrition consult  Appetite improving    Prealbumin pending    Hyponatremia  Patient has hyponatremia  "which is uncontrolled,We will aim to correct the sodium by 4-6mEq in 24 hours. We will monitor sodium Every 6 hours x4. The hyponatremia is due to Dehydration/hypovolemia. We will treat the hyponatremia with IV fluids as follows:  Normal saline at 100 mL/hr. The patient's sodium results have been reviewed and are listed below.  Recent Labs   Lab 04/05/24  0418        Improving   -hold spironolactone    - TSH normal, cortisol appropriate, free water restrict  -status post 1 L normal saline IV fluid bolus given in the emergency department    Resolved   Resume spironolactone  Monitor k        Hypotension  Relative hypotension with blood pressure 99/56.  Patient received 1 L normal saline IV fluid bolus in the emergency department.   Hemodynamically stable    Blood pressure improving  Resume spironolactone, lower dose  On fluids    Sepsis  This patient does have evidence of infective focus  My overall impression is sepsis.  Source: Respiratory  Antibiotics given-   Antibiotics (72h ago, onward)      Start     Stop Route Frequency Ordered    03/30/24 0900  linezolid tablet 600 mg         -- Oral Every 12 hours 03/30/24 0731    03/28/24 1800  piperacillin-tazobactam (ZOSYN) 4.5 g in dextrose 5 % in water (D5W) 100 mL IVPB (MB+)         -- IV Every 8 hours (non-standard times) 03/28/24 1538        No results for input(s): "LACTATE", "POCLAC" in the last 72 hours.    Organ dysfunction indicated by  none    Fluid challenge Not needed - patient is not hypotensive      Post- resuscitation assessment No - Post resuscitation assessment not needed       Will Not start Pressors- Levophed for MAP of 65  Source control achieved by: broad spectrum antibiotics  Improving   Leukocytosis trending down  Chest tube drainage trending down    Resolved       VTE Risk Mitigation (From admission, onward)           Ordered     Place sequential compression device  Until discontinued        Comments: Can be discontinued when not in the bed. "    03/30/24 1113     enoxaparin injection 40 mg  Every 24 hours         03/28/24 0912     IP VTE HIGH RISK PATIENT  Once         03/26/24 0043     Reason for No Pharmacological VTE Prophylaxis  Once        Question:  Reasons:  Answer:  Physician Provided (leave comment)  Comment:  pending pulmonary consult    03/26/24 0043     IP VTE HIGH RISK PATIENT  Once         03/26/24 0043     Place sequential compression device  Until discontinued         03/26/24 0043                    Discharge Planning   RAVINDER:      Code Status: Full Code   Is the patient medically ready for discharge?:     Reason for patient still in hospital (select all that apply): Patient trending condition, Laboratory test, Treatment, Imaging, Consult recommendations, and PT / OT recommendations  Discharge Plan A: Home   Discharge Delays: None known at this time              Perla Gutierres MD  Department of Hospital Medicine   [facility] ([facility])

## 2024-04-05 NOTE — PLAN OF CARE
Pt awake and alert. Hob elevated . Pt pain controlled with pain medication . No SOB noted . Chest tube in place . Dressing is patent . Safety measures in place . Chart check complete . Will continue to  monitor .

## 2024-04-05 NOTE — ASSESSMENT & PLAN NOTE
"-white blood cell count 30.46, lactic acid level 1.5, D-dimer 1.37, BNP 14, troponin negative, influenza a/B negative, COVID-19 negative  -blood cultures pending  -CTA of chest with "no pulmonary embolism, no dissection; dense consolidation in the left lower lobe extending to the left upper lobe with tree in bud opacities consistent with severe pneumonia; questionable areas of fluid density in the left lung base with foci of gas may relate to component of fluid density or possible liquefaction or possible abscess formation."  -continue IV Zosyn, vanco and IV azithromycin initiated in the emergency department  -check MRSA culture-negative, sputum culture-pending, HIV  -give IV fluids, O2 supplementation, p.r.n. albuterol inhaler  - repeat CT scan of chest today looks more like an empyema  -CVT consulted and patient underwent VATS with drainage    Chest tube to water seal per primary surgeon   Continue antibiotic(s)   Encourage incentive spirometer and acapella and breathing treatments  Ambulate   Repeat ct chest pending    "

## 2024-04-05 NOTE — PROGRESS NOTES
[facility])  Nephrology  Progress Note    Patient Name: Mayela Molina  MRN: [id number]  Admission Date: 3/25/2024  Hospital Length of Stay: 10 days  Attending Provider: Perla Gutierres MD   Primary Care Physician: Katie, Primary Doctor  Principal Problem:Pneumonia of left lung due to infectious organism    Subjective:     HPI: noted    Interval History: Pt was seen and examined. Labs and meds reviewed. Discussed with other providers. Chart was reviewed. Pt is a 27 y/o transgender female who initially presented on 3/26/24 with 3 week h/o of cough and SOB. Pt was admitted for left sided pneumonia and was treated for development of empyema with thoracotomy and decortication on 3/29/24 and placement of a chest tube. Pt developed DAVIAN likely due to supratherapeutic level of vancomycin on 3/30/24. Renal function since then has remained stable and has begun to improve. Pt today is feeling "OK", no new or acute c/o's, no discomfort, no worsened SOB, no leg swelling.    Review of patient's allergies indicates:  No Known Allergies  Current Facility-Administered Medications   Medication Frequency    0.9%  NaCl infusion (for blood administration) Q24H PRN    0.9%  NaCl infusion PRN    0.9%  NaCl infusion Continuous    acetaminophen tablet 650 mg Q6H PRN    albuterol nebulizer solution 2.5 mg Q6H PRN    benzonatate capsule 100 mg TID PRN    bisacodyL suppository 10 mg Daily PRN    enoxaparin injection 40 mg Q24H (prophylaxis, 1700)    estradioL tablet 8 mg Daily    famotidine tablet 20 mg Daily    glucagon (human recombinant) injection 1 mg PRN    glucose chewable tablet 16 g PRN    glucose chewable tablet 24 g PRN    HYDROmorphone injection 1 mg QID PRN    ipratropium 0.02 % nebulizer solution 0.5 mg QID PRN    linezolid tablet 600 mg Q12H    medroxyPROGESTERone tablet 10 mg Daily    melatonin tablet 6 mg Nightly PRN    methocarbamoL tablet 500 mg QID    metoclopramide injection 5 mg Q6H PRN    metoprolol " tartrate (LOPRESSOR) split tablet 12.5 mg BID    naloxone 0.4 mg/mL injection 0.02 mg PRN    ondansetron disintegrating tablet 8 mg Q8H PRN    ondansetron injection 4 mg Q6H PRN    oxyCODONE immediate release tablet 10 mg Q4H PRN    oxyCODONE immediate release tablet 5 mg Q4H PRN    piperacillin-tazobactam (ZOSYN) 4.5 g in dextrose 5 % in water (D5W) 100 mL IVPB (MB+) Q8H    polyethylene glycol packet 17 g Daily    sodium chloride 0.9% flush 10 mL PRN    sodium chloride 0.9% flush 10 mL Q12H PRN    sodium chloride 0.9% flush 10 mL PRN    sodium chloride 0.9% flush 10 mL PRN    sodium chloride 0.9% flush 2 mL Q6H    sodium chloride oral tablet 1,000 mg TID    spironolactone tablet 25 mg BID       Objective:     Vital Signs (Most Recent):  Temp: 97.8 °F (36.6 °C) (04/05/24 1543)  Pulse: 74 (04/05/24 1543)  Resp: 16 (04/05/24 1731)  BP: 119/70 (04/05/24 1543)  SpO2: 98 % (04/05/24 1543) Vital Signs (24h Range):  Temp:  [97.8 °F (36.6 °C)-98.3 °F (36.8 °C)] 97.8 °F (36.6 °C)  Pulse:  [] 74  Resp:  [16-19] 16  SpO2:  [93 %-98 %] 98 %  BP: (114-144)/(64-72) 119/70     Weight: 55.1 kg (121 lb 7.6 oz) (04/03/24 2344)  Body mass index is 17.94 kg/m².  Body surface area is 1.64 meters squared.    I/O last 3 completed shifts:  In: 482 [P.O.:480; I.V.:2]  Out: 3 [Urine:3]     Physical Exam  Vitals and nursing note reviewed.   Constitutional:       Appearance: Normal appearance.   Cardiovascular:      Rate and Rhythm: Normal rate and regular rhythm.      Pulses: Normal pulses.      Heart sounds: Normal heart sounds.   Pulmonary:      Effort: Pulmonary effort is normal.      Breath sounds: Normal breath sounds.   Abdominal:      Palpations: Abdomen is soft.      Tenderness: There is no abdominal tenderness.   Musculoskeletal:      Right lower leg: No edema.      Left lower leg: No edema.   Neurological:      Mental Status: She is alert and oriented to person, place, and time.   Psychiatric:         Behavior: Behavior  normal.          Significant Labs: reviewed  BMP  Lab Results   Component Value Date     04/05/2024    K 4.1 04/05/2024     04/05/2024    CO2 18 (L) 04/05/2024    BUN 18 04/05/2024    CREATININE 1.9 (H) 04/05/2024    CALCIUM 7.7 (L) 04/05/2024    ANIONGAP 8 04/05/2024    EGFRNORACEVR 49 (A) 04/05/2024     Lab Results   Component Value Date    WBC 13.00 (H) 04/05/2024    HGB 9.8 (L) 04/05/2024    HCT 30.8 (L) 04/05/2024    MCV 88 04/05/2024     04/05/2024           Significant Imaging: reviewed CXR and cT scan of the chest  Assessment/Plan:     27 y/o transgender female who presented with pneumonia complicated by empyema requiring a chest tube who further developed DAVIAN:       DAVIAN (acute kidney injury)  DAVIAN. 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  Has supratherapeutic level of vancomycin on 3/30/24 and DAVIAN started same day  FENa >1%  No other causes suspected  CT scans done were without contrast  Baseline s Cr is normal  DAVIAN is stable, s Cr lower, stable, DAVIAN slowly resolving  K normal  Metabolic acidosis stable  O2 sat good  No indications for acute dialysis    Pneumonia of left lung due to infectious organism  Lung infection complicated by necrotizing pneumonia and empyema  Hydropneumothorax  S/p VATS procedure (POD#7) with drainage of empyema and decortication  On abx  Will defer to CT surgery    Plans and recommendations:  As discussed above  Total time spent 40 minutes including time needed to review the records, the   patient evaluation, documentation, face-to-face discussion with the patient,   more than 50% of the time was spent on coordination of care and counseling.    Level V visit.    Thank you for your consult.     Linn Edwards MD  Nephrology  [facility] ([facility]

## 2024-04-05 NOTE — ASSESSMENT & PLAN NOTE
Current BMI 15.27, albumin 2.3.    Add nutritional supplements  Nutrition consult  Appetite improving    Prealbumin pending

## 2024-04-05 NOTE — ASSESSMENT & PLAN NOTE
DAVIAN. 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  Has supratherapeutic level of vancomycin on 3/30/24 and DAVIAN started same day  FENa >1%  No other causes suspected  CT scans done were without contrast  Baseline s Cr is normal  DAVIAN is stable, s Cr lower, stable, DAVIAN slowly resolving  K normal  Metabolic acidosis stable  O2 sat good  No indications for acute dialysis

## 2024-04-05 NOTE — PLAN OF CARE
Problem: Fall Injury Risk  Goal: Absence of Fall and Fall-Related Injury  Outcome: Ongoing, Progressing     Problem: Respiratory Compromise (Pneumothorax)  Goal: Optimal Oxygenation and Ventilation  Outcome: Ongoing, Progressing     Problem: Pain Acute  Goal: Acceptable Pain Control and Functional Ability  Outcome: Ongoing, Progressing

## 2024-04-05 NOTE — ASSESSMENT & PLAN NOTE
04/02/2024   Patient is postop day 4 status post VATS procedure with drainage of empyema and decortication.  Continue broad-spectrum antibiotics with linezolid and Zosyn.  Patient's white count is trending downward with white count of 20.  Patient is afebrile.  Chest tube output trending lower.  Continue pulmonary toileting continue incentive spirometer.  Patient is up and ambulating.  Increase as tolerated.    04/03/2024   The patient is postop day 5 status post VATS procedure with drainage of empyema and decortication.  Patient is on broad-spectrum antibiotics linezolid and Zosyn.  Is trending down.  Patient is afebrile.  Continue pulmonary toileting.  Chest x-ray shows continued patchy infiltrate left middle and lower lobes.  Continue chest tube to drainage.    04/04/2024   The patient is postop day 6 status post VATS procedure with drainage of empyema and decortication.  Patient is on broad-spectrum antibiotics.  Continue linezolid and Zosyn.  Patient is afebrile.  Continue pulmonary toileting continue incentive spirometer..  Chest tube output has been minimal.  Will repeat chest x-ray to access extent of left lung consolidation.    04/05/2024   The patient is postop day 7 status post VATS procedure with drainage of empyema and decortication patient continues on antibiotics.  Patient is afebrile.  White count is slightly elevated at toileting.  Continue incentive spirometer.  Continue pulmonary toileting.  Chest tube output has been minimal.  .  Chest x-ray shows dense left lower lobe infiltrate which is unchanged. Continue chest tube as per patient's primary surgeon

## 2024-04-05 NOTE — SUBJECTIVE & OBJECTIVE
"Interval History: Pt was seen and examined. Labs and meds reviewed. Discussed with other providers. Chart was reviewed. Pt is a 29 y/o transgender female who initially presented on 3/26/24 with 3 week h/o of cough and SOB. Pt was admitted for left sided pneumonia and was treated for development of empyema with thoracotomy and decortication on 3/29/24 and placement of a chest tube. Pt developed DAVIAN likely due to supratherapeutic level of vancomycin on 3/30/24. Renal function since then has remained stable and has begun to improve. Pt today is feeling "OK", no new or acute c/o's, no discomfort, no worsened SOB, no leg swelling.    Review of patient's allergies indicates:  No Known Allergies  Current Facility-Administered Medications   Medication Frequency    0.9%  NaCl infusion (for blood administration) Q24H PRN    0.9%  NaCl infusion PRN    0.9%  NaCl infusion Continuous    acetaminophen tablet 650 mg Q6H PRN    albuterol nebulizer solution 2.5 mg Q6H PRN    benzonatate capsule 100 mg TID PRN    bisacodyL suppository 10 mg Daily PRN    enoxaparin injection 40 mg Q24H (prophylaxis, 1700)    estradioL tablet 8 mg Daily    famotidine tablet 20 mg Daily    glucagon (human recombinant) injection 1 mg PRN    glucose chewable tablet 16 g PRN    glucose chewable tablet 24 g PRN    HYDROmorphone injection 1 mg QID PRN    ipratropium 0.02 % nebulizer solution 0.5 mg QID PRN    linezolid tablet 600 mg Q12H    medroxyPROGESTERone tablet 10 mg Daily    melatonin tablet 6 mg Nightly PRN    methocarbamoL tablet 500 mg QID    metoclopramide injection 5 mg Q6H PRN    metoprolol tartrate (LOPRESSOR) split tablet 12.5 mg BID    naloxone 0.4 mg/mL injection 0.02 mg PRN    ondansetron disintegrating tablet 8 mg Q8H PRN    ondansetron injection 4 mg Q6H PRN    oxyCODONE immediate release tablet 10 mg Q4H PRN    oxyCODONE immediate release tablet 5 mg Q4H PRN    piperacillin-tazobactam (ZOSYN) 4.5 g in dextrose 5 % in water (D5W) 100 mL " IVPB (MB+) Q8H    polyethylene glycol packet 17 g Daily    sodium chloride 0.9% flush 10 mL PRN    sodium chloride 0.9% flush 10 mL Q12H PRN    sodium chloride 0.9% flush 10 mL PRN    sodium chloride 0.9% flush 10 mL PRN    sodium chloride 0.9% flush 2 mL Q6H    sodium chloride oral tablet 1,000 mg TID    spironolactone tablet 25 mg BID       Objective:     Vital Signs (Most Recent):  Temp: 97.8 °F (36.6 °C) (04/05/24 1543)  Pulse: 74 (04/05/24 1543)  Resp: 16 (04/05/24 1731)  BP: 119/70 (04/05/24 1543)  SpO2: 98 % (04/05/24 1543) Vital Signs (24h Range):  Temp:  [97.8 °F (36.6 °C)-98.3 °F (36.8 °C)] 97.8 °F (36.6 °C)  Pulse:  [] 74  Resp:  [16-19] 16  SpO2:  [93 %-98 %] 98 %  BP: (114-144)/(64-72) 119/70     Weight: 55.1 kg (121 lb 7.6 oz) (04/03/24 2344)  Body mass index is 17.94 kg/m².  Body surface area is 1.64 meters squared.    I/O last 3 completed shifts:  In: 482 [P.O.:480; I.V.:2]  Out: 3 [Urine:3]     Physical Exam  Vitals and nursing note reviewed.   Constitutional:       Appearance: Normal appearance.   Cardiovascular:      Rate and Rhythm: Normal rate and regular rhythm.      Pulses: Normal pulses.      Heart sounds: Normal heart sounds.   Pulmonary:      Effort: Pulmonary effort is normal.      Breath sounds: Normal breath sounds.   Abdominal:      Palpations: Abdomen is soft.      Tenderness: There is no abdominal tenderness.   Musculoskeletal:      Right lower leg: No edema.      Left lower leg: No edema.   Neurological:      Mental Status: She is alert and oriented to person, place, and time.   Psychiatric:         Behavior: Behavior normal.          Significant Labs: reviewed  BMP  Lab Results   Component Value Date     04/05/2024    K 4.1 04/05/2024     04/05/2024    CO2 18 (L) 04/05/2024    BUN 18 04/05/2024    CREATININE 1.9 (H) 04/05/2024    CALCIUM 7.7 (L) 04/05/2024    ANIONGAP 8 04/05/2024    EGFRNORACEVR 49 (A) 04/05/2024     Lab Results   Component Value Date    WBC 13.00  (H) 04/05/2024    HGB 9.8 (L) 04/05/2024    HCT 30.8 (L) 04/05/2024    MCV 88 04/05/2024     04/05/2024           Significant Imaging: reviewed CXR and cT scan of the chest

## 2024-04-05 NOTE — ASSESSMENT & PLAN NOTE
Nutrition consulted. Most recent weight and BMI monitored-     Measurements:  Wt Readings from Last 1 Encounters:   04/03/24 55.1 kg (121 lb 7.6 oz)   Body mass index is 17.94 kg/m².    Patient has been screened and assessed by RD.    Malnutrition Type:  Context: acute illness or injury, chronic illness  Level: moderate    Malnutrition Characteristic Summary:  Energy Intake (Malnutrition): less than or equal to 75% for greater than or equal to 1 month  Subcutaneous Fat (Malnutrition): moderate depletion  Muscle Mass (Malnutrition): moderate depletion    Interventions/Recommendations (treatment strategy):  1. Recommend pt continues on Regular diet  2. Recommend pt continue Boost plus TID as medically appropriate 3. Recommend Rob BID for wound healing 4. Encourage PO intake of meals, snacks and supplements 5. Weigh twice weekly

## 2024-04-05 NOTE — PLAN OF CARE
TX COMPLETED: facilitated transfers with mod I, ambulated 450 ft x 2 SPV with no AD. Encouraged pt to participate in ambulation with ns staff as well. Cont POC

## 2024-04-05 NOTE — ASSESSMENT & PLAN NOTE
Patient's anemia is currently controlled. Has not received any PRBCs to date.   Current CBC reviewed-   Lab Results   Component Value Date    HGB 9.8 (L) 04/05/2024    HCT 30.8 (L) 04/05/2024     Monitor serial CBC and transfuse if patient becomes hemodynamically unstable, symptomatic or H/H drops below 7/21.  Iron def anemia   verbal cues/nonverbal cues (demo/gestures)

## 2024-04-05 NOTE — PT/OT/SLP PROGRESS
"Physical Therapy  Treatment    Mayela Molina   MRN: [id number]   Admitting Diagnosis: Pneumonia of left lung due to infectious organism    PT Received On: 03/30/24  PT Start Time: 1440     PT Stop Time: 1505    PT Total Time (min): 25 min       Billable Minutes:  Gait Training 25    Treatment Type: Treatment  PT/PTA: PT     Number of PTA visits since last PT visit: 0       General Precautions: Standard, fall  Orthopedic Precautions: N/A  Braces: N/A  Respiratory Status: Room air    Spiritual, Cultural Beliefs, Amish Practices, Values that Affect Care: no    Subjective:  Communicated with nursing (Meg) and performed chart review via epic system prior to session.  Pt agreeable to PT services "I feel better" reporting improvement in overall breathing, painful cough persists    Pain/Comfort  Pain Rating 1: 5/10  Pain Addressed 1: Pre-medicate for activity, Reposition, Distraction  Pain Rating Post-Intervention 1: 5/10    Objective:   Patient found with: peripheral IV, chest tube    Functional Mobility:  Bed Mobility:    Presented sitting EOB upon PT arrival    Transfers:   Sit<>stand mod I    Gait:    Facilitated gait activity 450 ft x 2 SPV with no AD, improved pacing and stride length. Intermittent short standing rest breaks during gait and occasional LOB but no s/s of distress      Balance:   Dynamic Sit: FAIR+: Maintains balance through MINIMAL excursions of active trunk motion  Dynamic stand: FAIR+: Needs CLOSE SUPERVISION during gait and is able to right self with minor LOB       Treatment and Education:  Educated pt on benefits of consistent participation in PT services to meet functional goals. Educated pt on importance of sitting EOB/OOB to promote endurance and overall activity tolerance. Encouraged increased ambulation with nsg staff to increased frequency of gait during the day. Educated pt on call don't fall policy and use of call button to alert nursing staff of needs. Pt expressed " understanding.      AM-PAC 6 CLICK MOBILITY  How much help from another person does this patient currently need?   1 = Unable, Total/Dependent Assistance  2 = A lot, Maximum/Moderate Assistance  3 = A little, Minimum/Contact Guard/Supervision  4 = None, Modified [place]/Independent    Turning over in bed (including adjusting bedclothes, sheets and blankets)?: 3  Sitting down on and standing up from a chair with arms (e.g., wheelchair, bedside commode, etc.): 3  Moving from lying on back to sitting on the side of the bed?: 3  Moving to and from a bed to a chair (including a wheelchair)?: 3  Need to walk in hospital room?: 3  Climbing 3-5 steps with a railing?: 1  Basic Mobility Total Score: 16    AM-PAC Raw Score CMS G-Code Modifier Level of Impairment Assistance   6 % Total / Unable   7 - 9 CM 80 - 100% Maximal Assist   10 - 14 CL 60 - 80% Moderate Assist   15 - 19 CK 40 - 60% Moderate Assist   20 - 22 CJ 20 - 40% Minimal Assist   23 CI 1-20% SBA / CGA   24 CH 0% Independent/ Mod I     Patient left  standing in room with family (at her request)  with all lines intact, call button in reach, nursing notified, and family present.    Assessment:  Mayela Molina is a 28 y.o. adult with a medical diagnosis of Pneumonia of left lung due to infectious organism and presents with deficits in overall mobility but demonstrating improvement in strength and motivation. Pt will benefit from continued PT services in order to progress toward baseline.    Rehab identified problem list/impairments: weakness, impaired endurance, impaired balance, decreased safety awareness, pain    Rehab potential is good.    Activity tolerance: Good    Discharge recommendations: Low Intensity Therapy      Barriers to discharge:      Equipment recommendations: none     GOALS:   Multidisciplinary Problems       Physical Therapy Goals          Problem: Physical Therapy    Goal Priority Disciplines Outcome Goal Variances Interventions    Physical Therapy Goal     PT, PT/OT Ongoing, Progressing     Description: Pt will perform bed mobility independently in order to participate in EOB activity.  Pt will perform transfers independently in order to participate in OOB activity.  Pt will ambulate 450 ft I with no AD in order to participate in daily tasks.                        PLAN:    Patient to be seen 3 x/week to address the above listed problems via gait training, therapeutic activities, therapeutic exercises, neuromuscular re-education  Plan of Care expires: 04/13/24  Plan of Care reviewed with: patient, family         04/05/2024

## 2024-04-05 NOTE — ASSESSMENT & PLAN NOTE
Resume home medications to include Estrace & Provera; hold spironolactone due to hyponatremia    Resume spironolactone at lower dose

## 2024-04-06 PROBLEM — I95.9 HYPOTENSION: Status: RESOLVED | Noted: 2024-03-26 | Resolved: 2024-04-06

## 2024-04-06 LAB
ALBUMIN SERPL BCP-MCNC: 1.9 G/DL (ref 3.5–5.2)
ALP SERPL-CCNC: 61 U/L (ref 55–135)
ALT SERPL W/O P-5'-P-CCNC: 124 U/L (ref 10–44)
ANION GAP SERPL CALC-SCNC: 11 MMOL/L (ref 8–16)
AST SERPL-CCNC: 120 U/L (ref 10–40)
BILIRUB SERPL-MCNC: 0.5 MG/DL (ref 0.1–1)
BUN SERPL-MCNC: 14 MG/DL (ref 6–20)
CALCIUM SERPL-MCNC: 7.6 MG/DL (ref 8.7–10.5)
CHLORIDE SERPL-SCNC: 108 MMOL/L (ref 95–110)
CO2 SERPL-SCNC: 18 MMOL/L (ref 23–29)
CREAT SERPL-MCNC: 1.7 MG/DL (ref 0.5–1.4)
ERYTHROCYTE [DISTWIDTH] IN BLOOD BY AUTOMATED COUNT: 15.5 % (ref 11.5–14.5)
EST. GFR  (NO RACE VARIABLE): 56 ML/MIN/1.73 M^2
GLUCOSE SERPL-MCNC: 86 MG/DL (ref 70–110)
HCT VFR BLD AUTO: 30.9 % (ref 40–54)
HGB BLD-MCNC: 10.1 G/DL (ref 14–18)
MCH RBC QN AUTO: 28.5 PG (ref 27–31)
MCHC RBC AUTO-ENTMCNC: 32.7 G/DL (ref 32–36)
MCV RBC AUTO: 87 FL (ref 82–98)
PLATELET # BLD AUTO: 257 K/UL (ref 150–450)
PMV BLD AUTO: 8.2 FL (ref 9.2–12.9)
POTASSIUM SERPL-SCNC: 3.6 MMOL/L (ref 3.5–5.1)
PROT SERPL-MCNC: 5.9 G/DL (ref 6–8.4)
RBC # BLD AUTO: 3.54 M/UL (ref 4.6–6.2)
SODIUM SERPL-SCNC: 137 MMOL/L (ref 136–145)
WBC # BLD AUTO: 11.45 K/UL (ref 3.9–12.7)

## 2024-04-06 PROCEDURE — 94760 N-INVAS EAR/PLS OXIMETRY 1: CPT

## 2024-04-06 PROCEDURE — 99900035 HC TECH TIME PER 15 MIN (STAT)

## 2024-04-06 PROCEDURE — 84134 ASSAY OF PREALBUMIN: CPT | Performed by: FAMILY MEDICINE

## 2024-04-06 PROCEDURE — 25000003 PHARM REV CODE 250: Performed by: NURSE PRACTITIONER

## 2024-04-06 PROCEDURE — 99233 SBSQ HOSP IP/OBS HIGH 50: CPT | Mod: ,,, | Performed by: INTERNAL MEDICINE

## 2024-04-06 PROCEDURE — 63600175 PHARM REV CODE 636 W HCPCS: Performed by: INTERNAL MEDICINE

## 2024-04-06 PROCEDURE — 80053 COMPREHEN METABOLIC PANEL: CPT | Performed by: FAMILY MEDICINE

## 2024-04-06 PROCEDURE — 25000003 PHARM REV CODE 250: Performed by: FAMILY MEDICINE

## 2024-04-06 PROCEDURE — 25000003 PHARM REV CODE 250: Performed by: THORACIC SURGERY (CARDIOTHORACIC VASCULAR SURGERY)

## 2024-04-06 PROCEDURE — 85027 COMPLETE CBC AUTOMATED: CPT | Performed by: FAMILY MEDICINE

## 2024-04-06 PROCEDURE — 25000003 PHARM REV CODE 250: Performed by: ANESTHESIOLOGY

## 2024-04-06 PROCEDURE — 21400001 HC TELEMETRY ROOM

## 2024-04-06 PROCEDURE — 25000003 PHARM REV CODE 250: Performed by: INTERNAL MEDICINE

## 2024-04-06 PROCEDURE — 36415 COLL VENOUS BLD VENIPUNCTURE: CPT | Performed by: FAMILY MEDICINE

## 2024-04-06 PROCEDURE — 63600175 PHARM REV CODE 636 W HCPCS: Performed by: THORACIC SURGERY (CARDIOTHORACIC VASCULAR SURGERY)

## 2024-04-06 PROCEDURE — A4216 STERILE WATER/SALINE, 10 ML: HCPCS | Performed by: ANESTHESIOLOGY

## 2024-04-06 RX ADMIN — METHOCARBAMOL 500 MG: 500 TABLET ORAL at 08:04

## 2024-04-06 RX ADMIN — SODIUM CHLORIDE 1000 MG: 1 TABLET ORAL at 03:04

## 2024-04-06 RX ADMIN — SODIUM CHLORIDE: 9 INJECTION, SOLUTION INTRAVENOUS at 06:04

## 2024-04-06 RX ADMIN — HYDROMORPHONE HYDROCHLORIDE 1 MG: 1 INJECTION, SOLUTION INTRAMUSCULAR; INTRAVENOUS; SUBCUTANEOUS at 07:04

## 2024-04-06 RX ADMIN — PIPERACILLIN SODIUM AND TAZOBACTAM SODIUM 4.5 G: 4; .5 INJECTION, POWDER, FOR SOLUTION INTRAVENOUS at 09:04

## 2024-04-06 RX ADMIN — OXYCODONE HYDROCHLORIDE 10 MG: 5 TABLET ORAL at 08:04

## 2024-04-06 RX ADMIN — MEDROXYPROGESTERONE ACETATE 10 MG: 5 TABLET ORAL at 09:04

## 2024-04-06 RX ADMIN — METHOCARBAMOL 500 MG: 500 TABLET ORAL at 06:04

## 2024-04-06 RX ADMIN — PIPERACILLIN SODIUM AND TAZOBACTAM SODIUM 4.5 G: 4; .5 INJECTION, POWDER, FOR SOLUTION INTRAVENOUS at 02:04

## 2024-04-06 RX ADMIN — SPIRONOLACTONE 25 MG: 25 TABLET, FILM COATED ORAL at 08:04

## 2024-04-06 RX ADMIN — Medication 2 ML: at 06:04

## 2024-04-06 RX ADMIN — PIPERACILLIN SODIUM AND TAZOBACTAM SODIUM 4.5 G: 4; .5 INJECTION, POWDER, FOR SOLUTION INTRAVENOUS at 06:04

## 2024-04-06 RX ADMIN — METHOCARBAMOL 500 MG: 500 TABLET ORAL at 12:04

## 2024-04-06 RX ADMIN — ESTRADIOL 8 MG: 1 TABLET ORAL at 08:04

## 2024-04-06 RX ADMIN — LINEZOLID 600 MG: 600 TABLET, FILM COATED ORAL at 08:04

## 2024-04-06 RX ADMIN — Medication 2 ML: at 12:04

## 2024-04-06 RX ADMIN — HYDROMORPHONE HYDROCHLORIDE 1 MG: 1 INJECTION, SOLUTION INTRAMUSCULAR; INTRAVENOUS; SUBCUTANEOUS at 01:04

## 2024-04-06 RX ADMIN — ENOXAPARIN SODIUM 40 MG: 40 INJECTION SUBCUTANEOUS at 06:04

## 2024-04-06 RX ADMIN — SODIUM CHLORIDE 1000 MG: 1 TABLET ORAL at 08:04

## 2024-04-06 RX ADMIN — METOPROLOL TARTRATE 12.5 MG: 25 TABLET, FILM COATED ORAL at 08:04

## 2024-04-06 RX ADMIN — FAMOTIDINE 20 MG: 20 TABLET ORAL at 08:04

## 2024-04-06 RX ADMIN — OXYCODONE HYDROCHLORIDE 5 MG: 5 TABLET ORAL at 03:04

## 2024-04-06 NOTE — ASSESSMENT & PLAN NOTE
Patient has hyponatremia which is uncontrolled,We will aim to correct the sodium by 4-6mEq in 24 hours. We will monitor sodium Every 6 hours x4. The hyponatremia is due to Dehydration/hypovolemia. We will treat the hyponatremia with IV fluids as follows:  Normal saline at 100 mL/hr. The patient's sodium results have been reviewed and are listed below.  Recent Labs   Lab 04/06/24  0529        Improving   -hold spironolactone    - TSH normal, cortisol appropriate, free water restrict  -status post 1 L normal saline IV fluid bolus given in the emergency department    Resolved   Resume spironolactone  Monitor k

## 2024-04-06 NOTE — SUBJECTIVE & OBJECTIVE
Interval History: See hospital course for today      Review of Systems   Constitutional:  Positive for activity change and appetite change.   Cardiovascular:  Positive for chest pain.   Musculoskeletal:  Positive for myalgias.   Skin:  Positive for wound.   Psychiatric/Behavioral:  Positive for dysphoric mood.      Objective:     Vital Signs (Most Recent):  Temp: 98.5 °F (36.9 °C) (04/06/24 1559)  Pulse: 84 (04/06/24 1559)  Resp: 18 (04/06/24 1559)  BP: 121/72 (04/06/24 1559)  SpO2: 96 % (04/06/24 1559) Vital Signs (24h Range):  Temp:  [98.1 °F (36.7 °C)-98.8 °F (37.1 °C)] 98.5 °F (36.9 °C)  Pulse:  [66-97] 84  Resp:  [16-18] 18  SpO2:  [94 %-96 %] 96 %  BP: (116-123)/(61-74) 121/72     Weight: 61.5 kg (135 lb 9.3 oz)  Body mass index is 20.02 kg/m².    Intake/Output Summary (Last 24 hours) at 4/6/2024 1645  Last data filed at 4/6/2024 0746  Gross per 24 hour   Intake 6258.63 ml   Output 0 ml   Net 6258.63 ml         Physical Exam  Vitals and nursing note reviewed.   Constitutional:       General: She is sleeping. She is not in acute distress.     Appearance: She is underweight. She is ill-appearing. She is not toxic-appearing.   HENT:      Head: Normocephalic and atraumatic.   Cardiovascular:      Rate and Rhythm: Normal rate.   Pulmonary:      Effort: Pulmonary effort is normal. No respiratory distress.   Abdominal:      Palpations: Abdomen is soft.   Musculoskeletal:      Right lower leg: No edema.      Left lower leg: No edema.   Skin:     General: Skin is warm.      Coloration: Skin is pale.   Neurological:      Motor: Weakness present.   Psychiatric:         Mood and Affect: Mood is depressed.             Significant Labs: All pertinent labs within the past 24 hours have been reviewed.  CBC:   Recent Labs   Lab 04/05/24  0418 04/06/24  0529   WBC 13.00* 11.45   HGB 9.8* 10.1*   HCT 30.8* 30.9*    257     CMP:   Recent Labs   Lab 04/05/24  0418 04/06/24  0529    137   K 4.1 3.6    108   CO2  18* 18*   GLU 87 86   BUN 18 14   CREATININE 1.9* 1.7*   CALCIUM 7.7* 7.6*   PROT  --  5.9*   ALBUMIN 1.9* 1.9*   BILITOT  --  0.5   ALKPHOS  --  61   AST  --  120*   ALT  --  124*   ANIONGAP 8 11       Significant Imaging: I have reviewed all pertinent imaging results/findings within the past 24 hours.  CT: I have reviewed all pertinent results/findings within the past 24 hours and my personal findings are:  complete plugging of left mainstem bronchus

## 2024-04-06 NOTE — ASSESSMENT & PLAN NOTE
27 y/o transgender female who presented with pneumonia complicated by empyema requiring a chest tube who further developed DAVIAN:         DAVIAN (acute kidney injury)  DAVIAN. 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  Has supratherapeutic level of vancomycin on 3/30/24 and DAVIAN started same day  FENa >1%  No other causes suspected  CT scans done were without contrast  Baseline s Cr is normal  DAVIAN is stable, s Cr lower, stable, DAVIAN slowly resolving  K normal  Metabolic acidosis stable  O2 sat good  No indications for acute dialysis     Pneumonia of left lung due to infectious organism  Lung infection complicated by necrotizing pneumonia and empyema  Hydropneumothorax  S/p VATS procedure (POD#7) with drainage of empyema and decortication  On abx  Will defer to CT surgery     Plans and recommendations:  As discussed above  Total time spent 40 minutes including time needed to review the records, the   patient evaluation, documentation, face-to-face discussion with the patient,   more than 50% of the time was spent on coordination of care and counseling.    Level V visit.     Thank you for your consult.

## 2024-04-06 NOTE — ASSESSMENT & PLAN NOTE
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 Baseline creatinine  normal  - Labs reviewed- Renal function/electrolytes with Estimated Creatinine Clearance (based on SCr of 1.7 mg/dL (H))  Female: 48.7 mL/min (A)  Male: 57.3 mL/min (A)  Hover for info according to latest data. Monitor urine output and serial BMP and adjust therapy as needed. Avoid nephrotoxins and renally dose meds for GFR listed above.  Nephrology following  Spironolactone resumed at lower dose

## 2024-04-06 NOTE — ASSESSMENT & PLAN NOTE
"-white blood cell count 30.46, lactic acid level 1.5, D-dimer 1.37, BNP 14, troponin negative, influenza a/B negative, COVID-19 negative  -blood cultures pending  -CTA of chest with "no pulmonary embolism, no dissection; dense consolidation in the left lower lobe extending to the left upper lobe with tree in bud opacities consistent with severe pneumonia; questionable areas of fluid density in the left lung base with foci of gas may relate to component of fluid density or possible liquefaction or possible abscess formation."  -continue IV Zosyn, vanco and IV azithromycin initiated in the emergency department  -check MRSA culture-negative, sputum culture-pending, HIV  -give IV fluids, O2 supplementation, p.r.n. albuterol inhaler  - repeat CT scan of chest today looks more like an empyema  -CVT consulted and patient underwent VATS with drainage    Chest tube to water seal per primary surgeon   Continue antibiotic(s)   Encourage incentive spirometer and acapella and breathing treatments  Ambulate   Repeat ct chest with completed plugging of left mainstem bronchus  Added chest physiotherapy with G5 machine for physical percussion    "

## 2024-04-06 NOTE — PROGRESS NOTES
[facility] Medicine  Progress Note    Patient Name: Mayela Molina  MRN: [id number]  Patient Class: IP- Inpatient   Admission Date: 3/25/2024  Length of Stay: 11 days  Attending Physician: Perla Gutierres MD  Primary Care Provider: Katie, Primary Doctor        Subjective:     Principal Problem:Pneumonia of left lung due to infectious organism        HPI:  28-year-old patient (male to female transgender) with no chronic medical conditions who presented to the emergency department with complaint of cough over the last 3 weeks, moderate severity, persistent.  Patient does have associated yellow sputum production, shortness for breath, and pleuritic chest pain.  No associated nausea, vomiting, diarrhea.  No complaints of fevers or chills although T-max in the emergency department recorded as 99.8.  Patient does not smoke cigarettes or vape.  No illicit drug use.  Abnormal labs in the emergency department include white blood cell count 30.46, hemoglobin 12.7, platelets 577, D-dimer 1.37, sodium 122, chloride 90, bicarb 21, glucose 121, albumin 2.3.  Last labs are from 2018 with no interim labs to compare with.  Patient does take spironolactone, Estrace, and Provera.  CT scan of chest with left-sided pneumonia with possible abscess formation.    Overview/Hospital Course:   Patient was admitted acute respiratory failure with left-sided pneumonia and possible abscess formation.  She initially was on nasal cannula and on morning after admission became acutely short of breath with a PO2 of 87% and tachycardic.  She was given a dose of adenosine which did not improve things however gradually her heart rate decreased.  Patient was admitted to the intensive care unit for respiratory failure tachycardia.  She was initially on high-flow nasal cannula and subsequently was able to wean to room air.  Cultures remained pending.  She was initially started on Rocephin and azithromycin and changed to Zosyn  who azithromycin vein.  Legionella antigen pending.  Continue with aggressive pulmonary care  With worsening CT scan, continued elevated WBC, continued elevated heart rate.  Patient underwent repeat CT scan of the chest which showed worsening of empyema.  She was seen and evaluated by Cardiothoracic surgery and underwent left VATS with decortication on 3/19.    Hyponatremia patient was admitted with hyponatremia she had fluid restrictions in place cortisol was normal, TSH normal, patient was on spironolactone and this was held.  She was being maintained on fluid restricted diet.     Encourage patient to deep breathe and walk in halls.  4/1 undergoing breathing treatment. Mom at bedside. Questions and concerns addressed. Encourage ambulation  4/2 questions and concerns addressed. Chest tube drainage 50cc yesterday. Ambulated with physical/occupational therapy. Continue intravenous fluids and intravenous antibiotic(s).   4/3 leukocytosis trending down. chest tube drainage 10cc/24 yesterday. Repeat chest x-ray with no adverse interval change. Patient wishes to ambulate more. Continue current care.  4/4 minimal chest tube output last 2 days. Leukocytosis resolved. Acute kidney injury improving. Ambulating with physical/occupational therapy. Repeat chest x-ray reviewed. Appetite improving  4/5 primary CTS recommending repeat ct chest noncontrast and prealbumin. Chest tube to water seal per primary surgeon. Discussed restarting spironolactone at lower dose with nephrology. Patient ambulating with physical/occupational therapy.   4/6 repeat ct chest with complete plugging of left mainstem bronchus. Chest physiotherapy with G5 added. Spironolactone resumed at reduced dose. Complains of chest pain at site of chest tube improved with pain analgesics.    Interval History: See hospital course for today      Review of Systems   Constitutional:  Positive for activity change and appetite change.   Cardiovascular:  Positive for  chest pain.   Musculoskeletal:  Positive for myalgias.   Skin:  Positive for wound.   Psychiatric/Behavioral:  Positive for dysphoric mood.      Objective:     Vital Signs (Most Recent):  Temp: 98.5 °F (36.9 °C) (04/06/24 1559)  Pulse: 84 (04/06/24 1559)  Resp: 18 (04/06/24 1559)  BP: 121/72 (04/06/24 1559)  SpO2: 96 % (04/06/24 1559) Vital Signs (24h Range):  Temp:  [98.1 °F (36.7 °C)-98.8 °F (37.1 °C)] 98.5 °F (36.9 °C)  Pulse:  [66-97] 84  Resp:  [16-18] 18  SpO2:  [94 %-96 %] 96 %  BP: (116-123)/(61-74) 121/72     Weight: 61.5 kg (135 lb 9.3 oz)  Body mass index is 20.02 kg/m².    Intake/Output Summary (Last 24 hours) at 4/6/2024 1645  Last data filed at 4/6/2024 0746  Gross per 24 hour   Intake 6258.63 ml   Output 0 ml   Net 6258.63 ml         Physical Exam  Vitals and nursing note reviewed.   Constitutional:       General: She is sleeping. She is not in acute distress.     Appearance: She is underweight. She is ill-appearing. She is not toxic-appearing.   HENT:      Head: Normocephalic and atraumatic.   Cardiovascular:      Rate and Rhythm: Normal rate.   Pulmonary:      Effort: Pulmonary effort is normal. No respiratory distress.   Abdominal:      Palpations: Abdomen is soft.   Musculoskeletal:      Right lower leg: No edema.      Left lower leg: No edema.   Skin:     General: Skin is warm.      Coloration: Skin is pale.   Neurological:      Motor: Weakness present.   Psychiatric:         Mood and Affect: Mood is depressed.             Significant Labs: All pertinent labs within the past 24 hours have been reviewed.  CBC:   Recent Labs   Lab 04/05/24 0418 04/06/24 0529   WBC 13.00* 11.45   HGB 9.8* 10.1*   HCT 30.8* 30.9*    257     CMP:   Recent Labs   Lab 04/05/24 0418 04/06/24 0529    137   K 4.1 3.6    108   CO2 18* 18*   GLU 87 86   BUN 18 14   CREATININE 1.9* 1.7*   CALCIUM 7.7* 7.6*   PROT  --  5.9*   ALBUMIN 1.9* 1.9*   BILITOT  --  0.5   ALKPHOS  --  61   AST  --  120*   ALT   "--  124*   ANIONGAP 8 11       Significant Imaging: I have reviewed all pertinent imaging results/findings within the past 24 hours.  CT: I have reviewed all pertinent results/findings within the past 24 hours and my personal findings are:  complete plugging of left mainstem bronchus     Assessment/Plan:      * Pneumonia of left lung due to infectious organism  -white blood cell count 30.46, lactic acid level 1.5, D-dimer 1.37, BNP 14, troponin negative, influenza a/B negative, COVID-19 negative  -blood cultures pending  -CTA of chest with "no pulmonary embolism, no dissection; dense consolidation in the left lower lobe extending to the left upper lobe with tree in bud opacities consistent with severe pneumonia; questionable areas of fluid density in the left lung base with foci of gas may relate to component of fluid density or possible liquefaction or possible abscess formation."  -continue IV Zosyn, vanco and IV azithromycin initiated in the emergency department  -check MRSA culture-negative, sputum culture-pending, HIV  -give IV fluids, O2 supplementation, p.r.n. albuterol inhaler  - repeat CT scan of chest today looks more like an empyema  -CVT consulted and patient underwent VATS with drainage    Chest tube to water seal per primary surgeon   Continue antibiotic(s)   Encourage incentive spirometer and acapella and breathing treatments  Ambulate   Repeat ct chest with completed plugging of left mainstem bronchus  Added chest physiotherapy with G5 machine for physical percussion      DAVIAN (acute kidney injury)  Patient with acute kidney injury/acute renal failure likely due to acute tubular necrosis caused by vancomycin induced nephropathy  DAVIAN is currently improving. Baseline creatinine  normal  - Labs reviewed- Renal function/electrolytes with Estimated Creatinine Clearance (based on SCr of 1.7 mg/dL (H))  Female: 48.7 mL/min (A)  Male: 57.3 mL/min (A)  Hover for info according to latest data. Monitor urine " output and serial BMP and adjust therapy as needed. Avoid nephrotoxins and renally dose meds for GFR listed above.  Nephrology following  Spironolactone resumed at lower dose     Moderate protein-calorie malnutrition  Nutrition consulted. Most recent weight and BMI monitored-     Measurements:  Wt Readings from Last 1 Encounters:   04/03/24 55.1 kg (121 lb 7.6 oz)   Body mass index is 17.94 kg/m².    Patient has been screened and assessed by RD.    Malnutrition Type:  Context: acute illness or injury, chronic illness  Level: moderate    Malnutrition Characteristic Summary:  Energy Intake (Malnutrition): less than or equal to 75% for greater than or equal to 1 month  Subcutaneous Fat (Malnutrition): moderate depletion  Muscle Mass (Malnutrition): moderate depletion    Interventions/Recommendations (treatment strategy):  1. Recommend pt continues on Regular diet  2. Recommend pt continue Boost plus TID as medically appropriate 3. Recommend Rob BID for wound healing 4. Encourage PO intake of meals, snacks and supplements 5. Weigh twice weekly      Hydropneumothorax  Status post VATS with left lung chest tubes  -culture positive for MRSA  -on Zosyn and Zyvox  CVT and pulmonary following chest tubes    Pulmonary abscess  Growing strep  On linezolid and zosyn  Repeat ct chest with complete plugging  Chest physiotherapy added with percussion    Tachycardia, paroxysmal  Beta-blocker started heart rate markedly improved.  Borderline blood pressure and heart rate underwent 100 we will DC    Resolved on beta blocker     Pleuritic chest pain  Pleuritic chest pain related to left-sided pneumonia/empyema.  Cardiac enzymes negative.      Male-to-female transgender person  Resume home medications to include Estrace & Provera; hold spironolactone due to hyponatremia    Resume spironolactone at lower dose      Normocytic anemia  Patient's anemia is currently controlled. Has not received any PRBCs to date.   Current CBC reviewed-  "  Lab Results   Component Value Date    HGB 10.1 (L) 04/06/2024    HCT 30.9 (L) 04/06/2024     Monitor serial CBC and transfuse if patient becomes hemodynamically unstable, symptomatic or H/H drops below 7/21.  Iron def anemia    Underweight  Current BMI 15.27, albumin 2.3.    Add nutritional supplements  Nutrition consult  Appetite improving    Prealbumin pending    Hyponatremia  Patient has hyponatremia which is uncontrolled,We will aim to correct the sodium by 4-6mEq in 24 hours. We will monitor sodium Every 6 hours x4. The hyponatremia is due to Dehydration/hypovolemia. We will treat the hyponatremia with IV fluids as follows:  Normal saline at 100 mL/hr. The patient's sodium results have been reviewed and are listed below.  Recent Labs   Lab 04/06/24  0529        Improving   -hold spironolactone    - TSH normal, cortisol appropriate, free water restrict  -status post 1 L normal saline IV fluid bolus given in the emergency department    Resolved   Resume spironolactone  Monitor k        Sepsis  This patient does have evidence of infective focus  My overall impression is sepsis.  Source: Respiratory  Antibiotics given-   Antibiotics (72h ago, onward)      Start     Stop Route Frequency Ordered    03/30/24 0900  linezolid tablet 600 mg         -- Oral Every 12 hours 03/30/24 0731    03/28/24 1800  piperacillin-tazobactam (ZOSYN) 4.5 g in dextrose 5 % in water (D5W) 100 mL IVPB (MB+)         -- IV Every 8 hours (non-standard times) 03/28/24 1538        No results for input(s): "LACTATE", "POCLAC" in the last 72 hours.    Organ dysfunction indicated by  none    Fluid challenge Not needed - patient is not hypotensive      Post- resuscitation assessment No - Post resuscitation assessment not needed       Will Not start Pressors- Levophed for MAP of 65  Source control achieved by: broad spectrum antibiotics  Improving   Leukocytosis trending down  Chest tube drainage trending down    Resolved       VTE Risk " Mitigation (From admission, onward)           Ordered     Place sequential compression device  Until discontinued        Comments: Can be discontinued when not in the bed.    03/30/24 1113     enoxaparin injection 40 mg  Every 24 hours         03/28/24 0912     IP VTE HIGH RISK PATIENT  Once         03/26/24 0043     Reason for No Pharmacological VTE Prophylaxis  Once        Question:  Reasons:  Answer:  Physician Provided (leave comment)  Comment:  pending pulmonary consult    03/26/24 0043     IP VTE HIGH RISK PATIENT  Once         03/26/24 0043     Place sequential compression device  Until discontinued         03/26/24 0043                    Discharge Planning   RAVINDER:      Code Status: Full Code   Is the patient medically ready for discharge?:     Reason for patient still in hospital (select all that apply): Patient new problem, Patient trending condition, Laboratory test, Treatment, Imaging, Consult recommendations, and PT / OT recommendations  Discharge Plan A: Home   Discharge Delays: None known at this time              Perla Gutierres MD  Department of Hospital Medicine   [facility] ([facility])

## 2024-04-06 NOTE — PROGRESS NOTES
[facility] ([facility])  Nephrology  Progress Note    Patient Name: Mayela Molina  MRN: [id number]  Admission Date: 3/25/2024  Hospital Length of Stay: 11 days  Attending Provider: Perla Gutierres MD   Primary Care Physician: Katie, Primary Doctor  Principal Problem:Pneumonia of left lung due to infectious organism    Subjective:     HPI: Noted    Interval History: Pt was seen and examined. Labs and meds reviewed. Discussed with other providers. No new c/o's, no new events.    Review of patient's allergies indicates:  No Known Allergies  Current Facility-Administered Medications   Medication Frequency    0.9%  NaCl infusion (for blood administration) Q24H PRN    0.9%  NaCl infusion PRN    0.9%  NaCl infusion Continuous    acetaminophen tablet 650 mg Q6H PRN    albuterol nebulizer solution 2.5 mg Q6H PRN    benzonatate capsule 100 mg TID PRN    bisacodyL suppository 10 mg Daily PRN    enoxaparin injection 40 mg Q24H (prophylaxis, 1700)    estradioL tablet 8 mg Daily    famotidine tablet 20 mg Daily    glucagon (human recombinant) injection 1 mg PRN    glucose chewable tablet 16 g PRN    glucose chewable tablet 24 g PRN    HYDROmorphone injection 1 mg QID PRN    ipratropium 0.02 % nebulizer solution 0.5 mg QID PRN    linezolid tablet 600 mg Q12H    medroxyPROGESTERone tablet 10 mg Daily    melatonin tablet 6 mg Nightly PRN    methocarbamoL tablet 500 mg QID    metoclopramide injection 5 mg Q6H PRN    metoprolol tartrate (LOPRESSOR) split tablet 12.5 mg BID    naloxone 0.4 mg/mL injection 0.02 mg PRN    ondansetron disintegrating tablet 8 mg Q8H PRN    ondansetron injection 4 mg Q6H PRN    oxyCODONE immediate release tablet 10 mg Q4H PRN    oxyCODONE immediate release tablet 5 mg Q4H PRN    piperacillin-tazobactam (ZOSYN) 4.5 g in dextrose 5 % in water (D5W) 100 mL IVPB (MB+) Q8H    polyethylene glycol packet 17 g Daily    sodium chloride 0.9% flush 10 mL PRN    sodium chloride 0.9% flush 10 mL Q12H PRN     sodium chloride 0.9% flush 10 mL PRN    sodium chloride 0.9% flush 10 mL PRN    sodium chloride 0.9% flush 2 mL Q6H    sodium chloride oral tablet 1,000 mg TID    spironolactone tablet 25 mg BID       Objective:     Vital Signs (Most Recent):  Temp: 98.4 °F (36.9 °C) (04/06/24 1210)  Pulse: 95 (04/06/24 1210)  Resp: 18 (04/06/24 1210)  BP: 122/66 (04/06/24 1210)  SpO2: 95 % (04/06/24 1210) Vital Signs (24h Range):  Temp:  [97.8 °F (36.6 °C)-98.8 °F (37.1 °C)] 98.4 °F (36.9 °C)  Pulse:  [66-97] 95  Resp:  [16-19] 18  SpO2:  [94 %-98 %] 95 %  BP: (116-123)/(61-74) 122/66     Weight: 61.5 kg (135 lb 9.3 oz) (04/05/24 2350)  Body mass index is 20.02 kg/m².  Body surface area is 1.73 meters squared.    I/O last 3 completed shifts:  In: 6740.6 [P.O.:600; I.V.:5247.8; IV Piggyback:892.8]  Out: 0      Physical Exam  Vitals and nursing note reviewed.   Constitutional:       Appearance: Normal appearance.   Cardiovascular:      Rate and Rhythm: Normal rate and regular rhythm.      Pulses: Normal pulses.      Heart sounds: Normal heart sounds.   Pulmonary:      Effort: Pulmonary effort is normal.      Breath sounds: Normal breath sounds.   Musculoskeletal:      Right lower leg: No edema.      Left lower leg: No edema.   Neurological:      Mental Status: She is alert.          Significant Labs: reviewed  BMP  Lab Results   Component Value Date     04/06/2024    K 3.6 04/06/2024     04/06/2024    CO2 18 (L) 04/06/2024    BUN 14 04/06/2024    CREATININE 1.7 (H) 04/06/2024    CALCIUM 7.6 (L) 04/06/2024    ANIONGAP 11 04/06/2024    EGFRNORACEVR 56 (A) 04/06/2024     Lab Results   Component Value Date    WBC 11.45 04/06/2024    HGB 10.1 (L) 04/06/2024    HCT 30.9 (L) 04/06/2024    MCV 87 04/06/2024     04/06/2024         Significant Imaging: reviewed  Assessment/Plan:     29 y/o transgender female who presented with pneumonia complicated by empyema requiring a chest tube who further developed DAVIAN:         DAVIAN  (acute kidney injury)  S Cr lower, improved further. DAVIAN resolving  DAVIAN, 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  Supratherapeutic vancomycin level  FENa > 1%, not prerenal azotemia  No other causes suspected  CT scans done were without contrast    Baseline s Cr is normal  K normal  Metabolic acidosis stable  O2 sat good  No indications for acute dialysis     Pneumonia of left lung due to infectious organism  Lung infection complicated by necrotizing pneumonia and empyema  Hydropneumothorax  S/p VATS procedure (POD#7) with drainage of empyema and decortication  On abx  Will defer to CT surgery     Plans and recommendations:  As discussed above  Total time spent 40 minutes including time needed to review the records, the   patient evaluation, documentation, face-to-face discussion with the patient,   more than 50% of the time was spent on coordination of care and counseling.    Level V visit.     Thank you for your consult.         Linn Edwards MD  Nephrology  [facility] ([facility])

## 2024-04-06 NOTE — ASSESSMENT & PLAN NOTE
Growing strep  On linezolid and zosyn  Repeat ct chest with complete plugging  Chest physiotherapy added with percussion

## 2024-04-06 NOTE — PLAN OF CARE
A253/A253 DELORISPark Molina is a 28 y.o.male admitted on 3/25/2024 for Pneumonia of left lung due to infectious organism   Code Status: Full Code MRN: [id number]   Review of patient's allergies indicates:  No Known Allergies  History reviewed. No pertinent past medical history.   PRN meds    0.9%  NaCl infusion (for blood administration), , Q24H PRN  sodium chloride 0.9%, , PRN  acetaminophen, 650 mg, Q6H PRN  albuterol sulfate, 2.5 mg, Q6H PRN  benzonatate, 100 mg, TID PRN  bisacodyL, 10 mg, Daily PRN  glucagon (human recombinant), 1 mg, PRN  glucose, 16 g, PRN  glucose, 24 g, PRN  HYDROmorphone, 1 mg, QID PRN  ipratropium, 0.5 mg, QID PRN  melatonin, 6 mg, Nightly PRN  metoclopramide, 5 mg, Q6H PRN  naloxone, 0.02 mg, PRN  ondansetron, 8 mg, Q8H PRN  ondansetron, 4 mg, Q6H PRN  oxyCODONE, 10 mg, Q4H PRN  oxyCODONE, 5 mg, Q4H PRN  sodium chloride 0.9%, 10 mL, PRN  sodium chloride 0.9%, 10 mL, Q12H PRN  sodium chloride 0.9%, 10 mL, PRN  sodium chloride 0.9%, 10 mL, PRN      No falls noted throughout duration of shift, patient voiced no concerns. Patient refused IV change, Patient's chest tube had no outout for today, but continues to sit at 550 ml on water seal, noted and charted. Cardiac monitoring continues, Chart check completed. Will continue plan of care.      Orientation                                       Ole Coma Scale Score: 15     Lead Monitored: Lead II Rhythm: normal sinus rhythm    Cardiac/Telemetry Box Number: [id number]  VTE Required Core Measure: (SCDs) Sequential compression device initiated/maintained Last Bowel Movement: 04/04/24  Diet Adult Regular (IDDSI Level 7) Standard Tray  Voiding Characteristics: ureteral catheter  Miki Score: 20  Fall Risk Score: 12  Accucheck []   Freq?      Lines/Drains/Airways       Drain  Duration                  Y Chest Tube 1 and 2 03/29/24 0903 1 Left Pleural 32 Fr. 2 Left Pleural 32 Fr. 8 days              Peripheral Intravenous Line  Duration                   Peripheral IV - Single Lumen 03/31/24 1104 20 G Left;Posterior Forearm 6 days

## 2024-04-06 NOTE — SUBJECTIVE & OBJECTIVE
Interval History: Pt was seen and examined. Labs and meds reviewed. Discussed with other providers. No new c/o's, no new events.    Review of patient's allergies indicates:  No Known Allergies  Current Facility-Administered Medications   Medication Frequency    0.9%  NaCl infusion (for blood administration) Q24H PRN    0.9%  NaCl infusion PRN    0.9%  NaCl infusion Continuous    acetaminophen tablet 650 mg Q6H PRN    albuterol nebulizer solution 2.5 mg Q6H PRN    benzonatate capsule 100 mg TID PRN    bisacodyL suppository 10 mg Daily PRN    enoxaparin injection 40 mg Q24H (prophylaxis, 1700)    estradioL tablet 8 mg Daily    famotidine tablet 20 mg Daily    glucagon (human recombinant) injection 1 mg PRN    glucose chewable tablet 16 g PRN    glucose chewable tablet 24 g PRN    HYDROmorphone injection 1 mg QID PRN    ipratropium 0.02 % nebulizer solution 0.5 mg QID PRN    linezolid tablet 600 mg Q12H    medroxyPROGESTERone tablet 10 mg Daily    melatonin tablet 6 mg Nightly PRN    methocarbamoL tablet 500 mg QID    metoclopramide injection 5 mg Q6H PRN    metoprolol tartrate (LOPRESSOR) split tablet 12.5 mg BID    naloxone 0.4 mg/mL injection 0.02 mg PRN    ondansetron disintegrating tablet 8 mg Q8H PRN    ondansetron injection 4 mg Q6H PRN    oxyCODONE immediate release tablet 10 mg Q4H PRN    oxyCODONE immediate release tablet 5 mg Q4H PRN    piperacillin-tazobactam (ZOSYN) 4.5 g in dextrose 5 % in water (D5W) 100 mL IVPB (MB+) Q8H    polyethylene glycol packet 17 g Daily    sodium chloride 0.9% flush 10 mL PRN    sodium chloride 0.9% flush 10 mL Q12H PRN    sodium chloride 0.9% flush 10 mL PRN    sodium chloride 0.9% flush 10 mL PRN    sodium chloride 0.9% flush 2 mL Q6H    sodium chloride oral tablet 1,000 mg TID    spironolactone tablet 25 mg BID       Objective:     Vital Signs (Most Recent):  Temp: 98.4 °F (36.9 °C) (04/06/24 1210)  Pulse: 95 (04/06/24 1210)  Resp: 18 (04/06/24 1210)  BP: 122/66 (04/06/24  1210)  SpO2: 95 % (04/06/24 1210) Vital Signs (24h Range):  Temp:  [97.8 °F (36.6 °C)-98.8 °F (37.1 °C)] 98.4 °F (36.9 °C)  Pulse:  [66-97] 95  Resp:  [16-19] 18  SpO2:  [94 %-98 %] 95 %  BP: (116-123)/(61-74) 122/66     Weight: 61.5 kg (135 lb 9.3 oz) (04/05/24 2350)  Body mass index is 20.02 kg/m².  Body surface area is 1.73 meters squared.    I/O last 3 completed shifts:  In: 6740.6 [P.O.:600; I.V.:5247.8; IV Piggyback:892.8]  Out: 0      Physical Exam  Vitals and nursing note reviewed.   Constitutional:       Appearance: Normal appearance.   Cardiovascular:      Rate and Rhythm: Normal rate and regular rhythm.      Pulses: Normal pulses.      Heart sounds: Normal heart sounds.   Pulmonary:      Effort: Pulmonary effort is normal.      Breath sounds: Normal breath sounds.   Musculoskeletal:      Right lower leg: No edema.      Left lower leg: No edema.   Neurological:      Mental Status: She is alert.          Significant Labs: reviewed  BMP  Lab Results   Component Value Date     04/06/2024    K 3.6 04/06/2024     04/06/2024    CO2 18 (L) 04/06/2024    BUN 14 04/06/2024    CREATININE 1.7 (H) 04/06/2024    CALCIUM 7.6 (L) 04/06/2024    ANIONGAP 11 04/06/2024    EGFRNORACEVR 56 (A) 04/06/2024     Lab Results   Component Value Date    WBC 11.45 04/06/2024    HGB 10.1 (L) 04/06/2024    HCT 30.9 (L) 04/06/2024    MCV 87 04/06/2024     04/06/2024         Significant Imaging: reviewed

## 2024-04-07 PROBLEM — E87.6 HYPOKALEMIA: Status: ACTIVE | Noted: 2024-04-07

## 2024-04-07 PROBLEM — T17.500A MUCUS PLUGGING OF BRONCHI: Status: ACTIVE | Noted: 2024-04-07

## 2024-04-07 PROBLEM — E87.1 HYPONATREMIA: Status: RESOLVED | Noted: 2024-03-26 | Resolved: 2024-04-07

## 2024-04-07 LAB
ALBUMIN SERPL BCP-MCNC: 1.7 G/DL (ref 3.5–5.2)
ALP SERPL-CCNC: 54 U/L (ref 55–135)
ALT SERPL W/O P-5'-P-CCNC: 115 U/L (ref 10–44)
ANION GAP SERPL CALC-SCNC: 10 MMOL/L (ref 8–16)
AST SERPL-CCNC: 109 U/L (ref 10–40)
BILIRUB SERPL-MCNC: 0.5 MG/DL (ref 0.1–1)
BUN SERPL-MCNC: 13 MG/DL (ref 6–20)
CALCIUM SERPL-MCNC: 7.4 MG/DL (ref 8.7–10.5)
CHLORIDE SERPL-SCNC: 109 MMOL/L (ref 95–110)
CO2 SERPL-SCNC: 18 MMOL/L (ref 23–29)
CREAT SERPL-MCNC: 1.6 MG/DL (ref 0.5–1.4)
ERYTHROCYTE [DISTWIDTH] IN BLOOD BY AUTOMATED COUNT: 15.4 % (ref 11.5–14.5)
EST. GFR  (NO RACE VARIABLE): >60 ML/MIN/1.73 M^2
GLUCOSE SERPL-MCNC: 87 MG/DL (ref 70–110)
HCT VFR BLD AUTO: 27.4 % (ref 40–54)
HGB BLD-MCNC: 8.9 G/DL (ref 14–18)
MAGNESIUM SERPL-MCNC: 1.4 MG/DL (ref 1.6–2.6)
MCH RBC QN AUTO: 28.4 PG (ref 27–31)
MCHC RBC AUTO-ENTMCNC: 32.5 G/DL (ref 32–36)
MCV RBC AUTO: 88 FL (ref 82–98)
PLATELET # BLD AUTO: 211 K/UL (ref 150–450)
PMV BLD AUTO: 8.4 FL (ref 9.2–12.9)
POTASSIUM SERPL-SCNC: 3.3 MMOL/L (ref 3.5–5.1)
PREALB SERPL-MCNC: 14 MG/DL (ref 20–43)
PROT SERPL-MCNC: 5.8 G/DL (ref 6–8.4)
RBC # BLD AUTO: 3.13 M/UL (ref 4.6–6.2)
SODIUM SERPL-SCNC: 137 MMOL/L (ref 136–145)
WBC # BLD AUTO: 13.12 K/UL (ref 3.9–12.7)

## 2024-04-07 PROCEDURE — 25000003 PHARM REV CODE 250: Performed by: FAMILY MEDICINE

## 2024-04-07 PROCEDURE — 83735 ASSAY OF MAGNESIUM: CPT | Performed by: FAMILY MEDICINE

## 2024-04-07 PROCEDURE — 99233 SBSQ HOSP IP/OBS HIGH 50: CPT | Mod: ,,, | Performed by: INTERNAL MEDICINE

## 2024-04-07 PROCEDURE — 36415 COLL VENOUS BLD VENIPUNCTURE: CPT | Performed by: FAMILY MEDICINE

## 2024-04-07 PROCEDURE — 92610 EVALUATE SWALLOWING FUNCTION: CPT

## 2024-04-07 PROCEDURE — A4216 STERILE WATER/SALINE, 10 ML: HCPCS | Performed by: ANESTHESIOLOGY

## 2024-04-07 PROCEDURE — 25000003 PHARM REV CODE 250: Performed by: NURSE PRACTITIONER

## 2024-04-07 PROCEDURE — 85027 COMPLETE CBC AUTOMATED: CPT | Performed by: FAMILY MEDICINE

## 2024-04-07 PROCEDURE — 63600175 PHARM REV CODE 636 W HCPCS: Performed by: INTERNAL MEDICINE

## 2024-04-07 PROCEDURE — 63600175 PHARM REV CODE 636 W HCPCS: Performed by: FAMILY MEDICINE

## 2024-04-07 PROCEDURE — 25000003 PHARM REV CODE 250: Performed by: THORACIC SURGERY (CARDIOTHORACIC VASCULAR SURGERY)

## 2024-04-07 PROCEDURE — 21400001 HC TELEMETRY ROOM

## 2024-04-07 PROCEDURE — 25000003 PHARM REV CODE 250: Performed by: ANESTHESIOLOGY

## 2024-04-07 PROCEDURE — 80053 COMPREHEN METABOLIC PANEL: CPT | Performed by: FAMILY MEDICINE

## 2024-04-07 PROCEDURE — 99900035 HC TECH TIME PER 15 MIN (STAT)

## 2024-04-07 PROCEDURE — 25000003 PHARM REV CODE 250: Performed by: INTERNAL MEDICINE

## 2024-04-07 PROCEDURE — 63600175 PHARM REV CODE 636 W HCPCS: Performed by: THORACIC SURGERY (CARDIOTHORACIC VASCULAR SURGERY)

## 2024-04-07 RX ORDER — POTASSIUM CHLORIDE 20 MEQ/1
40 TABLET, EXTENDED RELEASE ORAL DAILY
Status: COMPLETED | OUTPATIENT
Start: 2024-04-07 | End: 2024-04-09

## 2024-04-07 RX ORDER — SODIUM CHLORIDE FOR INHALATION 3 %
4 VIAL, NEBULIZER (ML) INHALATION
Status: DISCONTINUED | OUTPATIENT
Start: 2024-04-07 | End: 2024-04-10

## 2024-04-07 RX ORDER — MAGNESIUM SULFATE HEPTAHYDRATE 40 MG/ML
2 INJECTION, SOLUTION INTRAVENOUS ONCE
Status: COMPLETED | OUTPATIENT
Start: 2024-04-07 | End: 2024-04-07

## 2024-04-07 RX ORDER — POTASSIUM CHLORIDE 20 MEQ/1
40 TABLET, EXTENDED RELEASE ORAL DAILY
Status: DISCONTINUED | OUTPATIENT
Start: 2024-04-07 | End: 2024-04-07

## 2024-04-07 RX ORDER — BENZONATATE 100 MG/1
200 CAPSULE ORAL 3 TIMES DAILY
Status: DISCONTINUED | OUTPATIENT
Start: 2024-04-07 | End: 2024-04-10

## 2024-04-07 RX ORDER — POTASSIUM CHLORIDE 20 MEQ/1
40 TABLET, EXTENDED RELEASE ORAL 2 TIMES DAILY
Status: DISCONTINUED | OUTPATIENT
Start: 2024-04-07 | End: 2024-04-07

## 2024-04-07 RX ADMIN — METHOCARBAMOL 500 MG: 500 TABLET ORAL at 01:04

## 2024-04-07 RX ADMIN — LINEZOLID 600 MG: 600 TABLET, FILM COATED ORAL at 08:04

## 2024-04-07 RX ADMIN — MEDROXYPROGESTERONE ACETATE 10 MG: 5 TABLET ORAL at 09:04

## 2024-04-07 RX ADMIN — SODIUM CHLORIDE 1000 MG: 1 TABLET ORAL at 08:04

## 2024-04-07 RX ADMIN — SPIRONOLACTONE 25 MG: 25 TABLET, FILM COATED ORAL at 08:04

## 2024-04-07 RX ADMIN — OXYCODONE HYDROCHLORIDE 5 MG: 5 TABLET ORAL at 02:04

## 2024-04-07 RX ADMIN — OXYCODONE HYDROCHLORIDE 10 MG: 5 TABLET ORAL at 06:04

## 2024-04-07 RX ADMIN — Medication 2 ML: at 06:04

## 2024-04-07 RX ADMIN — Medication 2 ML: at 12:04

## 2024-04-07 RX ADMIN — ENOXAPARIN SODIUM 40 MG: 40 INJECTION SUBCUTANEOUS at 06:04

## 2024-04-07 RX ADMIN — LINEZOLID 600 MG: 600 TABLET, FILM COATED ORAL at 09:04

## 2024-04-07 RX ADMIN — METOPROLOL TARTRATE 12.5 MG: 25 TABLET, FILM COATED ORAL at 08:04

## 2024-04-07 RX ADMIN — POTASSIUM CHLORIDE 40 MEQ: 1500 TABLET, EXTENDED RELEASE ORAL at 01:04

## 2024-04-07 RX ADMIN — PIPERACILLIN SODIUM AND TAZOBACTAM SODIUM 4.5 G: 4; .5 INJECTION, POWDER, FOR SOLUTION INTRAVENOUS at 02:04

## 2024-04-07 RX ADMIN — SODIUM CHLORIDE: 9 INJECTION, SOLUTION INTRAVENOUS at 02:04

## 2024-04-07 RX ADMIN — OXYCODONE HYDROCHLORIDE 10 MG: 5 TABLET ORAL at 01:04

## 2024-04-07 RX ADMIN — OXYCODONE HYDROCHLORIDE 10 MG: 5 TABLET ORAL at 09:04

## 2024-04-07 RX ADMIN — SPIRONOLACTONE 25 MG: 25 TABLET, FILM COATED ORAL at 09:04

## 2024-04-07 RX ADMIN — SODIUM CHLORIDE: 9 INJECTION, SOLUTION INTRAVENOUS at 10:04

## 2024-04-07 RX ADMIN — OXYCODONE HYDROCHLORIDE 10 MG: 5 TABLET ORAL at 02:04

## 2024-04-07 RX ADMIN — METHOCARBAMOL 500 MG: 500 TABLET ORAL at 09:04

## 2024-04-07 RX ADMIN — METHOCARBAMOL 500 MG: 500 TABLET ORAL at 06:04

## 2024-04-07 RX ADMIN — FAMOTIDINE 20 MG: 20 TABLET ORAL at 09:04

## 2024-04-07 RX ADMIN — MAGNESIUM SULFATE HEPTAHYDRATE 2 G: 40 INJECTION, SOLUTION INTRAVENOUS at 06:04

## 2024-04-07 RX ADMIN — ESTRADIOL 8 MG: 1 TABLET ORAL at 09:04

## 2024-04-07 RX ADMIN — PIPERACILLIN SODIUM AND TAZOBACTAM SODIUM 4.5 G: 4; .5 INJECTION, POWDER, FOR SOLUTION INTRAVENOUS at 10:04

## 2024-04-07 RX ADMIN — HYDROMORPHONE HYDROCHLORIDE 1 MG: 1 INJECTION, SOLUTION INTRAMUSCULAR; INTRAVENOUS; SUBCUTANEOUS at 10:04

## 2024-04-07 RX ADMIN — METOPROLOL TARTRATE 12.5 MG: 25 TABLET, FILM COATED ORAL at 09:04

## 2024-04-07 RX ADMIN — BENZONATATE 200 MG: 100 CAPSULE ORAL at 06:04

## 2024-04-07 RX ADMIN — Medication 2 ML: at 11:04

## 2024-04-07 RX ADMIN — METHOCARBAMOL 500 MG: 500 TABLET ORAL at 08:04

## 2024-04-07 RX ADMIN — HYDROMORPHONE HYDROCHLORIDE 1 MG: 1 INJECTION, SOLUTION INTRAMUSCULAR; INTRAVENOUS; SUBCUTANEOUS at 11:04

## 2024-04-07 RX ADMIN — SODIUM CHLORIDE 1000 MG: 1 TABLET ORAL at 09:04

## 2024-04-07 RX ADMIN — SODIUM CHLORIDE 1000 MG: 1 TABLET ORAL at 03:04

## 2024-04-07 RX ADMIN — PIPERACILLIN SODIUM AND TAZOBACTAM SODIUM 4.5 G: 4; .5 INJECTION, POWDER, FOR SOLUTION INTRAVENOUS at 06:04

## 2024-04-07 RX ADMIN — BENZONATATE 200 MG: 100 CAPSULE ORAL at 08:04

## 2024-04-07 NOTE — PROGRESS NOTES
[facility] - Telemetry ([facility])  Cardiothoracic Surgery  Progress Note    Patient Name: Mayela Molina  MRN: [id number]  Admission Date: 3/25/2024  Hospital Length of Stay: 11 days  Code Status: Full Code   Attending Physician: Perla Gutierres MD   Referring Provider: Self, Aaareferral  Principal Problem:Pneumonia of left lung due to infectious organism            Subjective:     Post-Op Info:  Procedure(s) (LRB):  VATS (VIDEO-ASSISTED THORACOSCOPIC SURGERY) (Left)  BLOCK, NERVE, INTERCOSTAL, 2 OR MORE (Left)   8 Days Post-Op     Interval History:  Patient is status post VATS procedure with drainage of empyema and decortication.    ROS  Medications:  Continuous Infusions:   sodium chloride 0.9% 125 mL/hr at 04/06/24 1258     Scheduled Meds:   enoxparin  40 mg Subcutaneous Q24H (prophylaxis, 1700)    estradioL  8 mg Oral Daily    famotidine  20 mg Oral Daily    linezolid  600 mg Oral Q12H    medroxyPROGESTERone  10 mg Oral Daily    methocarbamoL  500 mg Oral QID    metoprolol tartrate  12.5 mg Oral BID    piperacillin-tazobactam (Zosyn) IV (PEDS and ADULTS) (extended infusion is not appropriate)  4.5 g Intravenous Q8H    polyethylene glycol  17 g Oral Daily    sodium chloride 0.9%  2 mL Intravenous Q6H    sodium chloride  1,000 mg Oral TID    spironolactone  25 mg Oral BID     PRN Meds:0.9%  NaCl infusion (for blood administration), sodium chloride 0.9%, acetaminophen, albuterol sulfate, benzonatate, bisacodyL, glucagon (human recombinant), glucose, glucose, HYDROmorphone, ipratropium, melatonin, metoclopramide, naloxone, ondansetron, ondansetron, oxyCODONE, oxyCODONE, sodium chloride 0.9%, sodium chloride 0.9%, sodium chloride 0.9%, sodium chloride 0.9%     Objective:     Vital Signs (Most Recent):  Temp: 98.5 °F (36.9 °C) (04/06/24 1559)  Pulse: 71 (04/06/24 1750)  Resp: 18 (04/06/24 1559)  BP: 121/72 (04/06/24 1559)  SpO2: 96 % (04/06/24 1559) Vital Signs (24h Range):  Temp:  [98.1 °F (36.7 °C)-98.8 °F (37.1 °C)]  98.5 °F (36.9 °C)  Pulse:  [66-97] 71  Resp:  [16-18] 18  SpO2:  [94 %-96 %] 96 %  BP: (116-123)/(61-74) 121/72     Weight: 61.5 kg (135 lb 9.3 oz)  Body mass index is 20.02 kg/m².    SpO2: 96 %       Intake/Output - Last 3 Shifts         04/04 0700  04/05 0659 04/05 0700  04/06 0659 04/06 0700  04/07 0659    P.O. 480 600     I.V. (mL/kg) 2 (0) 5245.8 (85.3) 931.8 (15.2)    Blood   0    IV Piggyback  892.8 104    Total Intake(mL/kg) 482 (8.7) 6738.6 (109.6) 1035.8 (16.8)    Urine (mL/kg/hr) 3 (0)      Chest Tube  0 0    Total Output 3 0 0    Net +479 +6738.6 +1035.8                   Lines/Drains/Airways       Drain  Duration                  Y Chest Tube 1 and 2 03/29/24 0903 1 Left Pleural 32 Fr. 2 Left Pleural 32 Fr. 8 days              Peripheral Intravenous Line  Duration                  Peripheral IV - Single Lumen 03/31/24 1104 20 G Left;Posterior Forearm 6 days                     Physical Exam  Constitutional:       Appearance: Normal appearance.   HENT:      Head: Normocephalic and atraumatic.      Nose: Nose normal.   Cardiovascular:      Rate and Rhythm: Normal rate and regular rhythm.      Heart sounds: Normal heart sounds.   Pulmonary:      Effort: Pulmonary effort is normal.      Breath sounds: Normal breath sounds.   Musculoskeletal:      Right lower leg: No edema.      Left lower leg: No edema.   Neurological:      General: No focal deficit present.      Mental Status: She is alert and oriented to person, place, and time.   Psychiatric:         Behavior: Behavior normal.            Significant Labs:  All pertinent labs from the last 24 hours have been reviewed.    Significant Diagnostics:  I have reviewed all pertinent imaging results/findings within the past 24 hours.  Assessment/Plan:     Hydropneumothorax  04/02/2024   Patient is postop day 4 status post VATS procedure with drainage of empyema and decortication.  Continue broad-spectrum antibiotics with linezolid and Zosyn.  Patient's white  count is trending downward with white count of 20.  Patient is afebrile.  Chest tube output trending lower.  Continue pulmonary toileting continue incentive spirometer.  Patient is up and ambulating.  Increase as tolerated.    04/03/2024   The patient is postop day 5 status post VATS procedure with drainage of empyema and decortication.  Patient is on broad-spectrum antibiotics linezolid and Zosyn.  Is trending down.  Patient is afebrile.  Continue pulmonary toileting.  Chest x-ray shows continued patchy infiltrate left middle and lower lobes.  Continue chest tube to drainage.    04/04/2024   The patient is postop day 6 status post VATS procedure with drainage of empyema and decortication.  Patient is on broad-spectrum antibiotics.  Continue linezolid and Zosyn.  Patient is afebrile.  Continue pulmonary toileting continue incentive spirometer..  Chest tube output has been minimal.  Will repeat chest x-ray to access extent of left lung consolidation.    04/05/2024   The patient is postop day 7 status post VATS procedure with drainage of empyema and decortication patient continues on antibiotics.  Patient is afebrile.  White count is slightly elevated at toileting.  Continue incentive spirometer.  Continue pulmonary toileting.  Chest tube output has been minimal.  .  Chest x-ray shows dense left lower lobe infiltrate which is unchanged. Continue chest tube as per patient's primary surgeon    04/06/2024   The patient is postop day 8 status post VATS procedure with drainage of empyema and decortication.  Patient continues on broad-spectrum antibiotics.  Patient is afebrile and WBC has decreased from 13 to 11 today.  Continue pulmonary toileting.  Continue incentive spirometer.  Chest tube output has been small however drainage in extension tube on chest tube looks bloody and cloudy.  Continue chest tube to drainage.        Mark Elliott MD  Cardiothoracic Surgery  '[place] - Telemetry ([facility])

## 2024-04-07 NOTE — ASSESSMENT & PLAN NOTE
"-white blood cell count 30.46, lactic acid level 1.5, D-dimer 1.37, BNP 14, troponin negative, influenza a/B negative, COVID-19 negative  -blood cultures pending  -CTA of chest with "no pulmonary embolism, no dissection; dense consolidation in the left lower lobe extending to the left upper lobe with tree in bud opacities consistent with severe pneumonia; questionable areas of fluid density in the left lung base with foci of gas may relate to component of fluid density or possible liquefaction or possible abscess formation."  -continue IV Zosyn, vanco and IV azithromycin initiated in the emergency department  -check MRSA culture-negative, sputum culture-pending, HIV  -give IV fluids, O2 supplementation, p.r.n. albuterol inhaler  - repeat CT scan of chest today looks more like an empyema  -CVT consulted and patient underwent VATS with drainage    Chest tube to water seal per primary surgeon   Continue antibiotic(s)   Encourage incentive spirometer and acapella and breathing treatments  Ambulate   Repeat ct chest with completed plugging of left mainstem bronchus  Added chest physiotherapy with G5 machine for physical percussion  Pulmonology planning for bronch in am  Npo after mn     "

## 2024-04-07 NOTE — ASSESSMENT & PLAN NOTE
Pulmonology following  3% saline nebs  Repeat chest x-ray in AM  If no improvement, bronch in AM  Npo after mn

## 2024-04-07 NOTE — SUBJECTIVE & OBJECTIVE
Interval History: See hospital course for today      Review of Systems   Constitutional:  Positive for activity change, appetite change and fatigue. Negative for fever.   Respiratory:  Positive for cough and shortness of breath.    Cardiovascular:  Positive for chest pain (at chest tube site).   Gastrointestinal:  Negative for abdominal pain, nausea and vomiting.   Skin:  Positive for wound.   Neurological:  Positive for weakness.   Psychiatric/Behavioral:  Positive for dysphoric mood. Negative for agitation, behavioral problems, confusion and decreased concentration. The patient is not nervous/anxious.      Objective:     Vital Signs (Most Recent):  Temp: 98.4 °F (36.9 °C) (04/07/24 1202)  Pulse: 85 (04/07/24 1202)  Resp: 14 (04/07/24 1405)  BP: 122/67 (04/07/24 1202)  SpO2: 98 % (04/07/24 1202) Vital Signs (24h Range):  Temp:  [98.4 °F (36.9 °C)-98.8 °F (37.1 °C)] 98.4 °F (36.9 °C)  Pulse:  [71-93] 85  Resp:  [14-18] 14  SpO2:  [93 %-98 %] 98 %  BP: (113-126)/(63-72) 122/67     Weight: 61.5 kg (135 lb 9.3 oz)  Body mass index is 20.02 kg/m².    Intake/Output Summary (Last 24 hours) at 4/7/2024 1410  Last data filed at 4/7/2024 0706  Gross per 24 hour   Intake 2569.75 ml   Output 20 ml   Net 2549.75 ml         Physical Exam  Vitals and nursing note reviewed.   Constitutional:       General: She is not in acute distress.     Appearance: She is ill-appearing. She is not toxic-appearing.   HENT:      Head: Normocephalic and atraumatic.   Cardiovascular:      Rate and Rhythm: Normal rate.   Pulmonary:      Effort: Pulmonary effort is normal. No respiratory distress.   Chest:      Chest wall: Tenderness present.   Abdominal:      Palpations: Abdomen is soft.   Musculoskeletal:      Right lower leg: No edema.      Left lower leg: No edema.   Skin:     General: Skin is warm.      Coloration: Skin is pale.   Neurological:      Mental Status: She is oriented to person, place, and time.      Motor: Weakness present.    Psychiatric:         Mood and Affect: Mood is depressed.         Speech: Speech normal.             Significant Labs: All pertinent labs within the past 24 hours have been reviewed.  Blood Culture: negative growth to date x 5 days  CBC:   Recent Labs   Lab 04/06/24  0529 04/07/24  0535   WBC 11.45 13.12*   HGB 10.1* 8.9*   HCT 30.9* 27.4*    211     CMP:   Recent Labs   Lab 04/06/24  0529 04/07/24  0535    137   K 3.6 3.3*    109   CO2 18* 18*   GLU 86 87   BUN 14 13   CREATININE 1.7* 1.6*   CALCIUM 7.6* 7.4*   PROT 5.9* 5.8*   ALBUMIN 1.9* 1.7*   BILITOT 0.5 0.5   ALKPHOS 61 54*   * 109*   * 115*   ANIONGAP 11 10       Significant Imaging: I have reviewed all pertinent imaging results/findings within the past 24 hours.  CT: I have reviewed all pertinent results/findings within the past 24 hours and my personal findings are:  mucous plugging of left mainstem bronchus

## 2024-04-07 NOTE — ASSESSMENT & PLAN NOTE
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 Baseline creatinine  normal  - Labs reviewed- Renal function/electrolytes with Estimated Creatinine Clearance (based on SCr of 1.6 mg/dL (H))  Female: 51.7 mL/min (A)  Male: 60.9 mL/min (A)  Hover for info according to latest data. Monitor urine output and serial BMP and adjust therapy as needed. Avoid nephrotoxins and renally dose meds for GFR listed above.  Nephrology following  Spironolactone resumed at lower dose

## 2024-04-07 NOTE — PROGRESS NOTES
[facility] Medicine  Progress Note    Patient Name: Mayela Molina  MRN: [id number]  Patient Class: IP- Inpatient   Admission Date: 3/25/2024  Length of Stay: 12 days  Attending Physician: Perla Gutierres MD  Primary Care Provider: Katie, Primary Doctor        Subjective:     Principal Problem:Pneumonia of left lung due to infectious organism        HPI:  28-year-old patient (male to female transgender) with no chronic medical conditions who presented to the emergency department with complaint of cough over the last 3 weeks, moderate severity, persistent.  Patient does have associated yellow sputum production, shortness for breath, and pleuritic chest pain.  No associated nausea, vomiting, diarrhea.  No complaints of fevers or chills although T-max in the emergency department recorded as 99.8.  Patient does not smoke cigarettes or vape.  No illicit drug use.  Abnormal labs in the emergency department include white blood cell count 30.46, hemoglobin 12.7, platelets 577, D-dimer 1.37, sodium 122, chloride 90, bicarb 21, glucose 121, albumin 2.3.  Last labs are from 2018 with no interim labs to compare with.  Patient does take spironolactone, Estrace, and Provera.  CT scan of chest with left-sided pneumonia with possible abscess formation.    Overview/Hospital Course:   Patient was admitted acute respiratory failure with left-sided pneumonia and possible abscess formation.  She initially was on nasal cannula and on morning after admission became acutely short of breath with a PO2 of 87% and tachycardic.  She was given a dose of adenosine which did not improve things however gradually her heart rate decreased.  Patient was admitted to the intensive care unit for respiratory failure tachycardia.  She was initially on high-flow nasal cannula and subsequently was able to wean to room air.  Cultures remained pending.  She was initially started on Rocephin and azithromycin and changed to Zosyn  who azithromycin vein.  Legionella antigen pending.  Continue with aggressive pulmonary care  With worsening CT scan, continued elevated WBC, continued elevated heart rate.  Patient underwent repeat CT scan of the chest which showed worsening of empyema.  She was seen and evaluated by Cardiothoracic surgery and underwent left VATS with decortication on 3/19.    Hyponatremia patient was admitted with hyponatremia she had fluid restrictions in place cortisol was normal, TSH normal, patient was on spironolactone and this was held.  She was being maintained on fluid restricted diet.     Encourage patient to deep breathe and walk in halls.  4/1 undergoing breathing treatment. Mom at bedside. Questions and concerns addressed. Encourage ambulation  4/2 questions and concerns addressed. Chest tube drainage 50cc yesterday. Ambulated with physical/occupational therapy. Continue intravenous fluids and intravenous antibiotic(s).   4/3 leukocytosis trending down. chest tube drainage 10cc/24 yesterday. Repeat chest x-ray with no adverse interval change. Patient wishes to ambulate more. Continue current care.  4/4 minimal chest tube output last 2 days. Leukocytosis resolved. Acute kidney injury improving. Ambulating with physical/occupational therapy. Repeat chest x-ray reviewed. Appetite improving  4/5 primary CTS recommending repeat ct chest noncontrast and prealbumin. Chest tube to water seal per primary surgeon. Discussed restarting spironolactone at lower dose with nephrology. Patient ambulating with physical/occupational therapy.   4/6 repeat ct chest with complete plugging of left mainstem bronchus. Chest physiotherapy with G5 added. Spironolactone resumed at reduced dose. Complains of chest pain at site of chest tube improved with pain analgesics.  4/7 pulmonology planning on bronch in am if no improvement. Chest x-ray in AM. Complains of pain with chest tube.    Interval History: See hospital course for today      Review  of Systems   Constitutional:  Positive for activity change, appetite change and fatigue. Negative for fever.   Respiratory:  Positive for cough and shortness of breath.    Cardiovascular:  Positive for chest pain (at chest tube site).   Gastrointestinal:  Negative for abdominal pain, nausea and vomiting.   Skin:  Positive for wound.   Neurological:  Positive for weakness.   Psychiatric/Behavioral:  Positive for dysphoric mood. Negative for agitation, behavioral problems, confusion and decreased concentration. The patient is not nervous/anxious.      Objective:     Vital Signs (Most Recent):  Temp: 98.4 °F (36.9 °C) (04/07/24 1202)  Pulse: 85 (04/07/24 1202)  Resp: 14 (04/07/24 1405)  BP: 122/67 (04/07/24 1202)  SpO2: 98 % (04/07/24 1202) Vital Signs (24h Range):  Temp:  [98.4 °F (36.9 °C)-98.8 °F (37.1 °C)] 98.4 °F (36.9 °C)  Pulse:  [71-93] 85  Resp:  [14-18] 14  SpO2:  [93 %-98 %] 98 %  BP: (113-126)/(63-72) 122/67     Weight: 61.5 kg (135 lb 9.3 oz)  Body mass index is 20.02 kg/m².    Intake/Output Summary (Last 24 hours) at 4/7/2024 1410  Last data filed at 4/7/2024 0706  Gross per 24 hour   Intake 2569.75 ml   Output 20 ml   Net 2549.75 ml         Physical Exam  Vitals and nursing note reviewed.   Constitutional:       General: She is not in acute distress.     Appearance: She is ill-appearing. She is not toxic-appearing.   HENT:      Head: Normocephalic and atraumatic.   Cardiovascular:      Rate and Rhythm: Normal rate.   Pulmonary:      Effort: Pulmonary effort is normal. No respiratory distress.   Chest:      Chest wall: Tenderness present.   Abdominal:      Palpations: Abdomen is soft.   Musculoskeletal:      Right lower leg: No edema.      Left lower leg: No edema.   Skin:     General: Skin is warm.      Coloration: Skin is pale.   Neurological:      Mental Status: She is oriented to person, place, and time.      Motor: Weakness present.   Psychiatric:         Mood and Affect: Mood is depressed.          "Speech: Speech normal.             Significant Labs: All pertinent labs within the past 24 hours have been reviewed.  Blood Culture: negative growth to date x 5 days  CBC:   Recent Labs   Lab 04/06/24  0529 04/07/24  0535   WBC 11.45 13.12*   HGB 10.1* 8.9*   HCT 30.9* 27.4*    211     CMP:   Recent Labs   Lab 04/06/24  0529 04/07/24  0535    137   K 3.6 3.3*    109   CO2 18* 18*   GLU 86 87   BUN 14 13   CREATININE 1.7* 1.6*   CALCIUM 7.6* 7.4*   PROT 5.9* 5.8*   ALBUMIN 1.9* 1.7*   BILITOT 0.5 0.5   ALKPHOS 61 54*   * 109*   * 115*   ANIONGAP 11 10       Significant Imaging: I have reviewed all pertinent imaging results/findings within the past 24 hours.  CT: I have reviewed all pertinent results/findings within the past 24 hours and my personal findings are:  mucous plugging of left mainstem bronchus     Assessment/Plan:      * Pneumonia of left lung due to infectious organism  -white blood cell count 30.46, lactic acid level 1.5, D-dimer 1.37, BNP 14, troponin negative, influenza a/B negative, COVID-19 negative  -blood cultures pending  -CTA of chest with "no pulmonary embolism, no dissection; dense consolidation in the left lower lobe extending to the left upper lobe with tree in bud opacities consistent with severe pneumonia; questionable areas of fluid density in the left lung base with foci of gas may relate to component of fluid density or possible liquefaction or possible abscess formation."  -continue IV Zosyn, vanco and IV azithromycin initiated in the emergency department  -check MRSA culture-negative, sputum culture-pending, HIV  -give IV fluids, O2 supplementation, p.r.n. albuterol inhaler  - repeat CT scan of chest today looks more like an empyema  -CVT consulted and patient underwent VATS with drainage    Chest tube to water seal per primary surgeon   Continue antibiotic(s)   Encourage incentive spirometer and acapella and breathing treatments  Ambulate   Repeat ct " chest with completed plugging of left mainstem bronchus  Added chest physiotherapy with G5 machine for physical percussion  Pulmonology planning for bronch in am  Npo after mn       Hypokalemia  Patient has hypokalemia which is Acute and currently controlled. Most recent potassium levels reviewed-   Lab Results   Component Value Date    K 3.3 (L) 04/07/2024   . Will continue potassium replacement per protocol and recheck repeat levels after replacement completed.   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 Baseline creatinine  normal  - Labs reviewed- Renal function/electrolytes with Estimated Creatinine Clearance (based on SCr of 1.6 mg/dL (H))  Female: 51.7 mL/min (A)  Male: 60.9 mL/min (A)  Hover for info according to latest data. Monitor urine output and serial BMP and adjust therapy as needed. Avoid nephrotoxins and renally dose meds for GFR listed above.  Nephrology following  Spironolactone resumed at lower dose     Moderate protein-calorie malnutrition  Nutrition consulted. Most recent weight and BMI monitored-     Measurements:  Wt Readings from Last 1 Encounters:   04/05/24 61.5 kg (135 lb 9.3 oz)   Body mass index is 20.02 kg/m².    Patient has been screened and assessed by RD.    Malnutrition Type:  Context: acute illness or injury, chronic illness  Level: moderate    Malnutrition Characteristic Summary:  Energy Intake (Malnutrition): less than or equal to 75% for greater than or equal to 1 month  Subcutaneous Fat (Malnutrition): moderate depletion  Muscle Mass (Malnutrition): moderate depletion    Interventions/Recommendations (treatment strategy):  1. Recommend pt continues on Regular diet  2.  "Recommend pt continue Boost plus TID as medically appropriate 3. Recommend Rob BID for wound healing 4. Encourage PO intake of meals, snacks and supplements 5. Weigh twice weekly      Hydropneumothorax  Status post VATS with left lung chest tubes  -culture positive for MRSA  -on Zosyn and Zyvox  CVT and pulmonary following chest tubes    Pulmonary abscess  Growing strep  On linezolid and zosyn  Repeat ct chest with complete plugging  Chest physiotherapy added with percussion    Tachycardia, paroxysmal  Beta-blocker started heart rate markedly improved.  Borderline blood pressure and heart rate underwent 100 we will DC    Resolved on beta blocker     Pleuritic chest pain  Pleuritic chest pain related to left-sided pneumonia/empyema.  Cardiac enzymes negative.      Male-to-female transgender person  Resume home medications to include Estrace & Provera; hold spironolactone due to hyponatremia    Resume spironolactone at lower dose      Normocytic anemia  Patient's anemia is currently controlled. Has not received any PRBCs to date.   Current CBC reviewed-   Lab Results   Component Value Date    HGB 8.9 (L) 04/07/2024    HCT 27.4 (L) 04/07/2024     Monitor serial CBC and transfuse if patient becomes hemodynamically unstable, symptomatic or H/H drops below 7/21.  Iron def anemia    Underweight  Current BMI 15.27, albumin 2.3.    Add nutritional supplements  Nutrition consult  Appetite improving    Prealbumin improved    Sepsis  This patient does have evidence of infective focus  My overall impression is sepsis.  Source: Respiratory  Antibiotics given-   Antibiotics (72h ago, onward)      Start     Stop Route Frequency Ordered    03/30/24 0900  linezolid tablet 600 mg         -- Oral Every 12 hours 03/30/24 0731    03/28/24 1800  piperacillin-tazobactam (ZOSYN) 4.5 g in dextrose 5 % in water (D5W) 100 mL IVPB (MB+)         -- IV Every 8 hours (non-standard times) 03/28/24 1538        No results for input(s): "LACTATE", " ""POCLAC" in the last 72 hours.    Organ dysfunction indicated by  none    Fluid challenge Not needed - patient is not hypotensive      Post- resuscitation assessment No - Post resuscitation assessment not needed       Will Not start Pressors- Levophed for MAP of 65  Source control achieved by: broad spectrum antibiotics  Improving   Leukocytosis trending down  Chest tube drainage trending down    Resolved       VTE Risk Mitigation (From admission, onward)           Ordered     Place sequential compression device  Until discontinued        Comments: Can be discontinued when not in the bed.    03/30/24 1113     enoxaparin injection 40 mg  Every 24 hours         03/28/24 0912     IP VTE HIGH RISK PATIENT  Once         03/26/24 0043     Reason for No Pharmacological VTE Prophylaxis  Once        Question:  Reasons:  Answer:  Physician Provided (leave comment)  Comment:  pending pulmonary consult    03/26/24 0043     IP VTE HIGH RISK PATIENT  Once         03/26/24 0043     Place sequential compression device  Until discontinued         03/26/24 0043                    Discharge Planning   RAVINDER:      Code Status: Full Code   Is the patient medically ready for discharge?:     Reason for patient still in hospital (select all that apply): Patient new problem, Patient trending condition, Laboratory test, Treatment, Imaging, and Consult recommendations  Discharge Plan A: Home   Discharge Delays: None known at this time              Perla Gutierres MD  Department of Hospital Medicine   [facility] [facility]    "

## 2024-04-07 NOTE — ASSESSMENT & PLAN NOTE
Current BMI 15.27, albumin 2.3.    Add nutritional supplements  Nutrition consult  Appetite improving    Prealbumin improved

## 2024-04-07 NOTE — PROGRESS NOTES
[facility] - Telemetry ([facility])  Nephrology  Progress Note    Patient Name: Mayela Molina  MRN: [id number]  Admission Date: 3/25/2024  Hospital Length of Stay: 12 days  Attending Provider: Perla Gutierres MD   Primary Care Physician: Katie, Primary Doctor  Principal Problem:Pneumonia of left lung due to infectious organism    Subjective:     HPI: Noted    Interval History: Pt was seen and examined. Labs and meds reviewed. Discussed with other providers. Noted emergence of mucus plug and planned bronchoscopy. Denied SOB this am.    Review of patient's allergies indicates:  No Known Allergies  Current Facility-Administered Medications   Medication Frequency    0.9%  NaCl infusion (for blood administration) Q24H PRN    0.9%  NaCl infusion PRN    0.9%  NaCl infusion Continuous    acetaminophen tablet 650 mg Q6H PRN    albuterol nebulizer solution 2.5 mg Q6H PRN    benzonatate capsule 100 mg TID PRN    benzonatate capsule 200 mg TID    bisacodyL suppository 10 mg Daily PRN    enoxaparin injection 40 mg Q24H (prophylaxis, 1700)    estradioL tablet 8 mg Daily    famotidine tablet 20 mg Daily    glucagon (human recombinant) injection 1 mg PRN    glucose chewable tablet 16 g PRN    glucose chewable tablet 24 g PRN    HYDROmorphone injection 1 mg QID PRN    ipratropium 0.02 % nebulizer solution 0.5 mg QID PRN    linezolid tablet 600 mg Q12H    magnesium sulfate 2g in water 50mL IVPB (premix) Once    medroxyPROGESTERone tablet 10 mg Daily    melatonin tablet 6 mg Nightly PRN    methocarbamoL tablet 500 mg QID    metoclopramide injection 5 mg Q6H PRN    metoprolol tartrate (LOPRESSOR) split tablet 12.5 mg BID    naloxone 0.4 mg/mL injection 0.02 mg PRN    ondansetron disintegrating tablet 8 mg Q8H PRN    ondansetron injection 4 mg Q6H PRN    oxyCODONE immediate release tablet 10 mg Q4H PRN    oxyCODONE immediate release tablet 5 mg Q4H PRN    piperacillin-tazobactam (ZOSYN) 4.5 g in dextrose 5 % in water (D5W) 100 mL IVPB (MB+)  Q8H    polyethylene glycol packet 17 g Daily    potassium chloride SA CR tablet 40 mEq Daily    sodium chloride 0.9% flush 10 mL PRN    sodium chloride 0.9% flush 10 mL Q12H PRN    sodium chloride 0.9% flush 10 mL PRN    sodium chloride 0.9% flush 10 mL PRN    sodium chloride 0.9% flush 2 mL Q6H    sodium chloride 3% nebulizer solution 4 mL Q6H WAKE    sodium chloride oral tablet 1,000 mg TID    spironolactone tablet 25 mg BID       Objective:     Vital Signs (Most Recent):  Temp: 98.4 °F (36.9 °C) (04/07/24 1202)  Pulse: 85 (04/07/24 1202)  Resp: 14 (04/07/24 1405)  BP: 122/67 (04/07/24 1202)  SpO2: 98 % (04/07/24 1202) Vital Signs (24h Range):  Temp:  [98.4 °F (36.9 °C)-98.8 °F (37.1 °C)] 98.4 °F (36.9 °C)  Pulse:  [71-93] 85  Resp:  [14-18] 14  SpO2:  [93 %-98 %] 98 %  BP: (113-126)/(63-72) 122/67     Weight: 61.5 kg (135 lb 9.3 oz) (04/05/24 2350)  Body mass index is 20.02 kg/m².  Body surface area is 1.73 meters squared.    I/O last 3 completed shifts:  In: 1035.8 [I.V.:931.8; IV Piggyback:104]  Out: 0      Physical Exam  Vitals and nursing note reviewed.   Constitutional:       Appearance: Normal appearance.   Cardiovascular:      Rate and Rhythm: Normal rate and regular rhythm.      Pulses: Normal pulses.      Heart sounds: Normal heart sounds.   Pulmonary:      Effort: Pulmonary effort is normal.      Breath sounds: No rales.      Comments: Unlabored  Abdominal:      Palpations: Abdomen is soft.      Tenderness: There is no abdominal tenderness.   Musculoskeletal:      Right lower leg: No edema.      Left lower leg: No edema.   Neurological:      Mental Status: She is alert and oriented to person, place, and time.   Psychiatric:         Behavior: Behavior normal.          Significant Labs: reviewed  BMP  Lab Results   Component Value Date     04/07/2024    K 3.3 (L) 04/07/2024     04/07/2024    CO2 18 (L) 04/07/2024    BUN 13 04/07/2024    CREATININE 1.6 (H) 04/07/2024    CALCIUM 7.4 (L)  04/07/2024    ANIONGAP 10 04/07/2024    EGFRNORACEVR >60 04/07/2024     Lab Results   Component Value Date    WBC 13.12 (H) 04/07/2024    HGB 8.9 (L) 04/07/2024    HCT 27.4 (L) 04/07/2024    MCV 88 04/07/2024     04/07/2024         Significant Imaging: reviewed  Assessment/Plan:     27 y/o transgender female who presented with pneumonia complicated by empyema requiring a chest tube who further developed DAVIAN:         DAVIAN (acute kidney injury)  S Cr further improved, DAVIAN slowly resolving  Stable renal function  DAVIAN, 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  Supratherapeutic vancomycin level  FENa > 1%, not prerenal azotemia  No other causes suspected  CT scans done were without contrast     Baseline s Cr is normal  K, mild hypokalemia, c/w recovery phase of DAVIAN. OK to repalce  Metabolic acidosis stable  O2 sat good  No indications for acute dialysis     Pneumonia of left lung due to infectious organism  Lung infection complicated by necrotizing pneumonia and empyema  Hydropneumothorax  S/p VATS procedure (POD#7) with drainage of empyema and decortication  On abx  Has a mucus plug now, planned bronchoscopy noted  Will defer to CT surgery     Plans and recommendations:  As discussed above  Total time spent 40 minutes including time needed to review the records, the   patient evaluation, documentation, face-to-face discussion with the patient,   more than 50% of the time was spent on coordination of care and counseling.    Level V visit.     Thank you for your consult.         Linn Edwards MD  Nephrology  [facility] - Telemetry ([facility])

## 2024-04-07 NOTE — PT/OT/SLP EVAL
Speech Language Pathology Evaluation  Cognitive/Bedside Swallow    Patient Name:  Mayela Molina   MRN:  [id number]  Admitting Diagnosis: Pneumonia of left lung due to infectious organism    Recommendations:                  General Recommendations:  Follow-up not indicated  Diet recommendations:  Regular Diet - IDDSI Level 7, Thin liquids - IDDSI Level 0   Aspiration Precautions: HOB to 90 degrees and Standard aspiration precautions   General Precautions: Standard,    Communication strategies:  none    History:     History reviewed. No pertinent past medical history.    Past Surgical History:   Procedure Laterality Date    INJECTION OF ANESTHETIC AGENT AROUND MULTIPLE INTERCOSTAL NERVES Left 3/29/2024    Procedure: BLOCK, NERVE, INTERCOSTAL, 2 OR MORE;  Surgeon: Josafat Sandoval MD;  Location: [facility] [place] [facility];  Service: Cardiothoracic;  Laterality: Left;    VIDEO-ASSISTED THORACOSCOPIC SURGERY (VATS) Left 3/29/2024    Procedure: VATS (VIDEO-ASSISTED THORACOSCOPIC SURGERY);  Surgeon: Josafat Sandoval MD;  Location: [facility];  Service: Cardiothoracic;  Laterality: Left;       Social History: Patient lives in [place], [place].    Prior Intubation HX:  underwent left VATS with decortication on 3/19.     Modified Barium Swallow: NA    CT CHEST WITHOUT CONTRAST on 04/05/2024:     COMPARISON:  Chest x-ray performed earlier today and chest CT dated 03/20/2024     FINDINGS:  There are 2 left-sided chest tubes in place.  There is volume loss in the left lung with consolidation and atelectasis of the left lower lobe.  Left upper lobe remains fairly well aerated with some basilar atelectasis in the lingula.  There is plugging of the entirety of the left mainstem bronchus which is new compared to the prior CT scan.  The right lung is clear.  Normal heart size.  No pericardial effusion.  Thoracic osseous structures show no acute abnormality.     Impression:  1. Complete plugging of the left mainstem bronchus either from  aspiration or from coughing up of secretions or hemoptysis.  This is a new finding since the chest CT from last week.  2. Further volume loss in the left lung with marked atelectasis of the left lower lobe along with some consolidation present as well.  Basilar atelectasis in the lingula.  3. Two chest tubes are in place in the left side in good position.    Electronically signed by: Yunior Wilder MD  Date:                                            04/05/2024    Prior diet: Regular diet.    Subjective     Pt seen bedside for dx with family present throughout  Patient goals: none stated     Pain/Comfort:  Pain Rating 1: 0/10  Pain Rating Post-Intervention 1: 0/10  Pain Rating 2: 0/10  Pain Rating Post-Intervention 2: 0/10    Objective:     Oral Musculature Evaluation  Oral Musculature: WFL  Dentition: present and adequate  Secretion Management: adequate  Mucosal Quality: good  Mandibular Strength and Mobility: WFL  Oral Labial Strength and Mobility: WFL  Lingual Strength and Mobility: WFL  Velar Elevation: WFL  Buccal Strength and Mobility: WFL  Volitional Cough: present  Volitional Swallow: present  Voice Prior to PO Intake: clear    Bedside Swallow Eval:     Clinical Swallow Examination:   Of note, patient self-fed throughout evaluation. Patient presented with:     CONSISTENCY  NOTES   THIN (IDDSI 0) Cup/straw sips, sequential sips   No overt s/s of aspiration appreciated throughout.   PUREE (IDDSI 4/Extremely Thick)   TSP/TBSP bites of pudding No overt s/s of aspiration appreciated throughout.   SOLID (IDDSI 7/Regular) Bite of Basilia Doone cookie    No overt s/s of aspiration appreciated throughout.     Thickened liquids were not used in this assessment. Alireza (2018) reported that thickened liquids have no sound evidence at reducing the risk of pneumonia in patients with dysphagia and can cause harm by increasing their risk of dehydration. It also presents an increased risk of UTI, electrolyte imbalance,  constipation, fecal impaction, cognitive impairment, functional decline and even death ([place], 2002; [place], 2016).  Thickened liquids are associated with risks including dehydration, increased pharyngeal residue, potential interference with medication absorption, and decreased quality of life (Kaitlin, 2013). Thickened liquids are also more likely to be silently aspirated than thin liquids (Bandar et al., 2018). This supports the assertion that we should confirm a patient requires thickened liquids with an instrumental swallow study prior to recommending them.    References:   Kaitlin VILLALTA (2013). Thickening agents used for dysphagia management: Effect on bioavailability of water, medication and feelings of satiety. Nutrition Journal, 12, 54. https://doi.org/10.1186/9271-3698-85-54    AMBAR Portillo, DYANA Gardner, JOSE Tobias, & DELORIS Ramirez. (2018). Cough response to aspiration in thin and thick fluids during FEES in hospitalized inpatients. International journal of language & communication disorders, 53(5), 909-918. https://doi.org/10.1111/1864-4637.14415    INTERPRETATION AND RISK ASSESSMENT:  Clinical swallow evaluation (CSE) revealed oral phase characterized by lingual, labial, buccal strength and range of motion functional for lip closure, bolus preparation and propulsion. The patient had no anterior loss of the bolus with complete closure of the lips around the utensils. No residue remained in the oral cavity following the swallow. Patient without overt clinical signs/symptoms of aspiration on any PO trials given.    Assessment:     Mayela Molina is a 28 y.o. adult admitted to [facility] acute with dx Pneumonia of left lung due to infectious organism.  OM and communication at functional baseline.  No overt s/s of dysphagia present during bedside CSE, and pt recommended for IDDSI 7-regular solids with IDDSI 0-thin liquids, following standard aspiration precautions.  No further acute SLP intervention indicated  at this time. MD to re-consult if needed.    Goals:   Multidisciplinary Problems       SLP Goals       Not on file                    Plan:     Patient to be seen:      Plan of Care expires:     Plan of Care reviewed with:  patient, family   SLP Follow-Up:  No       Discharge recommendations:  Therapy Intensity Recommendations at Discharge: No Therapy Indicated   Barriers to Discharge:  None    Time Tracking:     SLP Treatment Date:   04/07/24  Speech Start Time:  1430  Speech Stop Time:  1450     Speech Total Time (min):  20 min    Billable Minutes: Eval Swallow and Oral Function 20 04/07/2024

## 2024-04-07 NOTE — ASSESSMENT & PLAN NOTE
Patient has hypokalemia which is Acute and currently controlled. Most recent potassium levels reviewed-   Lab Results   Component Value Date    K 3.3 (L) 04/07/2024   . Will continue potassium replacement per protocol and recheck repeat levels after replacement completed.   Mg 1.4  Replete both

## 2024-04-07 NOTE — ASSESSMENT & PLAN NOTE
Nutrition consulted. Most recent weight and BMI monitored-     Measurements:  Wt Readings from Last 1 Encounters:   04/05/24 61.5 kg (135 lb 9.3 oz)   Body mass index is 20.02 kg/m².    Patient has been screened and assessed by RD.    Malnutrition Type:  Context: acute illness or injury, chronic illness  Level: moderate    Malnutrition Characteristic Summary:  Energy Intake (Malnutrition): less than or equal to 75% for greater than or equal to 1 month  Subcutaneous Fat (Malnutrition): moderate depletion  Muscle Mass (Malnutrition): moderate depletion    Interventions/Recommendations (treatment strategy):  1. Recommend pt continues on Regular diet  2. Recommend pt continue Boost plus TID as medically appropriate 3. Recommend Rob BID for wound healing 4. Encourage PO intake of meals, snacks and supplements 5. Weigh twice weekly

## 2024-04-07 NOTE — ASSESSMENT & PLAN NOTE
04/02/2024   Patient is postop day 4 status post VATS procedure with drainage of empyema and decortication.  Continue broad-spectrum antibiotics with linezolid and Zosyn.  Patient's white count is trending downward with white count of 20.  Patient is afebrile.  Chest tube output trending lower.  Continue pulmonary toileting continue incentive spirometer.  Patient is up and ambulating.  Increase as tolerated.    04/03/2024   The patient is postop day 5 status post VATS procedure with drainage of empyema and decortication.  Patient is on broad-spectrum antibiotics linezolid and Zosyn.  Is trending down.  Patient is afebrile.  Continue pulmonary toileting.  Chest x-ray shows continued patchy infiltrate left middle and lower lobes.  Continue chest tube to drainage.    04/04/2024   The patient is postop day 6 status post VATS procedure with drainage of empyema and decortication.  Patient is on broad-spectrum antibiotics.  Continue linezolid and Zosyn.  Patient is afebrile.  Continue pulmonary toileting continue incentive spirometer..  Chest tube output has been minimal.  Will repeat chest x-ray to access extent of left lung consolidation.    04/05/2024   The patient is postop day 7 status post VATS procedure with drainage of empyema and decortication patient continues on antibiotics.  Patient is afebrile.  White count is slightly elevated at toileting.  Continue incentive spirometer.  Continue pulmonary toileting.  Chest tube output has been minimal.  .  Chest x-ray shows dense left lower lobe infiltrate which is unchanged. Continue chest tube as per patient's primary surgeon    04/06/2024   The patient is postop day 8 status post VATS procedure with drainage of empyema and decortication.  Patient continues on broad-spectrum antibiotics.  Patient is afebrile and WBC has decreased from 13 to 11 today.  Continue pulmonary toileting.  Continue incentive spirometer.  Chest tube output has been small however drainage in  extension tube on chest tube looks bloody and cloudy.  Continue chest tube to drainage.

## 2024-04-07 NOTE — ASSESSMENT & PLAN NOTE
Patient's anemia is currently controlled. Has not received any PRBCs to date.   Current CBC reviewed-   Lab Results   Component Value Date    HGB 8.9 (L) 04/07/2024    HCT 27.4 (L) 04/07/2024     Monitor serial CBC and transfuse if patient becomes hemodynamically unstable, symptomatic or H/H drops below 7/21.  Iron def anemia

## 2024-04-07 NOTE — SUBJECTIVE & OBJECTIVE
Interval History:  Patient is status post VATS procedure with drainage of empyema and decortication.    ROS  Medications:  Continuous Infusions:   sodium chloride 0.9% 125 mL/hr at 04/06/24 1258     Scheduled Meds:   enoxparin  40 mg Subcutaneous Q24H (prophylaxis, 1700)    estradioL  8 mg Oral Daily    famotidine  20 mg Oral Daily    linezolid  600 mg Oral Q12H    medroxyPROGESTERone  10 mg Oral Daily    methocarbamoL  500 mg Oral QID    metoprolol tartrate  12.5 mg Oral BID    piperacillin-tazobactam (Zosyn) IV (PEDS and ADULTS) (extended infusion is not appropriate)  4.5 g Intravenous Q8H    polyethylene glycol  17 g Oral Daily    sodium chloride 0.9%  2 mL Intravenous Q6H    sodium chloride  1,000 mg Oral TID    spironolactone  25 mg Oral BID     PRN Meds:0.9%  NaCl infusion (for blood administration), sodium chloride 0.9%, acetaminophen, albuterol sulfate, benzonatate, bisacodyL, glucagon (human recombinant), glucose, glucose, HYDROmorphone, ipratropium, melatonin, metoclopramide, naloxone, ondansetron, ondansetron, oxyCODONE, oxyCODONE, sodium chloride 0.9%, sodium chloride 0.9%, sodium chloride 0.9%, sodium chloride 0.9%     Objective:     Vital Signs (Most Recent):  Temp: 98.5 °F (36.9 °C) (04/06/24 1559)  Pulse: 71 (04/06/24 1750)  Resp: 18 (04/06/24 1559)  BP: 121/72 (04/06/24 1559)  SpO2: 96 % (04/06/24 1559) Vital Signs (24h Range):  Temp:  [98.1 °F (36.7 °C)-98.8 °F (37.1 °C)] 98.5 °F (36.9 °C)  Pulse:  [66-97] 71  Resp:  [16-18] 18  SpO2:  [94 %-96 %] 96 %  BP: (116-123)/(61-74) 121/72     Weight: 61.5 kg (135 lb 9.3 oz)  Body mass index is 20.02 kg/m².    SpO2: 96 %       Intake/Output - Last 3 Shifts         04/04 0700  04/05 0659 04/05 0700  04/06 0659 04/06 0700  04/07 0659    P.O. 480 600     I.V. (mL/kg) 2 (0) 5245.8 (85.3) 931.8 (15.2)    Blood   0    IV Piggyback  892.8 104    Total Intake(mL/kg) 482 (8.7) 6738.6 (109.6) 1035.8 (16.8)    Urine (mL/kg/hr) 3 (0)      Chest Tube  0 0    Total  Output 3 0 0    Net +479 +6738.6 +1035.8                   Lines/Drains/Airways       Drain  Duration                  Y Chest Tube 1 and 2 03/29/24 0903 1 Left Pleural 32 Fr. 2 Left Pleural 32 Fr. 8 days              Peripheral Intravenous Line  Duration                  Peripheral IV - Single Lumen 03/31/24 1104 20 G Left;Posterior Forearm 6 days                     Physical Exam  Constitutional:       Appearance: Normal appearance.   HENT:      Head: Normocephalic and atraumatic.      Nose: Nose normal.   Cardiovascular:      Rate and Rhythm: Normal rate and regular rhythm.      Heart sounds: Normal heart sounds.   Pulmonary:      Effort: Pulmonary effort is normal.      Breath sounds: Normal breath sounds.   Musculoskeletal:      Right lower leg: No edema.      Left lower leg: No edema.   Neurological:      General: No focal deficit present.      Mental Status: She is alert and oriented to person, place, and time.   Psychiatric:         Behavior: Behavior normal.            Significant Labs:  All pertinent labs from the last 24 hours have been reviewed.    Significant Diagnostics:  I have reviewed all pertinent imaging results/findings within the past 24 hours.

## 2024-04-07 NOTE — SUBJECTIVE & OBJECTIVE
Interval History: Pt was seen and examined. Labs and meds reviewed. Discussed with other providers. Noted emergence of mucus plug and planned bronchoscopy. Denied SOB this am.    Review of patient's allergies indicates:  No Known Allergies  Current Facility-Administered Medications   Medication Frequency    0.9%  NaCl infusion (for blood administration) Q24H PRN    0.9%  NaCl infusion PRN    0.9%  NaCl infusion Continuous    acetaminophen tablet 650 mg Q6H PRN    albuterol nebulizer solution 2.5 mg Q6H PRN    benzonatate capsule 100 mg TID PRN    benzonatate capsule 200 mg TID    bisacodyL suppository 10 mg Daily PRN    enoxaparin injection 40 mg Q24H (prophylaxis, 1700)    estradioL tablet 8 mg Daily    famotidine tablet 20 mg Daily    glucagon (human recombinant) injection 1 mg PRN    glucose chewable tablet 16 g PRN    glucose chewable tablet 24 g PRN    HYDROmorphone injection 1 mg QID PRN    ipratropium 0.02 % nebulizer solution 0.5 mg QID PRN    linezolid tablet 600 mg Q12H    magnesium sulfate 2g in water 50mL IVPB (premix) Once    medroxyPROGESTERone tablet 10 mg Daily    melatonin tablet 6 mg Nightly PRN    methocarbamoL tablet 500 mg QID    metoclopramide injection 5 mg Q6H PRN    metoprolol tartrate (LOPRESSOR) split tablet 12.5 mg BID    naloxone 0.4 mg/mL injection 0.02 mg PRN    ondansetron disintegrating tablet 8 mg Q8H PRN    ondansetron injection 4 mg Q6H PRN    oxyCODONE immediate release tablet 10 mg Q4H PRN    oxyCODONE immediate release tablet 5 mg Q4H PRN    piperacillin-tazobactam (ZOSYN) 4.5 g in dextrose 5 % in water (D5W) 100 mL IVPB (MB+) Q8H    polyethylene glycol packet 17 g Daily    potassium chloride SA CR tablet 40 mEq Daily    sodium chloride 0.9% flush 10 mL PRN    sodium chloride 0.9% flush 10 mL Q12H PRN    sodium chloride 0.9% flush 10 mL PRN    sodium chloride 0.9% flush 10 mL PRN    sodium chloride 0.9% flush 2 mL Q6H    sodium chloride 3% nebulizer solution 4 mL Q6H WAKE     sodium chloride oral tablet 1,000 mg TID    spironolactone tablet 25 mg BID       Objective:     Vital Signs (Most Recent):  Temp: 98.4 °F (36.9 °C) (04/07/24 1202)  Pulse: 85 (04/07/24 1202)  Resp: 14 (04/07/24 1405)  BP: 122/67 (04/07/24 1202)  SpO2: 98 % (04/07/24 1202) Vital Signs (24h Range):  Temp:  [98.4 °F (36.9 °C)-98.8 °F (37.1 °C)] 98.4 °F (36.9 °C)  Pulse:  [71-93] 85  Resp:  [14-18] 14  SpO2:  [93 %-98 %] 98 %  BP: (113-126)/(63-72) 122/67     Weight: 61.5 kg (135 lb 9.3 oz) (04/05/24 2350)  Body mass index is 20.02 kg/m².  Body surface area is 1.73 meters squared.    I/O last 3 completed shifts:  In: 1035.8 [I.V.:931.8; IV Piggyback:104]  Out: 0      Physical Exam  Vitals and nursing note reviewed.   Constitutional:       Appearance: Normal appearance.   Cardiovascular:      Rate and Rhythm: Normal rate and regular rhythm.      Pulses: Normal pulses.      Heart sounds: Normal heart sounds.   Pulmonary:      Effort: Pulmonary effort is normal.      Breath sounds: No rales.      Comments: Unlabored  Abdominal:      Palpations: Abdomen is soft.      Tenderness: There is no abdominal tenderness.   Musculoskeletal:      Right lower leg: No edema.      Left lower leg: No edema.   Neurological:      Mental Status: She is alert and oriented to person, place, and time.   Psychiatric:         Behavior: Behavior normal.          Significant Labs: reviewed  BMP  Lab Results   Component Value Date     04/07/2024    K 3.3 (L) 04/07/2024     04/07/2024    CO2 18 (L) 04/07/2024    BUN 13 04/07/2024    CREATININE 1.6 (H) 04/07/2024    CALCIUM 7.4 (L) 04/07/2024    ANIONGAP 10 04/07/2024    EGFRNORACEVR >60 04/07/2024     Lab Results   Component Value Date    WBC 13.12 (H) 04/07/2024    HGB 8.9 (L) 04/07/2024    HCT 27.4 (L) 04/07/2024    MCV 88 04/07/2024     04/07/2024         Significant Imaging: reviewed

## 2024-04-07 NOTE — ASSESSMENT & PLAN NOTE
27 y/o transgender female who presented with pneumonia complicated by empyema requiring a chest tube who further developed DAVIAN:         DAVIAN (acute kidney injury)  S Cr lower, improved further. DAVIAN resolving  DAVIAN, 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  Supratherapeutic vancomycin level  FENa > 1%, not prerenal azotemia  No other causes suspected  CT scans done were without contrast     Baseline s Cr is normal  K normal  Metabolic acidosis stable  O2 sat good  No indications for acute dialysis     Pneumonia of left lung due to infectious organism  Lung infection complicated by necrotizing pneumonia and empyema  Hydropneumothorax  S/p VATS procedure (POD#7) with drainage of empyema and decortication  On abx  Will defer to CT surgery     Plans and recommendations:  As discussed above  Total time spent 40 minutes including time needed to review the records, the   patient evaluation, documentation, face-to-face discussion with the patient,   more than 50% of the time was spent on coordination of care and counseling.    Level V visit.     Thank you for your consult.

## 2024-04-08 PROBLEM — R07.81 PLEURITIC CHEST PAIN: Status: RESOLVED | Noted: 2024-03-26 | Resolved: 2024-04-08

## 2024-04-08 LAB
ALBUMIN SERPL BCP-MCNC: 1.7 G/DL (ref 3.5–5.2)
ALP SERPL-CCNC: 51 U/L (ref 55–135)
ALT SERPL W/O P-5'-P-CCNC: 97 U/L (ref 10–44)
ANION GAP SERPL CALC-SCNC: 7 MMOL/L (ref 8–16)
AST SERPL-CCNC: 73 U/L (ref 10–40)
BILIRUB SERPL-MCNC: 0.3 MG/DL (ref 0.1–1)
BUN SERPL-MCNC: 11 MG/DL (ref 6–20)
CALCIUM SERPL-MCNC: 7.1 MG/DL (ref 8.7–10.5)
CHLORIDE SERPL-SCNC: 110 MMOL/L (ref 95–110)
CO2 SERPL-SCNC: 19 MMOL/L (ref 23–29)
CREAT SERPL-MCNC: 1.5 MG/DL (ref 0.5–1.4)
ERYTHROCYTE [DISTWIDTH] IN BLOOD BY AUTOMATED COUNT: 15.4 % (ref 11.5–14.5)
EST. GFR  (NO RACE VARIABLE): >60 ML/MIN/1.73 M^2
GLUCOSE SERPL-MCNC: 84 MG/DL (ref 70–110)
HCT VFR BLD AUTO: 27.5 % (ref 40–54)
HGB BLD-MCNC: 9 G/DL (ref 14–18)
MAGNESIUM SERPL-MCNC: 1.8 MG/DL (ref 1.6–2.6)
MCH RBC QN AUTO: 28.2 PG (ref 27–31)
MCHC RBC AUTO-ENTMCNC: 32.7 G/DL (ref 32–36)
MCV RBC AUTO: 86 FL (ref 82–98)
PLATELET # BLD AUTO: 171 K/UL (ref 150–450)
PMV BLD AUTO: 8.3 FL (ref 9.2–12.9)
POTASSIUM SERPL-SCNC: 3.4 MMOL/L (ref 3.5–5.1)
PROT SERPL-MCNC: 5.3 G/DL (ref 6–8.4)
RBC # BLD AUTO: 3.19 M/UL (ref 4.6–6.2)
SODIUM SERPL-SCNC: 136 MMOL/L (ref 136–145)
WBC # BLD AUTO: 10.11 K/UL (ref 3.9–12.7)

## 2024-04-08 PROCEDURE — 36415 COLL VENOUS BLD VENIPUNCTURE: CPT | Performed by: FAMILY MEDICINE

## 2024-04-08 PROCEDURE — 85027 COMPLETE CBC AUTOMATED: CPT | Performed by: FAMILY MEDICINE

## 2024-04-08 PROCEDURE — 99233 SBSQ HOSP IP/OBS HIGH 50: CPT | Mod: ,,, | Performed by: INTERNAL MEDICINE

## 2024-04-08 PROCEDURE — 83735 ASSAY OF MAGNESIUM: CPT | Performed by: FAMILY MEDICINE

## 2024-04-08 PROCEDURE — A4216 STERILE WATER/SALINE, 10 ML: HCPCS | Performed by: ANESTHESIOLOGY

## 2024-04-08 PROCEDURE — 97116 GAIT TRAINING THERAPY: CPT

## 2024-04-08 PROCEDURE — 25000003 PHARM REV CODE 250: Performed by: INTERNAL MEDICINE

## 2024-04-08 PROCEDURE — 25000003 PHARM REV CODE 250: Performed by: ANESTHESIOLOGY

## 2024-04-08 PROCEDURE — 25000003 PHARM REV CODE 250: Performed by: FAMILY MEDICINE

## 2024-04-08 PROCEDURE — 63600175 PHARM REV CODE 636 W HCPCS: Performed by: FAMILY MEDICINE

## 2024-04-08 PROCEDURE — 80053 COMPREHEN METABOLIC PANEL: CPT | Performed by: FAMILY MEDICINE

## 2024-04-08 PROCEDURE — 25000003 PHARM REV CODE 250: Performed by: NURSE PRACTITIONER

## 2024-04-08 PROCEDURE — 25000003 PHARM REV CODE 250: Performed by: THORACIC SURGERY (CARDIOTHORACIC VASCULAR SURGERY)

## 2024-04-08 PROCEDURE — 63600175 PHARM REV CODE 636 W HCPCS: Performed by: INTERNAL MEDICINE

## 2024-04-08 PROCEDURE — 21400001 HC TELEMETRY ROOM

## 2024-04-08 PROCEDURE — 63600175 PHARM REV CODE 636 W HCPCS: Performed by: THORACIC SURGERY (CARDIOTHORACIC VASCULAR SURGERY)

## 2024-04-08 RX ORDER — LANOLIN ALCOHOL/MO/W.PET/CERES
400 CREAM (GRAM) TOPICAL ONCE
Status: COMPLETED | OUTPATIENT
Start: 2024-04-08 | End: 2024-04-08

## 2024-04-08 RX ORDER — LIDOCAINE 50 MG/G
1 PATCH TOPICAL
Status: DISCONTINUED | OUTPATIENT
Start: 2024-04-08 | End: 2024-04-11

## 2024-04-08 RX ORDER — FAMOTIDINE 20 MG/1
20 TABLET, FILM COATED ORAL 2 TIMES DAILY
Status: DISCONTINUED | OUTPATIENT
Start: 2024-04-08 | End: 2024-04-10

## 2024-04-08 RX ORDER — HYDROMORPHONE HYDROCHLORIDE 1 MG/ML
0.5 INJECTION, SOLUTION INTRAMUSCULAR; INTRAVENOUS; SUBCUTANEOUS 4 TIMES DAILY PRN
Status: DISCONTINUED | OUTPATIENT
Start: 2024-04-08 | End: 2024-04-10

## 2024-04-08 RX ADMIN — HYDROMORPHONE HYDROCHLORIDE 1 MG: 1 INJECTION, SOLUTION INTRAMUSCULAR; INTRAVENOUS; SUBCUTANEOUS at 04:04

## 2024-04-08 RX ADMIN — SODIUM CHLORIDE 1000 MG: 1 TABLET ORAL at 09:04

## 2024-04-08 RX ADMIN — OXYCODONE HYDROCHLORIDE 10 MG: 5 TABLET ORAL at 05:04

## 2024-04-08 RX ADMIN — PIPERACILLIN SODIUM AND TAZOBACTAM SODIUM 4.5 G: 4; .5 INJECTION, POWDER, FOR SOLUTION INTRAVENOUS at 03:04

## 2024-04-08 RX ADMIN — Medication 2 ML: at 11:04

## 2024-04-08 RX ADMIN — FAMOTIDINE 20 MG: 20 TABLET ORAL at 09:04

## 2024-04-08 RX ADMIN — MEDROXYPROGESTERONE ACETATE 10 MG: 5 TABLET ORAL at 09:04

## 2024-04-08 RX ADMIN — LIDOCAINE 1 PATCH: 50 PATCH CUTANEOUS at 05:04

## 2024-04-08 RX ADMIN — Medication 2 ML: at 06:04

## 2024-04-08 RX ADMIN — METOPROLOL TARTRATE 12.5 MG: 25 TABLET, FILM COATED ORAL at 09:04

## 2024-04-08 RX ADMIN — ACETAMINOPHEN 650 MG: 325 TABLET ORAL at 06:04

## 2024-04-08 RX ADMIN — POTASSIUM CHLORIDE 40 MEQ: 1500 TABLET, EXTENDED RELEASE ORAL at 09:04

## 2024-04-08 RX ADMIN — METHOCARBAMOL 500 MG: 500 TABLET ORAL at 02:04

## 2024-04-08 RX ADMIN — METHOCARBAMOL 500 MG: 500 TABLET ORAL at 09:04

## 2024-04-08 RX ADMIN — SPIRONOLACTONE 25 MG: 25 TABLET, FILM COATED ORAL at 09:04

## 2024-04-08 RX ADMIN — METHOCARBAMOL 500 MG: 500 TABLET ORAL at 04:04

## 2024-04-08 RX ADMIN — PIPERACILLIN SODIUM AND TAZOBACTAM SODIUM 4.5 G: 4; .5 INJECTION, POWDER, FOR SOLUTION INTRAVENOUS at 09:04

## 2024-04-08 RX ADMIN — ENOXAPARIN SODIUM 40 MG: 40 INJECTION SUBCUTANEOUS at 05:04

## 2024-04-08 RX ADMIN — Medication 400 MG: at 05:04

## 2024-04-08 RX ADMIN — SODIUM CHLORIDE 1000 MG: 1 TABLET ORAL at 02:04

## 2024-04-08 RX ADMIN — OXYCODONE HYDROCHLORIDE 10 MG: 5 TABLET ORAL at 09:04

## 2024-04-08 RX ADMIN — BENZONATATE 200 MG: 100 CAPSULE ORAL at 09:04

## 2024-04-08 RX ADMIN — BENZONATATE 200 MG: 100 CAPSULE ORAL at 02:04

## 2024-04-08 RX ADMIN — ESTRADIOL 8 MG: 1 TABLET ORAL at 09:04

## 2024-04-08 RX ADMIN — HYDROMORPHONE HYDROCHLORIDE 0.5 MG: 1 INJECTION, SOLUTION INTRAMUSCULAR; INTRAVENOUS; SUBCUTANEOUS at 11:04

## 2024-04-08 RX ADMIN — CEFTRIAXONE 2 G: 2 INJECTION, POWDER, FOR SOLUTION INTRAMUSCULAR; INTRAVENOUS at 06:04

## 2024-04-08 RX ADMIN — Medication 2 ML: at 05:04

## 2024-04-08 RX ADMIN — LINEZOLID 600 MG: 600 TABLET, FILM COATED ORAL at 09:04

## 2024-04-08 RX ADMIN — Medication 2 ML: at 12:04

## 2024-04-08 RX ADMIN — OXYCODONE HYDROCHLORIDE 10 MG: 5 TABLET ORAL at 02:04

## 2024-04-08 NOTE — ASSESSMENT & PLAN NOTE
"This patient does have evidence of infective focus  My overall impression is sepsis.  Source: Respiratory  Antibiotics given-   Antibiotics (72h ago, onward)      Start     Stop Route Frequency Ordered    03/30/24 0900  linezolid tablet 600 mg         -- Oral Every 12 hours 03/30/24 0731    03/28/24 1800  piperacillin-tazobactam (ZOSYN) 4.5 g in dextrose 5 % in water (D5W) 100 mL IVPB (MB+)         -- IV Every 8 hours (non-standard times) 03/28/24 1538        No results for input(s): "LACTATE", "POCLAC" in the last 72 hours.    Organ dysfunction indicated by  none    Fluid challenge Not needed - patient is not hypotensive      Post- resuscitation assessment No - Post resuscitation assessment not needed       Will Not start Pressors- Levophed for MAP of 65  Source control achieved by: broad spectrum antibiotics    Leukocytosis resolved   Chest tube minimal drainage    Resolved   "

## 2024-04-08 NOTE — PROGRESS NOTES
[facility] ([facility])  Nephrology  Progress Note    Patient Name: Mayela Molina  MRN: [id number]  Admission Date: 3/25/2024  Hospital Length of Stay: 13 days  Attending Provider: Perla Gutierres MD   Primary Care Physician: Katie, Primary Doctor  Principal Problem:Pneumonia of left lung due to infectious organism    Subjective:     HPI: Noted    Interval History: Pt was seen and examined. Labs and meds reviewed. Discussed with other providers. No new c/o's, no SOB. CT note reviewed.    Review of patient's allergies indicates:  No Known Allergies  Current Facility-Administered Medications   Medication Frequency    0.9%  NaCl infusion (for blood administration) Q24H PRN    0.9%  NaCl infusion PRN    acetaminophen tablet 650 mg Q6H PRN    albuterol nebulizer solution 2.5 mg Q6H PRN    benzonatate capsule 100 mg TID PRN    benzonatate capsule 200 mg TID    bisacodyL suppository 10 mg Daily PRN    enoxaparin injection 40 mg Q24H (prophylaxis, 1700)    estradioL tablet 8 mg Daily    famotidine tablet 20 mg BID    glucagon (human recombinant) injection 1 mg PRN    glucose chewable tablet 16 g PRN    glucose chewable tablet 24 g PRN    HYDROmorphone injection 1 mg QID PRN    ipratropium 0.02 % nebulizer solution 0.5 mg QID PRN    linezolid tablet 600 mg Q12H    medroxyPROGESTERone tablet 10 mg Daily    melatonin tablet 6 mg Nightly PRN    methocarbamoL tablet 500 mg QID    metoclopramide injection 5 mg Q6H PRN    metoprolol tartrate (LOPRESSOR) split tablet 12.5 mg BID    naloxone 0.4 mg/mL injection 0.02 mg PRN    ondansetron disintegrating tablet 8 mg Q8H PRN    ondansetron injection 4 mg Q6H PRN    oxyCODONE immediate release tablet 10 mg Q4H PRN    oxyCODONE immediate release tablet 5 mg Q4H PRN    piperacillin-tazobactam (ZOSYN) 4.5 g in dextrose 5 % in water (D5W) 100 mL IVPB (MB+) Q8H    polyethylene glycol packet 17 g Daily    potassium chloride SA CR tablet 40 mEq Daily    sodium chloride 0.9% flush 10 mL  PRN    sodium chloride 0.9% flush 10 mL Q12H PRN    sodium chloride 0.9% flush 10 mL PRN    sodium chloride 0.9% flush 10 mL PRN    sodium chloride 0.9% flush 2 mL Q6H    sodium chloride 3% nebulizer solution 4 mL Q6H WAKE    sodium chloride oral tablet 1,000 mg TID    spironolactone tablet 25 mg BID       Objective:     Vital Signs (Most Recent):  Temp: 98.9 °F (37.2 °C) (04/08/24 1229)  Pulse: 67 (04/08/24 1229)  Resp: 18 (04/08/24 1432)  BP: 122/65 (04/08/24 1229)  SpO2: 100 % (04/08/24 1229) Vital Signs (24h Range):  Temp:  [98.2 °F (36.8 °C)-99.2 °F (37.3 °C)] 98.9 °F (37.2 °C)  Pulse:  [] 67  Resp:  [15-18] 18  SpO2:  [94 %-100 %] 100 %  BP: (112-125)/(65-74) 122/65     Weight: 59.9 kg (132 lb 0.9 oz) (04/07/24 2331)  Body mass index is 19.5 kg/m².  Body surface area is 1.71 meters squared.    I/O last 3 completed shifts:  In: 4263.8 [I.V.:3882.7; IV Piggyback:381.1]  Out: 20 [Chest Tube:20]     Physical Exam  Vitals and nursing note reviewed.   Constitutional:       Appearance: Normal appearance.   Pulmonary:      Effort: Pulmonary effort is normal.      Breath sounds: Normal breath sounds. No rales.   Musculoskeletal:      Right lower leg: No edema.      Left lower leg: No edema.   Neurological:      Mental Status: She is alert.          Significant Labs: reviewed  BMP  Lab Results   Component Value Date     04/08/2024    K 3.4 (L) 04/08/2024     04/08/2024    CO2 19 (L) 04/08/2024    BUN 11 04/08/2024    CREATININE 1.5 (H) 04/08/2024    CALCIUM 7.1 (L) 04/08/2024    ANIONGAP 7 (L) 04/08/2024    EGFRNORACEVR >60 04/08/2024     Lab Results   Component Value Date    WBC 10.11 04/08/2024    HGB 9.0 (L) 04/08/2024    HCT 27.5 (L) 04/08/2024    MCV 86 04/08/2024     04/08/2024           Significant Imaging: reviewed      Assessment/Plan:     27 y/o transgender female who presented with pneumonia complicated by empyema requiring a chest tube who further developed DAVIAN:         DAVIAN (acute  kidney injury)  S Cr further improved, DAVIAN slowly resolving  Stable renal function  DAVIAN, 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  Supratherapeutic vancomycin level  FENa > 1%, not prerenal azotemia  No other causes suspected  CT scans done were without contrast     Baseline s Cr is normal  K, again slightly low, mild hypokalemia, c/w recovery phase of DAVIAN. OK to repalce  Metabolic acidosis stable  O2 sat good  No indications for acute dialysis     Pneumonia of left lung due to infectious organism  Lung infection complicated by necrotizing pneumonia and empyema  Hydropneumothorax  S/p VATS procedure (POD#7) with drainage of empyema and decortication  On abx  Has a mucus plug now, planned bronchoscopy noted  Will defer to CT surgery     Plans and recommendations:  As discussed above  Total time spent 40 minutes including time needed to review the records, the   patient evaluation, documentation, face-to-face discussion with the patient,   more than 50% of the time was spent on coordination of care and counseling.    Level V visit.     Thank you for your consult.         Linn Edwards MD  Nephrology  [facility] ([facility])

## 2024-04-08 NOTE — ASSESSMENT & PLAN NOTE
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 Baseline creatinine  normal  - Labs reviewed- Renal function/electrolytes with Estimated Creatinine Clearance (based on SCr of 1.5 mg/dL (H))  Female: 53.7 mL/min (A)  Male: 63.2 mL/min (A)  Hover for info according to latest data. Monitor urine output and serial BMP and adjust therapy as needed. Avoid nephrotoxins and renally dose meds for GFR listed above.    Improving  Nephrology following  Spironolactone resumed at lower dose

## 2024-04-08 NOTE — SUBJECTIVE & OBJECTIVE
Interval History: See hospital course for today      Review of Systems   Constitutional:  Positive for activity change (improving), appetite change (improving) and fatigue (improving). Negative for fever.   Respiratory:  Positive for cough and shortness of breath (w/ cough).    Cardiovascular:  Positive for chest pain (a/w chest tube). Negative for leg swelling.   Gastrointestinal:  Negative for nausea and vomiting.   Musculoskeletal:  Positive for myalgias.   Skin:  Positive for wound.   Neurological:  Positive for weakness (improving).   Psychiatric/Behavioral:  Negative for agitation, behavioral problems, confusion and decreased concentration. The patient is not nervous/anxious.      Objective:     Vital Signs (Most Recent):  Temp: 98 °F (36.7 °C) (04/08/24 1631)  Pulse: (!) 112 (04/08/24 1631)  Resp: 18 (04/08/24 1631)  BP: 125/75 (04/08/24 1631)  SpO2: 98 % (04/08/24 1631) Vital Signs (24h Range):  Temp:  [98 °F (36.7 °C)-99.2 °F (37.3 °C)] 98 °F (36.7 °C)  Pulse:  [] 112  Resp:  [15-18] 18  SpO2:  [94 %-100 %] 98 %  BP: (112-125)/(65-75) 125/75     Weight: 59.9 kg (132 lb 0.9 oz)  Body mass index is 19.5 kg/m².    Intake/Output Summary (Last 24 hours) at 4/8/2024 1644  Last data filed at 4/8/2024 0604  Gross per 24 hour   Intake 2729.87 ml   Output --   Net 2729.87 ml         Physical Exam  Vitals and nursing note reviewed. Exam conducted with a chaperone present (team, nursing).   Constitutional:       General: She is not in acute distress.     Appearance: She is underweight. She is ill-appearing. She is not toxic-appearing.   HENT:      Head: Normocephalic and atraumatic.   Cardiovascular:      Rate and Rhythm: Tachycardia present.   Pulmonary:      Effort: Pulmonary effort is normal. No respiratory distress.   Chest:      Chest wall: Tenderness present.      Comments: Chest tube  Abdominal:      Palpations: Abdomen is soft.      Tenderness: There is no abdominal tenderness.   Musculoskeletal:       Right lower leg: No edema.      Left lower leg: No edema.   Skin:     General: Skin is warm.      Coloration: Skin is pale.   Neurological:      Mental Status: She is alert and oriented to person, place, and time.      Motor: Weakness present.   Psychiatric:         Mood and Affect: Mood and affect normal.         Speech: Speech normal.         Cognition and Memory: Cognition and memory normal.             Significant Labs: All pertinent labs within the past 24 hours have been reviewed.    Blood Culture: negative growth to date x 5 days  Afb culture no acid fast bacilli  No anaerobes isolated  Mrsa swab negative  Aerobic culture growing strep    CBC:   Recent Labs   Lab 04/07/24  0535 04/08/24  0550   WBC 13.12* 10.11   HGB 8.9* 9.0*   HCT 27.4* 27.5*    171     CMP:   Recent Labs   Lab 04/07/24  0535 04/08/24  0549    136   K 3.3* 3.4*    110   CO2 18* 19*   GLU 87 84   BUN 13 11   CREATININE 1.6* 1.5*   CALCIUM 7.4* 7.1*   PROT 5.8* 5.3*   ALBUMIN 1.7* 1.7*   BILITOT 0.5 0.3   ALKPHOS 54* 51*   * 73*   * 97*   ANIONGAP 10 7*       Significant Imaging: I have reviewed all pertinent imaging results/findings within the past 24 hours.  CXR: I have reviewed all pertinent results/findings within the past 24 hours and my personal findings are:  no adverse interval change

## 2024-04-08 NOTE — ASSESSMENT & PLAN NOTE
Nutrition consulted. Most recent weight and BMI monitored-     Measurements:  Wt Readings from Last 1 Encounters:   04/07/24 59.9 kg (132 lb 0.9 oz)   Body mass index is 19.5 kg/m².    Patient has been screened and assessed by RD.    Malnutrition Type:  Context: acute illness or injury, chronic illness  Level: moderate    Malnutrition Characteristic Summary:  Energy Intake (Malnutrition): less than or equal to 75% for greater than or equal to 1 month  Subcutaneous Fat (Malnutrition): moderate depletion  Muscle Mass (Malnutrition): moderate depletion    Interventions/Recommendations (treatment strategy):  1. Recommend pt continues on Regular diet  2. Recommend pt continue Boost plus TID as medically appropriate 3. Recommend Rob BID for wound healing 4. Encourage PO intake of meals, snacks and supplements 5. Weigh twice weekly

## 2024-04-08 NOTE — PROGRESS NOTES
Subjective:      Patient ID: Mayela Molina is a 28 y.o. adult.    Chief Complaint: Cough (Pt c/o cough/SOB  x3-4wks w/ pain from coughing. Denies sick contacts/CP. )    HPI: 28-year-old patient with no chronic medical problems who presented to the ED complaining of a cough x 3 weeks. Patient is a biological male in the transition to female who reports a cough with yellow sputum, left sided pleuritic pain, and dyspnea that increases with exertion  Scan showed a large left-sided hydropneumothorax and the consolidation of the left lower lobe.  She is afebrile and got IV antibiotics.  White cell count is coming down     Date of Procedure: 3/29/2024      Procedure: Procedure(s) (LRB):  VATS (VIDEO-ASSISTED THORACOSCOPIC SURGERY) (Left)  BLOCK, NERVE, INTERCOSTAL, 2 OR MORE (Left)     Surgeon(s) and Role:     * Josafat Sandoval MD - Primary     Assisting Surgeon: None     Pre-Operative Diagnosis: Pneumonia of left lower lobe due to infectious organism [J18.9]  Left empyema hydropneumothorax  Hepatitis-C  Supraventricular tachycardia     Post-Operative Diagnosis: Post-Op Diagnosis Codes:     * Pneumonia of left lower lobe due to infectious organism [J18.9]  Same  Patient underwent left thoracotomy and decortication, intraoperatively purulent secretion with a necrotic lung involving the left lower lobe with multiple abscesses found.  She was tachycardic overnight and looks volume contracted with a rising creatinine and low albumin.  Still having pain issues not controlled with current dosage of morphine and oxycodone.  Incentive spirometry is not being done because of the pain.  Not able to cough.  Poor appetite.    03/31/2024 the patient pain was better controlled overnight she still has a week off.  Not much secretions during coughing.  Serosanguineous fluid more anemia this morning no obvious signs of blood loss.  Making dark urine.  Incentive spirometry less than 500.  No air leak from the chest tube.   Ambulated yesterday.  Appetite is improving.  04/01/2024 the patient is still having lot of pain issues and muscle spasm very ineffective cough incentive spirometry less than 500 chest tube draining minimal serous fluid still tachycardia.  Appetite is getting better.  Hydropneumothorax  04/02/2024   Patient is postop day 4 status post VATS procedure with drainage of empyema and decortication.  Continue broad-spectrum antibiotics with linezolid and Zosyn.  Patient's white count is trending downward with white count of 20.  Patient is afebrile.  Chest tube output trending lower.  Continue pulmonary toileting continue incentive spirometer.  Patient is up and ambulating.  Increase as tolerated.     04/03/2024   The patient is postop day 5 status post VATS procedure with drainage of empyema and decortication.  Patient is on broad-spectrum antibiotics linezolid and Zosyn.  Is trending down.  Patient is afebrile.  Continue pulmonary toileting.  Chest x-ray shows continued patchy infiltrate left middle and lower lobes.  Continue chest tube to drainage.     04/04/2024   The patient is postop day 6 status post VATS procedure with drainage of empyema and decortication.  Patient is on broad-spectrum antibiotics.  Continue linezolid and Zosyn.  Patient is afebrile.  Continue pulmonary toileting continue incentive spirometer..  Chest tube output has been minimal.  Will repeat chest x-ray to access extent of left lung consolidation.     04/05/2024   The patient is postop day 7 status post VATS procedure with drainage of empyema and decortication patient continues on antibiotics.  Patient is afebrile.  White count is slightly elevated at toileting.  Continue incentive spirometer.  Continue pulmonary toileting.  Chest tube output has been minimal.  .  Chest x-ray shows dense left lower lobe infiltrate which is unchanged. Continue chest tube as per patient's primary surgeon     04/06/2024   The patient is postop day 8 status post  VATS procedure with drainage of empyema and decortication.  Patient continues on broad-spectrum antibiotics.  Patient is afebrile and WBC has decreased from 13 to 11 today.  Continue pulmonary toileting.  Continue incentive spirometer.  Chest tube output has been small however drainage in extension tube on chest tube looks bloody and cloudy.  Continue chest tube to drainage.    04/08/2024 patient chest tube drainage is minimal very ineffective cough incentive spirometry 400 cc.  Pain issues are better than last week nutrition is still on board.  Hypoalbuminemia.  Go to the CT scan finding plugging of the left mainstem bronchus patient going for bronchoscopy today.  White count is normal afebrile.  Patient is still declining chest physical therapy in spite of explaining the importance.        Review of patient's allergies indicates:  No Known Allergies    History reviewed. No pertinent past medical history.    History reviewed. No pertinent family history.    Social History     Socioeconomic History    Marital status: Single   Tobacco Use    Smoking status: Never    Smokeless tobacco: Never   Substance and Sexual Activity    Alcohol use: Never    Drug use: Never    Sexual activity: Yes       Past Surgical History:   Procedure Laterality Date    INJECTION OF ANESTHETIC AGENT AROUND MULTIPLE INTERCOSTAL NERVES Left 3/29/2024    Procedure: BLOCK, NERVE, INTERCOSTAL, 2 OR MORE;  Surgeon: Josafat Sandoval MD;  Location: [facility] ([place] OR;  Service: Cardiothoracic;  Laterality: Left;    VIDEO-ASSISTED THORACOSCOPIC SURGERY (VATS) Left 3/29/2024    Procedure: VATS (VIDEO-ASSISTED THORACOSCOPIC SURGERY);  Surgeon: Josafat Sandoval MD;  Location: [facility] OR;  Service: Cardiothoracic;  Laterality: Left;       Review of Systems   Constitutional:  Positive for activity change, appetite change and fatigue.   HENT:  Negative for dental problem, nosebleeds and sore throat.    Eyes:  Negative for discharge and visual disturbance.  "  Respiratory:  Positive for cough, chest tightness and shortness of breath. Negative for stridor.    Cardiovascular:  Negative for leg swelling.   Gastrointestinal:  Negative for abdominal distention and abdominal pain.   Genitourinary:  Negative for difficulty urinating and dysuria.   Musculoskeletal:  Negative for arthralgias, back pain and joint swelling.   Allergic/Immunologic: Negative for environmental allergies.   Neurological:  Negative for dizziness, syncope and headaches.   Hematological:  Does not bruise/bleed easily.   Psychiatric/Behavioral:  Negative for behavioral problems.           Objective:   /66 (BP Location: Right arm, Patient Position: Lying)   Pulse 87   Temp 99.2 °F (37.3 °C) (Oral)   Resp 17   Ht 5' 9" (1.753 m)   Wt 59.9 kg (132 lb 0.9 oz)   SpO2 97%   BMI 19.50 kg/m²     X-Ray Chest AP Portable  Narrative: EXAMINATION:  XR CHEST AP PORTABLE    CLINICAL HISTORY:  respiratory failure; Pneumonia, unspecified organism    TECHNIQUE:  Single frontal view of the chest was performed.    COMPARISON:  04/05/2024    FINDINGS:  Left chest tube remains in position.  Patchy interstitial alveolar opacities seen throughout the mid and left lower lung zone.  Suspect small to moderate left-sided pleural effusion.  In comparison to the prior study, there is no adverse interval changes.  Impression: In comparison to the prior study, there is no adverse interval changes    Electronically signed by: Bob Velasco MD  Date:    04/08/2024  Time:    07:12         Physical Exam  Vitals reviewed.   Constitutional:       Appearance: Normal appearance.   HENT:      Head: Normocephalic and atraumatic.      Mouth/Throat:      Mouth: Mucous membranes are moist.   Eyes:      Extraocular Movements: Extraocular movements intact.   Cardiovascular:      Rate and Rhythm: Normal rate and regular rhythm.      Pulses: Normal pulses.      Heart sounds: Normal heart sounds.   Pulmonary:      Effort: Pulmonary effort " is normal.      Breath sounds: Rhonchi and rales present.   Abdominal:      Palpations: Abdomen is soft.   Musculoskeletal:         General: Normal range of motion.      Cervical back: Normal range of motion and neck supple.   Skin:     General: Skin is warm.      Capillary Refill: Capillary refill takes less than 2 seconds.   Neurological:      General: No focal deficit present.      Mental Status: She is alert and oriented to person, place, and time.   Psychiatric:         Mood and Affect: Mood normal.         Behavior: Behavior normal.         Assessment:     1. Hydropneumothorax    2. Tachycardia    3. Pneumonia of left lower lobe due to infectious organism    4. Leukocytosis, unspecified type    5. Hyponatremia    6. Chest pain          Plan   28-year-old female with a left sided hydropneumothorax status post thoracotomy and decortication with chest tube placement postop day 10.  Neuro Pain control as needed.  Activity ambulate as tolerated patient chest tubes can be discontinued from the suction while ambulating chest tube to  water seal.  The chest tube will stay until her pneumonia is cleared to prevent further loculations and empyema formation.  Bronchoscopy today with pulmonary.  Respiratory aggressive pulmonary toilet incentive spirometry.  Chest physiotherapy started with Mucomyst to loosen the secretions.  Cardiovascular beta-blocker for heart  rate control.  Anemia multifactorial   Renal creatinine i trending down   Avoid all the nephrotoxic medications.  Malnutrition and hypoalbuminemia protein supplements ordered.  Dietitian on board  Infectious disease antibiotics as per the culture managed by hospital medicine team.  DVT and GI prophylaxis with Lovenox bilateral SCDs and Pepcid.  PTOT out of bed ambulate  Rest of the management by hospital medicine team.          Josafat Sandoval MD  [facility]  [place]

## 2024-04-08 NOTE — ASSESSMENT & PLAN NOTE
Growing strep  On linezolid and zosyn  Repeat ct chest with complete plugging  Chest physiotherapy added with percussion  CTS to remove one chest tube on 4/8  Reevaluate over 1-2 days for possible removal of 2nd chest tube

## 2024-04-08 NOTE — PLAN OF CARE
TX COMPLETED: facilitated bed mobility independently, transfers independently, ambulated 450 ft independently with no AD or LOB. Pt has no further acute PT needs at this time.

## 2024-04-08 NOTE — SUBJECTIVE & OBJECTIVE
Chief complaint: pleuritic pain    Mayela Molina is a 28-year-old patient with no chronic medical problems who presented to the ED complaining of a cough x 3 weeks. Patient is a biological male in the transition to female who reports a cough with yellow sputum, left sided pleuritic pain, and dyspnea that increases with exertion. Denies fever, chills, dizziness, abdominal pain, n/v/d, dysuria, hematuria, myalgia. Denies tobacco use, vaping/e-cigarettes, or drug use. Abnormal labs in the emergency department include white blood cell count 30.46, hemoglobin 12.7, platelets 577, D-dimer 1.37, sodium 122, chloride 90, bicarb 21, glucose 121, albumin 2.3. Patient was admitted under hospital medicine service for left-sided pneumonia with possible underlying abscess formation. Pulmonary consulted for evaluation.    Interval history:  3/30: underwent VATS yesterday; chest tube in place. Pulmonary status stable on room air.   3/31: Remains on room air; poor cough quality. H&H decreased this am with plans for blood transfusion.  4/8: Pulmonary status stable on room air. Chest tube x1 removed today per cardiothoracic surgery.     Objective:     Vital Signs (Most Recent):  Temp: 98 °F (36.7 °C) (04/08/24 1631)  Pulse: (!) 112 (04/08/24 1631)  Resp: 18 (04/08/24 1631)  BP: 125/75 (04/08/24 1631)  SpO2: 98 % (04/08/24 1631) Vital Signs (24h Range):  Temp:  [98 °F (36.7 °C)-99.2 °F (37.3 °C)] 98 °F (36.7 °C)  Pulse:  [] 112  Resp:  [15-18] 18  SpO2:  [94 %-100 %] 98 %  BP: (112-125)/(65-75) 125/75     Weight: 59.9 kg (132 lb 0.9 oz); Body mass index is 19.5 kg/m².    Intake/Output Summary (Last 24 hours) at 4/8/2024 1656  Last data filed at 4/8/2024 0604  Gross per 24 hour   Intake 2729.87 ml   Output --   Net 2729.87 ml      Physical Exam  Vitals and nursing note reviewed.   HENT:      Head: Normocephalic.   Eyes:      Conjunctiva/sclera: Conjunctivae normal.   Cardiovascular:      Rate and Rhythm: Normal rate.    Pulmonary:      Effort: Pulmonary effort is normal.   Abdominal:      Palpations: Abdomen is soft.   Musculoskeletal:         General: Normal range of motion.      Cervical back: Normal range of motion.   Skin:     General: Skin is warm.   Neurological:      Mental Status: She is alert and oriented to person, place, and time.   Psychiatric:         Mood and Affect: Mood normal.         Review of Systems: Negative except as indicated in HPI    Significant Labs:  CBC/Anemia Profile:  Recent Labs   Lab 04/07/24  0535 04/08/24  0550   WBC 13.12* 10.11   HGB 8.9* 9.0*   HCT 27.4* 27.5*    171   MCV 88 86   RDW 15.4* 15.4*   Chemistries:  Recent Labs   Lab 04/07/24  0535 04/08/24  0549    136   K 3.3* 3.4*    110   CO2 18* 19*   BUN 13 11   CREATININE 1.6* 1.5*   CALCIUM 7.4* 7.1*   ALBUMIN 1.7* 1.7*   PROT 5.8* 5.3*   BILITOT 0.5 0.3   ALKPHOS 54* 51*   * 97*   * 73*   MG 1.4*  --    Significant Imaging:  CXR: I have reviewed all pertinent results/findings within the past 24 hours and my personal findings are:  left chest tube x2 in place; hazy opacification to the left lower lobe similar in appearance to prior imaging

## 2024-04-08 NOTE — ASSESSMENT & PLAN NOTE
Patient's anemia is currently controlled. Has not received any PRBCs to date.   Current CBC reviewed-   Lab Results   Component Value Date    HGB 9.0 (L) 04/08/2024    HCT 27.5 (L) 04/08/2024     Monitor serial CBC and transfuse if patient becomes hemodynamically unstable, symptomatic or H/H drops below 7/21.  Iron def anemia

## 2024-04-08 NOTE — ASSESSMENT & PLAN NOTE
29 y/o transgender female who presented with pneumonia complicated by empyema requiring a chest tube who further developed DAVIAN:         DAVIAN (acute kidney injury)  S Cr further improved, DAVIAN slowly resolving  Stable renal function  DAVIAN, 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  Supratherapeutic vancomycin level  FENa > 1%, not prerenal azotemia  No other causes suspected  CT scans done were without contrast     Baseline s Cr is normal  K, mild hypokalemia, c/w recovery phase of DAVIAN. OK to repalce  Metabolic acidosis stable  O2 sat good  No indications for acute dialysis     Pneumonia of left lung due to infectious organism  Lung infection complicated by necrotizing pneumonia and empyema  Hydropneumothorax  S/p VATS procedure (POD#7) with drainage of empyema and decortication  On abx  Has a mucus plug now, planned bronchoscopy noted  Will defer to CT surgery     Plans and recommendations:  As discussed above  Total time spent 40 minutes including time needed to review the records, the   patient evaluation, documentation, face-to-face discussion with the patient,   more than 50% of the time was spent on coordination of care and counseling.    Level V visit.     Thank you for your consult.

## 2024-04-08 NOTE — PROGRESS NOTES
[facility]  Pulmonology Progress Note    Patient Name: Mayela Molina   MRN: [id number]  Admission Date: 3/25/2024    Hospital Length of Stay: 13 days  Code Status: Full Code     Attending Provider: Perla Gutierres MD  Subjective:     Chief complaint: pleuritic pain    Mayela Molina is a 28-year-old patient with no chronic medical problems who presented to the ED complaining of a cough x 3 weeks. Patient is a biological male in the transition to female who reports a cough with yellow sputum, left sided pleuritic pain, and dyspnea that increases with exertion. Denies fever, chills, dizziness, abdominal pain, n/v/d, dysuria, hematuria, myalgia. Denies tobacco use, vaping/e-cigarettes, or drug use. Abnormal labs in the emergency department include white blood cell count 30.46, hemoglobin 12.7, platelets 577, D-dimer 1.37, sodium 122, chloride 90, bicarb 21, glucose 121, albumin 2.3. Patient was admitted under hospital medicine service for left-sided pneumonia with possible underlying abscess formation. Pulmonary consulted for evaluation.    Interval history:  3/30: underwent VATS yesterday; chest tube in place. Pulmonary status stable on room air.   3/31: Remains on room air; poor cough quality. H&H decreased this am with plans for blood transfusion.  4/8: Pulmonary status stable on room air. Chest tube x1 removed today per cardiothoracic surgery.     Objective:     Vital Signs (Most Recent):  Temp: 98 °F (36.7 °C) (04/08/24 1631)  Pulse: (!) 112 (04/08/24 1631)  Resp: 18 (04/08/24 1631)  BP: 125/75 (04/08/24 1631)  SpO2: 98 % (04/08/24 1631) Vital Signs (24h Range):  Temp:  [98 °F (36.7 °C)-99.2 °F (37.3 °C)] 98 °F (36.7 °C)  Pulse:  [] 112  Resp:  [15-18] 18  SpO2:  [94 %-100 %] 98 %  BP: (112-125)/(65-75) 125/75     Weight: 59.9 kg (132 lb 0.9 oz); Body mass index is 19.5 kg/m².    Intake/Output Summary (Last 24 hours) at 4/8/2024 1072  Last data filed at  4/8/2024 0604  Gross per 24 hour   Intake 2729.87 ml   Output --   Net 2729.87 ml      Physical Exam  Vitals and nursing note reviewed.   HENT:      Head: Normocephalic.   Eyes:      Conjunctiva/sclera: Conjunctivae normal.   Cardiovascular:      Rate and Rhythm: Normal rate.   Pulmonary:      Effort: Pulmonary effort is normal.   Abdominal:      Palpations: Abdomen is soft.   Musculoskeletal:         General: Normal range of motion.      Cervical back: Normal range of motion.   Skin:     General: Skin is warm.   Neurological:      Mental Status: She is alert and oriented to person, place, and time.   Psychiatric:         Mood and Affect: Mood normal.         Review of Systems: Negative except as indicated in HPI    Significant Labs:  CBC/Anemia Profile:  Recent Labs   Lab 04/07/24  0535 04/08/24  0550   WBC 13.12* 10.11   HGB 8.9* 9.0*   HCT 27.4* 27.5*    171   MCV 88 86   RDW 15.4* 15.4*   Chemistries:  Recent Labs   Lab 04/07/24  0535 04/08/24  0549    136   K 3.3* 3.4*    110   CO2 18* 19*   BUN 13 11   CREATININE 1.6* 1.5*   CALCIUM 7.4* 7.1*   ALBUMIN 1.7* 1.7*   PROT 5.8* 5.3*   BILITOT 0.5 0.3   ALKPHOS 54* 51*   * 97*   * 73*   MG 1.4*  --    Significant Imaging:  CXR: I have reviewed all pertinent results/findings within the past 24 hours and my personal findings are:  left chest tube x2 in place; hazy opacification to the left lower lobe similar in appearance to prior imaging    Assessment/Plan:   Pneumonia of left lung due to infectious organism  Hydropneumothorax  Pulmonary abscess  CT chest on admit with dense consolidation and questionable underlying abscess formation; no significant effusion noted  Repeat CT chest with apparent hydropneumothorax with underlying pulmonary abscess  Post VATS and chest tube placement on 3/29/2024  Chest tube x1 removed today; continue CTx1 and follow output  Culture --> streptococcus constellatus  De-escalate ABX coverage based on  culture findings  Supplemental oxygen as needed to maintain saturations 90% or greater; currently on room air   Follow chest imaging as needed  Mobilize as able; OOB to chair TID  IS while awake     Kiarra Willis NP  Pulmonary and Critical Care  [facility]

## 2024-04-08 NOTE — SUBJECTIVE & OBJECTIVE
Interval History: Pt was seen and examined. Labs and meds reviewed. Discussed with other providers. No new c/o's, no SOB. CT note reviewed.    Review of patient's allergies indicates:  No Known Allergies  Current Facility-Administered Medications   Medication Frequency    0.9%  NaCl infusion (for blood administration) Q24H PRN    0.9%  NaCl infusion PRN    acetaminophen tablet 650 mg Q6H PRN    albuterol nebulizer solution 2.5 mg Q6H PRN    benzonatate capsule 100 mg TID PRN    benzonatate capsule 200 mg TID    bisacodyL suppository 10 mg Daily PRN    enoxaparin injection 40 mg Q24H (prophylaxis, 1700)    estradioL tablet 8 mg Daily    famotidine tablet 20 mg BID    glucagon (human recombinant) injection 1 mg PRN    glucose chewable tablet 16 g PRN    glucose chewable tablet 24 g PRN    HYDROmorphone injection 1 mg QID PRN    ipratropium 0.02 % nebulizer solution 0.5 mg QID PRN    linezolid tablet 600 mg Q12H    medroxyPROGESTERone tablet 10 mg Daily    melatonin tablet 6 mg Nightly PRN    methocarbamoL tablet 500 mg QID    metoclopramide injection 5 mg Q6H PRN    metoprolol tartrate (LOPRESSOR) split tablet 12.5 mg BID    naloxone 0.4 mg/mL injection 0.02 mg PRN    ondansetron disintegrating tablet 8 mg Q8H PRN    ondansetron injection 4 mg Q6H PRN    oxyCODONE immediate release tablet 10 mg Q4H PRN    oxyCODONE immediate release tablet 5 mg Q4H PRN    piperacillin-tazobactam (ZOSYN) 4.5 g in dextrose 5 % in water (D5W) 100 mL IVPB (MB+) Q8H    polyethylene glycol packet 17 g Daily    potassium chloride SA CR tablet 40 mEq Daily    sodium chloride 0.9% flush 10 mL PRN    sodium chloride 0.9% flush 10 mL Q12H PRN    sodium chloride 0.9% flush 10 mL PRN    sodium chloride 0.9% flush 10 mL PRN    sodium chloride 0.9% flush 2 mL Q6H    sodium chloride 3% nebulizer solution 4 mL Q6H WAKE    sodium chloride oral tablet 1,000 mg TID    spironolactone tablet 25 mg BID       Objective:     Vital Signs (Most Recent):  Temp:  98.9 °F (37.2 °C) (04/08/24 1229)  Pulse: 67 (04/08/24 1229)  Resp: 18 (04/08/24 1432)  BP: 122/65 (04/08/24 1229)  SpO2: 100 % (04/08/24 1229) Vital Signs (24h Range):  Temp:  [98.2 °F (36.8 °C)-99.2 °F (37.3 °C)] 98.9 °F (37.2 °C)  Pulse:  [] 67  Resp:  [15-18] 18  SpO2:  [94 %-100 %] 100 %  BP: (112-125)/(65-74) 122/65     Weight: 59.9 kg (132 lb 0.9 oz) (04/07/24 2331)  Body mass index is 19.5 kg/m².  Body surface area is 1.71 meters squared.    I/O last 3 completed shifts:  In: 4263.8 [I.V.:3882.7; IV Piggyback:381.1]  Out: 20 [Chest Tube:20]     Physical Exam  Vitals and nursing note reviewed.   Constitutional:       Appearance: Normal appearance.   Pulmonary:      Effort: Pulmonary effort is normal.      Breath sounds: Normal breath sounds. No rales.   Musculoskeletal:      Right lower leg: No edema.      Left lower leg: No edema.   Neurological:      Mental Status: She is alert.          Significant Labs: reviewed  BMP  Lab Results   Component Value Date     04/08/2024    K 3.4 (L) 04/08/2024     04/08/2024    CO2 19 (L) 04/08/2024    BUN 11 04/08/2024    CREATININE 1.5 (H) 04/08/2024    CALCIUM 7.1 (L) 04/08/2024    ANIONGAP 7 (L) 04/08/2024    EGFRNORACEVR >60 04/08/2024     Lab Results   Component Value Date    WBC 10.11 04/08/2024    HGB 9.0 (L) 04/08/2024    HCT 27.5 (L) 04/08/2024    MCV 86 04/08/2024     04/08/2024           Significant Imaging: reviewed

## 2024-04-08 NOTE — PROGRESS NOTES
[facility]  Progress Note    Patient Name: Mayela Molina  MRN: [id number]  Patient Class: IP- Inpatient   Admission Date: 3/25/2024  Length of Stay: 13 days  Attending Physician: Perla Gutierres MD  Primary Care Provider: Katie, Primary Doctor        Subjective:     Principal Problem:Pneumonia of left lung due to infectious organism        HPI:  28-year-old patient (male to female transgender) with no chronic medical conditions who presented to the emergency department with complaint of cough over the last 3 weeks, moderate severity, persistent.  Patient does have associated yellow sputum production, shortness for breath, and pleuritic chest pain.  No associated nausea, vomiting, diarrhea.  No complaints of fevers or chills although T-max in the emergency department recorded as 99.8.  Patient does not smoke cigarettes or vape.  No illicit drug use.  Abnormal labs in the emergency department include white blood cell count 30.46, hemoglobin 12.7, platelets 577, D-dimer 1.37, sodium 122, chloride 90, bicarb 21, glucose 121, albumin 2.3.  Last labs are from 2018 with no interim labs to compare with.  Patient does take spironolactone, Estrace, and Provera.  CT scan of chest with left-sided pneumonia with possible abscess formation.    Overview/Hospital Course:   Patient was admitted acute respiratory failure with left-sided pneumonia and possible abscess formation.  She initially was on nasal cannula and on morning after admission became acutely short of breath with a PO2 of 87% and tachycardic.  She was given a dose of adenosine which did not improve things however gradually her heart rate decreased.  Patient was admitted to the intensive care unit for respiratory failure tachycardia.  She was initially on high-flow nasal cannula and subsequently was able to wean to room air.  Cultures remained pending.  She was initially started on Rocephin and azithromycin and changed to Zosyn  who azithromycin vein.  Legionella antigen pending.  Continue with aggressive pulmonary care  With worsening CT scan, continued elevated WBC, continued elevated heart rate.  Patient underwent repeat CT scan of the chest which showed worsening of empyema.  She was seen and evaluated by Cardiothoracic surgery and underwent left VATS with decortication on 3/19.    Hyponatremia patient was admitted with hyponatremia she had fluid restrictions in place cortisol was normal, TSH normal, patient was on spironolactone and this was held.  She was being maintained on fluid restricted diet.     Encourage patient to deep breathe and walk in halls.  4/1 undergoing breathing treatment. Mom at bedside. Questions and concerns addressed. Encourage ambulation  4/2 questions and concerns addressed. Chest tube drainage 50cc yesterday. Ambulated with physical/occupational therapy. Continue intravenous fluids and intravenous antibiotic(s).   4/3 leukocytosis trending down. chest tube drainage 10cc/24 yesterday. Repeat chest x-ray with no adverse interval change. Patient wishes to ambulate more. Continue current care.  4/4 minimal chest tube output last 2 days. Leukocytosis resolved. Acute kidney injury improving. Ambulating with physical/occupational therapy. Repeat chest x-ray reviewed. Appetite improving  4/5 primary CTS recommending repeat ct chest noncontrast and prealbumin. Chest tube to water seal per primary surgeon. Discussed restarting spironolactone at lower dose with nephrology. Patient ambulating with physical/occupational therapy.   4/6 repeat ct chest with complete plugging of left mainstem bronchus. Chest physiotherapy with G5 added. Spironolactone resumed at reduced dose. Complains of chest pain at site of chest tube improved with pain analgesics.  4/7 pulmonology planning on bronch in am if no improvement. Chest x-ray in AM. Complains of pain with chest tube.  4/8 multidisciplinary meeting with patient. Cts removing one  chest tube today. Counseled on compliance with medical recommendations with ct physiotherapy, incentive spirometer, ambulation, cough. Patient voiced understanding    Interval History: See hospital course for today      Review of Systems   Constitutional:  Positive for activity change (improving), appetite change (improving) and fatigue (improving). Negative for fever.   Respiratory:  Positive for cough and shortness of breath (w/ cough).    Cardiovascular:  Positive for chest pain (a/w chest tube). Negative for leg swelling.   Gastrointestinal:  Negative for nausea and vomiting.   Musculoskeletal:  Positive for myalgias.   Skin:  Positive for wound.   Neurological:  Positive for weakness (improving).   Psychiatric/Behavioral:  Negative for agitation, behavioral problems, confusion and decreased concentration. The patient is not nervous/anxious.      Objective:     Vital Signs (Most Recent):  Temp: 98 °F (36.7 °C) (04/08/24 1631)  Pulse: (!) 112 (04/08/24 1631)  Resp: 18 (04/08/24 1631)  BP: 125/75 (04/08/24 1631)  SpO2: 98 % (04/08/24 1631) Vital Signs (24h Range):  Temp:  [98 °F (36.7 °C)-99.2 °F (37.3 °C)] 98 °F (36.7 °C)  Pulse:  [] 112  Resp:  [15-18] 18  SpO2:  [94 %-100 %] 98 %  BP: (112-125)/(65-75) 125/75     Weight: 59.9 kg (132 lb 0.9 oz)  Body mass index is 19.5 kg/m².    Intake/Output Summary (Last 24 hours) at 4/8/2024 1644  Last data filed at 4/8/2024 0604  Gross per 24 hour   Intake 2729.87 ml   Output --   Net 2729.87 ml         Physical Exam  Vitals and nursing note reviewed. Exam conducted with a chaperone present (team, nursing).   Constitutional:       General: She is not in acute distress.     Appearance: She is underweight. She is ill-appearing. She is not toxic-appearing.   HENT:      Head: Normocephalic and atraumatic.   Cardiovascular:      Rate and Rhythm: Tachycardia present.   Pulmonary:      Effort: Pulmonary effort is normal. No respiratory distress.   Chest:      Chest wall:  "Tenderness present.      Comments: Chest tube  Abdominal:      Palpations: Abdomen is soft.      Tenderness: There is no abdominal tenderness.   Musculoskeletal:      Right lower leg: No edema.      Left lower leg: No edema.   Skin:     General: Skin is warm.      Coloration: Skin is pale.   Neurological:      Mental Status: She is alert and oriented to person, place, and time.      Motor: Weakness present.   Psychiatric:         Mood and Affect: Mood and affect normal.         Speech: Speech normal.         Cognition and Memory: Cognition and memory normal.             Significant Labs: All pertinent labs within the past 24 hours have been reviewed.    Blood Culture: negative growth to date x 5 days  Afb culture no acid fast bacilli  No anaerobes isolated  Mrsa swab negative  Aerobic culture growing strep    CBC:   Recent Labs   Lab 04/07/24  0535 04/08/24  0550   WBC 13.12* 10.11   HGB 8.9* 9.0*   HCT 27.4* 27.5*    171     CMP:   Recent Labs   Lab 04/07/24  0535 04/08/24  0549    136   K 3.3* 3.4*    110   CO2 18* 19*   GLU 87 84   BUN 13 11   CREATININE 1.6* 1.5*   CALCIUM 7.4* 7.1*   PROT 5.8* 5.3*   ALBUMIN 1.7* 1.7*   BILITOT 0.5 0.3   ALKPHOS 54* 51*   * 73*   * 97*   ANIONGAP 10 7*       Significant Imaging: I have reviewed all pertinent imaging results/findings within the past 24 hours.  CXR: I have reviewed all pertinent results/findings within the past 24 hours and my personal findings are:  no adverse interval change    Assessment/Plan:      * Pneumonia of left lung due to infectious organism  -white blood cell count 30.46, lactic acid level 1.5, D-dimer 1.37, BNP 14, troponin negative, influenza a/B negative, COVID-19 negative  -blood cultures pending  -CTA of chest with "no pulmonary embolism, no dissection; dense consolidation in the left lower lobe extending to the left upper lobe with tree in bud opacities consistent with severe pneumonia; questionable areas of " "fluid density in the left lung base with foci of gas may relate to component of fluid density or possible liquefaction or possible abscess formation."  -continue IV Zosyn, vanco and IV azithromycin initiated in the emergency department  -check MRSA culture-negative, sputum culture-pending, HIV  -give IV fluids, O2 supplementation, p.r.n. albuterol inhaler  - repeat CT scan of chest today looks more like an empyema  -CVT consulted and patient underwent VATS with drainage    Chest tube to water seal per primary surgeon   Continue antibiotic(s)   Encourage incentive spirometer and acapella and breathing treatments  Ambulate   Repeat ct chest with completed plugging of left mainstem bronchus  Added chest physiotherapy with G5 machine for physical percussion        Hypokalemia  Patient has hypokalemia which is Acute and currently controlled. Most recent potassium levels reviewed-   Lab Results   Component Value Date    K 3.4 (L) 04/08/2024   . Will continue potassium replacement per protocol and recheck repeat levels after replacement completed.   Mg 1.4  Replete both    Mucus plugging of bronchi  Pulmonology following  3% saline nebs  Repeat chest x-ray with no interval worsening   Monitor         DAVIAN (acute kidney injury)  Patient with acute kidney injury/acute renal failure likely due to acute tubular necrosis caused by vancomycin induced nephropathy  DAVIAN is currently improving. Baseline creatinine  normal  - Labs reviewed- Renal function/electrolytes with Estimated Creatinine Clearance (based on SCr of 1.5 mg/dL (H))  Female: 53.7 mL/min (A)  Male: 63.2 mL/min (A)  Hover for info according to latest data. Monitor urine output and serial BMP and adjust therapy as needed. Avoid nephrotoxins and renally dose meds for GFR listed above.    Improving  Nephrology following  Spironolactone resumed at lower dose     Moderate protein-calorie malnutrition  Nutrition consulted. Most recent weight and BMI monitored- "     Measurements:  Wt Readings from Last 1 Encounters:   04/07/24 59.9 kg (132 lb 0.9 oz)   Body mass index is 19.5 kg/m².    Patient has been screened and assessed by RD.    Malnutrition Type:  Context: acute illness or injury, chronic illness  Level: moderate    Malnutrition Characteristic Summary:  Energy Intake (Malnutrition): less than or equal to 75% for greater than or equal to 1 month  Subcutaneous Fat (Malnutrition): moderate depletion  Muscle Mass (Malnutrition): moderate depletion    Interventions/Recommendations (treatment strategy):  1. Recommend pt continues on Regular diet  2. Recommend pt continue Boost plus TID as medically appropriate 3. Recommend Rob BID for wound healing 4. Encourage PO intake of meals, snacks and supplements 5. Weigh twice weekly      Hydropneumothorax  Status post VATS with left lung chest tubes  -culture positive for MRSA  -on Zosyn and Zyvox  CTS to remove one chest tube on 4/8  Reevaluate over 1-2 days for discontinuing 2nd chest tube    Pulmonary abscess  Growing strep  On linezolid and zosyn  Repeat ct chest with complete plugging  Chest physiotherapy added with percussion  CTS to remove one chest tube on 4/8  Reevaluate over 1-2 days for possible removal of 2nd chest tube    Tachycardia, paroxysmal  Beta-blocker started heart rate markedly improved.  Borderline blood pressure and heart rate underwent 100 we will DC        Male-to-female transgender person  Resume home medications to include Estrace & Provera; hold spironolactone due to hyponatremia    Resume spironolactone at lower dose      Normocytic anemia  Patient's anemia is currently controlled. Has not received any PRBCs to date.   Current CBC reviewed-   Lab Results   Component Value Date    HGB 9.0 (L) 04/08/2024    HCT 27.5 (L) 04/08/2024     Monitor serial CBC and transfuse if patient becomes hemodynamically unstable, symptomatic or H/H drops below 7/21.  Iron def anemia    Underweight  Current BMI 15.27,  "albumin 2.3.    Add nutritional supplements  Nutrition consult  Appetite improving    Prealbumin improved    Sepsis  This patient does have evidence of infective focus  My overall impression is sepsis.  Source: Respiratory  Antibiotics given-   Antibiotics (72h ago, onward)      Start     Stop Route Frequency Ordered    03/30/24 0900  linezolid tablet 600 mg         -- Oral Every 12 hours 03/30/24 0731    03/28/24 1800  piperacillin-tazobactam (ZOSYN) 4.5 g in dextrose 5 % in water (D5W) 100 mL IVPB (MB+)         -- IV Every 8 hours (non-standard times) 03/28/24 1538        No results for input(s): "LACTATE", "POCLAC" in the last 72 hours.    Organ dysfunction indicated by  none    Fluid challenge Not needed - patient is not hypotensive      Post- resuscitation assessment No - Post resuscitation assessment not needed       Will Not start Pressors- Levophed for MAP of 65  Source control achieved by: broad spectrum antibiotics    Leukocytosis resolved   Chest tube minimal drainage    Resolved       VTE Risk Mitigation (From admission, onward)           Ordered     Place sequential compression device  Until discontinued        Comments: Can be discontinued when not in the bed.    03/30/24 1113     enoxaparin injection 40 mg  Every 24 hours         03/28/24 0912     IP VTE HIGH RISK PATIENT  Once         03/26/24 0043     Reason for No Pharmacological VTE Prophylaxis  Once        Question:  Reasons:  Answer:  Physician Provided (leave comment)  Comment:  pending pulmonary consult    03/26/24 0043     IP VTE HIGH RISK PATIENT  Once         03/26/24 0043     Place sequential compression device  Until discontinued         03/26/24 0043                    Discharge Planning   RAVINDER:      Code Status: Full Code   Is the patient medically ready for discharge?:     Reason for patient still in hospital (select all that apply): Patient trending condition, Laboratory test, Treatment, Imaging, and Consult recommendations  Discharge " Plan A: Home   Discharge Delays: None known at this time              Perla Gutierres MD  Department of Hospital Medicine   [facility] ([facility]

## 2024-04-08 NOTE — ASSESSMENT & PLAN NOTE
Patient has hypokalemia which is Acute and currently controlled. Most recent potassium levels reviewed-   Lab Results   Component Value Date    K 3.4 (L) 04/08/2024   . Will continue potassium replacement per protocol and recheck repeat levels after replacement completed.   Mg 1.4  Replete both

## 2024-04-08 NOTE — PT/OT/SLP PROGRESS
Physical Therapy  Treatment and discharge    Mayela Molina   MRN: [id number]   Admitting Diagnosis: Pneumonia of left lung due to infectious organism    PT Received On: 03/30/24  PT Start Time: 1425     PT Stop Time: 1450    PT Total Time (min): 25 min       Billable Minutes:  Gait Training 25    Treatment Type: Treatment  PT/PTA: PT     Number of PTA visits since last PT visit: 0       General Precautions: Standard, fall  Orthopedic Precautions: N/A  Braces: N/A  Respiratory Status: Room air    Spiritual, Cultural Beliefs, Restorationist Practices, Values that Affect Care: no    Subjective:  Communicated with nursing (Trev) and performed chart review via epic system prior to session.  Pt agreeable to PT services    Pain/Comfort  Pain Rating 1: 0/10 (reported tightness around chest tube site but no pain)  Pain Rating Post-Intervention 1: 0/10    Objective:   Patient found with: peripheral IV, chest tube    Functional Mobility:  Bed Mobility:    Supine to sit independently    Transfers:   Facilitated sit<>stand independently, stand pivot independently    Gait:    Facilitated gait activity 450 ft independently with no AD, slow pace but steady with improve step length and foot clearance when compared to gait efforts      Balance:   Dynamic stand: GOOD+: Independent gait (with or without assistive device)       Treatment and Education:  Educated pt on call don't fall policy and use of call button to alert nursing staff of needs. Pt expressed understanding.      AM-PAC 6 CLICK MOBILITY  How much help from another person does this patient currently need?   1 = Unable, Total/Dependent Assistance  2 = A lot, Maximum/Moderate Assistance  3 = A little, Minimum/Contact Guard/Supervision  4 = None, Modified [place]oyah/Independent    Turning over in bed (including adjusting bedclothes, sheets and blankets)?: 4  Sitting down on and standing up from a chair with arms (e.g., wheelchair, bedside commode, etc.): 4  Moving from  lying on back to sitting on the side of the bed?: 4  Moving to and from a bed to a chair (including a wheelchair)?: 4  Need to walk in hospital room?: 4  Climbing 3-5 steps with a railing?: 1  Basic Mobility Total Score: 21    AM-PAC Raw Score CMS G-Code Modifier Level of Impairment Assistance   6 % Total / Unable   7 - 9 CM 80 - 100% Maximal Assist   10 - 14 CL 60 - 80% Moderate Assist   15 - 19 CK 40 - 60% Moderate Assist   20 - 22 CJ 20 - 40% Minimal Assist   23 CI 1-20% SBA / CGA   24 CH 0% Independent/ Mod I     Patient left sitting edge of bed with all lines intact, call button in reach, and nursing notified.    Assessment:  Mayela Molina is a 28 y.o. adult with a medical diagnosis of Pneumonia of left lung due to infectious organism and has progressed functional status to functional independence. Pt has no further acute PT needs.    Rehab identified problem list/impairments:     Discharge recommendations: Low Intensity Therapy      Barriers to discharge:      Equipment recommendations: none     GOALS:   Multidisciplinary Problems       Physical Therapy Goals          Problem: Physical Therapy    Goal Priority Disciplines Outcome Goal Variances Interventions   Physical Therapy Goal     PT, PT/OT Ongoing, Progressing     Description: Pt will perform bed mobility independently in order to participate in EOB activity.  Pt will perform transfers independently in order to participate in OOB activity.  Pt will ambulate 450 ft I with no AD in order to participate in daily tasks.                        PLAN:    Patient to be seen 3 x/week to address the above listed problems via gait training, therapeutic activities, therapeutic exercises, neuromuscular re-education  Plan of Care expires: 04/13/24  Plan of Care reviewed with: patient         04/08/2024

## 2024-04-08 NOTE — PROGRESS NOTES
Pharmacist Renal Dose Adjustment Note    Mayela Molina is a 28 y.o. adult being treated with the medication famotidine    Patient Data:    Vital Signs (Most Recent):  Temp: 98.4 °F (36.9 °C) (04/08/24 0748)  Pulse: 105 (04/08/24 0826)  Resp: 18 (04/08/24 0954)  BP: 115/65 (04/08/24 0748)  SpO2: 99 % (04/08/24 0748) Vital Signs (72h Range):  Temp:  [97.8 °F (36.6 °C)-99.2 °F (37.3 °C)]   Pulse:  []   Resp:  [14-19]   BP: (112-126)/(61-74)   SpO2:  [93 %-99 %]      Recent Labs   Lab 04/06/24  0529 04/07/24  0535 04/08/24  0549   CREATININE 1.7* 1.6* 1.5*     Serum creatinine: 1.5 mg/dL (H) 04/08/24 0549  Hover for info  Estimated creatinine clearance: 53.7 mL/min (A) (Female)  Estimated creatinine clearance: 63.2 mL/min (A) (Male)    Medication:famotidine 20 mg po daily will be changed to medication:famotidine 20 mg po bid per renal dosing protocol.   Pharmacist's Name: Zuleika Hurt [facility]  Pharmacist's Extension: [phone]

## 2024-04-08 NOTE — ASSESSMENT & PLAN NOTE
Status post VATS with left lung chest tubes  -culture positive for MRSA  -on Zosyn and Zyvox  CTS to remove one chest tube on 4/8  Reevaluate over 1-2 days for discontinuing 2nd chest tube

## 2024-04-08 NOTE — ASSESSMENT & PLAN NOTE
Pulmonology following  3% saline nebs  Repeat chest x-ray with no interval worsening   Monitor

## 2024-04-08 NOTE — ASSESSMENT & PLAN NOTE
CT chest on admit with dense consolidation and questionable underlying abscess formation; no significant effusion noted  Repeat CT chest with apparent hydropneumothorax with underlying pulmonary abscess  Post VATS and chest tube placement on 3/29/2024  Chest tube x1 removed today; continue CTx1 and follow output  Culture --> streptococcus constellatus  De-escalate ABX coverage based on culture findings  Supplemental oxygen as needed to maintain saturations 90% or greater; currently on room air   Follow chest imaging as needed  Mobilize as able; OOB to chair TID  IS while awake

## 2024-04-09 PROCEDURE — 63600175 PHARM REV CODE 636 W HCPCS: Performed by: FAMILY MEDICINE

## 2024-04-09 PROCEDURE — 25000242 PHARM REV CODE 250 ALT 637 W/ HCPCS: Performed by: INTERNAL MEDICINE

## 2024-04-09 PROCEDURE — 27000646 HC AEROBIKA DEVICE

## 2024-04-09 PROCEDURE — 99233 SBSQ HOSP IP/OBS HIGH 50: CPT | Mod: ,,, | Performed by: INTERNAL MEDICINE

## 2024-04-09 PROCEDURE — 21400001 HC TELEMETRY ROOM

## 2024-04-09 PROCEDURE — 94640 AIRWAY INHALATION TREATMENT: CPT

## 2024-04-09 PROCEDURE — 94761 N-INVAS EAR/PLS OXIMETRY MLT: CPT

## 2024-04-09 PROCEDURE — 25000003 PHARM REV CODE 250: Performed by: FAMILY MEDICINE

## 2024-04-09 PROCEDURE — 25000003 PHARM REV CODE 250: Performed by: NURSE PRACTITIONER

## 2024-04-09 PROCEDURE — 99900035 HC TECH TIME PER 15 MIN (STAT)

## 2024-04-09 PROCEDURE — 25000003 PHARM REV CODE 250: Performed by: INTERNAL MEDICINE

## 2024-04-09 PROCEDURE — A4216 STERILE WATER/SALINE, 10 ML: HCPCS | Performed by: ANESTHESIOLOGY

## 2024-04-09 PROCEDURE — 94664 DEMO&/EVAL PT USE INHALER: CPT

## 2024-04-09 PROCEDURE — 25000003 PHARM REV CODE 250: Performed by: THORACIC SURGERY (CARDIOTHORACIC VASCULAR SURGERY)

## 2024-04-09 PROCEDURE — 63600175 PHARM REV CODE 636 W HCPCS: Performed by: INTERNAL MEDICINE

## 2024-04-09 PROCEDURE — 25000003 PHARM REV CODE 250: Performed by: ANESTHESIOLOGY

## 2024-04-09 PROCEDURE — 94799 UNLISTED PULMONARY SVC/PX: CPT | Mod: XB

## 2024-04-09 RX ORDER — POTASSIUM CHLORIDE 20 MEQ/1
20 TABLET, EXTENDED RELEASE ORAL DAILY
Status: DISCONTINUED | OUTPATIENT
Start: 2024-04-10 | End: 2024-04-10

## 2024-04-09 RX ORDER — METHOCARBAMOL 500 MG/1
500 TABLET, FILM COATED ORAL 4 TIMES DAILY PRN
Status: DISCONTINUED | OUTPATIENT
Start: 2024-04-09 | End: 2024-04-11 | Stop reason: HOSPADM

## 2024-04-09 RX ADMIN — SODIUM CHLORIDE 30 MG/ML INHALATION SOLUTION 4 ML: 30 SOLUTION INHALANT at 01:04

## 2024-04-09 RX ADMIN — OXYCODONE HYDROCHLORIDE 10 MG: 5 TABLET ORAL at 09:04

## 2024-04-09 RX ADMIN — SPIRONOLACTONE 25 MG: 25 TABLET, FILM COATED ORAL at 08:04

## 2024-04-09 RX ADMIN — SPIRONOLACTONE 25 MG: 25 TABLET, FILM COATED ORAL at 09:04

## 2024-04-09 RX ADMIN — METOPROLOL TARTRATE 12.5 MG: 25 TABLET, FILM COATED ORAL at 09:04

## 2024-04-09 RX ADMIN — OXYCODONE HYDROCHLORIDE 5 MG: 5 TABLET ORAL at 06:04

## 2024-04-09 RX ADMIN — ENOXAPARIN SODIUM 40 MG: 40 INJECTION SUBCUTANEOUS at 04:04

## 2024-04-09 RX ADMIN — SODIUM CHLORIDE 1000 MG: 1 TABLET ORAL at 08:04

## 2024-04-09 RX ADMIN — BENZONATATE 200 MG: 100 CAPSULE ORAL at 08:04

## 2024-04-09 RX ADMIN — POTASSIUM CHLORIDE 40 MEQ: 1500 TABLET, EXTENDED RELEASE ORAL at 09:04

## 2024-04-09 RX ADMIN — SODIUM CHLORIDE 1000 MG: 1 TABLET ORAL at 04:04

## 2024-04-09 RX ADMIN — SODIUM CHLORIDE 30 MG/ML INHALATION SOLUTION 4 ML: 30 SOLUTION INHALANT at 08:04

## 2024-04-09 RX ADMIN — SODIUM CHLORIDE 1000 MG: 1 TABLET ORAL at 09:04

## 2024-04-09 RX ADMIN — MEDROXYPROGESTERONE ACETATE 10 MG: 5 TABLET ORAL at 09:04

## 2024-04-09 RX ADMIN — Medication 2 ML: at 06:04

## 2024-04-09 RX ADMIN — OXYCODONE HYDROCHLORIDE 10 MG: 5 TABLET ORAL at 01:04

## 2024-04-09 RX ADMIN — METHOCARBAMOL 500 MG: 500 TABLET ORAL at 09:04

## 2024-04-09 RX ADMIN — CEFTRIAXONE 2 G: 2 INJECTION, POWDER, FOR SOLUTION INTRAMUSCULAR; INTRAVENOUS at 06:04

## 2024-04-09 RX ADMIN — FAMOTIDINE 20 MG: 20 TABLET ORAL at 08:04

## 2024-04-09 RX ADMIN — METHOCARBAMOL 500 MG: 500 TABLET ORAL at 01:04

## 2024-04-09 RX ADMIN — OXYCODONE HYDROCHLORIDE 10 MG: 5 TABLET ORAL at 03:04

## 2024-04-09 RX ADMIN — METOPROLOL TARTRATE 12.5 MG: 25 TABLET, FILM COATED ORAL at 08:04

## 2024-04-09 RX ADMIN — ESTRADIOL 8 MG: 1 TABLET ORAL at 09:04

## 2024-04-09 RX ADMIN — Medication 2 ML: at 01:04

## 2024-04-09 NOTE — PROGRESS NOTES
Patient sitting on couch using Aerobika and IS. CPT with G5 held due to left chest tube in place. Patient states that she has also been walking with PT.

## 2024-04-09 NOTE — PLAN OF CARE
Problem: Fall Injury Risk  Goal: Absence of Fall and Fall-Related Injury  Outcome: Ongoing, Progressing     Problem: Respiratory Compromise (Pneumothorax)  Goal: Optimal Oxygenation and Ventilation  Outcome: Ongoing, Progressing     Problem: Pain Acute  Goal: Acceptable Pain Control and Functional Ability  Outcome: Ongoing, Progressing     Problem: Fluid and Electrolyte Imbalance (Acute Kidney Injury/Impairment)  Goal: Fluid and Electrolyte Balance  Outcome: Ongoing, Progressing     Problem: Oral Intake Inadequate (Acute Kidney Injury/Impairment)  Goal: Optimal Nutrition Intake  Outcome: Ongoing, Progressing     Problem: Renal Function Impairment (Acute Kidney Injury/Impairment)  Goal: Effective Renal Function  Outcome: Ongoing, Progressing

## 2024-04-09 NOTE — ASSESSMENT & PLAN NOTE
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 Baseline creatinine  normal  - Labs reviewed- Renal function/electrolytes with Estimated Creatinine Clearance (based on SCr of 1.5 mg/dL (H))  Female: 53.7 mL/min (A)  Male: 63.2 mL/min (A)  Hover for info according to latest data. Monitor urine output and serial BMP and adjust therapy as needed. Avoid nephrotoxins and renally dose meds for GFR listed above.    Improving  Nephrology following  Spironolactone resumed at lower dose   Stable

## 2024-04-09 NOTE — PLAN OF CARE
[facility] - Telemetry (Hospital)  Discharge Reassessment    Primary Care Provider: No, Primary Doctor    Expected Discharge Date:     Reassessment (most recent)       Discharge Reassessment - 04/09/24 1528          Discharge Reassessment    Assessment Type Discharge Planning Reassessment     Did the patient's condition or plan change since previous assessment? No     Discharge Plan discussed with: Patient     Communicated RAVINDER with patient/caregiver Date not available/Unable to determine     Discharge Plan A Home     Discharge Plan B Home     DME Needed Upon Discharge  none     Transition of Care Barriers Unisured     Why the patient remains in the hospital Requires continued medical care        Post-Acute Status    Discharge Delays None known at this time                   Patient remains hospitalized due to continued medical care. Discharge disposition remains home pending hospital progress. SW to continue following to assist with discharge planning needs.

## 2024-04-09 NOTE — ASSESSMENT & PLAN NOTE
"This patient does have evidence of infective focus  My overall impression is sepsis.  Source: Respiratory  Antibiotics given-   Antibiotics (72h ago, onward)    Start     Stop Route Frequency Ordered    04/08/24 1815  cefTRIAXone (ROCEPHIN) 2 g in dextrose 5 % in water (D5W) 100 mL IVPB (MB+)         -- IV Every 12 hours (non-standard times) 04/08/24 1714      No results for input(s): "LACTATE", "POCLAC" in the last 72 hours.    Organ dysfunction indicated by  none    Fluid challenge Not needed - patient is not hypotensive      Post- resuscitation assessment No - Post resuscitation assessment not needed       Will Not start Pressors- Levophed for MAP of 65  Source control achieved by: broad spectrum antibiotics    Leukocytosis resolved   Chest tube minimal drainage    Resolved   "

## 2024-04-09 NOTE — SUBJECTIVE & OBJECTIVE
Interval History:  Patient seen and examined sitting at side of bed.  She looks much more comfortable than she had in the past.  Taking deep breaths and use incentive spirometry as well as her Acapella device.  No complaints of cough.    Review of Systems   Constitutional:  Positive for activity change. Negative for fatigue and fever.   Respiratory:  Negative for cough and shortness of breath.    Cardiovascular:  Negative for chest pain and leg swelling.   Gastrointestinal:  Negative for nausea and vomiting.   All other systems reviewed and are negative.    Objective:     Vital Signs (Most Recent):  Temp: 98.9 °F (37.2 °C) (04/09/24 1202)  Pulse: 71 (04/09/24 1353)  Resp: 18 (04/09/24 1353)  BP: 114/69 (04/09/24 1202)  SpO2: 100 % (04/09/24 1353) Vital Signs (24h Range):  Temp:  [97.6 °F (36.4 °C)-98.9 °F (37.2 °C)] 98.9 °F (37.2 °C)  Pulse:  [] 71  Resp:  [16-18] 18  SpO2:  [96 %-100 %] 100 %  BP: (114-125)/(69-78) 114/69     Weight: 59.9 kg (132 lb 0.9 oz)  Body mass index is 19.5 kg/m².    Intake/Output Summary (Last 24 hours) at 4/9/2024 1559  Last data filed at 4/9/2024 1438  Gross per 24 hour   Intake 480 ml   Output 870 ml   Net -390 ml         Physical Exam  Vitals reviewed.   Constitutional:       Appearance: Normal appearance.   HENT:      Head: Normocephalic and atraumatic.      Mouth/Throat:      Mouth: Mucous membranes are moist.      Pharynx: Oropharynx is clear.   Eyes:      Extraocular Movements: Extraocular movements intact.      Conjunctiva/sclera: Conjunctivae normal.   Cardiovascular:      Rate and Rhythm: Normal rate and regular rhythm.      Pulses: Normal pulses.      Heart sounds: Normal heart sounds.   Pulmonary:      Effort: Pulmonary effort is normal.      Breath sounds: Rhonchi present.   Abdominal:      General: Bowel sounds are normal.      Palpations: Abdomen is soft.   Musculoskeletal:         General: Normal range of motion.      Cervical back: Normal range of motion and neck  supple.   Skin:     General: Skin is warm and dry.   Neurological:      General: No focal deficit present.      Mental Status: She is alert and oriented to person, place, and time. Mental status is at baseline.   Psychiatric:         Mood and Affect: Mood normal.         Behavior: Behavior normal.         Thought Content: Thought content normal.          Significant Labs: All pertinent labs within the past 24 hours have been reviewed.  CBC:   Recent Labs   Lab 04/08/24  0550   WBC 10.11   HGB 9.0*   HCT 27.5*        CMP:   Recent Labs   Lab 04/08/24  0549      K 3.4*      CO2 19*   GLU 84   BUN 11   CREATININE 1.5*   CALCIUM 7.1*   PROT 5.3*   ALBUMIN 1.7*   BILITOT 0.3   ALKPHOS 51*   AST 73*   ALT 97*   ANIONGAP 7*       Significant Imaging: I have reviewed all pertinent imaging results/findings within the past 24 hours.

## 2024-04-09 NOTE — ASSESSMENT & PLAN NOTE
CT chest on admit with dense consolidation and questionable underlying abscess formation; no significant effusion noted  Repeat CT chest with apparent hydropneumothorax with underlying pulmonary abscess  Post VATS and chest tube placement on 3/29/2024  CTx1 in place; follow output  Culture --> streptococcus constellatus  De-escalated antibiotic coverage to Rocephin 2G IV BID  Supplemental oxygen as needed to maintain saturations 90% or greater; currently on room air   Follow chest imaging as needed  Mobilize as able; OOB to chair TID  IS while awake

## 2024-04-09 NOTE — PROGRESS NOTES
[facility] - Telemetry ([facility])  Nephrology  Progress Note    Patient Name: Mayela Molina  MRN: [id number]  Admission Date: 3/25/2024  Hospital Length of Stay: 14 days  Attending Provider: Linsey Garcia MD   Primary Care Physician: Katie, Primary Doctor  Principal Problem:Pneumonia of left lung due to infectious organism    Subjective:     HPI: noted    Interval History: Pt was seen and examined. Labs and meds reviewed. Discussed with other providers. No new c/o's.    Review of patient's allergies indicates:  No Known Allergies  Current Facility-Administered Medications   Medication Frequency    0.9%  NaCl infusion (for blood administration) Q24H PRN    0.9%  NaCl infusion PRN    acetaminophen tablet 650 mg Q6H PRN    albuterol nebulizer solution 2.5 mg Q6H PRN    benzonatate capsule 200 mg TID    bisacodyL suppository 10 mg Daily PRN    cefTRIAXone (ROCEPHIN) 2 g in dextrose 5 % in water (D5W) 100 mL IVPB (MB+) Q12H    enoxaparin injection 40 mg Q24H (prophylaxis, 1700)    estradioL tablet 8 mg Daily    famotidine tablet 20 mg BID    glucagon (human recombinant) injection 1 mg PRN    glucose chewable tablet 16 g PRN    glucose chewable tablet 24 g PRN    HYDROmorphone injection 0.5 mg QID PRN    ipratropium 0.02 % nebulizer solution 0.5 mg QID PRN    LIDOcaine 5 % patch 1 patch Q24H    medroxyPROGESTERone tablet 10 mg Daily    melatonin tablet 6 mg Nightly PRN    methocarbamoL tablet 500 mg QID    metoclopramide injection 5 mg Q6H PRN    metoprolol tartrate (LOPRESSOR) split tablet 12.5 mg BID    naloxone 0.4 mg/mL injection 0.02 mg PRN    ondansetron disintegrating tablet 8 mg Q8H PRN    ondansetron injection 4 mg Q6H PRN    oxyCODONE immediate release tablet 10 mg Q4H PRN    oxyCODONE immediate release tablet 5 mg Q4H PRN    polyethylene glycol packet 17 g Daily    sodium chloride 0.9% flush 10 mL PRN    sodium chloride 0.9% flush 10 mL Q12H PRN    sodium chloride 0.9% flush 10 mL PRN    sodium chloride  0.9% flush 10 mL PRN    sodium chloride 0.9% flush 2 mL Q6H    sodium chloride 3% nebulizer solution 4 mL Q6H WAKE    sodium chloride oral tablet 1,000 mg TID    spironolactone tablet 25 mg BID       Objective:     Vital Signs (Most Recent):  Temp: 98.9 °F (37.2 °C) (04/09/24 1202)  Pulse: 82 (04/09/24 1202)  Resp: 16 (04/09/24 1202)  BP: 114/69 (04/09/24 1202)  SpO2: 99 % (04/09/24 1202) Vital Signs (24h Range):  Temp:  [97.6 °F (36.4 °C)-98.9 °F (37.2 °C)] 98.9 °F (37.2 °C)  Pulse:  [] 82  Resp:  [16-18] 16  SpO2:  [96 %-100 %] 99 %  BP: (114-125)/(69-78) 114/69     Weight: 59.9 kg (132 lb 0.9 oz) (04/07/24 2331)  Body mass index is 19.5 kg/m².  Body surface area is 1.71 meters squared.    I/O last 3 completed shifts:  In: 2729.9 [I.V.:2534.2; IV Piggyback:195.7]  Out: 20 [Chest Tube:20]     Physical Exam  Vitals and nursing note reviewed.   Constitutional:       General: She is not in acute distress.     Appearance: Normal appearance.   Neurological:      Mental Status: She is alert.          Significant Labs: reviewed  BMP  Lab Results   Component Value Date     04/08/2024    K 3.4 (L) 04/08/2024     04/08/2024    CO2 19 (L) 04/08/2024    BUN 11 04/08/2024    CREATININE 1.5 (H) 04/08/2024    CALCIUM 7.1 (L) 04/08/2024    ANIONGAP 7 (L) 04/08/2024    EGFRNORACEVR >60 04/08/2024         Significant Imaging: reviewed  Assessment/Plan:     27 y/o transgender female who presented with pneumonia complicated by empyema requiring a chest tube who further developed DAVIAN:         DAVIAN (acute kidney injury)  S Cr lower, improving daily, DAVIAN resolving  Stable renal function  DAVIAN, 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  Supratherapeutic vancomycin level  FENa > 1%, not prerenal azotemia  No other causes suspected  CT scans done were without contrast     Baseline s Cr is normal  K, remains slightly low, c/w recovery phase of DAVIAN. Being replaced   Metabolic acidosis stable  O2 sat good  No  indications for acute dialysis     Pneumonia of left lung due to infectious organism  Lung infection complicated by necrotizing pneumonia and empyema  Hydropneumothorax  S/p VATS procedure (POD#7) with drainage of empyema and decortication  On abx  Has a mucus plug now, planned bronchoscopy noted  Will defer to CT surgery     Plans and recommendations:  As discussed above  Total time spent 40 minutes including time needed to review the records, the   patient evaluation, documentation, face-to-face discussion with the patient,   more than 50% of the time was spent on coordination of care and counseling.            Thank you for your consult.     Linn Edwards MD  Nephrology  [facility] - Telemetry ([facility])

## 2024-04-09 NOTE — PROGRESS NOTES
Subjective:      Patient ID: Mayela Molina is a 28 y.o. adult.    Chief Complaint: Cough (Pt c/o cough/SOB  x3-4wks w/ pain from coughing. Denies sick contacts/CP. )    HPI: 28-year-old patient with no chronic medical problems who presented to the ED complaining of a cough x 3 weeks. Patient is a biological male in the transition to female who reports a cough with yellow sputum, left sided pleuritic pain, and dyspnea that increases with exertion  Scan showed a large left-sided hydropneumothorax and the consolidation of the left lower lobe.  She is afebrile and got IV antibiotics.  White cell count is coming down     Date of Procedure: 3/29/2024      Procedure: Procedure(s) (LRB):  VATS (VIDEO-ASSISTED THORACOSCOPIC SURGERY) (Left)  BLOCK, NERVE, INTERCOSTAL, 2 OR MORE (Left)     Surgeon(s) and Role:     * Josafat Sandoval MD - Primary     Assisting Surgeon: None     Pre-Operative Diagnosis: Pneumonia of left lower lobe due to infectious organism [J18.9]  Left empyema hydropneumothorax  Hepatitis-C  Supraventricular tachycardia     Post-Operative Diagnosis: Post-Op Diagnosis Codes:     * Pneumonia of left lower lobe due to infectious organism [J18.9]  Same  Patient underwent left thoracotomy and decortication, intraoperatively purulent secretion with a necrotic lung involving the left lower lobe with multiple abscesses found.  She was tachycardic overnight and looks volume contracted with a rising creatinine and low albumin.  Still having pain issues not controlled with current dosage of morphine and oxycodone.  Incentive spirometry is not being done because of the pain.  Not able to cough.  Poor appetite.    03/31/2024 the patient pain was better controlled overnight she still has a week off.  Not much secretions during coughing.  Serosanguineous fluid more anemia this morning no obvious signs of blood loss.  Making dark urine.  Incentive spirometry less than 500.  No air leak from the chest tube.   Ambulated yesterday.  Appetite is improving.  04/01/2024 the patient is still having lot of pain issues and muscle spasm very ineffective cough incentive spirometry less than 500 chest tube draining minimal serous fluid still tachycardia.  Appetite is getting better.  Hydropneumothorax  04/02/2024   Patient is postop day 4 status post VATS procedure with drainage of empyema and decortication.  Continue broad-spectrum antibiotics with linezolid and Zosyn.  Patient's white count is trending downward with white count of 20.  Patient is afebrile.  Chest tube output trending lower.  Continue pulmonary toileting continue incentive spirometer.  Patient is up and ambulating.  Increase as tolerated.     04/03/2024   The patient is postop day 5 status post VATS procedure with drainage of empyema and decortication.  Patient is on broad-spectrum antibiotics linezolid and Zosyn.  Is trending down.  Patient is afebrile.  Continue pulmonary toileting.  Chest x-ray shows continued patchy infiltrate left middle and lower lobes.  Continue chest tube to drainage.     04/04/2024   The patient is postop day 6 status post VATS procedure with drainage of empyema and decortication.  Patient is on broad-spectrum antibiotics.  Continue linezolid and Zosyn.  Patient is afebrile.  Continue pulmonary toileting continue incentive spirometer..  Chest tube output has been minimal.  Will repeat chest x-ray to access extent of left lung consolidation.     04/05/2024   The patient is postop day 7 status post VATS procedure with drainage of empyema and decortication patient continues on antibiotics.  Patient is afebrile.  White count is slightly elevated at toileting.  Continue incentive spirometer.  Continue pulmonary toileting.  Chest tube output has been minimal.  .  Chest x-ray shows dense left lower lobe infiltrate which is unchanged. Continue chest tube as per patient's primary surgeon     04/06/2024   The patient is postop day 8 status post  VATS procedure with drainage of empyema and decortication.  Patient continues on broad-spectrum antibiotics.  Patient is afebrile and WBC has decreased from 13 to 11 today.  Continue pulmonary toileting.  Continue incentive spirometer.  Chest tube output has been small however drainage in extension tube on chest tube looks bloody and cloudy.  Continue chest tube to drainage.    04/08/2024 patient chest tube drainage is minimal very ineffective cough incentive spirometry 400 cc.  Pain issues are better than last week nutrition is still on board.  Hypoalbuminemia.  Go to the CT scan finding plugging of the left mainstem bronchus patient going for bronchoscopy today.  White count is normal afebrile.  Patient is still declining chest physical therapy in spite of explaining the importance.    4/9/2024 patient had her basal chest tube removed yesterday afebrile hemodynamically stable pain is better this morning eating breakfast.  Incentive spirometry 500 cc        Review of patient's allergies indicates:  No Known Allergies    History reviewed. No pertinent past medical history.    History reviewed. No pertinent family history.    Social History     Socioeconomic History    Marital status: Single   Tobacco Use    Smoking status: Never    Smokeless tobacco: Never   Substance and Sexual Activity    Alcohol use: Never    Drug use: Never    Sexual activity: Yes       Past Surgical History:   Procedure Laterality Date    INJECTION OF ANESTHETIC AGENT AROUND MULTIPLE INTERCOSTAL NERVES Left 3/29/2024    Procedure: BLOCK, NERVE, INTERCOSTAL, 2 OR MORE;  Surgeon: Josafat Sandoval MD;  Location: [facility];  Service: Cardiothoracic;  Laterality: Left;    VIDEO-ASSISTED THORACOSCOPIC SURGERY (VATS) Left 3/29/2024    Procedure: VATS (VIDEO-ASSISTED THORACOSCOPIC SURGERY);  Surgeon: Josafat Sandoval MD;  Location: [facility];  Service: Cardiothoracic;  Laterality: Left;       Review of Systems   Constitutional:  Positive for activity  "change, appetite change and fatigue.   HENT:  Negative for dental problem, nosebleeds and sore throat.    Eyes:  Negative for discharge and visual disturbance.   Respiratory:  Positive for cough, chest tightness and shortness of breath. Negative for stridor.    Cardiovascular:  Negative for leg swelling.   Gastrointestinal:  Negative for abdominal distention and abdominal pain.   Genitourinary:  Negative for difficulty urinating and dysuria.   Musculoskeletal:  Negative for arthralgias, back pain and joint swelling.   Allergic/Immunologic: Negative for environmental allergies.   Neurological:  Negative for dizziness, syncope and headaches.   Hematological:  Does not bruise/bleed easily.   Psychiatric/Behavioral:  Negative for behavioral problems.           Objective:   /73 (BP Location: Right arm, Patient Position: Lying)   Pulse (!) 54   Temp 97.6 °F (36.4 °C) (Tympanic)   Resp 18   Ht 5' 9" (1.753 m)   Wt 59.9 kg (132 lb 0.9 oz)   SpO2 100%   BMI 19.50 kg/m²     X-Ray Chest AP Portable  Narrative: EXAMINATION:  XR CHEST AP PORTABLE    CLINICAL HISTORY:  pna;    TECHNIQUE:  Single frontal view of the chest was performed.    COMPARISON:  04/08/2024    FINDINGS:  Left apical chest tube stable.  Caudal chest tube is been removed.  Small to moderate pleural effusion and patchy infiltrate left lower lobe.  No significant pneumothorax.    In comparison to the prior study, there is no adverse interval changes.  Impression: In comparison to the prior study, there is no adverse interval changes    Electronically signed by: Bob Velasco MD  Date:    04/09/2024  Time:    07:18         Physical Exam  Vitals reviewed.   Constitutional:       Appearance: Normal appearance.   HENT:      Head: Normocephalic and atraumatic.      Mouth/Throat:      Mouth: Mucous membranes are moist.   Eyes:      Extraocular Movements: Extraocular movements intact.   Cardiovascular:      Rate and Rhythm: Normal rate and regular rhythm. "      Pulses: Normal pulses.      Heart sounds: Normal heart sounds.   Pulmonary:      Effort: Pulmonary effort is normal.      Breath sounds: Rhonchi and rales present.   Abdominal:      Palpations: Abdomen is soft.   Musculoskeletal:         General: Normal range of motion.      Cervical back: Normal range of motion and neck supple.   Skin:     General: Skin is warm.      Capillary Refill: Capillary refill takes less than 2 seconds.   Neurological:      General: No focal deficit present.      Mental Status: She is alert and oriented to person, place, and time.   Psychiatric:         Mood and Affect: Mood normal.         Behavior: Behavior normal.         Assessment:     1. Hydropneumothorax    2. Tachycardia    3. Pneumonia of left lower lobe due to infectious organism    4. Leukocytosis, unspecified type    5. Hyponatremia    6. Chest pain          Plan   28-year-old female with a left sided hydropneumothorax status post thoracotomy and decortication with chest tube placement postop day 11.  Neuro Pain control as needed.  Activity ambulate as tolerated patient chest tubes  to  water seal.  Left lower lobe consolidation chest physical therapy  Respiratory aggressive pulmonary toilet incentive spirometry.  Chest physiotherapy started with Mucomyst to loosen the secretions.  Cardiovascular beta-blocker for heart  rate control.  Anemia multifactorial   Renal creatinine i trending down   Avoid all the nephrotoxic medications.  Malnutrition and hypoalbuminemia protein supplements ordered.  Dietitian on board  Infectious disease antibiotics as per the culture managed by hospital medicine team.  DVT and GI prophylaxis with Lovenox bilateral SCDs and Pepcid.  PTOT out of bed ambulate  Rest of the management by hospital medicine team.          Josafat Sandoval MD  [facility]  [place]

## 2024-04-09 NOTE — SUBJECTIVE & OBJECTIVE
Chief complaint: pleuritic pain    Mayela Molina is a 28-year-old patient with no chronic medical problems who presented to the ED complaining of a cough x 3 weeks. Patient is a biological male in the transition to female who reports a cough with yellow sputum, left sided pleuritic pain, and dyspnea that increases with exertion. Denies fever, chills, dizziness, abdominal pain, n/v/d, dysuria, hematuria, myalgia. Denies tobacco use, vaping/e-cigarettes, or drug use. Abnormal labs in the emergency department include white blood cell count 30.46, hemoglobin 12.7, platelets 577, D-dimer 1.37, sodium 122, chloride 90, bicarb 21, glucose 121, albumin 2.3. Patient was admitted under hospital medicine service for left-sided pneumonia with possible underlying abscess formation. Pulmonary consulted for evaluation.    Interval history:  3/30: underwent VATS yesterday; chest tube in place. Pulmonary status stable on room air.   3/31: Remains on room air; poor cough quality. H&H decreased this am with plans for blood transfusion.  4/8: Pulmonary status stable on room air. Chest tube x1 removed today per cardiothoracic surgery.   4/9: Pulmonary status stable on room air. Plan of care discussed. Patient anxious for discharge to home. Discussed long-term goals upon discharge and follow-up needs.     Objective:     Vital Signs (Most Recent):  Temp: 98.9 °F (37.2 °C) (04/09/24 1202)  Pulse: 71 (04/09/24 1353)  Resp: 18 (04/09/24 1353)  BP: 114/69 (04/09/24 1202)  SpO2: 100 % (04/09/24 1353) Vital Signs (24h Range):  Temp:  [97.6 °F (36.4 °C)-98.9 °F (37.2 °C)] 98.9 °F (37.2 °C)  Pulse:  [] 71  Resp:  [16-18] 18  SpO2:  [96 %-100 %] 100 %  BP: (114-125)/(69-78) 114/69   Weight: 59.9 kg (132 lb 0.9 oz); Body mass index is 19.5 kg/m².    Intake/Output Summary (Last 24 hours) at 4/9/2024 1452  Last data filed at 4/9/2024 1438  Gross per 24 hour   Intake 480 ml   Output 870 ml   Net -390 ml      Physical Exam  Vitals and  nursing note reviewed.   HENT:      Head: Normocephalic.   Eyes:      Conjunctiva/sclera: Conjunctivae normal.   Cardiovascular:      Rate and Rhythm: Normal rate.   Pulmonary:      Effort: Pulmonary effort is normal.   Abdominal:      Palpations: Abdomen is soft.   Musculoskeletal:         General: Normal range of motion.      Cervical back: Normal range of motion.   Skin:     General: Skin is warm and dry.   Neurological:      Mental Status: She is alert and oriented to person, place, and time.   Psychiatric:         Mood and Affect: Mood normal.         Review of Systems: Negative except as indicated in HPI    Significant Labs:  CBC/Anemia Profile:  Recent Labs   Lab 04/08/24  0550   WBC 10.11   HGB 9.0*   HCT 27.5*      MCV 86   RDW 15.4*   Chemistries:  Recent Labs   Lab 04/08/24  0549      K 3.4*      CO2 19*   BUN 11   CREATININE 1.5*   CALCIUM 7.1*   ALBUMIN 1.7*   PROT 5.3*   BILITOT 0.3   ALKPHOS 51*   ALT 97*   AST 73*   MG 1.8

## 2024-04-09 NOTE — PROGRESS NOTES
[facility]  Progress Note    Patient Name: Mayela Molina  MRN: [id number]  Patient Class: IP- Inpatient   Admission Date: 3/25/2024  Length of Stay: 14 days  Attending Physician: Linsey Garcia MD  Primary Care Provider: Katie, Primary Doctor        Subjective:     Principal Problem:Pneumonia of left lung due to infectious organism        HPI:  28-year-old patient (male to female transgender) with no chronic medical conditions who presented to the emergency department with complaint of cough over the last 3 weeks, moderate severity, persistent.  Patient does have associated yellow sputum production, shortness for breath, and pleuritic chest pain.  No associated nausea, vomiting, diarrhea.  No complaints of fevers or chills although T-max in the emergency department recorded as 99.8.  Patient does not smoke cigarettes or vape.  No illicit drug use.  Abnormal labs in the emergency department include white blood cell count 30.46, hemoglobin 12.7, platelets 577, D-dimer 1.37, sodium 122, chloride 90, bicarb 21, glucose 121, albumin 2.3.  Last labs are from 2018 with no interim labs to compare with.  Patient does take spironolactone, Estrace, and Provera.  CT scan of chest with left-sided pneumonia with possible abscess formation.    Overview/Hospital Course:   Patient was admitted acute respiratory failure with left-sided pneumonia and possible abscess formation.  She initially was on nasal cannula and on morning after admission became acutely short of breath with a PO2 of 87% and tachycardic.  She was given a dose of adenosine which did not improve things however gradually her heart rate decreased.  Patient was admitted to the intensive care unit for respiratory failure tachycardia.  She was initially on high-flow nasal cannula and subsequently was able to wean to room air.  Cultures remained pending.  She was initially started on Rocephin and azithromycin and changed to  Zosyn who azithromycin vein.  Legionella antigen pending.  Continue with aggressive pulmonary care  With worsening CT scan, continued elevated WBC, continued elevated heart rate.  Patient underwent repeat CT scan of the chest which showed worsening of empyema.  She was seen and evaluated by Cardiothoracic surgery and underwent left VATS with decortication on 3/19.    Hyponatremia patient was admitted with hyponatremia she had fluid restrictions in place cortisol was normal, TSH normal, patient was on spironolactone and this was held.  She was being maintained on fluid restricted diet.     Encourage patient to deep breathe and walk in halls.  4/1 undergoing breathing treatment. Mom at bedside. Questions and concerns addressed. Encourage ambulation  4/2 questions and concerns addressed. Chest tube drainage 50cc yesterday. Ambulated with physical/occupational therapy. Continue intravenous fluids and intravenous antibiotic(s).   4/3 leukocytosis trending down. chest tube drainage 10cc/24 yesterday. Repeat chest x-ray with no adverse interval change. Patient wishes to ambulate more. Continue current care.  4/4 minimal chest tube output last 2 days. Leukocytosis resolved. Acute kidney injury improving. Ambulating with physical/occupational therapy. Repeat chest x-ray reviewed. Appetite improving  4/5 primary CTS recommending repeat ct chest noncontrast and prealbumin. Chest tube to water seal per primary surgeon. Discussed restarting spironolactone at lower dose with nephrology. Patient ambulating with physical/occupational therapy.   4/6 repeat ct chest with complete plugging of left mainstem bronchus. Chest physiotherapy with G5 added. Spironolactone resumed at reduced dose. Complains of chest pain at site of chest tube improved with pain analgesics.  4/7 pulmonology planning on bronch in am if no improvement. Chest x-ray in AM. Complains of pain with chest tube.  4/8 multidisciplinary meeting with patient. Cts removing  one chest tube today. Counseled on compliance with medical recommendations with ct physiotherapy, incentive spirometer, ambulation, cough. Patient voiced understanding    4/9 patient seen and examined.  Discussed with Pulmonary and CTS.  Patient sitting on couch using incentive spirometer and Acapella.  Reports she discussed CPT with respiratory and they reported it was contraindicated with chest tube in place.    Interval History:  Patient seen and examined sitting at side of bed.  She looks much more comfortable than she had in the past.  Taking deep breaths and use incentive spirometry as well as her Acapella device.  No complaints of cough.    Review of Systems   Constitutional:  Positive for activity change. Negative for fatigue and fever.   Respiratory:  Negative for cough and shortness of breath.    Cardiovascular:  Negative for chest pain and leg swelling.   Gastrointestinal:  Negative for nausea and vomiting.   All other systems reviewed and are negative.    Objective:     Vital Signs (Most Recent):  Temp: 98.9 °F (37.2 °C) (04/09/24 1202)  Pulse: 71 (04/09/24 1353)  Resp: 18 (04/09/24 1353)  BP: 114/69 (04/09/24 1202)  SpO2: 100 % (04/09/24 1353) Vital Signs (24h Range):  Temp:  [97.6 °F (36.4 °C)-98.9 °F (37.2 °C)] 98.9 °F (37.2 °C)  Pulse:  [] 71  Resp:  [16-18] 18  SpO2:  [96 %-100 %] 100 %  BP: (114-125)/(69-78) 114/69     Weight: 59.9 kg (132 lb 0.9 oz)  Body mass index is 19.5 kg/m².    Intake/Output Summary (Last 24 hours) at 4/9/2024 1559  Last data filed at 4/9/2024 1438  Gross per 24 hour   Intake 480 ml   Output 870 ml   Net -390 ml         Physical Exam  Vitals reviewed.   Constitutional:       Appearance: Normal appearance.   HENT:      Head: Normocephalic and atraumatic.      Mouth/Throat:      Mouth: Mucous membranes are moist.      Pharynx: Oropharynx is clear.   Eyes:      Extraocular Movements: Extraocular movements intact.      Conjunctiva/sclera: Conjunctivae normal.  "  Cardiovascular:      Rate and Rhythm: Normal rate and regular rhythm.      Pulses: Normal pulses.      Heart sounds: Normal heart sounds.   Pulmonary:      Effort: Pulmonary effort is normal.      Breath sounds: Rhonchi present.   Abdominal:      General: Bowel sounds are normal.      Palpations: Abdomen is soft.   Musculoskeletal:         General: Normal range of motion.      Cervical back: Normal range of motion and neck supple.   Skin:     General: Skin is warm and dry.   Neurological:      General: No focal deficit present.      Mental Status: She is alert and oriented to person, place, and time. Mental status is at baseline.   Psychiatric:         Mood and Affect: Mood normal.         Behavior: Behavior normal.         Thought Content: Thought content normal.          Significant Labs: All pertinent labs within the past 24 hours have been reviewed.  CBC:   Recent Labs   Lab 04/08/24  0550   WBC 10.11   HGB 9.0*   HCT 27.5*        CMP:   Recent Labs   Lab 04/08/24  0549      K 3.4*      CO2 19*   GLU 84   BUN 11   CREATININE 1.5*   CALCIUM 7.1*   PROT 5.3*   ALBUMIN 1.7*   BILITOT 0.3   ALKPHOS 51*   AST 73*   ALT 97*   ANIONGAP 7*       Significant Imaging: I have reviewed all pertinent imaging results/findings within the past 24 hours.    Assessment/Plan:      * Pneumonia of left lung due to infectious organism  -white blood cell count 30.46, lactic acid level 1.5, D-dimer 1.37, BNP 14, troponin negative, influenza a/B negative, COVID-19 negative  -blood cultures pending  -CTA of chest with "no pulmonary embolism, no dissection; dense consolidation in the left lower lobe extending to the left upper lobe with tree in bud opacities consistent with severe pneumonia; questionable areas of fluid density in the left lung base with foci of gas may relate to component of fluid density or possible liquefaction or possible abscess formation."  -continue IV Zosyn, vanco and IV azithromycin initiated " in the emergency department  -HIV negative  -give IV fluids, O2 supplementation, p.r.n. albuterol inhaler prn  - repeat CT scan of chest today looks more like an empyema  -CVT consulted and patient underwent VATS with drainage    Chest tube to water seal per primary surgeon   Continue antibiotic(s)   Encourage incentive spirometer and acapella and breathing treatments  Ambulate   Repeat ct chest with completed plugging of left mainstem bronchus  Added chest physiotherapy with G5 machine for physical percussion        Hypokalemia  Patient has hypokalemia which is Acute and currently controlled. Most recent potassium levels reviewed-   Lab Results   Component Value Date    K 3.4 (L) 04/08/2024   . Will continue potassium replacement per protocol and recheck repeat levels after replacement completed.   Mg 1.4  Replete both    Mucus plugging of bronchi  Pulmonology following  3% saline nebs if needed  Repeat chest x-ray with no interval worsening   Monitor         DAVIAN (acute kidney injury)  Patient with acute kidney injury/acute renal failure likely due to acute tubular necrosis caused by vancomycin induced nephropathy  DAVIAN is currently improving. Baseline creatinine  normal  - Labs reviewed- Renal function/electrolytes with Estimated Creatinine Clearance (based on SCr of 1.5 mg/dL (H))  Female: 53.7 mL/min (A)  Male: 63.2 mL/min (A)  Hover for info according to latest data. Monitor urine output and serial BMP and adjust therapy as needed. Avoid nephrotoxins and renally dose meds for GFR listed above.    Improving  Nephrology following  Spironolactone resumed at lower dose   Stable    Moderate protein-calorie malnutrition  Nutrition consulted. Most recent weight and BMI monitored-     Measurements:  Wt Readings from Last 1 Encounters:   04/07/24 59.9 kg (132 lb 0.9 oz)   Body mass index is 19.5 kg/m².    Patient has been screened and assessed by RD.    Malnutrition Type:  Context: acute illness or injury, chronic  illness  Level: moderate    Malnutrition Characteristic Summary:  Energy Intake (Malnutrition): less than or equal to 75% for greater than or equal to 1 month  Subcutaneous Fat (Malnutrition): moderate depletion  Muscle Mass (Malnutrition): moderate depletion    Interventions/Recommendations (treatment strategy):  1. Recommend pt continues on Regular diet  2. Recommend pt continue Boost plus TID as medically appropriate 3. Recommend Rob BID for wound healing 4. Encourage PO intake of meals, snacks and supplements 5. Weigh twice weekly      Hydropneumothorax  Status post VATS with left lung chest tubes  -culture positive for strep  -on ceftriaxone  CTS to removed one chest tube on 4/8  Reevaluate over 1-2 days for discontinuing 2nd chest tube    Pulmonary abscess  Growing strep  On ceftriaxone    Repeat ct chest with complete plugging  Chest physiotherapy added with percussion  CTS to remove one chest tube on 4/8  Reevaluate over 1-2 days for possible removal of 2nd chest tube    Tachycardia, paroxysmal  Beta-blocker started heart rate markedly improved.  Borderline blood pressure and heart rate underwent 100 we will DC        Male-to-female transgender person  Resume home medications to include Estrace & Provera; hold spironolactone due to hyponatremia    Resume spironolactone at lower dose      Normocytic anemia  Patient's anemia is currently controlled. Has not received any PRBCs to date.   Current CBC reviewed-   Lab Results   Component Value Date    HGB 9.0 (L) 04/08/2024    HCT 27.5 (L) 04/08/2024     Monitor serial CBC and transfuse if patient becomes hemodynamically unstable, symptomatic or H/H drops below 7/21.  Iron def anemia    Underweight  Current BMI 15.27, albumin 2.3.    Add nutritional supplements  Nutrition consult  Appetite improving    Prealbumin improved    Sepsis  This patient does have evidence of infective focus  My overall impression is sepsis.  Source: Respiratory  Antibiotics given-  "  Antibiotics (72h ago, onward)      Start     Stop Route Frequency Ordered    04/08/24 1815  cefTRIAXone (ROCEPHIN) 2 g in dextrose 5 % in water (D5W) 100 mL IVPB (MB+)         -- IV Every 12 hours (non-standard times) 04/08/24 1714        No results for input(s): "LACTATE", "POCLAC" in the last 72 hours.    Organ dysfunction indicated by  none    Fluid challenge Not needed - patient is not hypotensive      Post- resuscitation assessment No - Post resuscitation assessment not needed       Will Not start Pressors- Levophed for MAP of 65  Source control achieved by: broad spectrum antibiotics    Leukocytosis resolved   Chest tube minimal drainage    Resolved       VTE Risk Mitigation (From admission, onward)           Ordered     Place sequential compression device  Until discontinued        Comments: Can be discontinued when not in the bed.    03/30/24 1113     enoxaparin injection 40 mg  Every 24 hours         03/28/24 0912     IP VTE HIGH RISK PATIENT  Once         03/26/24 0043     Reason for No Pharmacological VTE Prophylaxis  Once        Question:  Reasons:  Answer:  Physician Provided (leave comment)  Comment:  pending pulmonary consult    03/26/24 0043     IP VTE HIGH RISK PATIENT  Once         03/26/24 0043     Place sequential compression device  Until discontinued         03/26/24 0043                    Discharge Planning   RAVINDER:      Code Status: Full Code   Is the patient medically ready for discharge?:     Reason for patient still in hospital (select all that apply): Patient trending condition and Consult recommendations  Discharge Plan A: Home   Discharge Delays: None known at this time              Linsey Barragan MD  Department of Hospital Medicine   '[facility] - Telemetry ([facility])    "

## 2024-04-09 NOTE — ASSESSMENT & PLAN NOTE
"-white blood cell count 30.46, lactic acid level 1.5, D-dimer 1.37, BNP 14, troponin negative, influenza a/B negative, COVID-19 negative  -blood cultures pending  -CTA of chest with "no pulmonary embolism, no dissection; dense consolidation in the left lower lobe extending to the left upper lobe with tree in bud opacities consistent with severe pneumonia; questionable areas of fluid density in the left lung base with foci of gas may relate to component of fluid density or possible liquefaction or possible abscess formation."  -continue IV Zosyn, vanco and IV azithromycin initiated in the emergency department  -HIV negative  -give IV fluids, O2 supplementation, p.r.n. albuterol inhaler prn  - repeat CT scan of chest today looks more like an empyema  -CVT consulted and patient underwent VATS with drainage    Chest tube to water seal per primary surgeon   Continue antibiotic(s)   Encourage incentive spirometer and acapella and breathing treatments  Ambulate   Repeat ct chest with completed plugging of left mainstem bronchus  Added chest physiotherapy with G5 machine for physical percussion      "

## 2024-04-09 NOTE — ASSESSMENT & PLAN NOTE
Pulmonology following  3% saline nebs if needed  Repeat chest x-ray with no interval worsening   Monitor

## 2024-04-09 NOTE — SUBJECTIVE & OBJECTIVE
Interval History: Pt was seen and examined. Labs and meds reviewed. Discussed with other providers. No new c/o's.    Review of patient's allergies indicates:  No Known Allergies  Current Facility-Administered Medications   Medication Frequency    0.9%  NaCl infusion (for blood administration) Q24H PRN    0.9%  NaCl infusion PRN    acetaminophen tablet 650 mg Q6H PRN    albuterol nebulizer solution 2.5 mg Q6H PRN    benzonatate capsule 200 mg TID    bisacodyL suppository 10 mg Daily PRN    cefTRIAXone (ROCEPHIN) 2 g in dextrose 5 % in water (D5W) 100 mL IVPB (MB+) Q12H    enoxaparin injection 40 mg Q24H (prophylaxis, 1700)    estradioL tablet 8 mg Daily    famotidine tablet 20 mg BID    glucagon (human recombinant) injection 1 mg PRN    glucose chewable tablet 16 g PRN    glucose chewable tablet 24 g PRN    HYDROmorphone injection 0.5 mg QID PRN    ipratropium 0.02 % nebulizer solution 0.5 mg QID PRN    LIDOcaine 5 % patch 1 patch Q24H    medroxyPROGESTERone tablet 10 mg Daily    melatonin tablet 6 mg Nightly PRN    methocarbamoL tablet 500 mg QID    metoclopramide injection 5 mg Q6H PRN    metoprolol tartrate (LOPRESSOR) split tablet 12.5 mg BID    naloxone 0.4 mg/mL injection 0.02 mg PRN    ondansetron disintegrating tablet 8 mg Q8H PRN    ondansetron injection 4 mg Q6H PRN    oxyCODONE immediate release tablet 10 mg Q4H PRN    oxyCODONE immediate release tablet 5 mg Q4H PRN    polyethylene glycol packet 17 g Daily    sodium chloride 0.9% flush 10 mL PRN    sodium chloride 0.9% flush 10 mL Q12H PRN    sodium chloride 0.9% flush 10 mL PRN    sodium chloride 0.9% flush 10 mL PRN    sodium chloride 0.9% flush 2 mL Q6H    sodium chloride 3% nebulizer solution 4 mL Q6H WAKE    sodium chloride oral tablet 1,000 mg TID    spironolactone tablet 25 mg BID       Objective:     Vital Signs (Most Recent):  Temp: 98.9 °F (37.2 °C) (04/09/24 1202)  Pulse: 82 (04/09/24 1202)  Resp: 16 (04/09/24 1202)  BP: 114/69 (04/09/24  1202)  SpO2: 99 % (04/09/24 1202) Vital Signs (24h Range):  Temp:  [97.6 °F (36.4 °C)-98.9 °F (37.2 °C)] 98.9 °F (37.2 °C)  Pulse:  [] 82  Resp:  [16-18] 16  SpO2:  [96 %-100 %] 99 %  BP: (114-125)/(69-78) 114/69     Weight: 59.9 kg (132 lb 0.9 oz) (04/07/24 2331)  Body mass index is 19.5 kg/m².  Body surface area is 1.71 meters squared.    I/O last 3 completed shifts:  In: 2729.9 [I.V.:2534.2; IV Piggyback:195.7]  Out: 20 [Chest Tube:20]     Physical Exam  Vitals and nursing note reviewed.   Constitutional:       General: She is not in acute distress.     Appearance: Normal appearance.   Neurological:      Mental Status: She is alert.          Significant Labs: reviewed  BMP  Lab Results   Component Value Date     04/08/2024    K 3.4 (L) 04/08/2024     04/08/2024    CO2 19 (L) 04/08/2024    BUN 11 04/08/2024    CREATININE 1.5 (H) 04/08/2024    CALCIUM 7.1 (L) 04/08/2024    ANIONGAP 7 (L) 04/08/2024    EGFRNORACEVR >60 04/08/2024         Significant Imaging: reviewed

## 2024-04-09 NOTE — NURSING
PT REFUSED TO HAVE NEW IV STARTED.  SHE IS AWARE IT NEEDS TO BE CHANGED PER PROTOCOL BUT VOICES ITS FINE.

## 2024-04-09 NOTE — PROGRESS NOTES
[facility]  Pulmonology Progress Note    Patient Name: Mayela Molina MRN: [id number]  Admission Date: 3/25/2024  Hospital Length of Stay: 14 days  Code Status: Full Code   Attending Provider: Linsey Garcia MD  Subjective:   History of present illness:  Chief complaint: pleuritic pain    Mayela Molina is a 28-year-old patient with no chronic medical problems who presented to the ED complaining of a cough x 3 weeks. Patient is a biological male in the transition to female who reports a cough with yellow sputum, left sided pleuritic pain, and dyspnea that increases with exertion. Denies fever, chills, dizziness, abdominal pain, n/v/d, dysuria, hematuria, myalgia. Denies tobacco use, vaping/e-cigarettes, or drug use. Abnormal labs in the emergency department include white blood cell count 30.46, hemoglobin 12.7, platelets 577, D-dimer 1.37, sodium 122, chloride 90, bicarb 21, glucose 121, albumin 2.3. Patient was admitted under hospital medicine service for left-sided pneumonia with possible underlying abscess formation. Pulmonary consulted for evaluation.    Interval history:  3/30: underwent VATS yesterday; chest tube in place. Pulmonary status stable on room air.   3/31: Remains on room air; poor cough quality. H&H decreased this am with plans for blood transfusion.  4/8: Pulmonary status stable on room air. Chest tube x1 removed today per cardiothoracic surgery.   4/9: Pulmonary status stable on room air. Plan of care discussed. Patient anxious for discharge to home. Discussed long-term goals upon discharge and follow-up needs.     Objective:     Vital Signs (Most Recent):  Temp: 98.9 °F (37.2 °C) (04/09/24 1202)  Pulse: 71 (04/09/24 1353)  Resp: 18 (04/09/24 1353)  BP: 114/69 (04/09/24 1202)  SpO2: 100 % (04/09/24 1353) Vital Signs (24h Range):  Temp:  [97.6 °F (36.4 °C)-98.9 °F (37.2 °C)] 98.9 °F (37.2 °C)  Pulse:  [] 71  Resp:  [16-18] 18  SpO2:   [96 %-100 %] 100 %  BP: (114-125)/(69-78) 114/69   Weight: 59.9 kg (132 lb 0.9 oz); Body mass index is 19.5 kg/m².    Intake/Output Summary (Last 24 hours) at 4/9/2024 1452  Last data filed at 4/9/2024 1438  Gross per 24 hour   Intake 480 ml   Output 870 ml   Net -390 ml      Physical Exam  Vitals and nursing note reviewed.   HENT:      Head: Normocephalic.   Eyes:      Conjunctiva/sclera: Conjunctivae normal.   Cardiovascular:      Rate and Rhythm: Normal rate.   Pulmonary:      Effort: Pulmonary effort is normal.   Abdominal:      Palpations: Abdomen is soft.   Musculoskeletal:         General: Normal range of motion.      Cervical back: Normal range of motion.   Skin:     General: Skin is warm and dry.   Neurological:      Mental Status: She is alert and oriented to person, place, and time.   Psychiatric:         Mood and Affect: Mood normal.         Review of Systems: Negative except as indicated in HPI    Significant Labs:  CBC/Anemia Profile:  Recent Labs   Lab 04/08/24  0550   WBC 10.11   HGB 9.0*   HCT 27.5*      MCV 86   RDW 15.4*   Chemistries:  Recent Labs   Lab 04/08/24  0549      K 3.4*      CO2 19*   BUN 11   CREATININE 1.5*   CALCIUM 7.1*   ALBUMIN 1.7*   PROT 5.3*   BILITOT 0.3   ALKPHOS 51*   ALT 97*   AST 73*   MG 1.8     Assessment/Plan:   Pneumonia of left lung due to infectious organism  Hydropneumothorax  Pulmonary abscess  CT chest on admit with dense consolidation and questionable underlying abscess formation; no significant effusion noted  Repeat CT chest with apparent hydropneumothorax with underlying pulmonary abscess  Post VATS and chest tube placement on 3/29/2024  CTx1 in place; follow output  Culture --> streptococcus constellatus  De-escalated antibiotic coverage to Rocephin 2G IV BID  Supplemental oxygen as needed to maintain saturations 90% or greater; currently on room air   Follow chest imaging as needed  Mobilize as able; OOB to chair TID  IS while awake      Kiarra Willis NP  Pulmonary and Critical Care  [facility]

## 2024-04-09 NOTE — ASSESSMENT & PLAN NOTE
29 y/o transgender female who presented with pneumonia complicated by empyema requiring a chest tube who further developed DAVIAN:         DAVIAN (acute kidney injury)  S Cr further improved, DAVIAN slowly resolving  Stable renal function  DAVIAN, 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  Supratherapeutic vancomycin level  FENa > 1%, not prerenal azotemia  No other causes suspected  CT scans done were without contrast     Baseline s Cr is normal  K, again slightly low, mild hypokalemia, c/w recovery phase of DAVIAN. OK to repalce  Metabolic acidosis stable  O2 sat good  No indications for acute dialysis     Pneumonia of left lung due to infectious organism  Lung infection complicated by necrotizing pneumonia and empyema  Hydropneumothorax  S/p VATS procedure (POD#7) with drainage of empyema and decortication  On abx  Has a mucus plug now, planned bronchoscopy noted  Will defer to CT surgery     Plans and recommendations:  As discussed above  Total time spent 40 minutes including time needed to review the records, the   patient evaluation, documentation, face-to-face discussion with the patient,   more than 50% of the time was spent on coordination of care and counseling.    Level V visit.

## 2024-04-09 NOTE — ASSESSMENT & PLAN NOTE
Status post VATS with left lung chest tubes  -culture positive for strep  -on ceftriaxone  CTS to removed one chest tube on 4/8  Reevaluate over 1-2 days for discontinuing 2nd chest tube

## 2024-04-09 NOTE — ASSESSMENT & PLAN NOTE
Growing strep  On ceftriaxone    Repeat ct chest with complete plugging  Chest physiotherapy added with percussion  CTS to remove one chest tube on 4/8  Reevaluate over 1-2 days for possible removal of 2nd chest tube

## 2024-04-10 PROBLEM — T17.500A MUCUS PLUGGING OF BRONCHI: Status: RESOLVED | Noted: 2024-04-07 | Resolved: 2024-04-10

## 2024-04-10 PROBLEM — E87.6 HYPOKALEMIA: Status: RESOLVED | Noted: 2024-04-07 | Resolved: 2024-04-10

## 2024-04-10 PROBLEM — N17.9 AKI (ACUTE KIDNEY INJURY): Status: RESOLVED | Noted: 2024-04-05 | Resolved: 2024-04-10

## 2024-04-10 PROBLEM — A41.9 SEPSIS: Status: RESOLVED | Noted: 2024-03-26 | Resolved: 2024-04-10

## 2024-04-10 PROBLEM — I47.9 TACHYCARDIA, PAROXYSMAL: Status: RESOLVED | Noted: 2024-03-26 | Resolved: 2024-04-10

## 2024-04-10 LAB
ANION GAP SERPL CALC-SCNC: 9 MMOL/L (ref 8–16)
BUN SERPL-MCNC: 13 MG/DL (ref 6–20)
CALCIUM SERPL-MCNC: 7.9 MG/DL (ref 8.7–10.5)
CHLORIDE SERPL-SCNC: 109 MMOL/L (ref 95–110)
CO2 SERPL-SCNC: 19 MMOL/L (ref 23–29)
CREAT SERPL-MCNC: 1.3 MG/DL (ref 0.5–1.4)
EST. GFR  (NO RACE VARIABLE): >60 ML/MIN/1.73 M^2
GLUCOSE SERPL-MCNC: 91 MG/DL (ref 70–110)
MAGNESIUM SERPL-MCNC: 1.5 MG/DL (ref 1.6–2.6)
POTASSIUM SERPL-SCNC: 3.7 MMOL/L (ref 3.5–5.1)
SODIUM SERPL-SCNC: 137 MMOL/L (ref 136–145)

## 2024-04-10 PROCEDURE — 25000003 PHARM REV CODE 250: Performed by: INTERNAL MEDICINE

## 2024-04-10 PROCEDURE — 83735 ASSAY OF MAGNESIUM: CPT | Performed by: INTERNAL MEDICINE

## 2024-04-10 PROCEDURE — 21400001 HC TELEMETRY ROOM

## 2024-04-10 PROCEDURE — 25000003 PHARM REV CODE 250: Performed by: ANESTHESIOLOGY

## 2024-04-10 PROCEDURE — 99900035 HC TECH TIME PER 15 MIN (STAT)

## 2024-04-10 PROCEDURE — A4216 STERILE WATER/SALINE, 10 ML: HCPCS | Performed by: ANESTHESIOLOGY

## 2024-04-10 PROCEDURE — 36415 COLL VENOUS BLD VENIPUNCTURE: CPT | Performed by: INTERNAL MEDICINE

## 2024-04-10 PROCEDURE — 63600175 PHARM REV CODE 636 W HCPCS: Performed by: INTERNAL MEDICINE

## 2024-04-10 PROCEDURE — 99233 SBSQ HOSP IP/OBS HIGH 50: CPT | Mod: ,,, | Performed by: INTERNAL MEDICINE

## 2024-04-10 PROCEDURE — 63600175 PHARM REV CODE 636 W HCPCS: Performed by: NURSE PRACTITIONER

## 2024-04-10 PROCEDURE — 94761 N-INVAS EAR/PLS OXIMETRY MLT: CPT

## 2024-04-10 PROCEDURE — 94664 DEMO&/EVAL PT USE INHALER: CPT

## 2024-04-10 PROCEDURE — 63600175 PHARM REV CODE 636 W HCPCS: Performed by: FAMILY MEDICINE

## 2024-04-10 PROCEDURE — 25000003 PHARM REV CODE 250: Performed by: THORACIC SURGERY (CARDIOTHORACIC VASCULAR SURGERY)

## 2024-04-10 PROCEDURE — 25000003 PHARM REV CODE 250: Performed by: NURSE PRACTITIONER

## 2024-04-10 PROCEDURE — 25000003 PHARM REV CODE 250: Performed by: FAMILY MEDICINE

## 2024-04-10 PROCEDURE — 27000646 HC AEROBIKA DEVICE

## 2024-04-10 PROCEDURE — 80048 BASIC METABOLIC PNL TOTAL CA: CPT | Performed by: INTERNAL MEDICINE

## 2024-04-10 RX ORDER — MAGNESIUM SULFATE HEPTAHYDRATE 40 MG/ML
2 INJECTION, SOLUTION INTRAVENOUS
Status: COMPLETED | OUTPATIENT
Start: 2024-04-10 | End: 2024-04-10

## 2024-04-10 RX ORDER — MAGNESIUM SULFATE HEPTAHYDRATE 40 MG/ML
2 INJECTION, SOLUTION INTRAVENOUS ONCE
Status: CANCELLED | OUTPATIENT
Start: 2024-04-10 | End: 2024-04-10

## 2024-04-10 RX ADMIN — SPIRONOLACTONE 25 MG: 25 TABLET, FILM COATED ORAL at 09:04

## 2024-04-10 RX ADMIN — POTASSIUM CHLORIDE 20 MEQ: 1500 TABLET, EXTENDED RELEASE ORAL at 09:04

## 2024-04-10 RX ADMIN — MAGNESIUM SULFATE HEPTAHYDRATE 2 G: 40 INJECTION, SOLUTION INTRAVENOUS at 09:04

## 2024-04-10 RX ADMIN — ESTRADIOL 8 MG: 1 TABLET ORAL at 09:04

## 2024-04-10 RX ADMIN — SODIUM CHLORIDE 1000 MG: 1 TABLET ORAL at 09:04

## 2024-04-10 RX ADMIN — Medication 6 MG: at 09:04

## 2024-04-10 RX ADMIN — OXYCODONE HYDROCHLORIDE 10 MG: 5 TABLET ORAL at 03:04

## 2024-04-10 RX ADMIN — Medication 2 ML: at 06:04

## 2024-04-10 RX ADMIN — OXYCODONE HYDROCHLORIDE 10 MG: 5 TABLET ORAL at 05:04

## 2024-04-10 RX ADMIN — HYDROMORPHONE HYDROCHLORIDE 0.5 MG: 1 INJECTION, SOLUTION INTRAMUSCULAR; INTRAVENOUS; SUBCUTANEOUS at 02:04

## 2024-04-10 RX ADMIN — MEDROXYPROGESTERONE ACETATE 10 MG: 5 TABLET ORAL at 09:04

## 2024-04-10 RX ADMIN — MAGNESIUM SULFATE HEPTAHYDRATE 2 G: 40 INJECTION, SOLUTION INTRAVENOUS at 11:04

## 2024-04-10 RX ADMIN — METOPROLOL TARTRATE 12.5 MG: 25 TABLET, FILM COATED ORAL at 09:04

## 2024-04-10 RX ADMIN — ACETAMINOPHEN 650 MG: 325 TABLET ORAL at 06:04

## 2024-04-10 RX ADMIN — OXYCODONE HYDROCHLORIDE 10 MG: 5 TABLET ORAL at 09:04

## 2024-04-10 RX ADMIN — SODIUM CHLORIDE 1000 MG: 1 TABLET ORAL at 02:04

## 2024-04-10 RX ADMIN — ENOXAPARIN SODIUM 40 MG: 40 INJECTION SUBCUTANEOUS at 05:04

## 2024-04-10 RX ADMIN — CEFTRIAXONE 2 G: 2 INJECTION, POWDER, FOR SOLUTION INTRAMUSCULAR; INTRAVENOUS at 06:04

## 2024-04-10 RX ADMIN — Medication 2 ML: at 12:04

## 2024-04-10 RX ADMIN — OXYCODONE HYDROCHLORIDE 5 MG: 5 TABLET ORAL at 09:04

## 2024-04-10 NOTE — ASSESSMENT & PLAN NOTE
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  Supratherapeutic vancomycin level  FENa > 1%, not prerenal azotemia  No other causes suspected  CT scans done were without contrast     Baseline s Cr is normal  K, remains slightly low, c/w recovery phase of DAVIAN. Being replaced   Metabolic acidosis stable  O2 sat good  No indications for acute dialysis     Pneumonia of left lung due to infectious organism  Lung infection complicated by necrotizing pneumonia and empyema  Hydropneumothorax  S/p VATS procedure (POD#7) with drainage of empyema and decortication  On abx  Has a mucus plug now, planned bronchoscopy noted  Will defer to CT surgery     Plans and recommendations:  As discussed above  Total time spent 40 minutes including time needed to review the records, the   patient evaluation, documentation, face-to-face discussion with the patient,   more than 50% of the time was spent on coordination of care and counseling.

## 2024-04-10 NOTE — ASSESSMENT & PLAN NOTE
Status post VATS with left lung chest tubes  -culture positive for strep  -on ceftriaxone  CTS to removed one chest tube on 4/8  2nd chest tube removed 4/10

## 2024-04-10 NOTE — PROGRESS NOTES
[facility] Tele[facility] ([facility])  Nephrology  Progress Note    Patient Name: Mayela Molina  MRN: [id number]  Admission Date: 3/25/2024  Hospital Length of Stay: 15 days  Attending Provider: Linsey Garcia MD   Primary Care Physician: Katie, Primary Doctor  Principal Problem:Pneumonia of left lung due to infectious organism    Subjective:     HPI: Noted    Interval History: Pt was seen and examined. Labs and meds reviewed. No new events. Reviewed the chart. No new c/o's.    Review of patient's allergies indicates:  No Known Allergies  Current Facility-Administered Medications   Medication Frequency    0.9%  NaCl infusion (for blood administration) Q24H PRN    0.9%  NaCl infusion PRN    acetaminophen tablet 650 mg Q6H PRN    albuterol nebulizer solution 2.5 mg Q6H PRN    benzonatate capsule 200 mg TID    bisacodyL suppository 10 mg Daily PRN    cefTRIAXone (ROCEPHIN) 2 g in dextrose 5 % in water (D5W) 100 mL IVPB (MB+) Q12H    enoxaparin injection 40 mg Q24H (prophylaxis, 1700)    estradioL tablet 8 mg Daily    famotidine tablet 20 mg BID    glucagon (human recombinant) injection 1 mg PRN    glucose chewable tablet 16 g PRN    glucose chewable tablet 24 g PRN    HYDROmorphone injection 0.5 mg QID PRN    ipratropium 0.02 % nebulizer solution 0.5 mg QID PRN    LIDOcaine 5 % patch 1 patch Q24H    medroxyPROGESTERone tablet 10 mg Daily    melatonin tablet 6 mg Nightly PRN    methocarbamoL tablet 500 mg QID PRN    metoclopramide injection 5 mg Q6H PRN    metoprolol tartrate (LOPRESSOR) split tablet 12.5 mg BID    naloxone 0.4 mg/mL injection 0.02 mg PRN    ondansetron disintegrating tablet 8 mg Q8H PRN    ondansetron injection 4 mg Q6H PRN    oxyCODONE immediate release tablet 10 mg Q4H PRN    oxyCODONE immediate release tablet 5 mg Q4H PRN    polyethylene glycol packet 17 g Daily    potassium chloride SA CR tablet 20 mEq Daily    sodium chloride 0.9% flush 10 mL PRN    sodium chloride 0.9% flush 10 mL PRN    sodium  chloride 0.9% flush 2 mL Q6H    sodium chloride oral tablet 1,000 mg TID    spironolactone tablet 25 mg BID       Objective:     Vital Signs (Most Recent):  Temp: 97.4 °F (36.3 °C) (04/10/24 0719)  Pulse: 69 (04/10/24 1647)  Resp: 18 (04/10/24 1706)  BP: 125/78 (04/10/24 1647)  SpO2: 100 % (04/10/24 1647) Vital Signs (24h Range):  Temp:  [97.4 °F (36.3 °C)-98.9 °F (37.2 °C)] 97.4 °F (36.3 °C)  Pulse:  [] 69  Resp:  [17-18] 18  SpO2:  [97 %-100 %] 100 %  BP: (112-131)/(66-78) 125/78     Weight: 59.9 kg (132 lb 0.9 oz) (04/07/24 2331)  Body mass index is 19.5 kg/m².  Body surface area is 1.71 meters squared.    I/O last 3 completed shifts:  In: 720 [P.O.:720]  Out: 1110 [Urine:1100; Chest Tube:10]     Physical Exam  Vitals and nursing note reviewed.   Constitutional:       General: She is not in acute distress.  Neurological:      Mental Status: She is alert.          Significant Labs: reviewed  BMP  Lab Results   Component Value Date     04/10/2024    K 3.7 04/10/2024     04/10/2024    CO2 19 (L) 04/10/2024    BUN 13 04/10/2024    CREATININE 1.3 04/10/2024    CALCIUM 7.9 (L) 04/10/2024    ANIONGAP 9 04/10/2024    EGFRNORACEVR >60 04/10/2024       Significant Imaging: reviewed      Assessment/Plan:     27 y/o transgender female who presented with pneumonia complicated by empyema requiring a chest tube who further developed DAVIAN:         DAVIAN (acute kidney injury)  S Cr has improved further, now eithin normal range, DAVIAN resolving (baseline Cr 0.7)  Stable renal function  DAVIAN, 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  Supratherapeutic vancomycin level  FENa > 1%, not prerenal azotemia  No other causes suspected  CT scans done were without contrast     Baseline s Cr is normal  K normal, low K was replaced (low K due to recovery phase of DAVIAN)  Metabolic acidosis stable  O2 sat good    HTN: BP controlled     Pneumonia of left lung due to infectious organism  Lung infection complicated by  necrotizing pneumonia and empyema  Hydropneumothorax  S/p VATS procedure (POD#7) with drainage of empyema and decortication  On abx  Has a mucus plug now, planned bronchoscopy noted  Will defer to CT surgery     Plans and recommendations:  As discussed above  Total time spent 40 minutes including time needed to review the records, the   patient evaluation, documentation, face-to-face discussion with the patient,   more than 50% of the time was spent on coordination of care and counseling.          Thank you for your consult.     Linn Edwards MD  Nephrology  [facility] - Telemetry ([facility])

## 2024-04-10 NOTE — ASSESSMENT & PLAN NOTE
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 Baseline creatinine  normal  - Labs reviewed- Renal function/electrolytes with Estimated Creatinine Clearance (based on SCr of 1.3 mg/dL)  Female: 62 mL/min  Male: 73 mL/min  Hover for info according to latest data. Monitor urine output and serial BMP and adjust therapy as needed. Avoid nephrotoxins and renally dose meds for GFR listed above.    Improving  Nephrology following  Spironolactone resumed at lower dose   Stable

## 2024-04-10 NOTE — ASSESSMENT & PLAN NOTE
Patient has hypokalemia which is Acute and currently controlled. Most recent potassium levels reviewed-   Lab Results   Component Value Date    K 3.7 04/10/2024   . Will continue potassium replacement per protocol and recheck repeat levels after replacement completed.   Mg 1.4  Replete both

## 2024-04-10 NOTE — SUBJECTIVE & OBJECTIVE
Interval History:  Patient seen and examined multiple times at bedside.  She reports feeling better with less shortness of breath.  Pain is well-controlled.    Review of Systems   Constitutional:  Negative for fatigue and fever.   Respiratory:  Negative for cough and shortness of breath.    Cardiovascular:  Negative for chest pain and leg swelling.   Gastrointestinal:  Negative for nausea and vomiting.   All other systems reviewed and are negative.    Objective:     Vital Signs (Most Recent):  Temp: 97.4 °F (36.3 °C) (04/10/24 0719)  Pulse: 69 (04/10/24 1647)  Resp: 18 (04/10/24 1706)  BP: 125/78 (04/10/24 1647)  SpO2: 100 % (04/10/24 1647) Vital Signs (24h Range):  Temp:  [97.4 °F (36.3 °C)-98.9 °F (37.2 °C)] 97.4 °F (36.3 °C)  Pulse:  [] 69  Resp:  [17-18] 18  SpO2:  [97 %-100 %] 100 %  BP: (112-131)/(66-78) 125/78     Weight: 59.9 kg (132 lb 0.9 oz)  Body mass index is 19.5 kg/m².    Intake/Output Summary (Last 24 hours) at 4/10/2024 1755  Last data filed at 4/9/2024 1845  Gross per 24 hour   Intake 240 ml   Output 260 ml   Net -20 ml         Physical Exam  Vitals reviewed.   Constitutional:       Appearance: Normal appearance.   HENT:      Head: Normocephalic and atraumatic.      Mouth/Throat:      Mouth: Mucous membranes are moist.      Pharynx: Oropharynx is clear.   Eyes:      Extraocular Movements: Extraocular movements intact.      Conjunctiva/sclera: Conjunctivae normal.   Cardiovascular:      Rate and Rhythm: Normal rate and regular rhythm.      Pulses: Normal pulses.      Heart sounds: Normal heart sounds.   Pulmonary:      Effort: Pulmonary effort is normal.      Breath sounds: Normal breath sounds.   Abdominal:      General: Bowel sounds are normal.      Palpations: Abdomen is soft.   Musculoskeletal:         General: Normal range of motion.      Cervical back: Normal range of motion and neck supple.   Skin:     General: Skin is warm and dry.   Neurological:      General: No focal deficit  present.      Mental Status: She is alert and oriented to person, place, and time. Mental status is at baseline.   Psychiatric:         Mood and Affect: Mood normal.         Behavior: Behavior normal.         Thought Content: Thought content normal.             Significant Labs: All pertinent labs within the past 24 hours have been reviewed.    CMP:   Recent Labs   Lab 04/10/24  0417      K 3.7      CO2 19*   GLU 91   BUN 13   CREATININE 1.3   CALCIUM 7.9*   ANIONGAP 9       Significant Imaging: I have reviewed all pertinent imaging results/findings within the past 24 hours.

## 2024-04-10 NOTE — PROGRESS NOTES
[facility] - Tele[facility] ([facility])  Adult Nutrition  Progress Note    SUMMARY     Recommendations    Recommendation/Intervention:   1. Recommend pt continues on Regular diet    2. Recommend pt continue Boost plus TID as medically appropriate   3. Recommend Rob BID for wound healing   4. Encourage PO intake of meals, snacks and supplements   5. Weigh twice weekly    Goals:   1. Pt will tolerate and consume > 75% EEN and EPN prior to RD follow up   2. Pt will consume Rob BID prior to RD follow up  Nutrition Goal Status: progressing   Communication of RD Recs: other (comment) (POC, sticky note)    Assessment and Plan    Endocrine  Moderate protein-calorie malnutrition  Malnutrition Type:  Context: acute on chronic illness  Level: moderate    Related to (etiology):   Physiological causes increasing nutrient needs d/t chronic illness    Signs and Symptoms (as evidenced by):   BMI < 18.5  Underweight with loss of fat and muscle  Unable or unwilling to eat sufficient energy/protein to maintain a healthy weight  Food avoidance/ lack of interest in food  Sepsis, pneumonia    Malnutrition Characteristic Summary:  Energy Intake (Malnutrition): less than or equal to 75% for greater than or equal to 1 month  Subcutaneous Fat (Malnutrition): moderate depletion  Muscle Mass (Malnutrition): moderate depletion      Interventions/Recommendations (treatment strategy):  1. General healthful diet  2. Commercial beverage  3. Vitamin and mineral supplement therapy   4. PO intake encouragement   5. Collaboration with other providers     Nutrition Diagnosis Status:   Continues         Malnutrition Assessment  Malnutrition Context: acute illness or injury, chronic illness  Malnutrition Level: moderate  Skin (Micronutrient): pallor, wounds unhealed       Energy Intake (Malnutrition): less than or equal to 75% for greater than or equal to 1 month  Subcutaneous Fat (Malnutrition): moderate depletion  Muscle Mass (Malnutrition): moderate  "depletion   Orbital Region (Subcutaneous Fat Loss): moderate depletion (Slightly darker circles; Somewhat hallow look)  Upper Arm Region (Subcutaneous Fat Loss): moderate depletion (Some depth pinch but not ample; Fingers almost touch)   Clavicle Bone Region (Muscle Loss): moderate depletion (Visible, some protrusion)  Clavicle and Acromion Bone Region (Muscle Loss): moderate depletion (Acromion process slightly protrudes)  Patellar Region (Muscle Loss): moderate depletion (Knee cap less prominent, more rounded)  Anterior Thigh Region (Muscle Loss): moderate depletion (Mild depression on inner thigh)  Posterior Calf Region (Muscle Loss): moderate depletion (Not well developed)                 Reason for Assessment    Reason For Assessment: consult, low BMI (severe malnutrition)  Diagnosis:  (Pneumonia of left lung due to infectious organism)  Relevant Medical History: Sepsis, Hypotension, Hyponatremia, Underweight, Normocytic anemia, 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 Female admitted for Pneumonia of left lung due to infectious organism. Pt currently on Regular diet + Boost plus TID. H&P noted that the pt presented to the ED c/o moderate severity, persistent cough x 3 weeks, associated sx include  yellow sputum production, SOB, and pleuritic chest pain, denied N/V/D, CT scan of chest with left-sided pneumonia with possible abscess formation. EMR noted pt is postop day 4 S/p VATS with drainage of empyema and decortication, 50 cc drainage on 4/1, pt ambulating. Visited pt at bedside, pt sitting up, noted breakfast tray untouched, pt stated that she plans on eating later. Pt reported that she is a "picky eater" and does have an appetite but it depends on what it is, stated she does like some of the food here but cousin brought subway that she ate a 6 inch snadwhich and plans to order food from outside " if she doesn't like the food, pt confirm that she does have a menu and her orders are being taken and that she will drink the ONS as long as it is cold pt prefers chocolate, informed food and nutrition. Pt confirmed that she is not experiencing any N/V/D, abd pain, chewing difficulties, reported some difficulty swallowing burger yesterday d/t SOB. Pt reported that her UBW is 109 lbs PTA, stated that it is very difficult for her to gain weight and UBW was 100 lbs for a long time, actively trying to gain weight, encouraged PO intake of meals, snacks and supplements, pt expressed understanding, pt confirmed that she has access to adequate and healthy food at home. NFPE performed, moderate malnutrition noted. Reviewed chart: LBM 4/1; Skin: pale, incision L chest WDL; Miki score: 20 (no risk); Edema: None. Labs, meds, weight reviewed. Weight charted 4/2 122 lbs (BMI 18.10, protein-energy malnutrition grade I), no previous weights charted. RD will continue to follow and monitor pt's nutritional status during admit.    4/10/2024: Patient continues on a regular diet with Boost Plus TID.  Patient with improving po intake noted between 0-75%.  No tolerance complaints reported at this time.  Labs reviewed.  NKFA.  LBM: 4/6/2024.  RD to continue to encourage po intake.  Malnutrition assessment completed on 4/3/2024.  + for malnutrition.        Nutrition Discharge Planning: General healthful diet, High calorie and protein + Boost plus as warranted + Rob BID    Nutrition Risk Screen    Nutrition Risk Screen: no indicators present    Nutrition Related Social Determinants of Health: SDOH: Adequate food in home environment and None Identified      Nutrition/Diet History    Patient Reported Diet/Restrictions/Preferences: general  Spiritual, Cultural Beliefs, Yarsanism Practices, Values that Affect Care: no  Food Allergies: NKFA  Factors Affecting Nutritional Intake: None identified at this time    Anthropometrics    Temp: 97.4 °F  "(36.3 °C)  Height Method: Stated  Height: 5' 9" (175.3 cm)  Height (inches): 69 in  Weight Method: Bed Scale  Weight: 59.9 kg (132 lb 0.9 oz)  Weight (lb): 132.06 lb  Ideal Body Weight (IBW), Male: 160 lb  % Ideal Body Weight, Male (lb): 76.61 %  % Ideal Body Weight Malnutrition: 70-79%: moderate deficit  BMI (Calculated): 19.5  BMI Grade: 17 - 18.4 protein-energy malnutrition grade I     Wt Readings from Last 15 Encounters:   04/07/24 59.9 kg (132 lb 0.9 oz)     Lab/Procedures/Meds    Pertinent Labs Reviewed: reviewed    Pertinent Medications Reviewed: reviewed  Pertinent Medications Comments: sodium chloride, polyethylene glycol, Zosyn, Lopressor, methocarbumol, medroxy progesterone, linezolid, famotidine, estradiol, enoxaparin, acetylcysteine  BMP  Lab Results   Component Value Date     04/10/2024    K 3.7 04/10/2024     04/10/2024    CO2 19 (L) 04/10/2024    BUN 13 04/10/2024    CREATININE 1.3 04/10/2024    CALCIUM 7.9 (L) 04/10/2024    ANIONGAP 9 04/10/2024    EGFRNORACEVR >60 04/10/2024     Lab Results   Component Value Date    ALBUMIN 1.7 (L) 04/08/2024     Lab Results   Component Value Date    CALCIUM 7.9 (L) 04/10/2024    PHOS 2.9 04/05/2024     Lab Results   Component Value Date    ALT 97 (H) 04/08/2024    AST 73 (H) 04/08/2024    ALKPHOS 51 (L) 04/08/2024    BILITOT 0.3 04/08/2024     No results for input(s): "POCTGLUCOSE" in the last 24 hours.  Lab Results   Component Value Date    WBC 10.11 04/08/2024    HGB 9.0 (L) 04/08/2024    HCT 27.5 (L) 04/08/2024    MCV 86 04/08/2024     04/08/2024       Scheduled Meds:   benzonatate  200 mg Oral TID    cefTRIAXone (Rocephin) IV (PEDS and ADULTS)  2 g Intravenous Q12H    enoxparin  40 mg Subcutaneous Q24H (prophylaxis, 1700)    estradioL  8 mg Oral Daily    famotidine  20 mg Oral BID    LIDOcaine  1 patch Transdermal Q24H    magnesium sulfate IVPB  2 g Intravenous Q2H    medroxyPROGESTERone  10 mg Oral Daily    metoprolol tartrate  12.5 mg Oral " BID    polyethylene glycol  17 g Oral Daily    potassium chloride  20 mEq Oral Daily    sodium chloride 0.9%  2 mL Intravenous Q6H    sodium chloride  1,000 mg Oral TID    spironolactone  25 mg Oral BID     Continuous Infusions:      PRN Meds:.0.9%  NaCl infusion (for blood administration), sodium chloride 0.9%, acetaminophen, albuterol sulfate, bisacodyL, glucagon (human recombinant), glucose, glucose, HYDROmorphone, ipratropium, melatonin, methocarbamoL, metoclopramide, naloxone, ondansetron, ondansetron, oxyCODONE, oxyCODONE, sodium chloride 0.9%, sodium chloride 0.9%    Physical Findings/Assessment         Estimated/Assessed Needs    Weight Used For Calorie Calculations: 55.6 kg (122 lb 9.2 oz)  Energy Calorie Requirements (kcal): 7336-7122 kcals (30-35 kcals/kg ABW (Underweight, BMI 18.10)  Energy Need Method: Kcal/kg  Protein Requirements: 44-55 g (0.8-1.0 g/kg ABW (DAVIAN, no dialysis)  Weight Used For Protein Calculations: 55.6 kg (122 lb 9.2 oz)  Fluid Requirements (mL): 500 mL + total output (DAVIAN)  Estimated Fluid Requirement Method: other (see comments)  RDA Method (mL): 1668  CHO Requirement: 208-243 g (0377-2987 kcals/8)      Nutrition Prescription Ordered    Current Diet Order: Regular diet  Oral Nutrition Supplement: chocolate Boost plus TID    Evaluation of Received Nutrient/Fluid Intake  I/O: (Net since admit)   4/3: 1860 mL  4/10: [id number]   Energy Calories Required: meeting needs  Protein Required: meeting needs  Fluid Required: meeting needs  Tolerance: tolerating  % Intake of Estimated Energy Needs: 50 - 75 %  % Meal Intake: 50 - 75 %    Nutrition Risk    Level of Risk/Frequency of Follow-up: low (F/u x 1 weekly)       Monitor and Evaluation    Food and Nutrient Intake: energy intake, food and beverage intake  Food and Nutrient Adminstration: diet order  Knowledge/Beliefs/Attitudes: food and nutrition knowledge/skill, beliefs and attitudes  Anthropometric Measurements: weight, weight change, body  mass index  Biochemical Data, Medical Tests and Procedures: electrolyte and renal panel       Nutrition Follow-Up    RD Follow-up?: Yes  Monae Strong MS, RDN, LDN

## 2024-04-10 NOTE — ASSESSMENT & PLAN NOTE
This patient does have evidence of infective focus  My overall impression is sepsis.  Source: Respiratory  Antibiotics given-   Antibiotics (72h ago, onward)      Start     Stop Route Frequency Ordered    04/08/24 1815  cefTRIAXone (ROCEPHIN) 2 g in dextrose 5 % in water (D5W) 100 mL IVPB (MB+)         -- IV Every 12 hours (non-standard times) 04/08/24 1714            Organ dysfunction indicated by  none    Fluid challenge Not needed - patient is not hypotensive      Post- resuscitation assessment No - Post resuscitation assessment not needed       Will Not start Pressors- Levophed for MAP of 65  Source control achieved by: broad spectrum antibiotics    Leukocytosis resolved   Chest tube minimal drainage    Resolved

## 2024-04-10 NOTE — PLAN OF CARE
Problem: Fall Injury Risk  Goal: Absence of Fall and Fall-Related Injury  Outcome: Ongoing, Progressing     Problem: Fall Injury Risk  Goal: Absence of Fall and Fall-Related Injury  Outcome: Ongoing, Progressing

## 2024-04-10 NOTE — PROGRESS NOTES
[facility] Medicine  Progress Note    Patient Name: Mayela Molina  MRN: [id number]  Patient Class: IP- Inpatient   Admission Date: 3/25/2024  Length of Stay: 15 days  Attending Physician: Linsey Garcia MD  Primary Care Provider: Katie, Primary Doctor        Subjective:     Principal Problem:Pneumonia of left lung due to infectious organism        HPI:  28-year-old patient (male to female transgender) with no chronic medical conditions who presented to the emergency department with complaint of cough over the last 3 weeks, moderate severity, persistent.  Patient does have associated yellow sputum production, shortness for breath, and pleuritic chest pain.  No associated nausea, vomiting, diarrhea.  No complaints of fevers or chills although T-max in the emergency department recorded as 99.8.  Patient does not smoke cigarettes or vape.  No illicit drug use.  Abnormal labs in the emergency department include white blood cell count 30.46, hemoglobin 12.7, platelets 577, D-dimer 1.37, sodium 122, chloride 90, bicarb 21, glucose 121, albumin 2.3.  Last labs are from 2018 with no interim labs to compare with.  Patient does take spironolactone, Estrace, and Provera.  CT scan of chest with left-sided pneumonia with possible abscess formation.    Overview/Hospital Course:   Patient was admitted acute respiratory failure with left-sided pneumonia and possible abscess formation.  She initially was on nasal cannula and on morning after admission became acutely short of breath with a PO2 of 87% and tachycardic.  She was given a dose of adenosine which did not improve things however gradually her heart rate decreased.  Patient was admitted to the intensive care unit for respiratory failure tachycardia.  She was initially on high-flow nasal cannula and subsequently was able to wean to room air.  Cultures remained pending.  She was initially started on Rocephin and azithromycin and changed to  Zosyn who azithromycin vein.  Legionella antigen pending.  Continue with aggressive pulmonary care  With worsening CT scan, continued elevated WBC, continued elevated heart rate.  Patient underwent repeat CT scan of the chest which showed worsening of empyema.  She was seen and evaluated by Cardiothoracic surgery and underwent left VATS with decortication on 3/19.    Hyponatremia patient was admitted with hyponatremia she had fluid restrictions in place cortisol was normal, TSH normal, patient was on spironolactone and this was held.  She was being maintained on fluid restricted diet.     Encourage patient to deep breathe and walk in halls.  4/1 undergoing breathing treatment. Mom at bedside. Questions and concerns addressed. Encourage ambulation  4/2 questions and concerns addressed. Chest tube drainage 50cc yesterday. Ambulated with physical/occupational therapy. Continue intravenous fluids and intravenous antibiotic(s).   4/3 leukocytosis trending down. chest tube drainage 10cc/24 yesterday. Repeat chest x-ray with no adverse interval change. Patient wishes to ambulate more. Continue current care.  4/4 minimal chest tube output last 2 days. Leukocytosis resolved. Acute kidney injury improving. Ambulating with physical/occupational therapy. Repeat chest x-ray reviewed. Appetite improving  4/5 primary CTS recommending repeat ct chest noncontrast and prealbumin. Chest tube to water seal per primary surgeon. Discussed restarting spironolactone at lower dose with nephrology. Patient ambulating with physical/occupational therapy.   4/6 repeat ct chest with complete plugging of left mainstem bronchus. Chest physiotherapy with G5 added. Spironolactone resumed at reduced dose. Complains of chest pain at site of chest tube improved with pain analgesics.  4/7 pulmonology planning on bronch in am if no improvement. Chest x-ray in AM. Complains of pain with chest tube.  4/8 multidisciplinary meeting with patient. Cts removing  one chest tube today. Counseled on compliance with medical recommendations with ct physiotherapy, incentive spirometer, ambulation, cough. Patient voiced understanding    4/9 patient seen and examined.  Discussed with Pulmonary and CTS.  Patient sitting on couch using incentive spirometer and Acapella.  Reports she discussed CPT with respiratory and they reported it was contraindicated with chest tube in place.    4/10 patient reports much less pain.  She was seen by Cardiothoracic surgery and chest tube removed.    Interval History:  Patient seen and examined multiple times at bedside.  She reports feeling better with less shortness of breath.  Pain is well-controlled.    Review of Systems   Constitutional:  Negative for fatigue and fever.   Respiratory:  Negative for cough and shortness of breath.    Cardiovascular:  Negative for chest pain and leg swelling.   Gastrointestinal:  Negative for nausea and vomiting.   All other systems reviewed and are negative.    Objective:     Vital Signs (Most Recent):  Temp: 97.4 °F (36.3 °C) (04/10/24 0719)  Pulse: 69 (04/10/24 1647)  Resp: 18 (04/10/24 1706)  BP: 125/78 (04/10/24 1647)  SpO2: 100 % (04/10/24 1647) Vital Signs (24h Range):  Temp:  [97.4 °F (36.3 °C)-98.9 °F (37.2 °C)] 97.4 °F (36.3 °C)  Pulse:  [] 69  Resp:  [17-18] 18  SpO2:  [97 %-100 %] 100 %  BP: (112-131)/(66-78) 125/78     Weight: 59.9 kg (132 lb 0.9 oz)  Body mass index is 19.5 kg/m².    Intake/Output Summary (Last 24 hours) at 4/10/2024 1755  Last data filed at 4/9/2024 1845  Gross per 24 hour   Intake 240 ml   Output 260 ml   Net -20 ml         Physical Exam  Vitals reviewed.   Constitutional:       Appearance: Normal appearance.   HENT:      Head: Normocephalic and atraumatic.      Mouth/Throat:      Mouth: Mucous membranes are moist.      Pharynx: Oropharynx is clear.   Eyes:      Extraocular Movements: Extraocular movements intact.      Conjunctiva/sclera: Conjunctivae normal.   Cardiovascular:  "     Rate and Rhythm: Normal rate and regular rhythm.      Pulses: Normal pulses.      Heart sounds: Normal heart sounds.   Pulmonary:      Effort: Pulmonary effort is normal.      Breath sounds: Normal breath sounds.   Abdominal:      General: Bowel sounds are normal.      Palpations: Abdomen is soft.   Musculoskeletal:         General: Normal range of motion.      Cervical back: Normal range of motion and neck supple.   Skin:     General: Skin is warm and dry.   Neurological:      General: No focal deficit present.      Mental Status: She is alert and oriented to person, place, and time. Mental status is at baseline.   Psychiatric:         Mood and Affect: Mood normal.         Behavior: Behavior normal.         Thought Content: Thought content normal.             Significant Labs: All pertinent labs within the past 24 hours have been reviewed.    CMP:   Recent Labs   Lab 04/10/24  0417      K 3.7      CO2 19*   GLU 91   BUN 13   CREATININE 1.3   CALCIUM 7.9*   ANIONGAP 9       Significant Imaging: I have reviewed all pertinent imaging results/findings within the past 24 hours.    Assessment/Plan:      * Pneumonia of left lung due to infectious organism  -white blood cell count 30.46, lactic acid level 1.5, D-dimer 1.37, BNP 14, troponin negative, influenza a/B negative, COVID-19 negative  -blood cultures pending  -CTA of chest with "no pulmonary embolism, no dissection; dense consolidation in the left lower lobe extending to the left upper lobe with tree in bud opacities consistent with severe pneumonia; questionable areas of fluid density in the left lung base with foci of gas may relate to component of fluid density or possible liquefaction or possible abscess formation."  -continue IV ceftriaxone for strep  -HIV negative  -give IV fluids, O2 supplementation, p.r.n. albuterol inhaler prn  - repeat CT scan of chest today looks more like an empyema  -CVT consulted and patient underwent VATS with " drainage    Chest tubes removed  Continue antibiotic(s)   Encourage incentive spirometer and acapella and breathing treatments  Ambulate   We will need p.o. antibiotics at home  Follow up with Pulmonary        Hypokalemia  Patient has hypokalemia which is Acute and currently controlled. Most recent potassium levels reviewed-   Lab Results   Component Value Date    K 3.7 04/10/2024   . Will continue potassium replacement per protocol and recheck repeat levels after replacement completed.   Mg 1.4  Replete both    Mucus plugging of bronchi  Pulmonology following  3% saline nebs if needed  Repeat chest x-ray with no interval worsening   Monitor         DAVIAN (acute kidney injury)  Patient with acute kidney injury/acute renal failure likely due to acute tubular necrosis caused by vancomycin induced nephropathy  DAVIAN is currently improving. Baseline creatinine  normal  - Labs reviewed- Renal function/electrolytes with Estimated Creatinine Clearance (based on SCr of 1.3 mg/dL)  Female: 62 mL/min  Male: 73 mL/min  Hover for info according to latest data. Monitor urine output and serial BMP and adjust therapy as needed. Avoid nephrotoxins and renally dose meds for GFR listed above.    Improving  Nephrology following  Spironolactone resumed at lower dose   Stable    Moderate protein-calorie malnutrition  Nutrition consulted. Most recent weight and BMI monitored-     Measurements:  Wt Readings from Last 1 Encounters:   04/07/24 59.9 kg (132 lb 0.9 oz)   Body mass index is 19.5 kg/m².    Patient has been screened and assessed by RD.    Malnutrition Type:  Context: acute illness or injury, chronic illness  Level: moderate    Malnutrition Characteristic Summary:  Energy Intake (Malnutrition): less than or equal to 75% for greater than or equal to 1 month  Subcutaneous Fat (Malnutrition): moderate depletion  Muscle Mass (Malnutrition): moderate depletion    Interventions/Recommendations (treatment strategy):  1. Recommend pt  continues on Regular diet  2. Recommend pt continue Boost plus TID as medically appropriate 3. Recommend Rob BID for wound healing 4. Encourage PO intake of meals, snacks and supplements 5. Weigh twice weekly      Hydropneumothorax  Status post VATS with left lung chest tubes  -culture positive for strep  -on ceftriaxone  CTS to removed one chest tube on 4/8  2nd chest tube removed 4/10    Pulmonary abscess  Growing strep  On ceftriaxone    Chest tubes removed    Tachycardia, paroxysmal  Beta-blocker started heart rate markedly improved.  Borderline blood pressure and heart rate underwent 100 we will DC        Male-to-female transgender person  Resume home medications to include Estrace & Provera; hold spironolactone due to hyponatremia    Resume spironolactone at lower dose      Normocytic anemia  Patient's anemia is currently controlled. Has not received any PRBCs to date.   Current CBC reviewed-   Lab Results   Component Value Date    HGB 9.0 (L) 04/08/2024    HCT 27.5 (L) 04/08/2024     Monitor serial CBC and transfuse if patient becomes hemodynamically unstable, symptomatic or H/H drops below 7/21.  Iron def anemia    Underweight  Current BMI 15.27, albumin 2.3.    Add nutritional supplements  Nutrition consult  Appetite improving    Prealbumin improved    Sepsis  This patient does have evidence of infective focus  My overall impression is sepsis.  Source: Respiratory  Antibiotics given-   Antibiotics (72h ago, onward)      Start     Stop Route Frequency Ordered    04/08/24 1815  cefTRIAXone (ROCEPHIN) 2 g in dextrose 5 % in water (D5W) 100 mL IVPB (MB+)         -- IV Every 12 hours (non-standard times) 04/08/24 1714            Organ dysfunction indicated by  none    Fluid challenge Not needed - patient is not hypotensive      Post- resuscitation assessment No - Post resuscitation assessment not needed       Will Not start Pressors- Levophed for MAP of 65  Source control achieved by: broad spectrum  antibiotics    Leukocytosis resolved   Chest tube minimal drainage    Resolved       VTE Risk Mitigation (From admission, onward)           Ordered     Place PHIL hose  Until discontinued        Comments: On q am and off q pm    04/10/24 1429     Place sequential compression device  Until discontinued        Comments: Can be discontinued when not in the bed.    03/30/24 1113     enoxaparin injection 40 mg  Every 24 hours         03/28/24 0912     IP VTE HIGH RISK PATIENT  Once         03/26/24 0043     Reason for No Pharmacological VTE Prophylaxis  Once        Question:  Reasons:  Answer:  Physician Provided (leave comment)  Comment:  pending pulmonary consult    03/26/24 0043     IP VTE HIGH RISK PATIENT  Once         03/26/24 0043                    Discharge Planning   RAVINDER:      Code Status: Full Code   Is the patient medically ready for discharge?:     Reason for patient still in hospital (select all that apply): Patient trending condition and Imaging  Discharge Plan A: Home   Discharge Delays: None known at this time              Linsey Barragan MD  Department of Hospital Medicine   [place] - Telemetry ([facility])

## 2024-04-10 NOTE — ASSESSMENT & PLAN NOTE
"-white blood cell count 30.46, lactic acid level 1.5, D-dimer 1.37, BNP 14, troponin negative, influenza a/B negative, COVID-19 negative  -blood cultures pending  -CTA of chest with "no pulmonary embolism, no dissection; dense consolidation in the left lower lobe extending to the left upper lobe with tree in bud opacities consistent with severe pneumonia; questionable areas of fluid density in the left lung base with foci of gas may relate to component of fluid density or possible liquefaction or possible abscess formation."  -continue IV ceftriaxone for strep  -HIV negative  -give IV fluids, O2 supplementation, p.r.n. albuterol inhaler prn  - repeat CT scan of chest today looks more like an empyema  -CVT consulted and patient underwent VATS with drainage    Chest tubes removed  Continue antibiotic(s)   Encourage incentive spirometer and acapella and breathing treatments  Ambulate   We will need p.o. antibiotics at home  Follow up with Pulmonary      "

## 2024-04-10 NOTE — PLAN OF CARE
Nutrition Plan of Care:    Recommendations     Recommendation/Intervention:   1. Recommend pt continues on Regular diet    2. Recommend pt continue Boost plus TID as medically appropriate   3. Recommend Rob BID for wound healing   4. Encourage PO intake of meals, snacks and supplements   5. Weigh twice weekly     Goals:   1. Pt will tolerate and consume > 75% EEN and EPN prior to RD follow up   2. Pt will consume Rob BID prior to RD follow up  Nutrition Goal Status: progressing   Communication of RD Recs: other (comment) (POC, sticky note)     Assessment and Plan     Endocrine  Moderate protein-calorie malnutrition  Malnutrition Type:  Context: acute on chronic illness  Level: moderate     Related to (etiology):   Physiological causes increasing nutrient needs d/t chronic illness     Signs and Symptoms (as evidenced by):   BMI < 18.5  Underweight with loss of fat and muscle  Unable or unwilling to eat sufficient energy/protein to maintain a healthy weight  Food avoidance/ lack of interest in food  Sepsis, pneumonia     Malnutrition Characteristic Summary:  Energy Intake (Malnutrition): less than or equal to 75% for greater than or equal to 1 month  Subcutaneous Fat (Malnutrition): moderate depletion  Muscle Mass (Malnutrition): moderate depletion        Interventions/Recommendations (treatment strategy):  1. General healthful diet  2. Commercial beverage  3. Vitamin and mineral supplement therapy   4. PO intake encouragement   5. Collaboration with other providers      Nutrition Diagnosis Status:   Continues           Malnutrition Assessment  Malnutrition Context: acute illness or injury, chronic illness  Malnutrition Level: moderate  Skin (Micronutrient): pallor, wounds unhealed       Energy Intake (Malnutrition): less than or equal to 75% for greater than or equal to 1 month  Subcutaneous Fat (Malnutrition): moderate depletion  Muscle Mass (Malnutrition): moderate depletion   Orbital Region (Subcutaneous Fat  Loss): moderate depletion (Slightly darker circles; Somewhat hallow look)  Upper Arm Region (Subcutaneous Fat Loss): moderate depletion (Some depth pinch but not ample; Fingers almost touch)   Clavicle Bone Region (Muscle Loss): moderate depletion (Visible, some protrusion)  Clavicle and Acromion Bone Region (Muscle Loss): moderate depletion (Acromion process slightly protrudes)  Patellar Region (Muscle Loss): moderate depletion (Knee cap less prominent, more rounded)  Anterior Thigh Region (Muscle Loss): moderate depletion (Mild depression on inner thigh)  Posterior Calf Region (Muscle Loss): moderate depletion (Not well developed)              Monae Strong, MS, RDN, LDN

## 2024-04-10 NOTE — SUBJECTIVE & OBJECTIVE
Interval History: Pt was seen and examined. Labs and meds reviewed. No new events. Reviewed the chart. No new c/o's.    Review of patient's allergies indicates:  No Known Allergies  Current Facility-Administered Medications   Medication Frequency    0.9%  NaCl infusion (for blood administration) Q24H PRN    0.9%  NaCl infusion PRN    acetaminophen tablet 650 mg Q6H PRN    albuterol nebulizer solution 2.5 mg Q6H PRN    benzonatate capsule 200 mg TID    bisacodyL suppository 10 mg Daily PRN    cefTRIAXone (ROCEPHIN) 2 g in dextrose 5 % in water (D5W) 100 mL IVPB (MB+) Q12H    enoxaparin injection 40 mg Q24H (prophylaxis, 1700)    estradioL tablet 8 mg Daily    famotidine tablet 20 mg BID    glucagon (human recombinant) injection 1 mg PRN    glucose chewable tablet 16 g PRN    glucose chewable tablet 24 g PRN    HYDROmorphone injection 0.5 mg QID PRN    ipratropium 0.02 % nebulizer solution 0.5 mg QID PRN    LIDOcaine 5 % patch 1 patch Q24H    medroxyPROGESTERone tablet 10 mg Daily    melatonin tablet 6 mg Nightly PRN    methocarbamoL tablet 500 mg QID PRN    metoclopramide injection 5 mg Q6H PRN    metoprolol tartrate (LOPRESSOR) split tablet 12.5 mg BID    naloxone 0.4 mg/mL injection 0.02 mg PRN    ondansetron disintegrating tablet 8 mg Q8H PRN    ondansetron injection 4 mg Q6H PRN    oxyCODONE immediate release tablet 10 mg Q4H PRN    oxyCODONE immediate release tablet 5 mg Q4H PRN    polyethylene glycol packet 17 g Daily    potassium chloride SA CR tablet 20 mEq Daily    sodium chloride 0.9% flush 10 mL PRN    sodium chloride 0.9% flush 10 mL PRN    sodium chloride 0.9% flush 2 mL Q6H    sodium chloride oral tablet 1,000 mg TID    spironolactone tablet 25 mg BID       Objective:     Vital Signs (Most Recent):  Temp: 97.4 °F (36.3 °C) (04/10/24 0719)  Pulse: 69 (04/10/24 1647)  Resp: 18 (04/10/24 1706)  BP: 125/78 (04/10/24 1647)  SpO2: 100 % (04/10/24 1647) Vital Signs (24h Range):  Temp:  [97.4 °F (36.3 °C)-98.9  °F (37.2 °C)] 97.4 °F (36.3 °C)  Pulse:  [] 69  Resp:  [17-18] 18  SpO2:  [97 %-100 %] 100 %  BP: (112-131)/(66-78) 125/78     Weight: 59.9 kg (132 lb 0.9 oz) (04/07/24 2331)  Body mass index is 19.5 kg/m².  Body surface area is 1.71 meters squared.    I/O last 3 completed shifts:  In: 720 [P.O.:720]  Out: 1110 [Urine:1100; Chest Tube:10]     Physical Exam  Vitals and nursing note reviewed.   Constitutional:       General: She is not in acute distress.  Neurological:      Mental Status: She is alert.          Significant Labs: reviewed  BMP  Lab Results   Component Value Date     04/10/2024    K 3.7 04/10/2024     04/10/2024    CO2 19 (L) 04/10/2024    BUN 13 04/10/2024    CREATININE 1.3 04/10/2024    CALCIUM 7.9 (L) 04/10/2024    ANIONGAP 9 04/10/2024    EGFRNORACEVR >60 04/10/2024       Significant Imaging: reviewed

## 2024-04-11 ENCOUNTER — TELEPHONE (OUTPATIENT)
Dept: PULMONOLOGY | Facility: CLINIC | Age: 29
End: 2024-04-11

## 2024-04-11 VITALS
WEIGHT: 130.5 LBS | OXYGEN SATURATION: 98 % | HEIGHT: 69 IN | RESPIRATION RATE: 18 BRPM | TEMPERATURE: 98 F | DIASTOLIC BLOOD PRESSURE: 80 MMHG | BODY MASS INDEX: 19.33 KG/M2 | SYSTOLIC BLOOD PRESSURE: 122 MMHG | HEART RATE: 75 BPM

## 2024-04-11 LAB
ALBUMIN SERPL BCP-MCNC: 2 G/DL (ref 3.5–5.2)
ALP SERPL-CCNC: 54 U/L (ref 55–135)
ALT SERPL W/O P-5'-P-CCNC: 42 U/L (ref 10–44)
ANION GAP SERPL CALC-SCNC: 8 MMOL/L (ref 8–16)
AST SERPL-CCNC: 21 U/L (ref 10–40)
BASOPHILS # BLD AUTO: 0.05 K/UL (ref 0–0.2)
BASOPHILS NFR BLD: 0.6 % (ref 0–1.9)
BILIRUB SERPL-MCNC: 0.2 MG/DL (ref 0.1–1)
BUN SERPL-MCNC: 13 MG/DL (ref 6–20)
CALCIUM SERPL-MCNC: 8.2 MG/DL (ref 8.7–10.5)
CHLORIDE SERPL-SCNC: 109 MMOL/L (ref 95–110)
CO2 SERPL-SCNC: 23 MMOL/L (ref 23–29)
CREAT SERPL-MCNC: 1.2 MG/DL (ref 0.5–1.4)
DIFFERENTIAL METHOD BLD: ABNORMAL
EOSINOPHIL # BLD AUTO: 0.1 K/UL (ref 0–0.5)
EOSINOPHIL NFR BLD: 1.3 % (ref 0–8)
ERYTHROCYTE [DISTWIDTH] IN BLOOD BY AUTOMATED COUNT: 16.2 % (ref 11.5–14.5)
EST. GFR  (NO RACE VARIABLE): >60 ML/MIN/1.73 M^2
GLUCOSE SERPL-MCNC: 104 MG/DL (ref 70–110)
HCT VFR BLD AUTO: 26.3 % (ref 40–54)
HGB BLD-MCNC: 8.3 G/DL (ref 14–18)
IMM GRANULOCYTES # BLD AUTO: 0.03 K/UL (ref 0–0.04)
IMM GRANULOCYTES NFR BLD AUTO: 0.4 % (ref 0–0.5)
LYMPHOCYTES # BLD AUTO: 1.5 K/UL (ref 1–4.8)
LYMPHOCYTES NFR BLD: 17.3 % (ref 18–48)
MAGNESIUM SERPL-MCNC: 2 MG/DL (ref 1.6–2.6)
MCH RBC QN AUTO: 27.9 PG (ref 27–31)
MCHC RBC AUTO-ENTMCNC: 31.6 G/DL (ref 32–36)
MCV RBC AUTO: 89 FL (ref 82–98)
MONOCYTES # BLD AUTO: 0.8 K/UL (ref 0.3–1)
MONOCYTES NFR BLD: 9.3 % (ref 4–15)
NEUTROPHILS # BLD AUTO: 6 K/UL (ref 1.8–7.7)
NEUTROPHILS NFR BLD: 71.1 % (ref 38–73)
NRBC BLD-RTO: 0 /100 WBC
PLATELET # BLD AUTO: 156 K/UL (ref 150–450)
PMV BLD AUTO: 9 FL (ref 9.2–12.9)
POTASSIUM SERPL-SCNC: 3.6 MMOL/L (ref 3.5–5.1)
PROT SERPL-MCNC: 6.5 G/DL (ref 6–8.4)
RBC # BLD AUTO: 2.97 M/UL (ref 4.6–6.2)
SODIUM SERPL-SCNC: 140 MMOL/L (ref 136–145)
WBC # BLD AUTO: 8.46 K/UL (ref 3.9–12.7)

## 2024-04-11 PROCEDURE — 99233 SBSQ HOSP IP/OBS HIGH 50: CPT | Mod: ,,, | Performed by: INTERNAL MEDICINE

## 2024-04-11 PROCEDURE — 83735 ASSAY OF MAGNESIUM: CPT | Performed by: INTERNAL MEDICINE

## 2024-04-11 PROCEDURE — 94799 UNLISTED PULMONARY SVC/PX: CPT | Mod: XB

## 2024-04-11 PROCEDURE — 36415 COLL VENOUS BLD VENIPUNCTURE: CPT | Performed by: INTERNAL MEDICINE

## 2024-04-11 PROCEDURE — 25000003 PHARM REV CODE 250: Performed by: THORACIC SURGERY (CARDIOTHORACIC VASCULAR SURGERY)

## 2024-04-11 PROCEDURE — 80053 COMPREHEN METABOLIC PANEL: CPT | Performed by: INTERNAL MEDICINE

## 2024-04-11 PROCEDURE — 85025 COMPLETE CBC W/AUTO DIFF WBC: CPT | Performed by: INTERNAL MEDICINE

## 2024-04-11 PROCEDURE — 94664 DEMO&/EVAL PT USE INHALER: CPT

## 2024-04-11 PROCEDURE — 25000003 PHARM REV CODE 250: Performed by: INTERNAL MEDICINE

## 2024-04-11 RX ORDER — AMOXICILLIN AND CLAVULANATE POTASSIUM 875; 125 MG/1; MG/1
1 TABLET, FILM COATED ORAL EVERY 12 HOURS
Qty: 84 TABLET | Refills: 0 | Status: SHIPPED | OUTPATIENT
Start: 2024-04-11 | End: 2024-05-23

## 2024-04-11 RX ORDER — OXYCODONE AND ACETAMINOPHEN 5; 325 MG/1; MG/1
1 TABLET ORAL EVERY 8 HOURS PRN
Qty: 15 TABLET | Refills: 0 | Status: SHIPPED | OUTPATIENT
Start: 2024-04-11

## 2024-04-11 RX ORDER — SPIRONOLACTONE 25 MG/1
50 TABLET ORAL 2 TIMES DAILY
Status: DISCONTINUED | OUTPATIENT
Start: 2024-04-11 | End: 2024-04-11 | Stop reason: HOSPADM

## 2024-04-11 RX ORDER — NALOXONE HYDROCHLORIDE 4 MG/.1ML
1 SPRAY NASAL ONCE
Qty: 2 EACH | Refills: 0 | Status: SHIPPED | OUTPATIENT
Start: 2024-04-11 | End: 2024-04-12

## 2024-04-11 RX ORDER — SPIRONOLACTONE 50 MG/1
50 TABLET, FILM COATED ORAL 2 TIMES DAILY
Qty: 60 TABLET | Refills: 0
Start: 2024-04-11 | End: 2024-05-11

## 2024-04-11 RX ORDER — FUROSEMIDE 20 MG/1
20 TABLET ORAL DAILY
Status: DISCONTINUED | OUTPATIENT
Start: 2024-04-11 | End: 2024-04-11 | Stop reason: HOSPADM

## 2024-04-11 RX ORDER — AMOXICILLIN AND CLAVULANATE POTASSIUM 875; 125 MG/1; MG/1
1 TABLET, FILM COATED ORAL EVERY 12 HOURS
Status: DISCONTINUED | OUTPATIENT
Start: 2024-04-11 | End: 2024-04-11 | Stop reason: HOSPADM

## 2024-04-11 RX ADMIN — OXYCODONE HYDROCHLORIDE 5 MG: 5 TABLET ORAL at 10:04

## 2024-04-11 RX ADMIN — ESTRADIOL 8 MG: 1 TABLET ORAL at 09:04

## 2024-04-11 RX ADMIN — FUROSEMIDE 20 MG: 20 TABLET ORAL at 09:04

## 2024-04-11 RX ADMIN — AMOXICILLIN AND CLAVULANATE POTASSIUM 1 TABLET: 875; 125 TABLET, FILM COATED ORAL at 10:04

## 2024-04-11 RX ADMIN — ONDANSETRON 8 MG: 8 TABLET, ORALLY DISINTEGRATING ORAL at 10:04

## 2024-04-11 RX ADMIN — ACETAMINOPHEN 650 MG: 325 TABLET ORAL at 02:04

## 2024-04-11 RX ADMIN — OXYCODONE HYDROCHLORIDE 5 MG: 5 TABLET ORAL at 06:04

## 2024-04-11 RX ADMIN — MEDROXYPROGESTERONE ACETATE 10 MG: 5 TABLET ORAL at 09:04

## 2024-04-11 RX ADMIN — SPIRONOLACTONE 50 MG: 25 TABLET, FILM COATED ORAL at 09:04

## 2024-04-11 RX ADMIN — ACETAMINOPHEN 650 MG: 325 TABLET ORAL at 09:04

## 2024-04-11 NOTE — TELEPHONE ENCOUNTER
----- Message from Linsey Garcia MD sent at 4/11/2024  7:34 AM CDT -----  Regarding: pt needing follow up  Pt admitted with pulm abscess was treated and eventually had VATS. She had chest tubes removed 4/8 and 4/10. She lives in [place] but for work will be in  through end of June and need pulm follow up. She has no insurance but states she can pay cash for at least one or two pulm follow up visits. Sending home on 6 weeks augmentin.  Thanks

## 2024-04-11 NOTE — DISCHARGE INSTRUCTIONS
Six weeks  Augmentin prescribed and patient instructed to complete all 6 weeks  Patient will need pulmonary follow up.  Referral has been sent to [facility] as well as to [facility].   Continue to use incentive spirometry and Acapella device at least 100 times a day  Mucinex 1200 mg daily to thin secretions  Continued to exercise and ambulate  Practice deep breathing and coughing  Follow up with primary care  Follow up with Cardiothoracic surgery within 2 weeks for suture removal

## 2024-04-11 NOTE — PLAN OF CARE
Problem: Fall Injury Risk  Goal: Absence of Fall and Fall-Related Injury  Outcome: Met     Problem: Respiratory Compromise (Pneumothorax)  Goal: Optimal Oxygenation and Ventilation  Outcome: Met     Problem: Pain Acute  Goal: Acceptable Pain Control and Functional Ability  Outcome: Met     Problem: Fluid and Electrolyte Imbalance (Acute Kidney Injury/Impairment)  Goal: Fluid and Electrolyte Balance  Outcome: Met     Problem: Oral Intake Inadequate (Acute Kidney Injury/Impairment)  Goal: Optimal Nutrition Intake  Outcome: Met     Problem: Renal Function Impairment (Acute Kidney Injury/Impairment)  Goal: Effective Renal Function  Outcome: Met

## 2024-04-11 NOTE — PLAN OF CARE
Problem: Fall Injury Risk  Goal: Absence of Fall and Fall-Related Injury  Outcome: Ongoing, Progressing     Problem: Pain Acute  Goal: Acceptable Pain Control and Functional Ability  Outcome: Ongoing, Progressing     Problem: Fluid and Electrolyte Imbalance (Acute Kidney Injury/Impairment)  Goal: Fluid and Electrolyte Balance  Outcome: Ongoing, Progressing     Problem: Oral Intake Inadequate (Acute Kidney Injury/Impairment)  Goal: Optimal Nutrition Intake  Outcome: Ongoing, Progressing

## 2024-04-11 NOTE — PROGRESS NOTES
[facility] ([facility])  Nephrology  Progress Note    Patient Name: Mayela Molina  MRN: [id number]  Admission Date: 3/25/2024  Hospital Length of Stay: 16 days  Attending Provider: Linsey Garcia MD   Primary Care Physician: Katie, Primary Doctor  Principal Problem:Pneumonia of left lung due to infectious organism    Subjective:     HPI: Noted    Interval History: Pt was seen and examined. Labs and meds reviewed. No new events. CT surgery and hospitalist's notes reviewed. No new c/o's.    Review of patient's allergies indicates:  No Known Allergies  Current Facility-Administered Medications   Medication Frequency    0.9%  NaCl infusion (for blood administration) Q24H PRN    0.9%  NaCl infusion PRN    acetaminophen tablet 650 mg Q6H PRN    albuterol nebulizer solution 2.5 mg Q6H PRN    amoxicillin-clavulanate 875-125mg per tablet 1 tablet Q12H    bisacodyL suppository 10 mg Daily PRN    enoxaparin injection 40 mg Q24H (prophylaxis, 1700)    estradioL tablet 8 mg Daily    furosemide tablet 20 mg Daily    glucagon (human recombinant) injection 1 mg PRN    glucose chewable tablet 16 g PRN    glucose chewable tablet 24 g PRN    ipratropium 0.02 % nebulizer solution 0.5 mg QID PRN    medroxyPROGESTERone tablet 10 mg Daily    melatonin tablet 6 mg Nightly PRN    methocarbamoL tablet 500 mg QID PRN    metoclopramide injection 5 mg Q6H PRN    naloxone 0.4 mg/mL injection 0.02 mg PRN    ondansetron disintegrating tablet 8 mg Q8H PRN    oxyCODONE immediate release tablet 5 mg Q4H PRN    polyethylene glycol packet 17 g Daily    sodium chloride 0.9% flush 10 mL PRN    spironolactone tablet 50 mg BID       Objective:     Vital Signs (Most Recent):  Temp: 98.1 °F (36.7 °C) (04/11/24 0716)  Pulse: 75 (04/11/24 0716)  Resp: 18 (04/11/24 1012)  BP: 122/80 (04/11/24 0716)  SpO2: 98 % (04/11/24 0716) Vital Signs (24h Range):  Temp:  [97.9 °F (36.6 °C)-100.3 °F (37.9 °C)] 98.1 °F (36.7 °C)  Pulse:  [67-92] 75  Resp:   [16-18] 18  SpO2:  [97 %-100 %] 98 %  BP: (122-131)/(64-80) 122/80     Weight: 59.2 kg (130 lb 8.2 oz) (04/10/24 2312)  Body mass index is 19.27 kg/m².  Body surface area is 1.7 meters squared.    I/O last 3 completed shifts:  In: 99.7 [I.V.:99.7]  Out: -      Physical Exam  Vitals and nursing note reviewed.   Constitutional:       General: She is not in acute distress.     Appearance: Normal appearance.   Neurological:      Mental Status: She is alert.          Significant Labs: reviewed  BMP  Lab Results   Component Value Date     04/11/2024    K 3.6 04/11/2024     04/11/2024    CO2 23 04/11/2024    BUN 13 04/11/2024    CREATININE 1.2 04/11/2024    CALCIUM 8.2 (L) 04/11/2024    ANIONGAP 8 04/11/2024    EGFRNORACEVR >60 04/11/2024       Significant Imaging: reviewed  Assessment/Plan:     27 y/o transgender female who presented with pneumonia complicated by empyema requiring a chest tube who further developed DAVIAN:         DAVIAN (acute kidney injury)  S Cr improved and stable, in normal range , DAVIAN resolving (baseline Cr 0.7)  Stable renal function  DAVIAN, 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  Supratherapeutic vancomycin level  FENa > 1%, not prerenal azotemia  No other causes suspected  CT scans done were without contrast     Baseline s Cr is normal  K normal, low K was replaced (low K due to recovery phase of DAVIAN)  Metabolic acidosis stable  O2 sat good     HTN: BP controlled     Pneumonia of left lung due to infectious organism  Lung infection complicated by necrotizing pneumonia and empyema  Hydropneumothorax  S/p VATS procedure  with drainage of empyema and decortication  On abx  Has a mucus plug now, planned bronchoscopy noted  Will defer to CT surgery     Plans and recommendations:  As discussed above  Will sign off now. Please call for any concerns  Total time spent 40 minutes including time needed to review the records, the   patient evaluation, documentation, face-to-face  discussion with the patient,   more than 50% of the time was spent on coordination of care and counseling.          Thank you for your consult.     Linn Edwards MD  Nephrology  [facility] ([facility])

## 2024-04-11 NOTE — PLAN OF CARE
[facility] (Hospital)  Discharge Final Note    Primary Care Provider: No, Primary Doctor    Expected Discharge Date: 4/11/2024    Final Discharge Note (most recent)       Final Note - 04/11/24 1017          Final Note    Assessment Type Final Discharge Note     Anticipated Discharge Disposition Home or Self Care     Hospital Resources/Appts/Education Provided Post-Acute resouces added to AVS;Appointments scheduled and added to AVS        Post-Acute Status    Discharge Delays None known at this time                   SW met with patient at bedside to discuss discharge planning needs. SW advised Pulmonology referrals sent to [facility], [facility] [place] and [facility]. SW added contact information to AVS. Pt also has PCP follow up scheduled with [facility].   SW provided patient with [facility] phone number for follow up. [facility] to continue following patient to submit medical records and bills to Medicaid to reconsider pt's status.   All questions answered at this time. No additional needs for discharge.      Important Message from Medicare             Contact Info       [address] [address]   Phone: [phone]       Next Steps: Go on 4/18/2024    Instructions: Please go on 4/18/2024 at 2:20pm. Please bring in photo I.D. and  please bring in 2 paycheck stubs to possible qualify for screening for a discounted rate.    [facility]    [address] [address]   Phone: [phone]       Next Steps: Follow up    Instructions: Pulmonology referral faxed to clinic. Once referral is reviewed; clinic will call you for an appointment.   Call within a week to follow up if you are not contacted.  Alternate number: [phone]    [facility] [place]   Specialty: General Practice, Gastroenterology, Family  Medicine, Internal Medicine, Otolaryngology, Psychiatry, Pediatric Gastroenterology, Infusion Provider, Allergy, Pulmonary Disease, Sleep Medicine, Oral Surgery, Dental General Practice, Oral and Maxillofacial Surgery, Physical Therapy, Occupational Therapy, Speech Pathology, Wound Care, Allergy and Immunology, Cardiology, Hematology, Hematology and Oncology, Infectious Diseases, Infusion Provider, Nephrology, Neurology, Neurosurgery, Nutrition, Oncology, Pain Medicine, Pediatric Hematology, Pediatric Hematology and Oncology, Pediatric Oncology, Pulmonary Disease, Radiation Oncology, Radiology, Speech Pathology, Surgical Oncology, Pediatrics    [address] [address]   Phone: [phone]       Next Steps: Follow up    Instructions: Pulmonology referral faxed to clinic. Once referral is reviewed; clinic will call you for an appointment.   Call within a week to follow up if you are not contacted.   Alternate number: [phone], option 6 then option 3  [facility] located: [address] [place], [place] [facility] [address] [facility] [address]       Next Steps: Follow up    Instructions: Pulmonology referral sent to clinic  Monday-Friday, 7 a.m.- 4 p.m.  Call within a week to follow up if you are not contacted.   [phone]    [facility]    Call regarding financial assistance options       Next Steps: Call    Instructions: [phone] or   [phone]

## 2024-04-11 NOTE — TELEPHONE ENCOUNTER
Called and spoke to pt about scheduling hospital f/u. Pt was appreciative and verbalized understanding of appt time and date.

## 2024-04-11 NOTE — ASSESSMENT & PLAN NOTE
27 y/o transgender female who presented with pneumonia complicated by empyema requiring a chest tube who further developed DAVIAN:         DAVIAN (acute kidney injury)  S Cr has improved further, now eithin normal range, DAVIAN resolving (baseline Cr 0.7)  Stable renal function  DAVIAN, 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  Supratherapeutic vancomycin level  FENa > 1%, not prerenal azotemia  No other causes suspected  CT scans done were without contrast     Baseline s Cr is normal  K normal, low K was replaced (low K due to recovery phase of DAVIAN)  Metabolic acidosis stable  O2 sat good     HTN: BP controlled     Pneumonia of left lung due to infectious organism  Lung infection complicated by necrotizing pneumonia and empyema  Hydropneumothorax  S/p VATS procedure (POD#7) with drainage of empyema and decortication  On abx  Has a mucus plug now, planned bronchoscopy noted  Will defer to CT surgery     Plans and recommendations:  As discussed above  Total time spent 40 minutes including time needed to review the records, the   patient evaluation, documentation, face-to-face discussion with the patient,   more than 50% of the time was spent on coordination of care and counseling.

## 2024-04-11 NOTE — PROGRESS NOTES
Subjective:      Patient ID: Mayela Molina is a 28 y.o. adult.    Chief Complaint: Cough (Pt c/o cough/SOB  x3-4wks w/ pain from coughing. Denies sick contacts/CP. )    HPI: 28-year-old patient with no chronic medical problems who presented to the ED complaining of a cough x 3 weeks. Patient is a biological male in the transition to female who reports a cough with yellow sputum, left sided pleuritic pain, and dyspnea that increases with exertion  Scan showed a large left-sided hydropneumothorax and the consolidation of the left lower lobe.  She is afebrile and got IV antibiotics.  White cell count is coming down     Date of Procedure: 3/29/2024      Procedure: Procedure(s) (LRB):  VATS (VIDEO-ASSISTED THORACOSCOPIC SURGERY) (Left)  BLOCK, NERVE, INTERCOSTAL, 2 OR MORE (Left)     Surgeon(s) and Role:     * Josafat Sandoval MD - Primary     Assisting Surgeon: None     Pre-Operative Diagnosis: Pneumonia of left lower lobe due to infectious organism [J18.9]  Left empyema hydropneumothorax  Hepatitis-C  Supraventricular tachycardia     Post-Operative Diagnosis: Post-Op Diagnosis Codes:     * Pneumonia of left lower lobe due to infectious organism [J18.9]  Same  Patient underwent left thoracotomy and decortication, intraoperatively purulent secretion with a necrotic lung involving the left lower lobe with multiple abscesses found.  She was tachycardic overnight and looks volume contracted with a rising creatinine and low albumin.  Still having pain issues not controlled with current dosage of morphine and oxycodone.  Incentive spirometry is not being done because of the pain.  Not able to cough.  Poor appetite.    03/31/2024 the patient pain was better controlled overnight she still has a week off.  Not much secretions during coughing.  Serosanguineous fluid more anemia this morning no obvious signs of blood loss.  Making dark urine.  Incentive spirometry less than 500.  No air leak from the chest tube.   Ambulated yesterday.  Appetite is improving.  04/01/2024 the patient is still having lot of pain issues and muscle spasm very ineffective cough incentive spirometry less than 500 chest tube draining minimal serous fluid still tachycardia.  Appetite is getting better.  Hydropneumothorax  04/02/2024   Patient is postop day 4 status post VATS procedure with drainage of empyema and decortication.  Continue broad-spectrum antibiotics with linezolid and Zosyn.  Patient's white count is trending downward with white count of 20.  Patient is afebrile.  Chest tube output trending lower.  Continue pulmonary toileting continue incentive spirometer.  Patient is up and ambulating.  Increase as tolerated.     04/03/2024   The patient is postop day 5 status post VATS procedure with drainage of empyema and decortication.  Patient is on broad-spectrum antibiotics linezolid and Zosyn.  Is trending down.  Patient is afebrile.  Continue pulmonary toileting.  Chest x-ray shows continued patchy infiltrate left middle and lower lobes.  Continue chest tube to drainage.     04/04/2024   The patient is postop day 6 status post VATS procedure with drainage of empyema and decortication.  Patient is on broad-spectrum antibiotics.  Continue linezolid and Zosyn.  Patient is afebrile.  Continue pulmonary toileting continue incentive spirometer..  Chest tube output has been minimal.  Will repeat chest x-ray to access extent of left lung consolidation.     04/05/2024   The patient is postop day 7 status post VATS procedure with drainage of empyema and decortication patient continues on antibiotics.  Patient is afebrile.  White count is slightly elevated at toileting.  Continue incentive spirometer.  Continue pulmonary toileting.  Chest tube output has been minimal.  .  Chest x-ray shows dense left lower lobe infiltrate which is unchanged. Continue chest tube as per patient's primary surgeon     04/06/2024   The patient is postop day 8 status post  VATS procedure with drainage of empyema and decortication.  Patient continues on broad-spectrum antibiotics.  Patient is afebrile and WBC has decreased from 13 to 11 today.  Continue pulmonary toileting.  Continue incentive spirometer.  Chest tube output has been small however drainage in extension tube on chest tube looks bloody and cloudy.  Continue chest tube to drainage.    04/08/2024 patient chest tube drainage is minimal very ineffective cough incentive spirometry 400 cc.  Pain issues are better than last week nutrition is still on board.  Hypoalbuminemia.  Go to the CT scan finding plugging of the left mainstem bronchus patient going for bronchoscopy today.  White count is normal afebrile.  Patient is still declining chest physical therapy in spite of explaining the importance.    4/9/2024 patient had her basal chest tube removed yesterday afebrile hemodynamically stable pain is better this morning eating breakfast.  Incentive spirometry 500 cc    /410//2024 patient chest tube drainage was scanty no air leak incentive spirometry 1000 cc coughing up thick sputum afebrile hemodynamically stable ambulating        Review of patient's allergies indicates:  No Known Allergies    History reviewed. No pertinent past medical history.    History reviewed. No pertinent family history.    Social History     Socioeconomic History    Marital status: Single   Tobacco Use    Smoking status: Never    Smokeless tobacco: Never   Substance and Sexual Activity    Alcohol use: Never    Drug use: Never    Sexual activity: Yes       Past Surgical History:   Procedure Laterality Date    INJECTION OF ANESTHETIC AGENT AROUND MULTIPLE INTERCOSTAL NERVES Left 3/29/2024    Procedure: BLOCK, NERVE, INTERCOSTAL, 2 OR MORE;  Surgeon: Josafat Sandoval MD;  Location: [facility];  Service: Cardiothoracic;  Laterality: Left;    VIDEO-ASSISTED THORACOSCOPIC SURGERY (VATS) Left 3/29/2024    Procedure: VATS (VIDEO-ASSISTED THORACOSCOPIC SURGERY);   "Surgeon: Josafat Sandoval MD;  Location: [facility];  Service: Cardiothoracic;  Laterality: Left;       Review of Systems   Constitutional:  Positive for activity change, appetite change and fatigue.   HENT:  Negative for dental problem, nosebleeds and sore throat.    Eyes:  Negative for discharge and visual disturbance.   Respiratory:  Positive for cough, chest tightness and shortness of breath. Negative for stridor.    Cardiovascular:  Negative for leg swelling.   Gastrointestinal:  Negative for abdominal distention and abdominal pain.   Genitourinary:  Negative for difficulty urinating and dysuria.   Musculoskeletal:  Negative for arthralgias, back pain and joint swelling.   Allergic/Immunologic: Negative for environmental allergies.   Neurological:  Negative for dizziness, syncope and headaches.   Hematological:  Does not bruise/bleed easily.   Psychiatric/Behavioral:  Negative for behavioral problems.           Objective:   /74   Pulse 83   Temp 98.3 °F (36.8 °C)   Resp 16   Ht 5' 9" (1.753 m)   Wt 59.9 kg (132 lb 0.9 oz)   SpO2 99%   BMI 19.50 kg/m²     X-Ray Chest AP Portable  Narrative: EXAMINATION:  XR CHEST AP PORTABLE    CLINICAL HISTORY:  pna;    TECHNIQUE:  Single frontal view of the chest was performed.    COMPARISON:  04/08/2024    FINDINGS:  Left apical chest tube stable.  Caudal chest tube is been removed.  Small to moderate pleural effusion and patchy infiltrate left lower lobe.  No significant pneumothorax.    In comparison to the prior study, there is no adverse interval changes.  Impression: In comparison to the prior study, there is no adverse interval changes    Electronically signed by: Bob Velasco MD  Date:    04/09/2024  Time:    07:18         Physical Exam  Vitals reviewed.   Constitutional:       Appearance: Normal appearance.   HENT:      Head: Normocephalic and atraumatic.      Mouth/Throat:      Mouth: Mucous membranes are moist.   Eyes:      Extraocular Movements: " Extraocular movements intact.   Cardiovascular:      Rate and Rhythm: Normal rate and regular rhythm.      Pulses: Normal pulses.      Heart sounds: Normal heart sounds.   Pulmonary:      Effort: Pulmonary effort is normal.      Breath sounds: Rhonchi and rales present.   Abdominal:      Palpations: Abdomen is soft.   Musculoskeletal:         General: Normal range of motion.      Cervical back: Normal range of motion and neck supple.   Skin:     General: Skin is warm.      Capillary Refill: Capillary refill takes less than 2 seconds.   Neurological:      General: No focal deficit present.      Mental Status: She is alert and oriented to person, place, and time.   Psychiatric:         Mood and Affect: Mood normal.         Behavior: Behavior normal.         Assessment:     1. Hydropneumothorax    2. Tachycardia    3. Pneumonia of left lower lobe due to infectious organism    4. Leukocytosis, unspecified type    5. Hyponatremia    6. Chest pain    7. Mucus plugging of bronchi          Plan   28-year-old female with a left sided hydropneumothorax status post thoracotomy and decortication with chest tube placement postop day 12.  Neuro Pain control as needed.  Activity ambulate as tolerated patient chest tubes  out today  Left lower lobe consolidation chest physical therapy  Respiratory aggressive pulmonary toilet incentive spirometry.  Chest physiotherapy started with Mucomyst to loosen the secretions.  Cardiovascular beta-blocker for heart  rate control.  Anemia multifactorial   Renal creatinine i trending down   Avoid all the nephrotoxic medications.  Malnutrition and hypoalbuminemia protein supplements ordered.  Dietitian on board  Infectious disease antibiotics as per the culture managed by hospital medicine team.  DVT and GI prophylaxis with Lovenox bilateral SCDs and Pepcid.  PTOT out of bed ambulate  Rest of the management by hospital medicine team.    Patient can be discharged once the pneumonia has  resolved.  Follow-up with me in 2 weeks with a chest x-ray upon discharge.          Josafat Sandoval MD  [facility]  [place]

## 2024-04-11 NOTE — PROGRESS NOTES
Subjective:      Patient ID: Mayela Molina is a 28 y.o. adult.    Chief Complaint: Cough (Pt c/o cough/SOB  x3-4wks w/ pain from coughing. Denies sick contacts/CP. )    HPI: 28-year-old patient with no chronic medical problems who presented to the ED complaining of a cough x 3 weeks. Patient is a biological male in the transition to female who reports a cough with yellow sputum, left sided pleuritic pain, and dyspnea that increases with exertion  Scan showed a large left-sided hydropneumothorax and the consolidation of the left lower lobe.  She is afebrile and got IV antibiotics.  White cell count is coming down     Date of Procedure: 3/29/2024      Procedure: Procedure(s) (LRB):  VATS (VIDEO-ASSISTED THORACOSCOPIC SURGERY) (Left)  BLOCK, NERVE, INTERCOSTAL, 2 OR MORE (Left)     Surgeon(s) and Role:     * Josafat Sandoval MD - Primary     Assisting Surgeon: None     Pre-Operative Diagnosis: Pneumonia of left lower lobe due to infectious organism [J18.9]  Left empyema hydropneumothorax  Hepatitis-C  Supraventricular tachycardia     Post-Operative Diagnosis: Post-Op Diagnosis Codes:     * Pneumonia of left lower lobe due to infectious organism [J18.9]  Same  Patient underwent left thoracotomy and decortication, intraoperatively purulent secretion with a necrotic lung involving the left lower lobe with multiple abscesses found.  She was tachycardic overnight and looks volume contracted with a rising creatinine and low albumin.  Still having pain issues not controlled with current dosage of morphine and oxycodone.  Incentive spirometry is not being done because of the pain.  Not able to cough.  Poor appetite.    03/31/2024 the patient pain was better controlled overnight she still has a week off.  Not much secretions during coughing.  Serosanguineous fluid more anemia this morning no obvious signs of blood loss.  Making dark urine.  Incentive spirometry less than 500.  No air leak from the chest tube.   Ambulated yesterday.  Appetite is improving.  04/01/2024 the patient is still having lot of pain issues and muscle spasm very ineffective cough incentive spirometry less than 500 chest tube draining minimal serous fluid still tachycardia.  Appetite is getting better.  Hydropneumothorax  04/02/2024   Patient is postop day 4 status post VATS procedure with drainage of empyema and decortication.  Continue broad-spectrum antibiotics with linezolid and Zosyn.  Patient's white count is trending downward with white count of 20.  Patient is afebrile.  Chest tube output trending lower.  Continue pulmonary toileting continue incentive spirometer.  Patient is up and ambulating.  Increase as tolerated.     04/03/2024   The patient is postop day 5 status post VATS procedure with drainage of empyema and decortication.  Patient is on broad-spectrum antibiotics linezolid and Zosyn.  Is trending down.  Patient is afebrile.  Continue pulmonary toileting.  Chest x-ray shows continued patchy infiltrate left middle and lower lobes.  Continue chest tube to drainage.     04/04/2024   The patient is postop day 6 status post VATS procedure with drainage of empyema and decortication.  Patient is on broad-spectrum antibiotics.  Continue linezolid and Zosyn.  Patient is afebrile.  Continue pulmonary toileting continue incentive spirometer..  Chest tube output has been minimal.  Will repeat chest x-ray to access extent of left lung consolidation.     04/05/2024   The patient is postop day 7 status post VATS procedure with drainage of empyema and decortication patient continues on antibiotics.  Patient is afebrile.  White count is slightly elevated at toileting.  Continue incentive spirometer.  Continue pulmonary toileting.  Chest tube output has been minimal.  .  Chest x-ray shows dense left lower lobe infiltrate which is unchanged. Continue chest tube as per patient's primary surgeon     04/06/2024   The patient is postop day 8 status post  VATS procedure with drainage of empyema and decortication.  Patient continues on broad-spectrum antibiotics.  Patient is afebrile and WBC has decreased from 13 to 11 today.  Continue pulmonary toileting.  Continue incentive spirometer.  Chest tube output has been small however drainage in extension tube on chest tube looks bloody and cloudy.  Continue chest tube to drainage.    04/08/2024 patient chest tube drainage is minimal very ineffective cough incentive spirometry 400 cc.  Pain issues are better than last week nutrition is still on board.  Hypoalbuminemia.  Go to the CT scan finding plugging of the left mainstem bronchus patient going for bronchoscopy today.  White count is normal afebrile.  Patient is still declining chest physical therapy in spite of explaining the importance.    4/9/2024 patient had her basal chest tube removed yesterday afebrile hemodynamically stable pain is better this morning eating breakfast.  Incentive spirometry 500 cc    /410//2024 patient chest tube drainage was scanty no air leak incentive spirometry 1000 cc coughing up thick sputum afebrile hemodynamically stable ambulating    04/11/2024 patient chest tube was removed her pain was better overnight still having a weak cough ambulating afebrile.  Chest x-ray shows persistent left lower lobe infiltrate.        Review of patient's allergies indicates:  No Known Allergies    History reviewed. No pertinent past medical history.    History reviewed. No pertinent family history.    Social History     Socioeconomic History    Marital status: Single   Tobacco Use    Smoking status: Never    Smokeless tobacco: Never   Substance and Sexual Activity    Alcohol use: Never    Drug use: Never    Sexual activity: Yes       Past Surgical History:   Procedure Laterality Date    INJECTION OF ANESTHETIC AGENT AROUND MULTIPLE INTERCOSTAL NERVES Left 3/29/2024    Procedure: BLOCK, NERVE, INTERCOSTAL, 2 OR MORE;  Surgeon: Josafat Sandoval MD;   "Location: [facility] OR;  Service: Cardiothoracic;  Laterality: Left;    VIDEO-ASSISTED THORACOSCOPIC SURGERY (VATS) Left 3/29/2024    Procedure: VATS (VIDEO-ASSISTED THORACOSCOPIC SURGERY);  Surgeon: Josafat Sandoval MD;  Location: [facility];  Service: Cardiothoracic;  Laterality: Left;       Review of Systems   Constitutional:  Positive for activity change, appetite change and fatigue.   HENT:  Negative for dental problem, nosebleeds and sore throat.    Eyes:  Negative for discharge and visual disturbance.   Respiratory:  Positive for cough, chest tightness and shortness of breath. Negative for stridor.    Cardiovascular:  Negative for leg swelling.   Gastrointestinal:  Negative for abdominal distention and abdominal pain.   Genitourinary:  Negative for difficulty urinating and dysuria.   Musculoskeletal:  Negative for arthralgias, back pain and joint swelling.   Allergic/Immunologic: Negative for environmental allergies.   Neurological:  Negative for dizziness, syncope and headaches.   Hematological:  Does not bruise/bleed easily.   Psychiatric/Behavioral:  Negative for behavioral problems.           Objective:   /80 (BP Location: Right arm, Patient Position: Lying)   Pulse 75   Temp 98.1 °F (36.7 °C) (Oral)   Resp 18   Ht 5' 9" (1.753 m)   Wt 59.2 kg (130 lb 8.2 oz)   SpO2 98%   BMI 19.27 kg/m²     X-Ray Chest 1 View  Narrative: EXAMINATION:  XR CHEST 1 VIEW    CLINICAL HISTORY:  left sided pneumonia; Other specified pleural conditions    TECHNIQUE:  Single frontal view of the chest was performed.    COMPARISON:  04/09/2024    FINDINGS:  Interval removal of left-sided chest tube.  No significant pneumothorax.  Patchy infiltrate left lower lobe and small left pleural effusion similar to prior.  Right lung is clear.  In comparison to the prior study, there is no adverse interval changes  Impression: In comparison to the prior study, there is no adverse interval changes    Electronically signed by: Bob " MD Rod  Date:    04/11/2024  Time:    07:07         Physical Exam  Vitals reviewed.   Constitutional:       Appearance: Normal appearance.   HENT:      Head: Normocephalic and atraumatic.      Mouth/Throat:      Mouth: Mucous membranes are moist.   Eyes:      Extraocular Movements: Extraocular movements intact.   Cardiovascular:      Rate and Rhythm: Normal rate and regular rhythm.      Pulses: Normal pulses.      Heart sounds: Normal heart sounds.   Pulmonary:      Effort: Pulmonary effort is normal.      Breath sounds: Rhonchi and rales present.   Abdominal:      Palpations: Abdomen is soft.   Musculoskeletal:         General: Normal range of motion.      Cervical back: Normal range of motion and neck supple.   Skin:     General: Skin is warm.      Capillary Refill: Capillary refill takes less than 2 seconds.   Neurological:      General: No focal deficit present.      Mental Status: She is alert and oriented to person, place, and time.   Psychiatric:         Mood and Affect: Mood normal.         Behavior: Behavior normal.         Assessment:     1. Hydropneumothorax    2. Tachycardia    3. Pneumonia of left lower lobe due to infectious organism    4. Leukocytosis, unspecified type    5. Hyponatremia    6. Chest pain    7. Mucus plugging of bronchi          Plan   28-year-old female with a left sided hydropneumothorax status post thoracotomy and decortication with chest tube placement postop day 13  Neuro Pain control as needed.  Left lower lobe consolidation chest physical therapy  Respiratory aggressive pulmonary toilet incentive spirometry.  Chest physiotherapy started with Mucomyst to loosen the secretions.  Cardiovascular beta-blocker for heart  rate control.  Anemia multifactorial   Renal creatinine i trending down   Avoid all the nephrotoxic medications.  Malnutrition and hypoalbuminemia protein supplements ordered.  Dietitian on board  Infectious disease antibiotics as per the culture managed by  hospital medicine team.  DVT and GI prophylaxis with Lovenox bilateral SCDs and Pepcid.  PTOT out of bed ambulate  Rest of the management by hospital medicine team.    Patient can be discharged once the pneumonia has resolved.    Patient will need a follow-up with the pulmonology for persistent pneumonia which was explained to the patient and she agreed.  Follow-up with me in 2 weeks with a chest x-ray upon discharge.          Josafat Sandoval MD  [facility]  [place]

## 2024-04-11 NOTE — SUBJECTIVE & OBJECTIVE
Interval History: Pt was seen and examined. Labs and meds reviewed. No new events. CT surgery and hospitalist's notes reviewed. No new c/o's.    Review of patient's allergies indicates:  No Known Allergies  Current Facility-Administered Medications   Medication Frequency    0.9%  NaCl infusion (for blood administration) Q24H PRN    0.9%  NaCl infusion PRN    acetaminophen tablet 650 mg Q6H PRN    albuterol nebulizer solution 2.5 mg Q6H PRN    amoxicillin-clavulanate 875-125mg per tablet 1 tablet Q12H    bisacodyL suppository 10 mg Daily PRN    enoxaparin injection 40 mg Q24H (prophylaxis, 1700)    estradioL tablet 8 mg Daily    furosemide tablet 20 mg Daily    glucagon (human recombinant) injection 1 mg PRN    glucose chewable tablet 16 g PRN    glucose chewable tablet 24 g PRN    ipratropium 0.02 % nebulizer solution 0.5 mg QID PRN    medroxyPROGESTERone tablet 10 mg Daily    melatonin tablet 6 mg Nightly PRN    methocarbamoL tablet 500 mg QID PRN    metoclopramide injection 5 mg Q6H PRN    naloxone 0.4 mg/mL injection 0.02 mg PRN    ondansetron disintegrating tablet 8 mg Q8H PRN    oxyCODONE immediate release tablet 5 mg Q4H PRN    polyethylene glycol packet 17 g Daily    sodium chloride 0.9% flush 10 mL PRN    spironolactone tablet 50 mg BID       Objective:     Vital Signs (Most Recent):  Temp: 98.1 °F (36.7 °C) (04/11/24 0716)  Pulse: 75 (04/11/24 0716)  Resp: 18 (04/11/24 1012)  BP: 122/80 (04/11/24 0716)  SpO2: 98 % (04/11/24 0716) Vital Signs (24h Range):  Temp:  [97.9 °F (36.6 °C)-100.3 °F (37.9 °C)] 98.1 °F (36.7 °C)  Pulse:  [67-92] 75  Resp:  [16-18] 18  SpO2:  [97 %-100 %] 98 %  BP: (122-131)/(64-80) 122/80     Weight: 59.2 kg (130 lb 8.2 oz) (04/10/24 2312)  Body mass index is 19.27 kg/m².  Body surface area is 1.7 meters squared.    I/O last 3 completed shifts:  In: 99.7 [I.V.:99.7]  Out: -      Physical Exam  Vitals and nursing note reviewed.   Constitutional:       General: She is not in acute  distress.     Appearance: Normal appearance.   Neurological:      Mental Status: She is alert.          Significant Labs: reviewed  BMP  Lab Results   Component Value Date     04/11/2024    K 3.6 04/11/2024     04/11/2024    CO2 23 04/11/2024    BUN 13 04/11/2024    CREATININE 1.2 04/11/2024    CALCIUM 8.2 (L) 04/11/2024    ANIONGAP 8 04/11/2024    EGFRNORACEVR >60 04/11/2024       Significant Imaging: reviewed

## 2024-04-23 ENCOUNTER — TELEPHONE (OUTPATIENT)
Dept: CARDIOLOGY | Facility: CLINIC | Age: 29
End: 2024-04-23

## 2024-04-23 NOTE — TELEPHONE ENCOUNTER
Patient contacted and was advised that the provider is out the clinic this week. Patient appointment was set up for 05/02/2024. Patient had no questions or concerns.

## 2024-04-30 LAB — FUNGUS SPEC CULT: NORMAL

## 2024-05-02 ENCOUNTER — HOSPITAL ENCOUNTER (OUTPATIENT)
Dept: RADIOLOGY | Facility: HOSPITAL | Age: 29
Discharge: HOME | End: 2024-05-02
Attending: THORACIC SURGERY (CARDIOTHORACIC VASCULAR SURGERY)

## 2024-05-02 ENCOUNTER — OFFICE VISIT (OUTPATIENT)
Dept: CARDIOTHORACIC SURGERY | Facility: CLINIC | Age: 29
End: 2024-05-02

## 2024-05-02 VITALS
BODY MASS INDEX: 15.48 KG/M2 | SYSTOLIC BLOOD PRESSURE: 100 MMHG | HEART RATE: 123 BPM | WEIGHT: 104.5 LBS | HEIGHT: 69 IN | DIASTOLIC BLOOD PRESSURE: 74 MMHG | OXYGEN SATURATION: 98 %

## 2024-05-02 DIAGNOSIS — E44.0 MODERATE PROTEIN-CALORIE MALNUTRITION: ICD-10-CM

## 2024-05-02 DIAGNOSIS — J18.9 PNEUMONIA OF LEFT LOWER LOBE DUE TO INFECTIOUS ORGANISM: ICD-10-CM

## 2024-05-02 DIAGNOSIS — J94.8 HYDROPNEUMOTHORAX: ICD-10-CM

## 2024-05-02 DIAGNOSIS — N17.9 AKI (ACUTE KIDNEY INJURY): ICD-10-CM

## 2024-05-02 DIAGNOSIS — Z78.9 MALE-TO-FEMALE TRANSGENDER PERSON: ICD-10-CM

## 2024-05-02 DIAGNOSIS — R63.6 UNDERWEIGHT: ICD-10-CM

## 2024-05-02 DIAGNOSIS — J85.1 ABSCESS OF LOWER LOBE OF LEFT LUNG WITH PNEUMONIA: ICD-10-CM

## 2024-05-02 DIAGNOSIS — D64.9 NORMOCYTIC ANEMIA: ICD-10-CM

## 2024-05-02 DIAGNOSIS — J18.9 PNEUMONIA OF LEFT LOWER LOBE DUE TO INFECTIOUS ORGANISM: Primary | ICD-10-CM

## 2024-05-02 PROCEDURE — 99999 PR PBB SHADOW E&M-EST. PATIENT-LVL III: CPT | Mod: PBBFAC,,, | Performed by: THORACIC SURGERY (CARDIOTHORACIC VASCULAR SURGERY)

## 2024-05-02 PROCEDURE — 71046 X-RAY EXAM CHEST 2 VIEWS: CPT | Mod: TC

## 2024-05-02 PROCEDURE — 99024 POSTOP FOLLOW-UP VISIT: CPT | Mod: S$GLB,,, | Performed by: THORACIC SURGERY (CARDIOTHORACIC VASCULAR SURGERY)

## 2024-05-02 PROCEDURE — 71046 X-RAY EXAM CHEST 2 VIEWS: CPT | Mod: 26,,, | Performed by: RADIOLOGY

## 2024-05-02 NOTE — PROGRESS NOTES
"Subjective:      Patient ID: Mayela Molina is a 28 y.o. adult.    Chief Complaint: No chief complaint on file.    HPI:  28-year-old status post left thoracotomy and decortication for parapneumonic effusion and lung abscess with a necrotizing pneumonia is here for the follow-up visit patient does not have any history of fever.  No cough with expectoration at this time.  Her breathing is getting better no pain.    Review of patient's allergies indicates:  No Known Allergies    Review of Systems   Constitutional:  Negative for activity change and appetite change.   HENT:  Negative for dental problem, nosebleeds and sore throat.    Eyes:  Negative for discharge and visual disturbance.   Respiratory:  Positive for shortness of breath. Negative for cough, chest tightness and stridor.    Cardiovascular:  Negative for leg swelling.   Gastrointestinal:  Negative for abdominal distention and abdominal pain.   Genitourinary:  Negative for difficulty urinating and dysuria.   Musculoskeletal:  Negative for arthralgias, back pain and joint swelling.   Allergic/Immunologic: Negative for environmental allergies.   Neurological:  Negative for dizziness, syncope and headaches.   Hematological:  Does not bruise/bleed easily.   Psychiatric/Behavioral:  Negative for behavioral problems.           Objective:   /74   Pulse (!) 123   Ht 5' 9" (1.753 m)   Wt 47.4 kg (104 lb 8 oz)   SpO2 98%   BMI 15.43 kg/m²     X-Ray Chest PA And Lateral  Narrative: EXAMINATION:  XR CHEST PA AND LATERAL    CLINICAL HISTORY:  left sided effusion; Pneumonia, unspecified organism    TECHNIQUE:  PA and lateral views of the chest were performed.    COMPARISON:  Prior radiographs    FINDINGS:  Cardiac silhouette and mediastinal contours are normal.  Improved aeration the left lung base with persistent airspace opacity and small possibly loculated effusion.  Osseous structures are intact.  Impression: Improvement with persistent left basilar " findings as above.    Electronically signed by: Malcolm Nowak MD  Date:    05/02/2024  Time:    14:17         Physical Exam  Vitals reviewed.   Constitutional:       Appearance: Normal appearance.   HENT:      Head: Normocephalic and atraumatic.      Mouth/Throat:      Mouth: Mucous membranes are moist.   Eyes:      Extraocular Movements: Extraocular movements intact.   Cardiovascular:      Rate and Rhythm: Normal rate and regular rhythm.      Pulses: Normal pulses.      Heart sounds: Normal heart sounds.   Pulmonary:      Effort: Pulmonary effort is normal.      Breath sounds: Rhonchi present.      Comments: Incision clean and dry chest tube sites sutures removed and healing well  Abdominal:      Palpations: Abdomen is soft.   Musculoskeletal:         General: Normal range of motion.      Cervical back: Normal range of motion and neck supple.   Skin:     General: Skin is warm.      Capillary Refill: Capillary refill takes less than 2 seconds.   Neurological:      General: No focal deficit present.      Mental Status: She is alert and oriented to person, place, and time.   Psychiatric:         Mood and Affect: Mood normal.         Behavior: Behavior normal.         Assessment:     1. Pneumonia of left lower lobe due to infectious organism    2. Abscess of lower lobe of left lung with pneumonia    3. Hydropneumothorax    4. DAVIAN (acute kidney injury)    5. Normocytic anemia    6. Underweight    7. Moderate protein-calorie malnutrition    8. Male-to-female transgender person      Chest x-ray shows atelectasis and scar tissue at the left base better than the last previous x-rays    Plan   Continue chest physical therapy and incentive spirometry.  Continue antibiotics for the pneumonia and follow up with pulmonology.  Sutures removed of the chest tube insertion site and it is healing well.  Local wound care for the chest tube site.  I will discharge the patient from my care today.          Josafat Sandoval MD  [facility]  [place]

## 2024-05-06 NOTE — DISCHARGE SUMMARY
[facility]  Discharge Summary      Patient Name: Mayela Molina  MRN: [id number]  [facility]: [id number]  Patient Class: IP- Inpatient  Admission Date: 3/25/2024  Hospital Length of Stay: 16 days  Discharge Date and Time: 4/11/2024 12:42 PM  Attending Physician: Katie att. providers found   Discharging Provider: Linsey Barragan MD  Primary Care Provider: Katie, Primary Doctor    Primary Care Team: Networked reference to record PCT     HPI:   28-year-old patient (male to female transgender) with no chronic medical conditions who presented to the emergency department with complaint of cough over the last 3 weeks, moderate severity, persistent.  Patient does have associated yellow sputum production, shortness for breath, and pleuritic chest pain.  No associated nausea, vomiting, diarrhea.  No complaints of fevers or chills although T-max in the emergency department recorded as 99.8.  Patient does not smoke cigarettes or vape.  No illicit drug use.  Abnormal labs in the emergency department include white blood cell count 30.46, hemoglobin 12.7, platelets 577, D-dimer 1.37, sodium 122, chloride 90, bicarb 21, glucose 121, albumin 2.3.  Last labs are from 2018 with no interim labs to compare with.  Patient does take spironolactone, Estrace, and Provera.  CT scan of chest with left-sided pneumonia with possible abscess formation.    Procedure(s) (LRB):  VATS (VIDEO-ASSISTED THORACOSCOPIC SURGERY) (Left)  BLOCK, NERVE, INTERCOSTAL, 2 OR MORE (Left)      Hospital Course:    Patient was admitted acute respiratory failure with left-sided pneumonia and possible abscess formation.  She initially was on nasal cannula and on morning after admission became acutely short of breath with a PO2 of 87% and tachycardic.  She was given a dose of adenosine which did not improve things however gradually her heart rate decreased.  Patient was admitted to the intensive care unit for respiratory failure  tachycardia.  She was initially on high-flow nasal cannula and subsequently was able to wean to room air.  Cultures remained pending.  She was initially started on Rocephin and azithromycin and changed to Zosyn who azithromycin vein.  Legionella antigen pending.  Continue with aggressive pulmonary care  With worsening CT scan, continued elevated WBC, continued elevated heart rate.  Patient underwent repeat CT scan of the chest which showed worsening of empyema.  She was seen and evaluated by Cardiothoracic surgery and underwent left VATS with decortication on 3/19.    Hyponatremia patient was admitted with hyponatremia she had fluid restrictions in place cortisol was normal, TSH normal, patient was on spironolactone and this was held.  She was being maintained on fluid restricted diet.     Encourage patient to deep breathe and walk in halls.  4/1 undergoing breathing treatment. Mom at bedside. Questions and concerns addressed. Encourage ambulation  4/2 questions and concerns addressed. Chest tube drainage 50cc yesterday. Ambulated with physical/occupational therapy. Continue intravenous fluids and intravenous antibiotic(s).   4/3 leukocytosis trending down. chest tube drainage 10cc/24 yesterday. Repeat chest x-ray with no adverse interval change. Patient wishes to ambulate more. Continue current care.  4/4 minimal chest tube output last 2 days. Leukocytosis resolved. Acute kidney injury improving. Ambulating with physical/occupational therapy. Repeat chest x-ray reviewed. Appetite improving  4/5 primary CTS recommending repeat ct chest noncontrast and prealbumin. Chest tube to water seal per primary surgeon. Discussed restarting spironolactone at lower dose with nephrology. Patient ambulating with physical/occupational therapy.   4/6 repeat ct chest with complete plugging of left mainstem bronchus. Chest physiotherapy with G5 added. Spironolactone resumed at reduced dose. Complains of chest pain at site of chest  tube improved with pain analgesics.  4/7 pulmonology planning on bronch in am if no improvement. Chest x-ray in AM. Complains of pain with chest tube.  4/8 multidisciplinary meeting with patient. Cts removing one chest tube today. Counseled on compliance with medical recommendations with ct physiotherapy, incentive spirometer, ambulation, cough. Patient voiced understanding    4/9 patient seen and examined.  Discussed with Pulmonary and CTS.  Patient sitting on couch using incentive spirometer and Acapella.  Reports she discussed CPT with respiratory and they reported it was contraindicated with chest tube in place.    Six weeks  Augmentin prescribed and patient instructed to complete all 6 weeks  Patient will need pulmonary follow up.  Referral has been sent to [facility] as well as to [facility].  Patient reports that even though she has no insurance she does have cast to pay for 1-2 pulmonary follow up.  Renal function has stabilized therefore will increase spironolactone to 50 mg b.i.d. with instructions to follow up with her PCP for lab work prior to further increase.  Nutritional status is poor him instructed patient to use protein diet.  Patient has been instructed to continue using her Acapella daily to continue with exercise.    4/10 patient reports much less pain.  She was seen by Cardiothoracic surgery and chest tube removed.    Pt without SOB, still with some discomfort.   Pt seen and examined on day of discharge and stable for dc home.     Goals of Care Treatment Preferences:  Code Status: Full Code      Consults:   Consults (From admission, onward)          Status Ordering Provider     Inpatient consult to Registered Dietitian/Nutritionist  Once        Provider:  (Not yet assigned)    Completed MICHAEL ONTIVEROS     Inpatient consult to Nephrology  Once        Provider:  Linsey Isaac MD    Completed LINSEY ISAAC     Inpatient consult to Cardiothoracic Surgery  Once         Provider:  Josafat Sandoval MD    Completed LEONELA WONG     Inpatient consult to Critical Care Medicine  Once        Provider:  Shasha Grace MD    Completed BENITA BERGMAN     Inpatient consult to Pulmonology  Once        Provider:  Shasha Grace MD    Completed BENITA BERGMAN            No new Assessment & Plan notes have been filed under this hospital service since the last note was generated.  Service: Hospital Medicine    Final Active Diagnoses:    Diagnosis Date Noted POA    PRINCIPAL PROBLEM:  Pneumonia of left lung due to infectious organism [J18.9] 03/26/2024 Yes    DAVIAN (acute kidney injury) [N17.9] 04/05/2024 No    Moderate protein-calorie malnutrition [E44.0] 04/03/2024 Yes    Pulmonary abscess [J85.2] 03/30/2024 Yes    Hydropneumothorax [J94.8] 03/30/2024 Yes    Underweight [R63.6] 03/26/2024 Yes    Normocytic anemia [D64.9] 03/26/2024 Yes    Male-to-female transgender person [Z78.9] 03/26/2024 Yes      Problems Resolved During this Admission:    Diagnosis Date Noted Date Resolved POA    Mucus plugging of bronchi [T17.500A] 04/07/2024 04/10/2024 No    Hypokalemia [E87.6] 04/07/2024 04/10/2024 No    Hepatitis C antibody positive in blood [R76.8] 03/27/2024 03/28/2024 Yes    Sepsis [A41.9] 03/26/2024 04/10/2024 Yes    Hypotension [I95.9] 03/26/2024 04/06/2024 Yes    Hyponatremia [E87.1] 03/26/2024 04/07/2024 Yes    Thrombocytosis [D75.839] 03/26/2024 04/02/2024 Yes    Pleuritic chest pain [R07.81] 03/26/2024 04/08/2024 Yes    Tachycardia, paroxysmal [I47.9] 03/26/2024 04/10/2024 No       Discharged Condition: fair    Disposition: Home or Self Care    Follow Up:   Follow-up Information       Clinic, [facility]. Go on 4/18/2024.    Why: Please go on 4/18/2024 at 2:20pm. Please bring in photo I.D. and  please bring in 2 paycheck stubs to possible qualify for screening for a discounted rate.  Contact information:  [address]  [place] [address] [phone]                Clinics, [facility] Follow up.    Why: Pulmonology referral faxed to clinic. Once referral is reviewed; clinic will call you for an appointment.   Call within a week to follow up if you are not contacted.  Alternate number: [phone]  Contact information:  [address]  [facility] [address]  [phone]               [facility] Follow up.    Specialties: General Practice, Gastroenterology, Family Medicine, Internal Medicine, Otolaryngology, Psychiatry, Pediatric Gastroenterology, Infusion Provider, Allergy, Pulmonary Disease, Sleep Medicine, Oral Surgery, Dental General Practice, Oral and Maxillofacial Surgery, Physical Therapy, Occupational Therapy, Speech Pathology, Wound Care, Allergy and Immunology, Cardiology, Hematology, Hematology and Oncology, Infectious Diseases, Infusion Provider, Nephrology, Neurology, Neurosurgery, Nutrition, Oncology, Pain Medicine, Pediatric Hematology, Pediatric Hematology and Oncology, Pediatric Oncology, Pulmonary Disease, Radiation Oncology, Radiology, Speech Pathology, Surgical Oncology, Pediatrics  Why: Pulmonology referral faxed to clinic. Once referral is reviewed; clinic will call you for an appointment.   Call within a week to follow up if you are not contacted.   Alternate number: [phone], option 6 then option 3  Clinic located: [address] [place], [place]  Contact information:  [address] [facility] [address]  [phone]               [facility] Follow up.    Why: Pulmonology referral sent to clinic  Monday-Friday, 7 a.m.- 4 p.m.  Call within a week to follow up if you are not contacted.   [phone]  Contact information:  [facility] [address]             [facility]. Call.    Why: [phone] or   [phone]  Contact information:  Call regarding financial assistance options                          Patient Instructions:      Ambulatory referral/consult to Pulmonology   Standing Status: Future   Referral Priority: Routine Referral Type: Consultation   Referral Reason: Specialty Services Required Referral Location: [facility]   Requested Specialty: Pulmonary Disease   Number of Visits Requested: 1     Ambulatory referral/consult to Pulmonology   Standing Status: Future   Referral Priority: Routine Referral Type: Consultation   Referral Reason: Specialty Services Required   Requested Specialty: Pulmonary Disease   Number of Visits Requested: 1     Diet Adult Regular     Order Specific Question Answer Comments   Protein restriction, if any: High Protein      Notify your health care provider if you experience any of the following:  temperature >100.4     Notify your health care provider if you experience any of the following:  persistent nausea and vomiting or diarrhea     Notify your health care provider if you experience any of the following:  severe uncontrolled pain     Notify your health care provider if you experience any of the following:  redness, tenderness, or signs of infection (pain, swelling, redness, odor or green/yellow discharge around incision site)     Notify your health care provider if you experience any of the following:  difficulty breathing or increased cough     Notify your health care provider if you experience any of the following:  increased confusion or weakness     Activity as tolerated   Order Comments: Continue to use IS and accapella device at least 100 times each daily       Significant Diagnostic Studies: Labs: All labs within the past 24 hours have been reviewed    Pending Diagnostic Studies:       Procedure Component Value Units Date/Time    Respiratory Pneumonia Panel [[id number]] Collected: 03/26/24 0931    Order Status: Sent Lab Status: No result            Medications:  Reconciled Home Medications:      Medication List        START taking these  medications      amoxicillin-clavulanate 875-125mg 875-125 mg per tablet  Commonly known as: AUGMENTIN  Take 1 tablet by mouth every 12 (twelve) hours.     oxyCODONE-acetaminophen 5-325 mg per tablet  Commonly known as: PERCOCET  Take 1 tablet by mouth every 8 (eight) hours as needed for Pain.            CHANGE how you take these medications      spironolactone 50 MG tablet  Commonly known as: ALDACTONE  Take 1 tablet (50 mg total) by mouth 2 (two) times daily.  What changed:   medication strength  how much to take  Another medication with the same name was removed. Continue taking this medication, and follow the directions you see here.            CONTINUE taking these medications      estradioL 2 MG tablet  Commonly known as: ESTRACE  Take 8 mg by mouth.     medroxyPROGESTERone 10 MG tablet  Commonly known as: PROVERA  Take 10 mg by mouth.            ASK your doctor about these medications      naloxone 4 mg/actuation Spry  Commonly known as: NARCAN  1 spray (4 mg total) by Nasal route once. for 1 dose  Ask about: Should I take this medication?              Indwelling Lines/Drains at time of discharge:   Lines/Drains/Airways       None                   Time spent on the discharge of patient: 45 minutes         Linsey Barragan MD  Department of Hospital Medicine  [facility] - Telemetry ([facility])

## 2024-05-17 ENCOUNTER — TELEPHONE (OUTPATIENT)
Dept: PULMONOLOGY | Facility: CLINIC | Age: 29
End: 2024-05-17

## 2024-05-17 LAB
ACID FAST MOD KINY STN SPEC: NORMAL
MYCOBACTERIUM SPEC QL CULT: NORMAL

## 2025-01-09 NOTE — ASSESSMENT & PLAN NOTE
04/02/2024   Patient is postop day 4 status post VATS procedure with drainage of empyema and decortication.  Continue broad-spectrum antibiotics with linezolid and Zosyn.  Patient's white count is trending downward with white count of 20.  Patient is afebrile.  Chest tube output trending lower.  Continue pulmonary toileting continue incentive spirometer.  Patient is up and ambulating.  Increase as tolerated.   Discussed workup.

## (undated) DEVICE — APPLICATOR CHLORAPREP ORN 26ML

## (undated) DEVICE — SUT X424H ETHIBOND 0-0

## (undated) DEVICE — PACK BASIC SETUP SC BR

## (undated) DEVICE — SUT PERMA HAND SILK BLK 0-0

## (undated) DEVICE — ELECTRODE BLADE INSULATED 1 IN

## (undated) DEVICE — TUBE LUKI ASP COLL 6 1/4

## (undated) DEVICE — TOWEL OR DISP STRL BLUE 4/PK

## (undated) DEVICE — DRAPE INCISE IOBAN 2 23X33IN

## (undated) DEVICE — ELECTRODE REM PLYHSV RETURN 9

## (undated) DEVICE — RELOAD GREEN FOR ECHELON

## (undated) DEVICE — PAD CURAD NONADH 3X4IN

## (undated) DEVICE — SET PNEUMOCLEAR HEAT HUM SE HF

## (undated) DEVICE — CONTAINER SPECIMEN OR STER 4OZ

## (undated) DEVICE — STAPLER ECHELON FLEX GST 45MM

## (undated) DEVICE — TAPE SILK 3IN

## (undated) DEVICE — CANNULA ENDOPATH XCEL 5X100MM

## (undated) DEVICE — GLOVE SURGICAL LATEX SZ 6.5

## (undated) DEVICE — CONNECTOR TUBING STR 5 IN 1

## (undated) DEVICE — TRAP MEDI-VAC SPEC MUCS 40CC

## (undated) DEVICE — TUBING MEDI-VAC 20FT .25IN

## (undated) DEVICE — CONNECTOR Y 3/8X3/8X1/2 STRL

## (undated) DEVICE — SUT MONOCRYL 4-0 PS-1 UND

## (undated) DEVICE — DRAPE INCISE IOBAN 2 23X17IN

## (undated) DEVICE — TIP GRASPER FENESTRATED DISP

## (undated) DEVICE — MANIFOLD 4 PORT

## (undated) DEVICE — NDL SPINAL 18GX3.5 SPINOCAN

## (undated) DEVICE — ELECTRODE BLD EXT 6.50 ST DISP

## (undated) DEVICE — SYR 30CC LUER LOCK

## (undated) DEVICE — STRIP MEDI WND CLSR 1/2X4IN

## (undated) DEVICE — TROCAR ENDOPATH XCEL 5X100MM

## (undated) DEVICE — COVER LIGHT HANDLE 80/CA

## (undated) DEVICE — SOL NORMAL USPCA 0.9%

## (undated) DEVICE — KIT ANTIFOG W/SPONG & FLUID

## (undated) DEVICE — GRASPER TIP RENEW LAP FENEST

## (undated) DEVICE — DRAPE LAPSCP CHOLE 122X102X78

## (undated) DEVICE — RELOAD ECHELON ENDOPATH 45MM

## (undated) DEVICE — CATH THOR STND RGHT ANG 32FR

## (undated) DEVICE — DRAIN CHEST DRY SUCTION

## (undated) DEVICE — GOWN POLY REINF BRTH SLV XL

## (undated) DEVICE — SUT VICRYL 1 OB 36 CTX

## (undated) DEVICE — NDL SAFETY 22G X 1.5 ECLIPSE

## (undated) DEVICE — DRESSING MEPORE ISLAND 31/2X4

## (undated) DEVICE — CATH THORACIC 32FR ST